# Patient Record
Sex: MALE | Race: ASIAN | Employment: OTHER | URBAN - METROPOLITAN AREA
[De-identification: names, ages, dates, MRNs, and addresses within clinical notes are randomized per-mention and may not be internally consistent; named-entity substitution may affect disease eponyms.]

---

## 2018-01-22 ENCOUNTER — OFFICE VISIT (OUTPATIENT)
Dept: FAMILY MEDICINE | Facility: CLINIC | Age: 63
End: 2018-01-22
Payer: COMMERCIAL

## 2018-01-22 VITALS
TEMPERATURE: 98 F | DIASTOLIC BLOOD PRESSURE: 82 MMHG | WEIGHT: 208.9 LBS | HEART RATE: 64 BPM | BODY MASS INDEX: 38.21 KG/M2 | SYSTOLIC BLOOD PRESSURE: 130 MMHG | OXYGEN SATURATION: 95 % | RESPIRATION RATE: 14 BRPM

## 2018-01-22 DIAGNOSIS — H10.33 ACUTE BACTERIAL CONJUNCTIVITIS OF BOTH EYES: Primary | ICD-10-CM

## 2018-01-22 PROCEDURE — 99213 OFFICE O/P EST LOW 20 MIN: CPT | Performed by: FAMILY MEDICINE

## 2018-01-22 RX ORDER — TOBRAMYCIN AND DEXAMETHASONE 3; 1 MG/ML; MG/ML
1 SUSPENSION/ DROPS OPHTHALMIC 4 TIMES DAILY
Qty: 1.4 ML | Refills: 0 | Status: SHIPPED | OUTPATIENT
Start: 2018-01-22 | End: 2018-01-29

## 2018-01-22 NOTE — MR AVS SNAPSHOT
"              After Visit Summary   2018    Essie Guzman    MRN: [id number]           Patient Information     Date Of Birth          1955        Visit Information        Provider Department      2018 11:15 AM Serge Kimble MD [facility]        Today's Diagnoses     Acute bacterial conjunctivitis of both eyes    -  1       Follow-ups after your visit        Who to contact     If you have questions or need follow up information about today's clinic visit or your schedule please contact [facility] directly at [phone].  Normal or non-critical lab and imaging results will be communicated to you by [employer], letter or phone within 4 business days after the clinic has received the results. If you do not hear from us within 7 days, please contact the clinic through [employer] or phone. If you have a critical or abnormal lab result, we will notify you by phone as soon as possible.  Submit refill requests through [employer] or call your pharmacy and they will forward the refill request to us. Please allow 3 business days for your refill to be completed.          Additional Information About Your Visit        MyChart Information     [employer] lets you send messages to your doctor, view your test results, renew your prescriptions, schedule appointments and more. To sign up, go to [email or web address] . Click on \"Log in\" on the left side of the screen, which will take you to the Welcome page. Then click on \"Sign up Now\" on the right side of the page.     You will be asked to enter the access code listed below, as well as some personal information. Please follow the directions to create your username and password.     Your access code is: [email or web address]  Expires: 2018 12:31 PM     Your access code will  in 90 days. If you need help or a new code, please call your [facility] or [phone].        Care EveryWhere ID     This is your Care EveryWhere ID. " This could be used by other organizations to access your [facility] medical records  [id number]        Your Vitals Were     Pulse Temperature Respirations Pulse Oximetry BMI (Body Mass Index)       64 98  F (36.7  C) (Oral) 14 95% 38.21 kg/m2        Blood Pressure from Last 3 Encounters:   01/22/18 130/82   11/24/15 117/77   10/16/15 102/62    Weight from Last 3 Encounters:   01/22/18 208 lb 14.4 oz (94.8 kg)   11/24/15 190 lb (86.2 kg)   10/16/15 193 lb (87.5 kg)              Today, you had the following     No orders found for display         Today's Medication Changes          These changes are accurate as of: 1/22/18 12:31 PM.  If you have any questions, ask your nurse or doctor.               Start taking these medicines.        Dose/Directions    tobramycin-dexamethasone 0.3-0.1 % ophthalmic susp   Commonly known as:  TOBRADEX   Used for:  Acute bacterial conjunctivitis of both eyes   Started by:  Serge Kimble MD        Dose:  1 drop   Place 1 drop into both eyes 4 times daily for 7 days   Quantity:  1.4 mL   Refills:  0            Where to get your medicines      These medications were sent to [facility] [address]  [id number] [place] [address]     Phone:  [phone]     tobramycin-dexamethasone 0.3-0.1 % ophthalmic susp                Primary Care Provider Office Phone # Fax #    Serge Kimble [phone] [address] [facility] [address]        Equal Access to Services     [facility] HospitalJACI AH: Hadii radha hyatt hadasho Soomaali, waaxda luqadaha, qaybta kaalmada adeegyaaletha, elia hernandez. So [facility] [phone].    ATENCIÓN: Si habla español, tiene a gentile disposición servicios gratuitos de asistencia lingüística. Llame al [phone].    We comply with applicable federal civil rights laws and [place] laws. We do not discriminate on the basis of race, color, national origin, age, disability,  sex, sexual orientation, or gender identity.            Thank you!     Thank you for choosing [facility]  for your care. Our goal is always to provide you with excellent care. Hearing back from our patients is one way we can continue to improve our services. Please take a few minutes to complete the written survey that you may receive in the mail after your visit with us. Thank you!             Your Updated Medication List - Protect others around you: Learn how to safely use, store and throw away your medicines at www.disposemymeds.org.          This list is accurate as of: 1/22/18 12:31 PM.  Always use your most recent med list.                   Brand Name Dispense Instructions for use Diagnosis    tobramycin-dexamethasone 0.3-0.1 % ophthalmic susp    TOBRADEX    1.4 mL    Place 1 drop into both eyes 4 times daily for 7 days    Acute bacterial conjunctivitis of both eyes

## 2018-01-22 NOTE — PROGRESS NOTES
SUBJECTIVE:   Essie Guzman is a 62 year old male who presents to clinic today for the following health issues:      Eye(s) Problem      Duration: 2 days    Description:  Location: bilateral  Pain: YES- a little  Redness: no   Discharge: no     Accompanying signs and symptoms: none    History (Trauma, foreign body exposure,): None    Precipitating or alleviating factors (contact use): None    Therapies tried and outcome: OTC eye drops      Symptoms of eye itching, mattering and watering since two days ago.  Did start with a household contact. No known contact  Does have associated uri symptoms of  coryza,cough and fever.  No abnormality of ear, throat,lungs,skin, adenopathy and no neck stiffness.    No corneal fluoroscein uptake or foreign body seen.    Bacterial conjunctivitis  (H10.33) Acute bacterial conjunctivitis of both eyes  (primary encounter diagnosis)  Comment:   Plan: tobramycin-dexamethasone (TOBRADEX) 0.3-0.1 %         ophthalmic susp

## 2018-03-06 ENCOUNTER — OFFICE VISIT (OUTPATIENT)
Dept: FAMILY MEDICINE | Facility: CLINIC | Age: 63
End: 2018-03-06
Payer: COMMERCIAL

## 2018-03-06 VITALS
OXYGEN SATURATION: 95 % | WEIGHT: 213 LBS | BODY MASS INDEX: 38.96 KG/M2 | RESPIRATION RATE: 14 BRPM | HEART RATE: 62 BPM | SYSTOLIC BLOOD PRESSURE: 121 MMHG | DIASTOLIC BLOOD PRESSURE: 81 MMHG | TEMPERATURE: 97.7 F

## 2018-03-06 DIAGNOSIS — M10.9 ACUTE GOUTY ARTHRITIS: Primary | ICD-10-CM

## 2018-03-06 PROCEDURE — 99213 OFFICE O/P EST LOW 20 MIN: CPT | Performed by: FAMILY MEDICINE

## 2018-03-06 RX ORDER — PREDNISONE 20 MG/1
20 TABLET ORAL DAILY
Qty: 8 TABLET | Refills: 0 | Status: SHIPPED | OUTPATIENT
Start: 2018-03-06 | End: 2018-12-22

## 2018-03-06 NOTE — MR AVS SNAPSHOT
"              After Visit Summary   3/6/2018    Essie Guzman    MRN: [id number]           Patient Information     Date Of Birth          1955        Visit Information        Provider Department      3/6/2018 10:30 AM Serge Kimble MD [facility]        Today's Diagnoses     Acute gouty arthritis    -  1       Follow-ups after your visit        Who to contact     If you have questions or need follow up information about today's clinic visit or your schedule please contact [facility] directly at [phone].  Normal or non-critical lab and imaging results will be communicated to you by [employer], letter or phone within 4 business days after the clinic has received the results. If you do not hear from us within 7 days, please contact the clinic through [employer] or phone. If you have a critical or abnormal lab result, we will notify you by phone as soon as possible.  Submit refill requests through [employer] or call your pharmacy and they will forward the refill request to us. Please allow 3 business days for your refill to be completed.          Additional Information About Your Visit        MyChart Information     [employer] lets you send messages to your doctor, view your test results, renew your prescriptions, schedule appointments and more. To sign up, go to [email or web address] . Click on \"Log in\" on the left side of the screen, which will take you to the Welcome page. Then click on \"Sign up Now\" on the right side of the page.     You will be asked to enter the access code listed below, as well as some personal information. Please follow the directions to create your username and password.     Your access code is: [email or web address]  Expires: 2018 12:31 PM     Your access code will  in 90 days. If you need help or a new code, please call your [facility] or [phone].        Care EveryWhere ID     This is your Care EveryWhere ID. This could be used by " other organizations to access your [facility] medical records  [id number]        Your Vitals Were     Pulse Temperature Respirations Pulse Oximetry BMI (Body Mass Index)       62 97.7  F (36.5  C) (Oral) 14 95% 38.96 kg/m2        Blood Pressure from Last 3 Encounters:   03/06/18 121/81   01/22/18 130/82   11/24/15 117/77    Weight from Last 3 Encounters:   03/06/18 213 lb (96.6 kg)   01/22/18 208 lb 14.4 oz (94.8 kg)   11/24/15 190 lb (86.2 kg)              Today, you had the following     No orders found for display         Today's Medication Changes          These changes are accurate as of 3/6/18 11:12 AM.  If you have any questions, ask your nurse or doctor.               Start taking these medicines.        Dose/Directions    predniSONE 20 MG tablet   Commonly known as:  DELTASONE   Used for:  Acute gouty arthritis   Started by:  Serge Kimble MD        Dose:  20 mg   Take 1 tablet (20 mg) by mouth daily For five days then one half pill daily for six days take with food   Quantity:  8 tablet   Refills:  0            Where to get your medicines      These medications were sent to [facility] [address]  [address]  [address]     Phone:  [phone]     predniSONE 20 MG tablet                Primary Care Provider Office Phone # Fax #    Serge Kimble [phone] [address] [facility] [address]        Equal Access to Services     JOSIANE MARIE : Hadii aad ku hadasho Soomaali, waaxda luqadaha, qaybta kaalmada adeegyada, waxay elvia hernandez. So [facility] [phone].    ATENCIÓN: Si habla español, tiene a gentile disposición servicios gratuitos de asistencia lingüística. Llame al [phone].    We comply with applicable federal civil rights laws and [place] laws. We do not discriminate on the basis of race, color, national origin, age, disability, sex, sexual orientation, or gender  identity.            Thank you!     Thank you for choosing [facility]  for your care. Our goal is always to provide you with excellent care. Hearing back from our patients is one way we can continue to improve our services. Please take a few minutes to complete the written survey that you may receive in the mail after your visit with us. Thank you!             Your Updated Medication List - Protect others around you: Learn how to safely use, store and throw away your medicines at www.disposemymeds.org.          This list is accurate as of 3/6/18 11:12 AM.  Always use your most recent med list.                   Brand Name Dispense Instructions for use Diagnosis    predniSONE 20 MG tablet    DELTASONE    8 tablet    Take 1 tablet (20 mg) by mouth daily For five days then one half pill daily for six days take with food    Acute gouty arthritis

## 2018-03-06 NOTE — PROGRESS NOTES
SUBJECTIVE:  Essie Guzman is a 62 year old male who sustained a right foot pain 3 days ago. Mechanism of injury: none. Immediate symptoms: immediate pain, immediate swelling. Symptoms have been gradual since that time. Prior history of related problems: no prior problems with this area in the past, had previous gouty elbow.    OBJECTIVE:  Vital signs as noted above.  Appearance: in no apparent distress.  Foot/ankle exam: reduced range of motion of mtp one,  two and three  .  X-ray: not indicated.    ASSESSMENT:  foot gouty arthritis, he has morbid obesity and could have pseudogout     PLAN:  NSAID, ice suggested  steroid burst wtth taper   See orders in [facility].      (M10.9) Acute gouty arthritis  (primary encounter diagnosis)  Comment:   Plan: predniSONE (DELTASONE) 20 MG tablet

## 2018-06-28 ENCOUNTER — HOSPITAL ENCOUNTER (EMERGENCY)
Facility: CLINIC | Age: 63
Discharge: HOME | End: 2018-06-28
Attending: EMERGENCY MEDICINE | Admitting: EMERGENCY MEDICINE
Payer: MEDICARE

## 2018-06-28 VITALS
RESPIRATION RATE: 18 BRPM | DIASTOLIC BLOOD PRESSURE: 94 MMHG | WEIGHT: 213.85 LBS | SYSTOLIC BLOOD PRESSURE: 143 MMHG | HEIGHT: 62 IN | BODY MASS INDEX: 39.35 KG/M2 | OXYGEN SATURATION: 100 % | TEMPERATURE: 99.2 F

## 2018-06-28 DIAGNOSIS — T15.11XA FOREIGN BODY OF RIGHT CONJUNCTIVA, INITIAL ENCOUNTER: ICD-10-CM

## 2018-06-28 PROCEDURE — 25000125 ZZHC RX 250

## 2018-06-28 PROCEDURE — 99283 EMERGENCY DEPT VISIT LOW MDM: CPT

## 2018-06-28 RX ORDER — POLYVINYL ALCOHOL 14 MG/ML
1 SOLUTION/ DROPS OPHTHALMIC PRN
Qty: 15 ML | Refills: 0 | Status: SHIPPED | OUTPATIENT
Start: 2018-06-28 | End: 2019-01-16

## 2018-06-28 RX ORDER — TETRACAINE HYDROCHLORIDE 5 MG/ML
2 SOLUTION OPHTHALMIC ONCE
Status: COMPLETED | OUTPATIENT
Start: 2018-06-28 | End: 2018-06-28

## 2018-06-28 RX ORDER — CIPROFLOXACIN HYDROCHLORIDE 3.5 MG/ML
1 SOLUTION/ DROPS TOPICAL
Qty: 1 BOTTLE | Refills: 0 | Status: SHIPPED | OUTPATIENT
Start: 2018-06-28 | End: 2018-07-05

## 2018-06-28 RX ORDER — TETRACAINE HYDROCHLORIDE 5 MG/ML
SOLUTION OPHTHALMIC
Status: COMPLETED
Start: 2018-06-28 | End: 2018-06-28

## 2018-06-28 RX ADMIN — TETRACAINE HYDROCHLORIDE 2 DROP: 5 SOLUTION OPHTHALMIC at 01:23

## 2018-06-28 RX ADMIN — FLUORESCEIN SODIUM 1 STRIP: 1 STRIP OPHTHALMIC at 01:23

## 2018-06-28 ASSESSMENT — ENCOUNTER SYMPTOMS
EYE PAIN: 1
EYE REDNESS: 1

## 2018-06-28 NOTE — ED AVS SNAPSHOT
[facility] Emergency Department    [address] [facility] [address]    Phone:  [phone]    Fax:  [phone]                                       Essie Guzman   MRN: [id number]    Department:  [facility] Emergency Department   Date of Visit:  6/28/2018           Patient Information     Date Of Birth          1955        Your diagnoses for this visit were:     Foreign body of right conjunctiva, initial encounter        You were seen by Talha Aguilar MD.      Follow-up Information     Schedule an appointment as soon as possible for a visit with Fer Bentley MD.    Specialty:  Ophthalmology    Contact information:    [facility]  [address]  [facility] [address] [phone]          Follow up with [facility] Emergency Department.    Specialty:  EMERGENCY MEDICINE    Why:  If symptoms worsen    Contact information:    [facility] [address] [phone]        Discharge Instructions       Watch for increased redness swelling of lower eye lid.      Antibiotic drops 4 times daily.    Lubrication eye drops every hour    24 Hour Appointment Hotline       To make an appointment at any [facility] clinic, call [phone] ([phone]). If you don't have a family doctor or clinic, we will help you find one. [facility] clinics are conveniently located to serve the needs of you and your family.             Review of your medicines      START taking        Dose / Directions Last dose taken    ciprofloxacin 0.3 % ophthalmic solution   Commonly known as:  CILOXAN   Dose:  1 drop   Quantity:  1 Bottle        Apply 1 drop to eye every 4 hours (while awake) for 7 days   Refills:  0        polyvinyl alcohol 1.4 % ophthalmic solution   Commonly known as:  LIQUIFILM TEARS   Dose:  1 drop   Quantity:  15 mL        Place 1 drop into the right eye as needed for dry eyes (apply 1-2 drops every 1-2  hours for eye irritation)   Refills:  0          Our records show that you are taking the medicines listed below. If these are incorrect, please call your family doctor or clinic.        Dose / Directions Last dose taken    predniSONE 20 MG tablet   Commonly known as:  DELTASONE   Dose:  20 mg   Quantity:  8 tablet        Take 1 tablet (20 mg) by mouth daily For five days then one half pill daily for six days take with food   Refills:  0                Prescriptions were sent or printed at these locations (2 Prescriptions)                   Other Prescriptions                Printed at Department/Unit printer (2 of 2)         ciprofloxacin (CILOXAN) 0.3 % ophthalmic solution               polyvinyl alcohol (LIQUIFILM TEARS) 1.4 % ophthalmic solution                Orders Needing Specimen Collection     None      Pending Results     No orders found from 6/26/2018 to 6/29/2018.            Pending Culture Results     No orders found from 6/26/2018 to 6/29/2018.            Pending Results Instructions     If you had any lab results that were not finalized at the time of your Discharge, you can call the ED Lab Result RN at [phone]. You will be contacted by this team for any positive Lab results or changes in treatment. The nurses are available 7 days a week from 10A to 6:30P.  You can leave a message 24 hours per day and they will return your call.        Test Results From Your Hospital Stay               Clinical Quality Measure: Blood Pressure Screening     Your blood pressure was checked while you were in the emergency department today. The last reading we obtained was  BP: (!) 143/94 . Please read the guidelines below about what these numbers mean and what you should do about them.  If your systolic blood pressure (the top number) is less than 120 and your diastolic blood pressure (the bottom number) is less than 80, then your blood pressure is normal. There is nothing more that you need to do about it.  If your  "systolic blood pressure (the top number) is 120-139 or your diastolic blood pressure (the bottom number) is 80-89, your blood pressure may be higher than it should be. You should have your blood pressure rechecked within a year by a primary care provider.  If your systolic blood pressure (the top number) is 140 or greater or your diastolic blood pressure (the bottom number) is 90 or greater, you may have high blood pressure. High blood pressure is treatable, but if left untreated over time it can put you at risk for heart attack, stroke, or kidney failure. You should have your blood pressure rechecked by a primary care provider within the next 4 weeks.  If your provider in the emergency department today gave you specific instructions to follow-up with your doctor or provider even sooner than that, you should follow that instruction and not wait for up to 4 weeks for your follow-up visit.        Thank you for choosing [facility]       Thank you for choosing [facility] for your care. Our goal is always to provide you with excellent care. Hearing back from our patients is one way we can continue to improve our services. Please take a few minutes to complete the written survey that you may receive in the mail after you visit with us. Thank you!        [employer] Information     [employer] lets you send messages to your doctor, view your test results, renew your prescriptions, schedule appointments and more. To sign up, go to [email or web address] . Click on \"Log in\" on the left side of the screen, which will take you to the Welcome page. Then click on \"Sign up Now\" on the right side of the page.     You will be asked to enter the access code listed below, as well as some personal information. Please follow the directions to create your username and password.     Your access code is: [email or web address]  Expires: 2018  2:25 AM     Your access code will  in 90 days. If you need help or a new code, please call your [facility] " [facility] or [phone].        Care EveryWhere ID     This is your Care EveryWhere ID. This could be used by other organizations to access your [facility] medical records  [id number]        Equal Access to Services     JOSIANE MARIE : Zuri Sandhu, wajanyda luqadaha, qaybta kaalmada luke, elia hernandez. So [facility] [phone].    ATENCIÓN: Si habla español, tiene a gentile disposición servicios gratuitos de asistencia lingüística. Llame al [phone].    We comply with applicable federal civil rights laws and [place] laws. We do not discriminate on the basis of race, color, national origin, age, disability, sex, sexual orientation, or gender identity.            After Visit Summary       This is your record. Keep this with you and show to your community pharmacist(s) and doctor(s) at your next visit.

## 2018-06-28 NOTE — ED AVS SNAPSHOT
[facility] Emergency Department    [address]    [facility] [address]    Phone:  [phone]    Fax:  [phone]                                       Essie Guzman   MRN: [id number]    Department:  [facility] Emergency Department   Date of Visit:  6/28/2018           After Visit Summary Signature Page     I have received my discharge instructions, and my questions have been answered. I have discussed any challenges I see with this plan with the nurse or doctor.    ..........................................................................................................................................  Patient/Patient Representative Signature      ..........................................................................................................................................  Patient Representative Print Name and Relationship to Patient    ..................................................               ................................................  Date                                            Time    ..........................................................................................................................................  Reviewed by Signature/Title    ...................................................              ..............................................  Date                                                            Time

## 2018-06-28 NOTE — ED TRIAGE NOTES
Pt felt something go into rt eye approx 3hrs PTA.  Now pain and redness to same.  Denies vision problems.

## 2018-06-28 NOTE — DISCHARGE INSTRUCTIONS
Watch for increased redness swelling of lower eye lid.      Antibiotic drops 4 times daily.    Lubrication eye drops every hour

## 2018-06-28 NOTE — ED PROVIDER NOTES
"  History     Chief Complaint:  Eye Pain    HPI   Essie Guzman is a 63 year old male who presents with eye pain. Per report, around 2200 the patient was parking the tractor when the wind blew and he felt either exhaust or a foreign object go into his right eye. He notes redness and irritation, but denies visual disturbance. Otherwise healthy.     Allergies:  No Known Allergies     Medications:    Deltasone    Past Medical History:    The patient does not have any past pertinent medical history.    Past Surgical History:    Colonoscopy    Family History:    History reviewed. No pertinent family history.     Social History:  The patient was accompanied to the ED by his daughter.  Smoking Status: Never  Smokeless Tobacco: Never  Alcohol Use: No  Marital Status:       Review of Systems   Eyes: Positive for pain and redness.   All other systems reviewed and are negative.    Physical Exam   First Vitals:  BP: (!) 143/94  Heart Rate: 69  Temp: 99.2  F (37.3  C)  Resp: 18  Height: 157.5 cm (5' 2\")  Weight: 97 kg (213 lb 13.5 oz)  SpO2: 100 %    Physical Exam  Vital signs and nursing notes reviewed    Vital signs and nursing notes reviewed.     Constitutional: laying on gurney appears comfortable  HENT: No evidence of facial or head injury.    Eyes:  Pupils equal, no tearing or foreign body of right sclera or cornea.  Appears to have organic foreign body at the conjunctiva of the right lower inner eyelid base.  No edema of lids.  No corneal uptake with fluorescein staining.  Neck: normal range of motion  Cardiovascular: Normal rate.    Pulmonary/Chest: No respiratory distress.   Musculoskeletal: normal  Neurological: Alert and oriented. No focal weakness  Skin: Skin is warm and dry. No rash noted.   Psych: normal affect   Emergency Department Course     Interventions:  0123 - Ful-taemra 1 strip right eye  0123 - Pontocaine 0.5% opthalmic solution 2 drop right eye     Emergency Department Course:  Nursing notes and " vitals reviewed.  0129: I performed an exam of the patient as documented above.     Findings and plan explained to the Patient. Patient discharged home with instructions regarding supportive care, medications, and reasons to return. The importance of close follow-up was reviewed.     I personally reviewed the results with the Patient and answered all related questions prior to discharge.      Impression & Plan      Medical Decision Making:  This patient is a 63 year old male who presents with a foreign body in his right eye. On examination no evidence of conjunctival eye corneal or scleral foreign body. Inversion of the upper eyelid did not show evidence of any retained foreign body, however at the conjunctiva of the lower inner eyelid it did appear that a foreign body was imbedded. I was able to remove the majority of this, but it seemed like it was underneath the conjunctival layer superficially. But it was very difficult to remove all the debri. I did not want to get too aggressive with this sight and potentially cause any further damage. Therefore I recommended antibiotic drops and artificial tear treatment. He is getting a referral to opthalmology and is advised to monitor his symptoms closely for any eyelid swelling or drainage.  If this occurs follow up with opthalmology or return here if he is not able to see them at an expedited time.     Diagnosis:    ICD-10-CM    1. Foreign body of right conjunctiva, initial encounter T15.11XA      Disposition:  discharged to home    Discharge Medications:  Discharge Medication List as of 6/28/2018  2:25 AM      START taking these medications    Details   ciprofloxacin (CILOXAN) 0.3 % ophthalmic solution Apply 1 drop to eye every 4 hours (while awake) for 7 days, Disp-1 Bottle, R-0, Local Print      polyvinyl alcohol (LIQUIFILM TEARS) 1.4 % ophthalmic solution Place 1 drop into the right eye as needed for dry eyes (apply 1-2 drops every 1-2 hours for eye irritation),  Disp-15 mL, R-0, Local Print           Lili Vital  6/28/2018   [facility] EMERGENCY DEPARTMENT  I, Lili Vital, am serving as a scribe at 1:29 AM on 6/28/2018 to document services personally performed by Talha Aguilar MD based on my observations and the provider's statements to me.       Talha Aguilar MD  06/29/18 0712

## 2018-12-22 ENCOUNTER — APPOINTMENT (OUTPATIENT)
Dept: GENERAL RADIOLOGY | Facility: CLINIC | Age: 63
End: 2018-12-22
Attending: EMERGENCY MEDICINE
Payer: COMMERCIAL

## 2018-12-22 ENCOUNTER — HOSPITAL ENCOUNTER (EMERGENCY)
Facility: CLINIC | Age: 63
Discharge: HOME | End: 2018-12-22
Attending: EMERGENCY MEDICINE | Admitting: EMERGENCY MEDICINE
Payer: COMMERCIAL

## 2018-12-22 VITALS
TEMPERATURE: 97.2 F | RESPIRATION RATE: 18 BRPM | SYSTOLIC BLOOD PRESSURE: 133 MMHG | HEART RATE: 65 BPM | OXYGEN SATURATION: 99 % | DIASTOLIC BLOOD PRESSURE: 104 MMHG

## 2018-12-22 DIAGNOSIS — S46.812A TRAPEZIUS STRAIN, LEFT, INITIAL ENCOUNTER: ICD-10-CM
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
## 2018-12-22 DIAGNOSIS — S80.12XA CONTUSION OF LEFT LOWER LEG, INITIAL ENCOUNTER: ICD-10-CM

## 2018-12-22 LAB — INTERPRETATION ECG - MUSE: NORMAL

## 2018-12-22 PROCEDURE — 93005 ELECTROCARDIOGRAM TRACING: CPT

## 2018-12-22 PROCEDURE — 99285 EMERGENCY DEPT VISIT HI MDM: CPT | Mod: 25

## 2018-12-22 PROCEDURE — A9270 NON-COVERED ITEM OR SERVICE: HCPCS | Mod: GY | Performed by: EMERGENCY MEDICINE

## 2018-12-22 PROCEDURE — 73590 X-RAY EXAM OF LOWER LEG: CPT | Mod: LT

## 2018-12-22 PROCEDURE — 71046 X-RAY EXAM CHEST 2 VIEWS: CPT

## 2018-12-22 PROCEDURE — 25000132 ZZH RX MED GY IP 250 OP 250 PS 637: Performed by: EMERGENCY MEDICINE

## 2018-12-22 RX ORDER — IBUPROFEN 800 MG/1
800 TABLET, FILM COATED ORAL ONCE
Status: COMPLETED | OUTPATIENT
Start: 2018-12-22 | End: 2018-12-22

## 2018-12-22 RX ADMIN — IBUPROFEN 800 MG: 800 TABLET ORAL at 01:54

## 2018-12-22 ASSESSMENT — ENCOUNTER SYMPTOMS
PHOTOPHOBIA: 0
NUMBNESS: 0
ABDOMINAL PAIN: 0
WEAKNESS: 0
ARTHRALGIAS: 1
NECK PAIN: 0
HEADACHES: 0
DIZZINESS: 0
MYALGIAS: 1
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
## 2018-12-22 NOTE — ED AVS SNAPSHOT
[facility] Emergency Department  [address]  [facility] [address]  Phone:  [phone]  Fax:  [phone]                                    Essie Guzman   MRN: [id number]    Department:  [facility] Emergency Department   Date of Visit:  12/22/2018           After Visit Summary Signature Page    I have received my discharge instructions, and my questions have been answered. I have discussed any challenges I see with this plan with the nurse or doctor.    ..........................................................................................................................................  Patient/Patient Representative Signature      ..........................................................................................................................................  Patient Representative Print Name and Relationship to Patient    ..................................................               ................................................  Date                                   Time    ..........................................................................................................................................  Reviewed by Signature/Title    ...................................................              ..............................................  Date                                               Time          22EPIC Rev 08/18

## 2019-01-16 ENCOUNTER — OFFICE VISIT (OUTPATIENT)
Dept: FAMILY MEDICINE | Facility: CLINIC | Age: 64
End: 2019-01-16
Payer: COMMERCIAL

## 2019-01-16 VITALS
BODY MASS INDEX: 38.32 KG/M2 | TEMPERATURE: 97.8 F | DIASTOLIC BLOOD PRESSURE: 72 MMHG | WEIGHT: 209.5 LBS | OXYGEN SATURATION: 94 % | HEART RATE: 57 BPM | SYSTOLIC BLOOD PRESSURE: 100 MMHG

## 2019-01-16 DIAGNOSIS — S80.12XD TRAUMATIC ECCHYMOSIS OF LEFT LOWER LEG, SUBSEQUENT ENCOUNTER: ICD-10-CM

## 2019-01-16 DIAGNOSIS — H04.123 DRY EYES: ICD-10-CM

## 2019-01-16 DIAGNOSIS — Z87.820 SIGNIFICANT CLOSED HEAD TRAUMA WITHIN PAST 3 MONTHS: Primary | ICD-10-CM

## 2019-01-16 DIAGNOSIS — S63.502D LEFT WRIST SPRAIN, SUBSEQUENT ENCOUNTER: ICD-10-CM

## 2019-01-16 PROCEDURE — 99214 OFFICE O/P EST MOD 30 MIN: CPT | Performed by: FAMILY MEDICINE

## 2019-01-16 RX ORDER — POLYVINYL ALCOHOL 14 MG/ML
1 SOLUTION/ DROPS OPHTHALMIC PRN
Qty: 15 ML | Refills: 11 | Status: SHIPPED | OUTPATIENT
Start: 2019-01-16 | End: 2020-09-08
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
## 2019-06-05 ENCOUNTER — OFFICE VISIT (OUTPATIENT)
Dept: FAMILY MEDICINE | Facility: CLINIC | Age: 64
End: 2019-06-05
Payer: COMMERCIAL

## 2019-06-05 VITALS
OXYGEN SATURATION: 97 % | WEIGHT: 215 LBS | RESPIRATION RATE: 12 BRPM | TEMPERATURE: 98.4 F | SYSTOLIC BLOOD PRESSURE: 120 MMHG | HEART RATE: 59 BPM | DIASTOLIC BLOOD PRESSURE: 82 MMHG | BODY MASS INDEX: 39.32 KG/M2

## 2019-06-05 DIAGNOSIS — H10.9 CONJUNCTIVITIS OF BOTH EYES, UNSPECIFIED CONJUNCTIVITIS TYPE: Primary | ICD-10-CM

## 2019-06-05 PROCEDURE — 99213 OFFICE O/P EST LOW 20 MIN: CPT | Performed by: PHYSICIAN ASSISTANT

## 2019-06-05 RX ORDER — CIPROFLOXACIN HYDROCHLORIDE 3.5 MG/ML
SOLUTION/ DROPS TOPICAL
Qty: 2.5 ML | Refills: 0 | Status: SHIPPED | OUTPATIENT
Start: 2019-06-05 | End: 2020-09-08

## 2019-06-05 NOTE — PATIENT INSTRUCTIONS
Start the antibiotic drops today  If not improving return to care for further evaluation  May consider trying OTC allergy drops called Zatador if no response to antibiotic drops

## 2019-06-05 NOTE — PROGRESS NOTES
Subjective     Essie Guzman is a 64 year old male who presents to clinic today for the following health issues:    HPI   Eye(s) Problem  Onset: 3-4 days    Description:   Location: bilateral  Pain: no  Redness: YES    Accompanying Signs & Symptoms:  Discharge/mattering: watering  Itching: Yes, mild  Swelling: no  Visual changes: no  Fever: no  Nasal Congestion: no  Bothered by bright lights: no    History:   Trauma: no   Foreign body exposure: no    Precipitating factors:   Wearing contacts: no    Alleviating factors:  Improved by: nothing    Therapies Tried and outcome: In the past tried Cipro drops which helped. Feels like past episode which responded well to cipro drops. No history of seasonal allergies.       Patient Active Problem List   Diagnosis   (none) - all problems resolved or deleted     Past Surgical History:   Procedure Laterality Date     COLONOSCOPY N/A 11/24/2015    Procedure: COLONOSCOPY;  Surgeon: Clyde Mosher MD;  Location:  GI       Social History     Tobacco Use     Smoking status: Never Smoker     Smokeless tobacco: Never Used   Substance Use Topics     Alcohol use: No     Family History   Problem Relation Age of Onset     Asthma No family hx of      Diabetes No family hx of      Hypertension No family hx of      Cerebrovascular Disease No family hx of      Cancer No family hx of          Current Outpatient Medications   Medication Sig Dispense Refill     ciprofloxacin (CILOXAN) 0.3 % ophthalmic solution Instill 1 to 2 drops every 2 hours while awake for 2 days and 1 to 2 drops every 4 hours while awake for the next 5 days 2.5 mL 0     polyvinyl alcohol (LIQUIFILM TEARS) 1.4 % ophthalmic solution Place 1 drop into the right eye as needed for dry eyes (apply 1-2 drops every 1-2 hours for eye irritation) 15 mL 11     No Known Allergies    Reviewed and updated as needed this visit by Provider         Review of Systems    ROS: 10 point ROS neg other than the symptoms noted above in the  HPI.        Objective    /82 (BP Location: Right arm, Patient Position: Chair, Cuff Size: Adult Large)   Pulse 59   Temp 98.4  F (36.9  C) (Oral)   Resp 12   Wt 97.5 kg (215 lb)   SpO2 97%   BMI 39.32 kg/m    Body mass index is 39.32 kg/m .  Physical Exam   Constitutional: He is oriented to person, place, and time. He appears well-developed and well-nourished. No distress.   HENT:   Head: Normocephalic and atraumatic.   Right Ear: External ear normal.   Left Ear: External ear normal.   Nose: Nose normal.   Mouth/Throat: Oropharynx is clear and moist. No oropharyngeal exudate.   Eyes: Pupils are equal, round, and reactive to light. EOM are normal. Right conjunctiva is injected. Left conjunctiva is injected.   Neck: Normal range of motion.   Pulmonary/Chest: Effort normal.   Musculoskeletal: Normal range of motion.   Neurological: He is alert and oriented to person, place, and time.   Skin: Skin is warm and dry.   Psychiatric: He has a normal mood and affect. His behavior is normal.          Diagnostic Test Results:  Labs reviewed in Epic  none         Assessment & Plan   Assessment  64 year old male presenting for evaluation of bilateral conjunctivitis for the past 3-4 days. Differential includes bacterial conjunctivitis, viral conjunctivitis, and allergic conjunctivitis. No history of welding, injury, or sensation of foreign body.     Plan  1. Conjunctivitis of both eyes, unspecified conjunctivitis type  - ciprofloxacin (CILOXAN) 0.3 % ophthalmic solution; Instill 1 to 2 drops every 2 hours while awake for 2 days and 1 to 2 drops every 4 hours while awake for the next 5 days  Dispense: 2.5 mL; Refill: 0  - Patient reports current symptoms feel like past episode which responded well to cipro drops  - If no resolution with cipro drops, would be inclined for patient to try OTC allergy eye drops. Provided patient with name of drops Zatador and he will  if eyes do not respond to the antibiotic drops.      Patient Instructions   Start the antibiotic drops today  If not improving return to care for further evaluation  May consider trying OTC allergy drops called Zatador if no response to antibiotic drops       Return for if not improving or worsening.    Patrick Sesay PA-C  [facility]

## 2020-01-01 ENCOUNTER — VIRTUAL VISIT (OUTPATIENT)
Dept: ONCOLOGY | Facility: CLINIC | Age: 65
End: 2020-01-01
Attending: STUDENT IN AN ORGANIZED HEALTH CARE EDUCATION/TRAINING PROGRAM
Payer: MEDICARE

## 2020-01-01 ENCOUNTER — HOSPITAL ENCOUNTER (OUTPATIENT)
Facility: AMBULATORY SURGERY CENTER | Age: 65
End: 2020-01-01
Attending: RADIOLOGY
Payer: MEDICARE

## 2020-01-01 ENCOUNTER — HOSPITAL ENCOUNTER (OUTPATIENT)
Dept: PET IMAGING | Facility: CLINIC | Age: 65
Discharge: HOME | End: 2020-12-16
Attending: STUDENT IN AN ORGANIZED HEALTH CARE EDUCATION/TRAINING PROGRAM | Admitting: STUDENT IN AN ORGANIZED HEALTH CARE EDUCATION/TRAINING PROGRAM
Payer: MEDICARE

## 2020-01-01 ENCOUNTER — OFFICE VISIT (OUTPATIENT)
Dept: SURGERY | Facility: CLINIC | Age: 65
End: 2020-01-01
Payer: MEDICARE

## 2020-01-01 ENCOUNTER — PRE VISIT (OUTPATIENT)
Dept: SURGERY | Facility: CLINIC | Age: 65
End: 2020-01-01

## 2020-01-01 ENCOUNTER — PATIENT OUTREACH (OUTPATIENT)
Dept: ONCOLOGY | Facility: CLINIC | Age: 65
End: 2020-01-01

## 2020-01-01 ENCOUNTER — HOSPITAL ENCOUNTER (OUTPATIENT)
Facility: CLINIC | Age: 65
Discharge: HOME | End: 2020-12-22
Attending: INTERNAL MEDICINE | Admitting: INTERNAL MEDICINE
Payer: MEDICARE

## 2020-01-01 ENCOUNTER — VIRTUAL VISIT (OUTPATIENT)
Dept: SURGERY | Facility: CLINIC | Age: 65
End: 2020-01-01
Attending: STUDENT IN AN ORGANIZED HEALTH CARE EDUCATION/TRAINING PROGRAM
Payer: MEDICARE

## 2020-01-01 ENCOUNTER — TELEPHONE (OUTPATIENT)
Dept: GASTROENTEROLOGY | Facility: CLINIC | Age: 65
End: 2020-01-01

## 2020-01-01 ENCOUNTER — ANESTHESIA (OUTPATIENT)
Dept: GASTROENTEROLOGY | Facility: CLINIC | Age: 65
End: 2020-01-01
Payer: MEDICARE

## 2020-01-01 ENCOUNTER — ANESTHESIA EVENT (OUTPATIENT)
Dept: SURGERY | Facility: AMBULATORY SURGERY CENTER | Age: 65
End: 2020-01-01

## 2020-01-01 ENCOUNTER — ANESTHESIA EVENT (OUTPATIENT)
Dept: GASTROENTEROLOGY | Facility: CLINIC | Age: 65
End: 2020-01-01
Payer: MEDICARE

## 2020-01-01 ENCOUNTER — ANESTHESIA (OUTPATIENT)
Dept: SURGERY | Facility: AMBULATORY SURGERY CENTER | Age: 65
End: 2020-01-01

## 2020-01-01 ENCOUNTER — PATIENT OUTREACH (OUTPATIENT)
Dept: SURGERY | Facility: CLINIC | Age: 65
End: 2020-01-01

## 2020-01-01 VITALS
HEIGHT: 60 IN | SYSTOLIC BLOOD PRESSURE: 104 MMHG | DIASTOLIC BLOOD PRESSURE: 64 MMHG | OXYGEN SATURATION: 96 % | TEMPERATURE: 97.7 F | RESPIRATION RATE: 7 BRPM | WEIGHT: 170 LBS | BODY MASS INDEX: 33.38 KG/M2 | HEART RATE: 49 BPM

## 2020-01-01 VITALS
DIASTOLIC BLOOD PRESSURE: 87 MMHG | HEIGHT: 60 IN | BODY MASS INDEX: 33.38 KG/M2 | OXYGEN SATURATION: 99 % | RESPIRATION RATE: 16 BRPM | WEIGHT: 170 LBS | HEART RATE: 57 BPM | SYSTOLIC BLOOD PRESSURE: 132 MMHG

## 2020-01-01 DIAGNOSIS — C16.9 GASTRIC CANCER (H): ICD-10-CM

## 2020-01-01 DIAGNOSIS — Z01.818 PREOP EXAMINATION: Primary | ICD-10-CM

## 2020-01-01 DIAGNOSIS — C78.6 MALIGNANT NEOPLASM OF STOMACH METASTATIC TO RETROPERITONEUM (H): ICD-10-CM

## 2020-01-01 DIAGNOSIS — C16.2: ICD-10-CM

## 2020-01-01 DIAGNOSIS — C16.9 MALIGNANT NEOPLASM OF STOMACH METASTATIC TO RETROPERITONEUM (H): ICD-10-CM

## 2020-01-01 DIAGNOSIS — C16.2: Primary | ICD-10-CM

## 2020-01-01 DIAGNOSIS — C16.9 MALIGNANT NEOPLASM OF STOMACH, UNSPECIFIED LOCATION (H): ICD-10-CM

## 2020-01-01 DIAGNOSIS — C16.9 GASTRIC CANCER (H): Primary | ICD-10-CM

## 2020-01-01 LAB
COPATH REPORT: NORMAL
UPPER EUS: NORMAL

## 2020-01-01 PROCEDURE — 99215 OFFICE O/P EST HI 40 MIN: CPT | Mod: 95 | Performed by: STUDENT IN AN ORGANIZED HEALTH CARE EDUCATION/TRAINING PROGRAM

## 2020-01-01 PROCEDURE — 88360 TUMOR IMMUNOHISTOCHEM/MANUAL: CPT | Mod: TC | Performed by: INTERNAL MEDICINE

## 2020-01-01 PROCEDURE — 88360 TUMOR IMMUNOHISTOCHEM/MANUAL: CPT | Mod: 26 | Performed by: PATHOLOGY

## 2020-01-01 PROCEDURE — 88305 TISSUE EXAM BY PATHOLOGIST: CPT | Mod: 26 | Performed by: PATHOLOGY

## 2020-01-01 PROCEDURE — 250N000013 HC RX MED GY IP 250 OP 250 PS 637: Performed by: INTERNAL MEDICINE

## 2020-01-01 PROCEDURE — 250N000011 HC RX IP 250 OP 636: Performed by: NURSE ANESTHETIST, CERTIFIED REGISTERED

## 2020-01-01 PROCEDURE — 999N001018 HC STATISTIC H-CELL BLOCK W/STAIN: Performed by: INTERNAL MEDICINE

## 2020-01-01 PROCEDURE — 88341 IMHCHEM/IMCYTCHM EA ADD ANTB: CPT | Mod: 26 | Performed by: PATHOLOGY

## 2020-01-01 PROCEDURE — 88341 IMHCHEM/IMCYTCHM EA ADD ANTB: CPT | Mod: TC,XU | Performed by: INTERNAL MEDICINE

## 2020-01-01 PROCEDURE — 78813 PET IMAGE FULL BODY: CPT | Mod: 26 | Performed by: RADIOLOGY

## 2020-01-01 PROCEDURE — 370N000002 HC ANESTHESIA TECHNICAL FEE, EACH ADDTL 15 MIN: Performed by: INTERNAL MEDICINE

## 2020-01-01 PROCEDURE — 999N001020 HC STATISTIC H-SEND OUTS PREP: Performed by: INTERNAL MEDICINE

## 2020-01-01 PROCEDURE — 343N000001 HC RX 343: Performed by: STUDENT IN AN ORGANIZED HEALTH CARE EDUCATION/TRAINING PROGRAM

## 2020-01-01 PROCEDURE — A9552 F18 FDG: HCPCS | Performed by: STUDENT IN AN ORGANIZED HEALTH CARE EDUCATION/TRAINING PROGRAM

## 2020-01-01 PROCEDURE — 43242 EGD US FINE NEEDLE BX/ASPIR: CPT | Performed by: INTERNAL MEDICINE

## 2020-01-01 PROCEDURE — 250N000009 HC RX 250: Performed by: NURSE ANESTHETIST, CERTIFIED REGISTERED

## 2020-01-01 PROCEDURE — 88305 TISSUE EXAM BY PATHOLOGIST: CPT | Mod: TC | Performed by: INTERNAL MEDICINE

## 2020-01-01 PROCEDURE — 88342 IMHCHEM/IMCYTCHM 1ST ANTB: CPT | Mod: 26 | Performed by: PATHOLOGY

## 2020-01-01 PROCEDURE — 250N000011 HC RX IP 250 OP 636: Performed by: STUDENT IN AN ORGANIZED HEALTH CARE EDUCATION/TRAINING PROGRAM

## 2020-01-01 PROCEDURE — 99204 OFFICE O/P NEW MOD 45 MIN: CPT | Mod: 95 | Performed by: SURGERY

## 2020-01-01 PROCEDURE — 88342 IMHCHEM/IMCYTCHM 1ST ANTB: CPT | Mod: TC | Performed by: INTERNAL MEDICINE

## 2020-01-01 PROCEDURE — 88173 CYTOPATH EVAL FNA REPORT: CPT | Mod: 26 | Performed by: PATHOLOGY

## 2020-01-01 PROCEDURE — 370N000001 HC ANESTHESIA TECHNICAL FEE, 1ST 30 MIN: Performed by: INTERNAL MEDICINE

## 2020-01-01 PROCEDURE — 88173 CYTOPATH EVAL FNA REPORT: CPT | Mod: TC | Performed by: INTERNAL MEDICINE

## 2020-01-01 PROCEDURE — 99202 OFFICE O/P NEW SF 15 MIN: CPT | Mod: CS | Performed by: PHYSICIAN ASSISTANT

## 2020-01-01 PROCEDURE — 272N000043 HC NEEDLE EUS, EACH ADDITIONAL: Performed by: INTERNAL MEDICINE

## 2020-01-01 PROCEDURE — 43238 EGD US FINE NEEDLE BX/ASPIR: CPT | Performed by: INTERNAL MEDICINE

## 2020-01-01 PROCEDURE — 88172 CYTP DX EVAL FNA 1ST EA SITE: CPT | Mod: 26 | Performed by: PATHOLOGY

## 2020-01-01 PROCEDURE — 71260 CT THORAX DX C+: CPT

## 2020-01-01 PROCEDURE — 88172 CYTP DX EVAL FNA 1ST EA SITE: CPT | Mod: TC | Performed by: INTERNAL MEDICINE

## 2020-01-01 PROCEDURE — 74177 CT ABD & PELVIS W/CONTRAST: CPT | Mod: 26 | Performed by: RADIOLOGY

## 2020-01-01 PROCEDURE — 71260 CT THORAX DX C+: CPT | Mod: 26 | Performed by: RADIOLOGY

## 2020-01-01 PROCEDURE — 258N000003 HC RX IP 258 OP 636: Performed by: NURSE ANESTHETIST, CERTIFIED REGISTERED

## 2020-01-01 RX ORDER — ONDANSETRON 2 MG/ML
4 INJECTION INTRAMUSCULAR; INTRAVENOUS EVERY 30 MIN PRN
Status: DISCONTINUED | OUTPATIENT
Start: 2020-01-01 | End: 2020-01-01 | Stop reason: HOSPADM

## 2020-01-01 RX ORDER — SODIUM CHLORIDE, SODIUM LACTATE, POTASSIUM CHLORIDE, CALCIUM CHLORIDE 600; 310; 30; 20 MG/100ML; MG/100ML; MG/100ML; MG/100ML
INJECTION, SOLUTION INTRAVENOUS CONTINUOUS PRN
Status: DISCONTINUED | OUTPATIENT
Start: 2020-01-01 | End: 2020-01-01

## 2020-01-01 RX ORDER — MEPERIDINE HYDROCHLORIDE 25 MG/ML
12.5 INJECTION INTRAMUSCULAR; INTRAVENOUS; SUBCUTANEOUS
Status: DISCONTINUED | OUTPATIENT
Start: 2020-01-01 | End: 2020-01-01 | Stop reason: HOSPADM

## 2020-01-01 RX ORDER — LIDOCAINE HYDROCHLORIDE 20 MG/ML
INJECTION, SOLUTION INFILTRATION; PERINEURAL PRN
Status: DISCONTINUED | OUTPATIENT
Start: 2020-01-01 | End: 2020-01-01

## 2020-01-01 RX ORDER — HYDROMORPHONE HYDROCHLORIDE 1 MG/ML
.3-.5 INJECTION, SOLUTION INTRAMUSCULAR; INTRAVENOUS; SUBCUTANEOUS EVERY 10 MIN PRN
Status: CANCELLED | OUTPATIENT
Start: 2020-01-01

## 2020-01-01 RX ORDER — NALOXONE HYDROCHLORIDE 0.4 MG/ML
0.4 INJECTION, SOLUTION INTRAMUSCULAR; INTRAVENOUS; SUBCUTANEOUS
Status: DISCONTINUED | OUTPATIENT
Start: 2020-01-01 | End: 2020-01-01 | Stop reason: HOSPADM

## 2020-01-01 RX ORDER — FENTANYL CITRATE 50 UG/ML
25-50 INJECTION, SOLUTION INTRAMUSCULAR; INTRAVENOUS
Status: CANCELLED | OUTPATIENT
Start: 2020-01-01

## 2020-01-01 RX ORDER — MEPERIDINE HYDROCHLORIDE 25 MG/ML
12.5 INJECTION INTRAMUSCULAR; INTRAVENOUS; SUBCUTANEOUS
Status: CANCELLED | OUTPATIENT
Start: 2020-01-01

## 2020-01-01 RX ORDER — ACETAMINOPHEN 325 MG/1
975 TABLET ORAL ONCE
Status: DISCONTINUED | OUTPATIENT
Start: 2020-01-01 | End: 2020-01-01 | Stop reason: HOSPADM

## 2020-01-01 RX ORDER — PROPOFOL 10 MG/ML
INJECTION, EMULSION INTRAVENOUS CONTINUOUS PRN
Status: DISCONTINUED | OUTPATIENT
Start: 2020-01-01 | End: 2020-01-01

## 2020-01-01 RX ORDER — NALOXONE HYDROCHLORIDE 0.4 MG/ML
0.4 INJECTION, SOLUTION INTRAMUSCULAR; INTRAVENOUS; SUBCUTANEOUS
Status: CANCELLED | OUTPATIENT
Start: 2020-01-01 | End: 2020-01-01

## 2020-01-01 RX ORDER — ACETAMINOPHEN 325 MG/1
975 TABLET ORAL ONCE
Status: CANCELLED | OUTPATIENT
Start: 2020-01-01 | End: 2020-01-01

## 2020-01-01 RX ORDER — NALOXONE HYDROCHLORIDE 0.4 MG/ML
0.2 INJECTION, SOLUTION INTRAMUSCULAR; INTRAVENOUS; SUBCUTANEOUS
Status: DISCONTINUED | OUTPATIENT
Start: 2020-01-01 | End: 2020-01-01 | Stop reason: HOSPADM

## 2020-01-01 RX ORDER — ONDANSETRON 4 MG/1
4 TABLET, ORALLY DISINTEGRATING ORAL EVERY 30 MIN PRN
Status: DISCONTINUED | OUTPATIENT
Start: 2020-01-01 | End: 2020-01-01 | Stop reason: HOSPADM

## 2020-01-01 RX ORDER — FLUMAZENIL 0.1 MG/ML
0.2 INJECTION, SOLUTION INTRAVENOUS
Status: DISCONTINUED | OUTPATIENT
Start: 2020-01-01 | End: 2020-01-01 | Stop reason: HOSPADM

## 2020-01-01 RX ORDER — ALBUTEROL SULFATE 0.83 MG/ML
2.5 SOLUTION RESPIRATORY (INHALATION) EVERY 4 HOURS PRN
Status: CANCELLED | OUTPATIENT
Start: 2020-01-01

## 2020-01-01 RX ORDER — ONDANSETRON 2 MG/ML
4 INJECTION INTRAMUSCULAR; INTRAVENOUS EVERY 30 MIN PRN
Status: CANCELLED | OUTPATIENT
Start: 2020-01-01

## 2020-01-01 RX ORDER — NALOXONE HYDROCHLORIDE 0.4 MG/ML
0.2 INJECTION, SOLUTION INTRAMUSCULAR; INTRAVENOUS; SUBCUTANEOUS
Status: CANCELLED | OUTPATIENT
Start: 2020-01-01 | End: 2020-01-01

## 2020-01-01 RX ORDER — DIMENHYDRINATE 50 MG/ML
25 INJECTION, SOLUTION INTRAMUSCULAR; INTRAVENOUS
Status: CANCELLED | OUTPATIENT
Start: 2020-01-01

## 2020-01-01 RX ORDER — FENTANYL CITRATE 50 UG/ML
25-50 INJECTION, SOLUTION INTRAMUSCULAR; INTRAVENOUS EVERY 5 MIN PRN
Status: DISCONTINUED | OUTPATIENT
Start: 2020-01-01 | End: 2020-01-01 | Stop reason: HOSPADM

## 2020-01-01 RX ORDER — SODIUM CHLORIDE, SODIUM LACTATE, POTASSIUM CHLORIDE, CALCIUM CHLORIDE 600; 310; 30; 20 MG/100ML; MG/100ML; MG/100ML; MG/100ML
INJECTION, SOLUTION INTRAVENOUS CONTINUOUS
Status: CANCELLED | OUTPATIENT
Start: 2020-01-01

## 2020-01-01 RX ORDER — OXYCODONE HYDROCHLORIDE 5 MG/1
5 TABLET ORAL EVERY 4 HOURS PRN
Status: CANCELLED | OUTPATIENT
Start: 2020-01-01

## 2020-01-01 RX ORDER — PROPOFOL 10 MG/ML
INJECTION, EMULSION INTRAVENOUS PRN
Status: DISCONTINUED | OUTPATIENT
Start: 2020-01-01 | End: 2020-01-01

## 2020-01-01 RX ORDER — IOPAMIDOL 755 MG/ML
10-140 INJECTION, SOLUTION INTRAVASCULAR ONCE
Status: COMPLETED | OUTPATIENT
Start: 2020-01-01 | End: 2020-01-01

## 2020-01-01 RX ORDER — SODIUM CHLORIDE, SODIUM LACTATE, POTASSIUM CHLORIDE, CALCIUM CHLORIDE 600; 310; 30; 20 MG/100ML; MG/100ML; MG/100ML; MG/100ML
INJECTION, SOLUTION INTRAVENOUS CONTINUOUS
Status: DISCONTINUED | OUTPATIENT
Start: 2020-01-01 | End: 2020-01-01 | Stop reason: HOSPADM

## 2020-01-01 RX ORDER — LORAZEPAM 2 MG/ML
.5-1 INJECTION INTRAMUSCULAR
Status: CANCELLED | OUTPATIENT
Start: 2020-01-01

## 2020-01-01 RX ORDER — ONDANSETRON 2 MG/ML
INJECTION INTRAMUSCULAR; INTRAVENOUS PRN
Status: DISCONTINUED | OUTPATIENT
Start: 2020-01-01 | End: 2020-01-01

## 2020-01-01 RX ORDER — SIMETHICONE
LIQUID (ML) MISCELLANEOUS PRN
Status: DISCONTINUED | OUTPATIENT
Start: 2020-01-01 | End: 2020-01-01 | Stop reason: HOSPADM

## 2020-01-01 RX ORDER — ONDANSETRON 4 MG/1
4 TABLET, ORALLY DISINTEGRATING ORAL EVERY 30 MIN PRN
Status: CANCELLED | OUTPATIENT
Start: 2020-01-01

## 2020-01-01 RX ADMIN — PROPOFOL 10 MG: 10 INJECTION, EMULSION INTRAVENOUS at 09:44

## 2020-01-01 RX ADMIN — FLUDEOXYGLUCOSE F-18 10.04 MCI.: 500 INJECTION, SOLUTION INTRAVENOUS at 08:23

## 2020-01-01 RX ADMIN — LIDOCAINE HYDROCHLORIDE 60 MG: 20 INJECTION, SOLUTION INFILTRATION; PERINEURAL at 09:24

## 2020-01-01 RX ADMIN — IOPAMIDOL 104 ML: 755 INJECTION, SOLUTION INTRAVENOUS at 09:26

## 2020-01-01 RX ADMIN — PROPOFOL 150 MCG/KG/MIN: 10 INJECTION, EMULSION INTRAVENOUS at 09:25

## 2020-01-01 RX ADMIN — SODIUM CHLORIDE, POTASSIUM CHLORIDE, SODIUM LACTATE AND CALCIUM CHLORIDE: 600; 310; 30; 20 INJECTION, SOLUTION INTRAVENOUS at 09:23

## 2020-01-01 RX ADMIN — ONDANSETRON 4 MG: 2 INJECTION INTRAMUSCULAR; INTRAVENOUS at 10:06

## 2020-01-01 RX ADMIN — TOPICAL ANESTHETIC 2 EACH: 200 SPRAY DENTAL; PERIODONTAL at 09:23

## 2020-01-01 RX ADMIN — PROPOFOL 20 MG: 10 INJECTION, EMULSION INTRAVENOUS at 09:34

## 2020-01-01 RX ADMIN — PROPOFOL 20 MG: 10 INJECTION, EMULSION INTRAVENOUS at 09:54

## 2020-01-01 ASSESSMENT — MIFFLIN-ST. JEOR
SCORE: 1393.61
SCORE: 1393.61

## 2020-01-01 ASSESSMENT — LIFESTYLE VARIABLES
TOBACCO_USE: 0
TOBACCO_USE: 0

## 2020-01-01 ASSESSMENT — PAIN SCALES - GENERAL: PAINLEVEL: NO PAIN (0)

## 2020-06-25 ENCOUNTER — TRANSFERRED RECORDS (OUTPATIENT)
Dept: HEALTH INFORMATION MANAGEMENT | Facility: CLINIC | Age: 65
End: 2020-06-25

## 2020-07-06 ENCOUNTER — APPOINTMENT (OUTPATIENT)
Dept: INTERPRETER SERVICES | Facility: CLINIC | Age: 65
End: 2020-07-06
Payer: MEDICARE

## 2020-09-08 ENCOUNTER — OFFICE VISIT (OUTPATIENT)
Dept: FAMILY MEDICINE | Facility: CLINIC | Age: 65
End: 2020-09-08
Payer: MEDICARE

## 2020-09-08 VITALS
RESPIRATION RATE: 14 BRPM | WEIGHT: 177 LBS | BODY MASS INDEX: 34.75 KG/M2 | DIASTOLIC BLOOD PRESSURE: 79 MMHG | HEART RATE: 59 BPM | SYSTOLIC BLOOD PRESSURE: 117 MMHG | OXYGEN SATURATION: 98 % | TEMPERATURE: 98.6 F | HEIGHT: 60 IN

## 2020-09-08 DIAGNOSIS — A04.8 H. PYLORI INFECTION: ICD-10-CM

## 2020-09-08 DIAGNOSIS — R74.8 ELEVATED LIVER ENZYMES: ICD-10-CM

## 2020-09-08 DIAGNOSIS — R63.4 WEIGHT LOSS: ICD-10-CM

## 2020-09-08 DIAGNOSIS — M54.50 ACUTE MIDLINE LOW BACK PAIN WITHOUT SCIATICA: ICD-10-CM

## 2020-09-08 DIAGNOSIS — R73.9 HYPERGLYCEMIA: ICD-10-CM

## 2020-09-08 DIAGNOSIS — M54.6 ACUTE MIDLINE THORACIC BACK PAIN: ICD-10-CM

## 2020-09-08 DIAGNOSIS — K21.9 GASTROESOPHAGEAL REFLUX DISEASE, ESOPHAGITIS PRESENCE NOT SPECIFIED: ICD-10-CM

## 2020-09-08 DIAGNOSIS — Z00.00 ENCOUNTER FOR MEDICARE ANNUAL WELLNESS EXAM: Primary | ICD-10-CM

## 2020-09-08 DIAGNOSIS — R10.13 EPIGASTRIC PAIN: ICD-10-CM

## 2020-09-08 LAB
BASOPHILS # BLD AUTO: 0 10E9/L (ref 0–0.2)
BASOPHILS NFR BLD AUTO: 0.2 %
DIFFERENTIAL METHOD BLD: ABNORMAL
EOSINOPHIL # BLD AUTO: 0.1 10E9/L (ref 0–0.7)
EOSINOPHIL NFR BLD AUTO: 0.9 %
ERYTHROCYTE [DISTWIDTH] IN BLOOD BY AUTOMATED COUNT: 12.9 % (ref 10–15)
HCT VFR BLD AUTO: 39.4 % (ref 40–53)
HGB BLD-MCNC: 13 G/DL (ref 13.3–17.7)
LIPASE SERPL-CCNC: 215 U/L (ref 73–393)
LYMPHOCYTES # BLD AUTO: 2.1 10E9/L (ref 0.8–5.3)
LYMPHOCYTES NFR BLD AUTO: 22.2 %
MCH RBC QN AUTO: 31 PG (ref 26.5–33)
MCHC RBC AUTO-ENTMCNC: 33 G/DL (ref 31.5–36.5)
MCV RBC AUTO: 94 FL (ref 78–100)
MONOCYTES # BLD AUTO: 0.7 10E9/L (ref 0–1.3)
MONOCYTES NFR BLD AUTO: 7.9 %
NEUTROPHILS # BLD AUTO: 6.4 10E9/L (ref 1.6–8.3)
NEUTROPHILS NFR BLD AUTO: 68.8 %
PLATELET # BLD AUTO: 297 10E9/L (ref 150–450)
RBC # BLD AUTO: 4.2 10E12/L (ref 4.4–5.9)
WBC # BLD AUTO: 9.3 10E9/L (ref 4–11)

## 2020-09-08 PROCEDURE — 83690 ASSAY OF LIPASE: CPT | Performed by: FAMILY MEDICINE

## 2020-09-08 PROCEDURE — 36415 COLL VENOUS BLD VENIPUNCTURE: CPT | Performed by: FAMILY MEDICINE

## 2020-09-08 PROCEDURE — 82150 ASSAY OF AMYLASE: CPT | Performed by: FAMILY MEDICINE

## 2020-09-08 PROCEDURE — G0438 PPPS, INITIAL VISIT: HCPCS | Performed by: FAMILY MEDICINE

## 2020-09-08 PROCEDURE — 83036 HEMOGLOBIN GLYCOSYLATED A1C: CPT | Performed by: FAMILY MEDICINE

## 2020-09-08 PROCEDURE — 85025 COMPLETE CBC W/AUTO DIFF WBC: CPT | Performed by: FAMILY MEDICINE

## 2020-09-08 PROCEDURE — 99214 OFFICE O/P EST MOD 30 MIN: CPT | Mod: 25 | Performed by: FAMILY MEDICINE

## 2020-09-08 ASSESSMENT — ENCOUNTER SYMPTOMS
EYE PAIN: 0
NAUSEA: 1
HEARTBURN: 1
ABDOMINAL PAIN: 0
NERVOUS/ANXIOUS: 0
HEMATURIA: 0
SHORTNESS OF BREATH: 0
WEAKNESS: 0
DIZZINESS: 0
FEVER: 0
MYALGIAS: 0
PALPITATIONS: 0
DIARRHEA: 0
DYSURIA: 0
HEMATOCHEZIA: 0
FREQUENCY: 0
JOINT SWELLING: 0
SORE THROAT: 0
CHILLS: 0
HEADACHES: 0
PARESTHESIAS: 0
COUGH: 0
ARTHRALGIAS: 0
CONSTIPATION: 0

## 2020-09-08 ASSESSMENT — ANXIETY QUESTIONNAIRES
7. FEELING AFRAID AS IF SOMETHING AWFUL MIGHT HAPPEN: NOT AT ALL
5. BEING SO RESTLESS THAT IT IS HARD TO SIT STILL: NOT AT ALL
4. TROUBLE RELAXING: NOT AT ALL
GAD7 TOTAL SCORE: 0
2. NOT BEING ABLE TO STOP OR CONTROL WORRYING: NOT AT ALL
GAD7 TOTAL SCORE: 0
6. BECOMING EASILY ANNOYED OR IRRITABLE: NOT AT ALL
3. WORRYING TOO MUCH ABOUT DIFFERENT THINGS: NOT AT ALL
GAD7 TOTAL SCORE: 0
7. FEELING AFRAID AS IF SOMETHING AWFUL MIGHT HAPPEN: NOT AT ALL
1. FEELING NERVOUS, ANXIOUS, OR ON EDGE: NOT AT ALL

## 2020-09-08 ASSESSMENT — PATIENT HEALTH QUESTIONNAIRE - PHQ9
10. IF YOU CHECKED OFF ANY PROBLEMS, HOW DIFFICULT HAVE THESE PROBLEMS MADE IT FOR YOU TO DO YOUR WORK, TAKE CARE OF THINGS AT HOME, OR GET ALONG WITH OTHER PEOPLE: NOT DIFFICULT AT ALL
SUM OF ALL RESPONSES TO PHQ QUESTIONS 1-9: 2
SUM OF ALL RESPONSES TO PHQ QUESTIONS 1-9: 2

## 2020-09-08 ASSESSMENT — MIFFLIN-ST. JEOR: SCORE: 1429.12

## 2020-09-08 ASSESSMENT — ACTIVITIES OF DAILY LIVING (ADL): CURRENT_FUNCTION: NO ASSISTANCE NEEDED

## 2020-09-08 NOTE — PROGRESS NOTES
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 you have a chronic health condition, ask your healthcare provider if you needs exams more often   Vaccine Who needs it How often   Chickenpox (varicella) All men in this age group who have no record of this infection or vaccine 2 doses; second dose should be given at least 4 weeks after the first dose   Hepatitis A Men at increased risk for infection - talk with your healthcare provider 2 doses given at least 6 months apart   Hepatitis B Men at increased risk for infection - talk with your healthcare provider 3 doses over 6 months; second dose should be given 1 month after the first dose; the third dose should be given at least 2 months after the second dose and at least 4 months after the first dose   Haemophilus influenzae Type B (HIB) Men at increased risk for infection - talk with your healthcare provider 1 to 3 doses   Influenza (flu) All men in this age group  Once a year   Meningococcal Men at increased risk for infection - talk with your healthcare provider 1 or more doses   Pneumococcal conjugate vaccine (PCV13) and pneumococcal polysaccharide vaccine (PPSV23) All men in this age group 1 dose of each vaccine   Tetanus/diphtheria/  pertussis (Td/Tdap) booster All  men in this age group Td every 10 years, or Tdap if you will have contact with a child younger than 12 months old   Zoster All men in this age group 1 dose   Counseling Who needs it How often   Diet and exercise Men who are overweight or obese When diagnosed, and then at routine exams   Fall prevention (exercise, vitamin D supplements) All men in this age group At routine exams   Sexually transmitted infection Men at increased risk for infection - talk with your healthcare provider At routine exams   Use of daily aspirin Men ages 45 to 79 at risk for cardiovascular health problems At routine exams   Use of tobacco and the health effects it can cause All men in this age group Every visit   [address] [place] [address] [facility]   Date Last Reviewed: 2/1/2017 2000-2019 [employer]. [address] [place] [address], [place] [address]. All rights reserved. This information is not intended as a substitute for professional medical care. Always follow your healthcare professional's instructions.               Appropriate preventive services were discussed with this patient, including applicable screening as appropriate for cardiovascular disease, diabetes, osteopenia/osteoporosis, and glaucoma.  As appropriate for age/gender, discussed screening for colorectal cancer, prostate cancer, breast cancer, and cervical cancer. Checklist reviewing preventive services available has been given to the patient.    Reviewed patients plan of care and provided an AVS. The Basic Care Plan (routine screening as documented in Health Maintenance) for Essie meets the Care Plan requirement. This Care Plan has been established and reviewed with the Patient.    Counseling Resources:  ATP IV Guidelines  Pooled Cohorts Equation Calculator  Breast Cancer Risk Calculator  Breast Cancer: Medication to Reduce Risk  FRAX Risk Assessment  ICSI Preventive Guidelines  Dietary Guidelines for Americans, 2010  USDA's MyPlate  ASA Prophylaxis  Lung CA  Screening    Gualberto Montes DO  [facility]    Identified Health Risks:

## 2020-09-08 NOTE — LETTER
September 11, 2020      Essie Guzman  [id number]  [facility] [address]        Dear ,    We are writing to inform you of your test results.    I reviewed your lab results, with most lab values being within, or very close to normal limits.  Your serum glucose was mildly elevated so I did a test to check for diabetes, the hemoglobin A1c, which was normal.  Two of your liver enzymes were mildly elevated.  This can have a lot of causes. Evaluation often starts with checking for possible hepatitis, and getting an ultrasound of the liver.  I think a short video visit would be a good idea to discuss the lab work, and further management.  No new treatment is needed immediately.     Resulted Orders   Comprehensive metabolic panel (BMP + Alb, Alk Phos, ALT, AST, Total. Bili, TP)   Result Value Ref Range    Sodium 133 133 - 144 mmol/L    Potassium 4.1 3.4 - 5.3 mmol/L    Chloride 101 94 - 109 mmol/L    Carbon Dioxide 24 20 - 32 mmol/L    Anion Gap 8 3 - 14 mmol/L    Glucose 124 (H) 70 - 99 mg/dL    Urea Nitrogen 14 7 - 30 mg/dL    Creatinine 0.89 0.66 - 1.25 mg/dL    GFR Estimate 88 >60 mL/min/[1.73_m2]      Comment:      Non  GFR Calc  Starting 12/18/2018, serum creatinine based estimated GFR (eGFR) will be   calculated using the Chronic Kidney Disease Epidemiology Collaboration   (CKD-EPI) equation.      GFR Estimate If Black >90 >60 mL/min/[1.73_m2]      Comment:       GFR Calc  Starting 12/18/2018, serum creatinine based estimated GFR (eGFR) will be   calculated using the Chronic Kidney Disease Epidemiology Collaboration   (CKD-EPI) equation.      Calcium 9.5 8.5 - 10.1 mg/dL    Bilirubin Total 0.5 0.2 - 1.3 mg/dL    Albumin 3.9 3.4 - 5.0 g/dL    Protein Total 8.0 6.8 - 8.8 g/dL    Alkaline Phosphatase 81 40 - 150 U/L    ALT 75 (H) 0 - 70 U/L    AST 46 (H) 0 - 45 U/L   Lipase   Result Value Ref Range    Lipase 215 73 - 393 U/L   Amylase   Result Value Ref Range    Amylase 72 30  - 110 U/L   TSH with free T4 reflex   Result Value Ref Range    TSH 1.80 0.40 - 4.00 mU/L   CBC with platelets and differential   Result Value Ref Range    WBC 9.3 4.0 - 11.0 10e9/L    RBC Count 4.20 (L) 4.4 - 5.9 10e12/L      Comment:      Results confirmed by repeat test    Hemoglobin 13.0 (L) 13.3 - 17.7 g/dL      Comment:      Results confirmed by repeat test    Hematocrit 39.4 (L) 40.0 - 53.0 %      Comment:      Results confirmed by repeat test    MCV 94 78 - 100 fl    MCH 31.0 26.5 - 33.0 pg    MCHC 33.0 31.5 - 36.5 g/dL    RDW 12.9 10.0 - 15.0 %    Platelet Count 297 150 - 450 10e9/L    % Neutrophils 68.8 %    % Lymphocytes 22.2 %    % Monocytes 7.9 %    % Eosinophils 0.9 %    % Basophils 0.2 %    Absolute Neutrophil 6.4 1.6 - 8.3 10e9/L    Absolute Lymphocytes 2.1 0.8 - 5.3 10e9/L    Absolute Monocytes 0.7 0.0 - 1.3 10e9/L    Absolute Eosinophils 0.1 0.0 - 0.7 10e9/L    Absolute Basophils 0.0 0.0 - 0.2 10e9/L    Diff Method Automated Method    Hemoglobin A1c   Result Value Ref Range    Hemoglobin A1C 5.6 0 - 5.6 %      Comment:      Normal <5.7% Prediabetes 5.7-6.4%  Diabetes 6.5% or higher - adopted from ADA   consensus guidelines.         If you have any questions or concerns, please call the clinic at the number listed above.       Sincerely,        Gualberto Montes, DO

## 2020-09-08 NOTE — PROGRESS NOTES
No future appointments.  Appointment Notes for this encounter:      rapid weight loss; painful stomach   Pt's will be with to assist; No  needed  *Get updated Medicare card scan*    Health Maintenance Due   Topic Date Due     HEPATITIS C SCREENING  1955     ANNUAL REVIEW OF HM ORDERS  1955     ADVANCE CARE PLANNING  1955     DTAP/TDAP/TD IMMUNIZATION (1 - Tdap) 05/31/1978     LIPID  05/31/1988     ZOSTER IMMUNIZATION (1 of 2) 05/31/2003     MEDICARE ANNUAL WELLNESS VISIT  05/31/2018     FALL RISK ASSESSMENT  05/31/2018     PNEUMOCOCCAL IMMUNIZATION 65+ LOW/MEDIUM RISK (1 of 2 - PCV13) 05/31/2018     PHQ-2  01/01/2020     INFLUENZA VACCINE (1) 09/01/2020     Health Maintenance addressed:  Colon Cancer Screen/FIT, Immunizations and Flu Vaccine    Immunizations Pt declined  Flu Vaccine Pt declined    MyChart Status:  Not Active Patient declined

## 2020-09-08 NOTE — PATIENT INSTRUCTIONS
Consider a good time to get a fasting lipid profile and recommended immunizations done done as a lab only appointment. If needing to use omeprazole daily for more then 6 months, further discuss use with a provider.   Patient Education   Personalized Prevention Plan  You are due for the preventive services outlined below.  Your care team is available to assist you in scheduling these services.  If you have already completed any of these items, please share that information with your care team to update in your medical record.  Health Maintenance Due   Topic Date Due     Hepatitis C Screening  1955     ANNUAL REVIEW OF HM ORDERS  1955     Discuss Advance Care Planning  1955     Diptheria Tetanus Pertussis (DTAP/TDAP/TD) Vaccine (1 - Tdap) 05/31/1978     Cholesterol Lab  05/31/1988     Zoster (Shingles) Vaccine (1 of 2) 05/31/2003     Annual Wellness Visit  05/31/2018     FALL RISK ASSESSMENT  05/31/2018     Pneumococcal Vaccine (1 of 2 - PCV13) 05/31/2018     PHQ-2  01/01/2020     Flu Vaccine (1) 09/01/2020        Patient Education     General Neck and Back Pain    Both neck and back pain are usually caused by injury to the muscles or ligaments of the spine. Sometimes the disks that separate each bone of the spine may cause pain by pressing on a nearby nerve. Back and neck pain may appear after a sudden twisting or bending force (such as in a car accident), or sometimes after a simple awkward movement. In either case, muscle spasm is often present and adds to the pain.  Acute neck and back pain usually gets better in 1 to 2 weeks. Pain related to disk disease, arthritis in the spinal joints or spinal stenosis (narrowing of the spinal canal) can become chronic and last for months or years.  Back and neck pain are common problems. Most people feel better in 1 or 2 weeks, and most of the rest in 1 to 2 months. Most people can remain active.  People have and describe pain differently.    Pain can be  sharp, stabbing, shooting, aching, cramping, or burning    Movement, standing, bending, lifting, sitting, or walking may worsen the pain    Pain can be localized to one spot or area, or it can be more generalized    Pain can spread or radiate upwards, downwards, to the front, or go down your arms    Muscle spasm may occur.  Most of the time mechanical problems with the muscles or spine cause the pain. it is usually caused by an injury, whether known or not, to the muscles or ligaments. While illnesses can cause back pain, it is usually not caused by a serious illness. Pain is usually related to physical activity, whether sports, exercise, work, or normal activity. Sometimes it can occur without an identifiable cause. This can happen simply by stretching or moving wrong, without noting pain at the time. Other causes include:    Overexertion, lifting, pushing, pulling incorrectly or too aggressively.    Sudden twisting, bending or stretching from an accident (car or fall), or accidental movement.    Poor posture    Poor conditioning, lack of regular exercise    Spinal disc disease or arthritis    Stress    Pregnancy, or illness like appendicitis, bladder or kidney infection, pelvic infections   Home care    For neck pain: Use a comfortable pillow that supports the head and keeps the spine in a neutral position. The position of the head should not be tilted forward or backward.    When in bed, try to find a position of comfort. A firm mattress is best. Try lying flat on your back with pillows under your knees. You can also try lying on your side with your knees bent up towards your chest and a pillow between your knees.    At first, do not try to stretch out the sore spots. If there is a strain, it is not like the good soreness you get after exercising without an injury. In this case, stretching may make it worse.    Don't sit for long periods, as in long car rides or other travel. This puts more stress on the lower  back than standing or walking.    During the first 24 to 72 hours after an injury, apply an ice pack to the painful area for 20 minutes and then remove it for 20 minutes over a period of 60 to 90 minutes or several times a day.     You can alternate ice and heat therapies. Talk with your healthcare provider about the best treatment for your back or neck pain. As a safety precaution, do not use a heating pad at bedtime. Sleeping with a heating pad can lead to skin burns or tissue damage.    Therapeutic massage can help relax the back and neck muscles without stretching them.    Be aware of safe lifting methods and do not lift anything over 15 pounds until all the pain is gone.  Medicines  Talk to your healthcare provider before using medicine, especially if you have other medical problems or are taking other medicines.    You may use over-the-counter medicine to control pain, unless another pain medicine was prescribed. If you have chronic conditions like diabetes, liver or kidney disease, stomach ulcers,  gastrointestinal bleeding, or are taking blood thinner medicines.    Be careful if you are given pain medicines, narcotics, or medicine for muscle spasm. They can cause drowsiness, and can affect your coordination, reflexes, and judgment. Do not drive or operate heavy machinery.  Follow-up care  Follow up with your healthcare provider, or as advised. Physical therapy or further tests may be needed.  If X-rays were taken, you will be notified of any new findings that may affect your care.  Call 911  Call 911 if any of the following occur:    Trouble breathing    Confusion    Very drowsy or trouble awakening    Fainting or loss of consciousness    Rapid or very slow heart rate    Loss of bowel or bladder control  When to seek medical advice  Call your healthcare provider right away if any of these occur:    Pain becomes worse or spreads into your arms or legs    Weakness, numbness or pain in one or both arms or  legs    Numbness in the groin area    Difficulty walking    Fever of 100.4 F (38 C) or higher, or as directed by your healthcare provider  Date Last Reviewed: 7/1/2016 2000-2019 [employer]. [address] [place] [id number]. All rights reserved. This information is not intended as a substitute for professional medical care. Always follow your healthcare professional's instructions.           Patient Education     Tips to Control Acid Reflux    To control acid reflux, you ll need to make some basic diet and lifestyle changes. The simple steps outlined below may be all you ll need to ease discomfort.  Watch what you eat    Avoid fatty foods and spicy foods.    Eat fewer acidic foods, such as citrus and tomato-based foods. These can increase symptoms.    Limit drinking alcohol, caffeine, and fizzy beverages. All increase acid reflux.    Try limiting chocolate, peppermint, and spearmint. These can worsen acid reflux in some people.  Watch when you eat    Avoid lying down for 3 hours after eating.    Do not snack before going to bed.  Raise your head  Raising your head and upper body by 4 to 6 inches helps limit reflux when you re lying down. Put blocks under the head of your bed frame to raise it.  Other changes    Lose weight, if you need to    Don t exercise near bedtime    Avoid tight-fitting clothes    Limit aspirin and ibuprofen    Stop smoking   Date Last Reviewed: 7/1/2016 2000-2019 [employer]. [address] [place] [id number]. All rights reserved. This information is not intended as a substitute for professional medical care. Always follow your healthcare professional's instructions.           Patient Education     Medicines for Acid Reflux  Your healthcare provider has told you that you have acid reflux. This condition causes stomach acid to wash up into your throat. For most people, acid reflux is troubling but not dangerous. But left untreated, acid reflux  sometimes damages the esophagus. Medicines can help control acid reflux and limit your risk of future problems.  Medicines for acid reflux  Your healthcare provider may prescribe medicine to help treat your acid reflux. Medicine will be based on your symptoms and any test results. Your provider will explain how to take your medicine. You will also be told about possible side effects.  Reducing stomach acid  Your provider may suggest antacids that you can buy over the counter. Antacids can give fast relief. Or you may be told to take a type of medicine called H2 blockers. These are available over the counter and by prescription (for higher doses).  Blocking stomach acid  In more severe cases, your healthcare provider may suggest stronger medicines such as proton pump inhibitors (PPIs). These keep the stomach from making acid. They are often prescribed for long-term use.  Other medicines  In some cases medicines to reduce or block stomach acid may not work. Then you may be switched to another type of medicine that helps your stomach empty better.     Date Last Reviewed: 10/1/2016    8172-1116 [employer]. [address] [place] [address]. All rights reserved. This information is not intended as a substitute for professional medical care. Always follow your healthcare professional's instructions.           Patient Education     Prevention Guidelines, Men Ages 65 and Older  Screening tests and vaccines are an important part of managing your health.A screening test is done to find possible disorders or diseases in people who don't have any symptoms. The goal is to find a disease early so lifestyle changes can be made and you can be watched more closely to reduce the risk of disease, or to detect it early enough to treat it most effectively. Screening tests are not considered diagnostic, but are used to determine if more testing is needed.  Health counseling is essential, too. Below are guidelines  for these, for men ages 65 and older. Talk with your healthcare provider to make sure you re up-to-date on what you need.  Screening Who needs it How often   Abdominal aortic aneurysm Men ages 65 to 75 who have ever smoked 1 ultrasound   Alcohol misuse All men in this age group At routine exams   Blood pressure All men in this age group Yearly checkup if your blood pressure is normal  Normal blood pressure is less than 120/80 mm Hg  If your blood pressure reading is higher than normal, follow the advice of your healthcare provider   Colorectal cancer All men in this age group Flexible sigmoidoscopy every 5 years, or colonoscopy every 10 years, or double-contrast barium enema every 5 years; yearly fecal occult blood test or fecal immunochemical test; or a stool DNA test as often as your healthcare provider advises; talk with your healthcare provider about which tests are best for you and when you no longer need colonoscopies (generally after age 75)   Depression All men in this age group At routine exams   Type 2 diabetes or prediabetes All men beginning at age 45 and men without symptoms at any age who are overweight or obese and have 1 or more other risk factors for diabetes At least every 3 years (yearly if your blood sugar has already begun to rise)   Type 2 diabetes All men with prediabetes Every year   Hepatitis C Men at increased risk for infection - talk with your healthcare provider At routine exams   High cholesterol or triglycerides All men in this age group At least every 5 years   HIV Men at increased risk for infection - talk with your healthcare provider At routine exams   Lung cancer Adults ages 55 to 80 who have smoked Yearly screening in smokers with 30 pack-year history of smoking or who quit within 15 years   Obesity All men in this age group At routine exams   Prostate cancer All men in this age group, talk to healthcare provider about risks and benefits of digital rectal exam (JASPER) and  prostate-specific antigen (PSA) screening1 At routine exams   Syphilis Men at increased risk for infection - talk with your healthcare provider At routine exams   Tuberculosis Men at increased risk for infection - talk with your healthcare provider Ask your healthcare provider   Vision All men in this age group Every 1 to 2 years; if you have a chronic health condition, ask your healthcare provider if you needs exams more often   Vaccine Who needs it How often   Chickenpox (varicella) All men in this age group who have no record of this infection or vaccine 2 doses; second dose should be given at least 4 weeks after the first dose   Hepatitis A Men at increased risk for infection - talk with your healthcare provider 2 doses given at least 6 months apart   Hepatitis B Men at increased risk for infection - talk with your healthcare provider 3 doses over 6 months; second dose should be given 1 month after the first dose; the third dose should be given at least 2 months after the second dose and at least 4 months after the first dose   Haemophilus influenzae Type B (HIB) Men at increased risk for infection - talk with your healthcare provider 1 to 3 doses   Influenza (flu) All men in this age group  Once a year   Meningococcal Men at increased risk for infection - talk with your healthcare provider 1 or more doses   Pneumococcal conjugate vaccine (PCV13) and pneumococcal polysaccharide vaccine (PPSV23) All men in this age group 1 dose of each vaccine   Tetanus/diphtheria/  pertussis (Td/Tdap) booster All men in this age group Td every 10 years, or Tdap if you will have contact with a child younger than 12 months old   Zoster All men in this age group 1 dose   Counseling Who needs it How often   Diet and exercise Men who are overweight or obese When diagnosed, and then at routine exams   Fall prevention (exercise, vitamin D supplements) All men in this age group At routine exams   Sexually transmitted infection Men at  increased risk for infection - talk with your healthcare provider At routine exams   Use of daily aspirin Men ages 45 to 79 at risk for cardiovascular health problems At routine exams   Use of tobacco and the health effects it can cause All men in this age group Every visit   [address] [place] [address] [facility]   Date Last Reviewed: 2/1/2017 2000-2019 [employer]. [address] [place] [address], [place] [address]. All rights reserved. This information is not intended as a substitute for professional medical care. Always follow your healthcare professional's instructions.

## 2020-09-09 LAB
ALBUMIN SERPL-MCNC: 3.9 G/DL (ref 3.4–5)
ALP SERPL-CCNC: 81 U/L (ref 40–150)
ALT SERPL W P-5'-P-CCNC: 75 U/L (ref 0–70)
AMYLASE SERPL-CCNC: 72 U/L (ref 30–110)
ANION GAP SERPL CALCULATED.3IONS-SCNC: 8 MMOL/L (ref 3–14)
AST SERPL W P-5'-P-CCNC: 46 U/L (ref 0–45)
BILIRUB SERPL-MCNC: 0.5 MG/DL (ref 0.2–1.3)
BUN SERPL-MCNC: 14 MG/DL (ref 7–30)
CALCIUM SERPL-MCNC: 9.5 MG/DL (ref 8.5–10.1)
CHLORIDE SERPL-SCNC: 101 MMOL/L (ref 94–109)
CO2 SERPL-SCNC: 24 MMOL/L (ref 20–32)
CREAT SERPL-MCNC: 0.89 MG/DL (ref 0.66–1.25)
GFR SERPL CREATININE-BSD FRML MDRD: 88 ML/MIN/{1.73_M2}
GLUCOSE SERPL-MCNC: 124 MG/DL (ref 70–99)
HBA1C MFR BLD: 5.6 % (ref 0–5.6)
POTASSIUM SERPL-SCNC: 4.1 MMOL/L (ref 3.4–5.3)
PROT SERPL-MCNC: 8 G/DL (ref 6.8–8.8)
SODIUM SERPL-SCNC: 133 MMOL/L (ref 133–144)
TSH SERPL DL<=0.005 MIU/L-ACNC: 1.8 MU/L (ref 0.4–4)

## 2020-09-09 ASSESSMENT — PATIENT HEALTH QUESTIONNAIRE - PHQ9: SUM OF ALL RESPONSES TO PHQ QUESTIONS 1-9: 2

## 2020-09-09 ASSESSMENT — ANXIETY QUESTIONNAIRES: GAD7 TOTAL SCORE: 0

## 2020-09-14 DIAGNOSIS — K21.9 GASTROESOPHAGEAL REFLUX DISEASE, ESOPHAGITIS PRESENCE NOT SPECIFIED: ICD-10-CM

## 2020-09-14 DIAGNOSIS — R10.13 EPIGASTRIC PAIN: ICD-10-CM

## 2020-09-14 PROCEDURE — 87338 HPYLORI STOOL AG IA: CPT | Performed by: FAMILY MEDICINE

## 2020-09-15 ENCOUNTER — ALLIED HEALTH/NURSE VISIT (OUTPATIENT)
Dept: INTERPRETER SERVICES | Facility: CLINIC | Age: 65
End: 2020-09-15
Payer: MEDICARE

## 2020-09-15 DIAGNOSIS — Z53.9 ERRONEOUS ENCOUNTER--DISREGARD: Primary | ICD-10-CM

## 2020-09-15 LAB — H PYLORI AG STL QL IA: POSITIVE

## 2020-09-15 RX ORDER — CLARITHROMYCIN 500 MG
500 TABLET ORAL 2 TIMES DAILY
Qty: 28 TABLET | Refills: 0 | Status: SHIPPED | OUTPATIENT
Start: 2020-09-15 | End: 2020-09-29

## 2020-09-15 RX ORDER — AMOXICILLIN 500 MG/1
1000 CAPSULE ORAL 2 TIMES DAILY
Qty: 56 CAPSULE | Refills: 0 | Status: SHIPPED | OUTPATIENT
Start: 2020-09-15 | End: 2020-09-29

## 2020-09-21 ENCOUNTER — ANCILLARY PROCEDURE (OUTPATIENT)
Dept: GENERAL RADIOLOGY | Facility: CLINIC | Age: 65
End: 2020-09-21
Attending: PHYSICIAN ASSISTANT
Payer: MEDICARE

## 2020-09-21 ENCOUNTER — OFFICE VISIT (OUTPATIENT)
Dept: NEUROSURGERY | Facility: CLINIC | Age: 65
End: 2020-09-21
Attending: FAMILY MEDICINE
Payer: MEDICARE

## 2020-09-21 VITALS
OXYGEN SATURATION: 97 % | WEIGHT: 173.8 LBS | SYSTOLIC BLOOD PRESSURE: 98 MMHG | HEIGHT: 62 IN | TEMPERATURE: 98.3 F | HEART RATE: 62 BPM | BODY MASS INDEX: 31.98 KG/M2 | DIASTOLIC BLOOD PRESSURE: 59 MMHG

## 2020-09-21 DIAGNOSIS — M54.50 ACUTE MIDLINE LOW BACK PAIN WITHOUT SCIATICA: ICD-10-CM

## 2020-09-21 DIAGNOSIS — M54.6 ACUTE MIDLINE THORACIC BACK PAIN: ICD-10-CM

## 2020-09-21 PROCEDURE — G0463 HOSPITAL OUTPT CLINIC VISIT: HCPCS

## 2020-09-21 PROCEDURE — 99204 OFFICE O/P NEW MOD 45 MIN: CPT | Performed by: PHYSICIAN ASSISTANT

## 2020-09-21 PROCEDURE — 72120 X-RAY BEND ONLY L-S SPINE: CPT

## 2020-09-21 ASSESSMENT — PAIN SCALES - GENERAL: PAINLEVEL: SEVERE PAIN (7)

## 2020-09-21 ASSESSMENT — MIFFLIN-ST. JEOR: SCORE: 1442.6
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
## 2020-09-21 NOTE — NURSING NOTE
"Essie Guzman is a 65 year old male who presents for:  Chief Complaint   Patient presents with     Neurologic Problem     Cronic Midline low back pain        Initial Vitals:  BP 98/59 (BP Location: Right arm, Patient Position: Sitting, Cuff Size: Adult Regular)   Pulse 62   Temp 98.3  F (36.8  C) (Oral)   Ht 5' 2\" (1.575 m)   Wt 173 lb 12.8 oz (78.8 kg)   SpO2 97%   BMI 31.79 kg/m   Estimated body mass index is 31.79 kg/m  as calculated from the following:    Height as of this encounter: 5' 2\" (1.575 m).    Weight as of this encounter: 173 lb 12.8 oz (78.8 kg).. Body surface area is 1.86 meters squared. BP completed using cuff size: regular  Severe Pain (7)    Nursing Comments: pain radiates across the back.     Fabricio Nicole, LOULOU    "

## 2020-09-21 NOTE — PATIENT INSTRUCTIONS
-Order placed for physical therapy. They will call you to set this up    -lumbar x-rays ordered today.     TULIO Parekh  [facility] Neurosurgery  [facility]     Tel: [phone]  Pager: [phone]
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
## 2020-10-21 NOTE — PROGRESS NOTES
Pre-Visit Planning   10/23/20  0715/0730  Gualberto Montes DO    Appointment Notes for this encounter:      follow up stomach issues   phone interp avail [phone] option 1 or ipad    Questionnaires Reviewed/Assigned  No additional questionnaires are needed        Patient preferred phone number: [phone]    Unable to reach patient and unable to leave voicemail.

## 2020-10-23 ENCOUNTER — OFFICE VISIT (OUTPATIENT)
Dept: FAMILY MEDICINE | Facility: CLINIC | Age: 65
End: 2020-10-23
Payer: MEDICARE

## 2020-10-23 VITALS
WEIGHT: 170.9 LBS | DIASTOLIC BLOOD PRESSURE: 79 MMHG | BODY MASS INDEX: 33.55 KG/M2 | RESPIRATION RATE: 16 BRPM | TEMPERATURE: 98.3 F | HEART RATE: 62 BPM | OXYGEN SATURATION: 100 % | SYSTOLIC BLOOD PRESSURE: 131 MMHG | HEIGHT: 60 IN

## 2020-10-23 DIAGNOSIS — R76.8 HEPATITIS A ANTIBODY POSITIVE: ICD-10-CM

## 2020-10-23 DIAGNOSIS — D62 ANEMIA DUE TO BLOOD LOSS, ACUTE: ICD-10-CM

## 2020-10-23 DIAGNOSIS — R19.5 DARK STOOLS: Primary | ICD-10-CM

## 2020-10-23 DIAGNOSIS — R10.13 ABDOMINAL PAIN, EPIGASTRIC: ICD-10-CM

## 2020-10-23 DIAGNOSIS — R74.8 ELEVATED LIVER ENZYMES: ICD-10-CM

## 2020-10-23 LAB
BASOPHILS # BLD AUTO: 0 10E9/L (ref 0–0.2)
BASOPHILS NFR BLD AUTO: 0.2 %
DIFFERENTIAL METHOD BLD: ABNORMAL
EOSINOPHIL # BLD AUTO: 0.1 10E9/L (ref 0–0.7)
EOSINOPHIL NFR BLD AUTO: 1.6 %
ERYTHROCYTE [DISTWIDTH] IN BLOOD BY AUTOMATED COUNT: 12.6 % (ref 10–15)
HCT VFR BLD AUTO: 37.5 % (ref 40–53)
HGB BLD-MCNC: 11.9 G/DL (ref 13.3–17.7)
LYMPHOCYTES # BLD AUTO: 1.9 10E9/L (ref 0.8–5.3)
LYMPHOCYTES NFR BLD AUTO: 21.8 %
MCH RBC QN AUTO: 29.2 PG (ref 26.5–33)
MCHC RBC AUTO-ENTMCNC: 31.7 G/DL (ref 31.5–36.5)
MCV RBC AUTO: 92 FL (ref 78–100)
MONOCYTES # BLD AUTO: 0.8 10E9/L (ref 0–1.3)
MONOCYTES NFR BLD AUTO: 8.8 %
NEUTROPHILS # BLD AUTO: 6 10E9/L (ref 1.6–8.3)
NEUTROPHILS NFR BLD AUTO: 67.6 %
PLATELET # BLD AUTO: 297 10E9/L (ref 150–450)
RBC # BLD AUTO: 4.07 10E12/L (ref 4.4–5.9)
WBC # BLD AUTO: 8.9 10E9/L (ref 4–11)

## 2020-10-23 PROCEDURE — 85025 COMPLETE CBC W/AUTO DIFF WBC: CPT | Performed by: FAMILY MEDICINE

## 2020-10-23 PROCEDURE — 86708 HEPATITIS A ANTIBODY: CPT | Performed by: FAMILY MEDICINE

## 2020-10-23 PROCEDURE — 80076 HEPATIC FUNCTION PANEL: CPT | Performed by: FAMILY MEDICINE

## 2020-10-23 PROCEDURE — 36415 COLL VENOUS BLD VENIPUNCTURE: CPT | Performed by: FAMILY MEDICINE

## 2020-10-23 PROCEDURE — 86803 HEPATITIS C AB TEST: CPT | Performed by: FAMILY MEDICINE

## 2020-10-23 PROCEDURE — 99214 OFFICE O/P EST MOD 30 MIN: CPT | Performed by: FAMILY MEDICINE

## 2020-10-23 ASSESSMENT — MIFFLIN-ST. JEOR: SCORE: 1397.7

## 2020-10-23 NOTE — PATIENT INSTRUCTIONS
Start taking two of your omeprazole 20 mg tablets daily from the prescription you already have. Someone should be calling you about your referral to gastroenterology. I am concerned you may have a slow bleed from your stomach area to investigate. If you should feel dizzy, or your resting heart rate stay above 80, call clinic or go to urgent care or ER to be seen if this happens before being seen by GI. I will review your labs by [facility] when they are available.

## 2020-10-23 NOTE — LETTER
October 26, 2020      Essie Guzman  [address] [place] [address]        Dear ,    We are writing to inform you of your test results.    The testing for hepatitis A suggested an infection with this virus sometime during lifetime.  I think the chances of being infected with this locally is very low.  Further discussion of this finding can be done through gastroenterology.  The previously elevated liver enzymes have returned back to normal range.  No other abnormalities were noted on lab testing not discussed at time of exam.  No change in management recommended from what was discussed at last exam.     Resulted Orders   CBC with platelets and differential   Result Value Ref Range    WBC 8.9 4.0 - 11.0 10e9/L    RBC Count 4.07 (L) 4.4 - 5.9 10e12/L      Comment:      Results confirmed by repeat test    Hemoglobin 11.9 (L) 13.3 - 17.7 g/dL      Comment:      Results confirmed by repeat test    Hematocrit 37.5 (L) 40.0 - 53.0 %      Comment:      Results confirmed by repeat test    MCV 92 78 - 100 fl    MCH 29.2 26.5 - 33.0 pg    MCHC 31.7 31.5 - 36.5 g/dL    RDW 12.6 10.0 - 15.0 %    Platelet Count 297 150 - 450 10e9/L    % Neutrophils 67.6 %    % Lymphocytes 21.8 %    % Monocytes 8.8 %    % Eosinophils 1.6 %    % Basophils 0.2 %    Absolute Neutrophil 6.0 1.6 - 8.3 10e9/L    Absolute Lymphocytes 1.9 0.8 - 5.3 10e9/L    Absolute Monocytes 0.8 0.0 - 1.3 10e9/L    Absolute Eosinophils 0.1 0.0 - 0.7 10e9/L    Absolute Basophils 0.0 0.0 - 0.2 10e9/L    Diff Method Automated Method    Hepatic panel (Albumin, ALT, AST, Bili, Alk Phos, TP)   Result Value Ref Range    Bilirubin Direct <0.1 0.0 - 0.2 mg/dL    Bilirubin Total 0.2 0.2 - 1.3 mg/dL    Albumin 3.5 3.4 - 5.0 g/dL    Protein Total 7.5 6.8 - 8.8 g/dL    Alkaline Phosphatase 87 40 - 150 U/L    ALT 48 0 - 70 U/L    AST 26 0 - 45 U/L   Hepatitis C antibody   Result Value Ref Range    Hepatitis C Antibody Nonreactive NR^Nonreactive      Comment:       Assay performance characteristics have not been established for newborns,   infants, and children     Hepatitis Antibody A IgG   Result Value Ref Range    Hepatitis A Antibody IgG Reactive (AA) NR^Nonreactive      Comment:      This assay cannot be used for the diagnosis of acute HAV infection.       If you have any questions or concerns, please call the clinic at the number listed above.       Sincerely,        Gualberto Montes DO/AL

## 2020-10-23 NOTE — PROGRESS NOTES
Subjective     Essie Guzman is a 65 year old male who presents to clinic today for the following health issues:    HPI         Concern - Stomach pains  Onset: rough 1 1/2 months ago  Description: burning intermittent pain, some heartburn   Intensity: mild  Progression of Symptoms:  same  Accompanying Signs & Symptoms: heartburn occasional   Previous history of similar problem: none  Precipitating factors:        Worsened by: get's worse when he is not eating.  Alleviating factors:        Improved by: goes away on its own.  Therapies tried and outcome: None    Review of Systems   Patient is seen, accompanied by his daughter who helps with language interpretation, primarily for follow-up of abdominal discomfort and recent treatment for an H. pylori infection.  He also had some abnormally elevated liver enzymes from labs done through last exam which need to be followed up on.    I discussed the treatment for H. pylori with patient and daughter.  A 2-week supply of the antibiotic and omeprazole treatment were prescribed for management of the H. pylori infection.  Unfortunately, there was some miscommunication between the patient and  which led the patient to stop taking the omeprazole treatment while taking the antibiotic treatment, and at time of exam he is currently not using any omeprazole treatment.    His symptoms of GERD and gastritis have improved since last exam, but he still has a burning sensation in his gastric area if he has not eaten recently.    He has had darker formed stools since last exam.  No bright red bloody stools or melanotic stools reported.    No reported dizziness or difficulty breathing.        Objective    /79 (BP Location: Right arm, Patient Position: Sitting, Cuff Size: Adult Regular)   Pulse 62   Temp 98.3  F (36.8  C) (Oral)   Resp 16   Ht 1.524 m (5')   Wt 77.5 kg (170 lb 14.4 oz)   SpO2 100%   BMI 33.38 kg/m    Body mass index is 33.38 kg/m .  Physical Exam    Vital signs reviewed.  Patient is in no acute appearing distress.  Breathing appears nonlabored.  Patient is alert and oriented ×3.  He makes good eye contact, and is very pleasant.  Heart: Heart rate is regular without murmur.  Lungs: Lungs are clear to auscultation with good airflow bilaterally.  Back: No areas of tenderness.  Abdomen:  Abdomen is soft, nontender.  No palpable abnormal masses or organomegaly.  Bowel sounds are normal.  Skin/extremities: Warm and dry, with no ankle edema noted.    Results for orders placed or performed in visit on 10/23/20   CBC with platelets and differential     Status: Abnormal   Result Value Ref Range    WBC 8.9 4.0 - 11.0 10e9/L    RBC Count 4.07 (L) 4.4 - 5.9 10e12/L    Hemoglobin 11.9 (L) 13.3 - 17.7 g/dL    Hematocrit 37.5 (L) 40.0 - 53.0 %    MCV 92 78 - 100 fl    MCH 29.2 26.5 - 33.0 pg    MCHC 31.7 31.5 - 36.5 g/dL    RDW 12.6 10.0 - 15.0 %    Platelet Count 297 150 - 450 10e9/L    % Neutrophils 67.6 %    % Lymphocytes 21.8 %    % Monocytes 8.8 %    % Eosinophils 1.6 %    % Basophils 0.2 %    Absolute Neutrophil 6.0 1.6 - 8.3 10e9/L    Absolute Lymphocytes 1.9 0.8 - 5.3 10e9/L    Absolute Monocytes 0.8 0.0 - 1.3 10e9/L    Absolute Eosinophils 0.1 0.0 - 0.7 10e9/L    Absolute Basophils 0.0 0.0 - 0.2 10e9/L    Diff Method Automated Method    Hepatic panel (Albumin, ALT, AST, Bili, Alk Phos, TP)     Status: None   Result Value Ref Range    Bilirubin Direct <0.1 0.0 - 0.2 mg/dL    Bilirubin Total 0.2 0.2 - 1.3 mg/dL    Albumin 3.5 3.4 - 5.0 g/dL    Protein Total 7.5 6.8 - 8.8 g/dL    Alkaline Phosphatase 87 40 - 150 U/L    ALT 48 0 - 70 U/L    AST 26 0 - 45 U/L   Hepatitis C antibody     Status: None   Result Value Ref Range    Hepatitis C Antibody Nonreactive NR^Nonreactive   Hepatitis Antibody A IgG     Status: Abnormal   Result Value Ref Range    Hepatitis A Antibody IgG Reactive (AA) NR^Nonreactive   The CBC lab result was available for review with patient during exam.   I advised him that the hemoglobin was mildly low, a little lower than previous exam, suggesting a possible slow upper GI bleed may be present.  I recommended further evaluation by gastroenterology to address this, which she was agreeable to.  Other lab results were reviewed after exam.  He was positive for hepatitis A, though the time of infection is unclear.  His ALT and AST labs have returned back to normal range.        Assessment & Plan     Abdominal pain, epigastric    - GASTROENTEROLOGY ADULT REF CONSULT ONLY    Dark stools    - CBC with platelets and differential  - Hepatic panel (Albumin, ALT, AST, Bili, Alk Phos, TP)  - GASTROENTEROLOGY ADULT REF CONSULT ONLY    Elevated liver enzymes    - Hepatic panel (Albumin, ALT, AST, Bili, Alk Phos, TP)  - Hepatitis C antibody  - Hepatitis Antibody A IgG  - GASTROENTEROLOGY ADULT REF CONSULT ONLY    Anemia due to blood loss, acute      Hepatitis A antibody positive           Patient Instructions   Start taking two of your omeprazole 20 mg tablets daily from the prescription you already have. Someone should be calling you about your referral to gastroenterology. I am concerned you may have a slow bleed from your stomach area to investigate. If you should feel dizzy, or your resting heart rate stay above 80, call clinic or go to urgent care or ER to be seen if this happens before being seen by GI. I will review your labs by [facility] when they are available.         Return in about 1 year (around 10/23/2021) for Routine preventive.    Gualberto Montes DO  [facility]

## 2020-10-24 LAB
ALBUMIN SERPL-MCNC: 3.5 G/DL (ref 3.4–5)
ALP SERPL-CCNC: 87 U/L (ref 40–150)
ALT SERPL W P-5'-P-CCNC: 48 U/L (ref 0–70)
AST SERPL W P-5'-P-CCNC: 26 U/L (ref 0–45)
BILIRUB DIRECT SERPL-MCNC: <0.1 MG/DL (ref 0–0.2)
BILIRUB SERPL-MCNC: 0.2 MG/DL (ref 0.2–1.3)
HAV IGG SER QL IA: REACTIVE
HCV AB SERPL QL IA: NONREACTIVE
PROT SERPL-MCNC: 7.5 G/DL (ref 6.8–8.8)

## 2020-10-26 ENCOUNTER — TELEPHONE (OUTPATIENT)
Dept: GASTROENTEROLOGY | Facility: CLINIC | Age: 65
End: 2020-10-26

## 2020-10-26 DIAGNOSIS — Z11.59 ENCOUNTER FOR SCREENING FOR OTHER VIRAL DISEASES: Primary | ICD-10-CM

## 2020-10-26 DIAGNOSIS — R79.89 ELEVATED LFTS: ICD-10-CM

## 2020-10-26 NOTE — TELEPHONE ENCOUNTER
RECORDS RECEIVED FROM: Internal   Appt Date: 10.29.2020   NOTES STATUS DETAILS   OFFICE NOTE from referring provider Internal 10.23.2020 Consult Gualberto Montes DO   OFFICE NOTES from other specialists N/A    DISCHARGE SUMMARY from hospital N/A    MEDICATION LIST Internal    LIVER BIOSPY (IF APPLICABLE)      PATHOLOGY REPORTS  N/A    IMAGING     ENDOSCOPY (IF AVAILABLE) N/A    COLONOSCOPY (IF AVAILABLE) N/A    ULTRASOUND LIVER N/A    CT OF ABDOMEN N/A    MRI OF LIVER N/A    FIBROSCAN, US ELASTOGRAPHY, FIBROSIS SCAN, MR ELASTOGRAPHY N/A    LABS     HEPATIC PANEL (LIVER PANEL) Internal 10.23.2020   BASIC METABOLIC PANEL N/A    COMPLETE METABOLIC PANEL Internal 10.23.2020   COMPLETE BLOOD COUNT (CBC) Internal 10.23.2020   INTERNATIONAL NORMALIZED RATIO (INR) N/A    HEPATITIS C ANTIBODY Internal 10.23.2020   HEPATITIS C VIRAL LOAD/PCR N/A    HEPATITIS C GENOTYPE N/A    HEPATITIS B SURFACE ANTIGEN N/A    HEPATITIS B SURFACE ANTIBODY N/A    HEPATITIS B DNA QUANT LEVEL N/A    HEPATITIS B CORE ANTIBODY N/A

## 2020-10-26 NOTE — TELEPHONE ENCOUNTER
[facility] Call Center    Phone Message    May a detailed message be left on voicemail: yes     Reason for Call: Order(s): Other:   Reason for requested: Pt is requesting for their lab orders to be sent to the  in [place].  Date needed: asap  Provider name: Dr. Renteria      Action Taken: Message routed to:  [facility] ([facility] hep    Travel Screening: Not Applicable

## 2020-10-28 DIAGNOSIS — R79.89 ELEVATED LFTS: ICD-10-CM

## 2020-10-28 DIAGNOSIS — Z11.59 ENCOUNTER FOR SCREENING FOR OTHER VIRAL DISEASES: ICD-10-CM

## 2020-10-28 LAB
ANION GAP SERPL CALCULATED.3IONS-SCNC: 4 MMOL/L (ref 3–14)
BUN SERPL-MCNC: 19 MG/DL (ref 7–30)
CALCIUM SERPL-MCNC: 9 MG/DL (ref 8.5–10.1)
CHLORIDE SERPL-SCNC: 104 MMOL/L (ref 94–109)
CO2 SERPL-SCNC: 28 MMOL/L (ref 20–32)
CREAT SERPL-MCNC: 0.84 MG/DL (ref 0.66–1.25)
GFR SERPL CREATININE-BSD FRML MDRD: >90 ML/MIN/{1.73_M2}
GLUCOSE SERPL-MCNC: 106 MG/DL (ref 70–99)
POTASSIUM SERPL-SCNC: 4.3 MMOL/L (ref 3.4–5.3)
SODIUM SERPL-SCNC: 136 MMOL/L (ref 133–144)

## 2020-10-28 PROCEDURE — 80048 BASIC METABOLIC PNL TOTAL CA: CPT | Performed by: PATHOLOGY

## 2020-10-28 PROCEDURE — 86704 HEP B CORE ANTIBODY TOTAL: CPT | Performed by: PATHOLOGY

## 2020-10-28 PROCEDURE — 36415 COLL VENOUS BLD VENIPUNCTURE: CPT | Performed by: PATHOLOGY

## 2020-10-28 PROCEDURE — 87340 HEPATITIS B SURFACE AG IA: CPT | Performed by: PATHOLOGY

## 2020-10-28 PROCEDURE — 86706 HEP B SURFACE ANTIBODY: CPT | Performed by: PATHOLOGY

## 2020-10-29 ENCOUNTER — VIRTUAL VISIT (OUTPATIENT)
Dept: GASTROENTEROLOGY | Facility: CLINIC | Age: 65
End: 2020-10-29
Attending: FAMILY MEDICINE
Payer: MEDICARE

## 2020-10-29 ENCOUNTER — PRE VISIT (OUTPATIENT)
Dept: GASTROENTEROLOGY | Facility: CLINIC | Age: 65
End: 2020-10-29

## 2020-10-29 DIAGNOSIS — R19.5 DARK STOOLS: ICD-10-CM

## 2020-10-29 DIAGNOSIS — R74.8 ELEVATED LIVER ENZYMES: ICD-10-CM

## 2020-10-29 DIAGNOSIS — Z11.59 ENCOUNTER FOR SCREENING FOR OTHER VIRAL DISEASES: ICD-10-CM

## 2020-10-29 DIAGNOSIS — R74.8 ABNORMAL LEVELS OF OTHER SERUM ENZYMES: ICD-10-CM

## 2020-10-29 DIAGNOSIS — R10.13 ABDOMINAL PAIN, EPIGASTRIC: ICD-10-CM

## 2020-10-29 DIAGNOSIS — R79.9 ABNORMAL FINDING OF BLOOD CHEMISTRY, UNSPECIFIED: ICD-10-CM

## 2020-10-29 LAB
HBV CORE AB SERPL QL IA: NONREACTIVE
HBV SURFACE AB SERPL IA-ACNC: 193.62 M[IU]/ML
HBV SURFACE AG SERPL QL IA: NONREACTIVE

## 2020-10-29 PROCEDURE — 99443 PR PHYSICIAN TELEPHONE EVALUATION 21-30 MIN: CPT | Mod: 95 | Performed by: PHYSICIAN ASSISTANT

## 2020-10-29 ASSESSMENT — PAIN SCALES - GENERAL: PAINLEVEL: MILD PAIN (2)

## 2020-10-29 NOTE — LETTER
"    10/29/2020         RE: Essie Guzman  [id number]  [facility] [address]        Dear Colleague,    Thank you for referring your patient, Essie Guzman, to the [facility] [place]. Please see a copy of my visit note below.    Essie Guzman is a 65 year old male who is being evaluated via a billable telephone visit.      The patient has been notified of following:     \"This telephone visit will be conducted via a call between you and your physician/provider. We have found that certain health care needs can be provided without the need for a physical exam.  This service lets us provide the care you need with a short phone conversation.  If a prescription is necessary we can send it directly to your pharmacy.  If lab work is needed we can place an order for that and you can then stop by our lab to have the test done at a later time.    Telephone visits are billed at different rates depending on your insurance coverage. During this emergency period, for some insurers they may be billed the same as an in-person visit.  Please reach out to your insurance provider with any questions.    If during the course of the call the physician/provider feels a telephone visit is not appropriate, you will not be charged for this service.\"    Patient has given verbal consent for Telephone visit?  Yes    What phone number would you like to be contacted at? Please call  services first at [phone] please use (other) number    How would you like to obtain your AVS? Mail a copy    Phone call duration: 36 minutes    Gama Renteria PA-C    Hepatology Clinic note  Essie Guzman   Date of Birth 1955  Date of Service 10/29/2020    REASON FOR CONSULTATION: Elevated LFT's  REFERRING PROVIDER: Serge Kimble MD          Assessment/plan:   Essie Guzman is a 65 year old male with history of mildly elevated LFT's which have trended down now (ALT 48 and AST 26). He was treated for H pylori " in September and continues to have persistent epigastric discomfort despite increasing prilosec. Given anemia and persistent symptoms, recommend upper endoscopy.     # Upper epigastric discomfort  - EGD  - US abdomen in the near future   - ADNRÉS, F-actin, iron panel  # Follow-up in clinic in six months or sooner as needed    Gama Renteria PA-C   [facility] Hepatology     -----------------------------------------------------       HPI:   Essie Guzman is a 65 year old male  presenting for the evaluation of elevated LFT's. Patient was called with a [facility] . His daughter also provided some history.     LFT's were first noticed to be abnormal in late September 2020, recent labs improved. No previous labs available for review.  Abdominal pain first started about 2 months ago, located on mlld burning in the epigastric area. He has been told his hemoglobin is low. He was prescibed omeprozole which he is still taking now at 6 am and 6 pm.  He was treated for H Pylori based on a stool test. Treated with standard antibiotics. States his symptoms improved for awhile but have now worsening again. He is having some weight loss 3 lbs. Appetite is improved now after increasing omeprazole to twice daily, but still not back to baseline.     Currently stools are dark green or brown. He typically has BM's once daily.  Patient also denies melena, hematochezia or hematemesis.      Patient denies jaundice, lower extremity edema, abdominal distension or confusion. Patient denies  fevers, sweats or chills.    PMH: H pylori,     SMH: No previous surgery     Medications:   See below     No previous tobacco use. No history of alcohol use. No previous IV/IN drug use.    Stays patient currently lives with wife and children. No known family history of liver disease or liver cancer.     Previous work-up:  HCV antibody nonreactive  HAV Ab IgG - reactive  HAV Ab IgM   HBV SAb 193 - immune  HBV SAg nonreactive  HBV CAb  nonreactive  ANDRÉS:   F-actin  AMA  Iron panel:   Ferritin   Iron Sats:   TTG   Alpha-1-antripsin  TSH 1.8  Cholesterol Total   HDL  LDL  Triglycerides -  Hemoglobin A1c 5.6    Medical hx Surgical hx   No past medical history on file. Past Surgical History:   Procedure Laterality Date     COLONOSCOPY N/A 11/24/2015    Procedure: COLONOSCOPY;  Surgeon: Clyde Mosher MD;  Location:  GI                 Medications:     Current Outpatient Medications   Medication     omeprazole (PRILOSEC) 20 MG DR capsule     omeprazole (PRILOSEC) 20 MG DR capsule     No current facility-administered medications for this visit.             Allergies:   No Known Allergies         Social History:     Social History     Socioeconomic History     Marital status:      Spouse name: Not on file     Number of children: Not on file     Years of education: Not on file     Highest education level: Not on file   Occupational History     Not on file   Social Needs     Financial resource strain: Not on file     Food insecurity     Worry: Not on file     Inability: Not on file     Transportation needs     Medical: Not on file     Non-medical: Not on file   Tobacco Use     Smoking status: Never Smoker     Smokeless tobacco: Never Used   Substance and Sexual Activity     Alcohol use: No     Drug use: No     Sexual activity: Yes     Partners: Female   Lifestyle     Physical activity     Days per week: Not on file     Minutes per session: Not on file     Stress: Not on file   Relationships     Social connections     Talks on phone: Not on file     Gets together: Not on file     Attends [employer] service: Not on file     Active member of club or organization: Not on file     Attends meetings of clubs or organizations: Not on file     Relationship status: Not on file     Intimate partner violence     Fear of current or ex partner: Not on file     Emotionally abused: Not on file     Physically abused: Not on file     Forced sexual activity: Not  on file   Other Topics Concern     Parent/sibling w/ CABG, MI or angioplasty before 65F 55M? No   Social History Narrative     Not on file            Family History:     Family History   Problem Relation Age of Onset     Asthma No family hx of      Diabetes No family hx of      Hypertension No family hx of      Cerebrovascular Disease No family hx of      Cancer No family hx of               Review of Systems:   Gen: See HPI     HEENT: No change in vision or hearing, mouth sores, dysphagia, lymph nodes  Resp: No shortness of breath, coughing, hx of asthma  CV: No chest pain, palpitations, syncope   GI: See HPI  : No dysuria, history of stones, urine color    Skin: No rash; no pruritus or psoriasis  MS: No arthralgias, myalgias, joint swelling  Neuro: No memory changes, confusion, numbness    Heme: No difficulty clotting, bruising, bleeding  Psych:  No anxiety, depression, agitation          Physical Exam:     PSYCH: Alert and oriented times 3; coherent speech, normal   rate and volume, able to articulate logical thoughts, able   to abstract reason, no tangential thoughts, no hallucinations   or delusions  His affect is normal  RESP: No cough, no audible wheezing, able to talk in full sentences  Remainder of exam unable to be completed due to telephone visits           Data:   Reviewed in person and significant for:    Lab Results   Component Value Date     10/28/2020      Lab Results   Component Value Date    POTASSIUM 4.3 10/28/2020     Lab Results   Component Value Date    CHLORIDE 104 10/28/2020     Lab Results   Component Value Date    CO2 28 10/28/2020     Lab Results   Component Value Date    BUN 19 10/28/2020     Lab Results   Component Value Date    CR 0.84 10/28/2020       Lab Results   Component Value Date    WBC 8.9 10/23/2020     Lab Results   Component Value Date    HGB 11.9 10/23/2020     Lab Results   Component Value Date    HCT 37.5 10/23/2020     Lab Results   Component Value Date    MCV 92  10/23/2020     Lab Results   Component Value Date     10/23/2020       Lab Results   Component Value Date    AST 26 10/23/2020     Lab Results   Component Value Date    ALT 48 10/23/2020     No results found for: BILICONJ   Lab Results   Component Value Date    BILITOTAL 0.2 10/23/2020       Lab Results   Component Value Date    ALBUMIN 3.5 10/23/2020     Lab Results   Component Value Date    PROTTOTAL 7.5 10/23/2020      Lab Results   Component Value Date    ALKPHOS 87 10/23/2020       Lab Results   Component Value Date    INR 1.04 10/11/2015         Imaging:      No recent abdominal imaging      Again, thank you for allowing me to participate in the care of your patient.        Sincerely,        Gama Renteria PA-C

## 2020-10-29 NOTE — PROGRESS NOTES
"Essie Guzman is a 65 year old male who is being evaluated via a billable telephone visit.      The patient has been notified of following:     \"This telephone visit will be conducted via a call between you and your physician/provider. We have found that certain health care needs can be provided without the need for a physical exam.  This service lets us provide the care you need with a short phone conversation.  If a prescription is necessary we can send it directly to your pharmacy.  If lab work is needed we can place an order for that and you can then stop by our lab to have the test done at a later time.    Telephone visits are billed at different rates depending on your insurance coverage. During this emergency period, for some insurers they may be billed the same as an in-person visit.  Please reach out to your insurance provider with any questions.    If during the course of the call the physician/provider feels a telephone visit is not appropriate, you will not be charged for this service.\"    Patient has given verbal consent for Telephone visit?  Yes    What phone number would you like to be contacted at? Please call  services first at [phone] please use (other) number    How would you like to obtain your AVS? Mail a copy    Phone call duration: 36 minutes    Gama Renteria PA-C    Hepatology Clinic note  Essie Guzman   Date of Birth 1955  Date of Service 10/29/2020    REASON FOR CONSULTATION: Elevated LFT's  REFERRING PROVIDER: Serge Kimble MD          Assessment/plan:   Essie Guzman is a 65 year old male with history of mildly elevated LFT's which have trended down now (ALT 48 and AST 26). He was treated for H pylori in September and continues to have persistent epigastric discomfort despite increasing prilosec. Given anemia and persistent symptoms, recommend upper endoscopy.     # Upper epigastric discomfort  - EGD  - US abdomen in the near future   - ANDRÉS, F-actin, iron " panel  # Follow-up in clinic in six months or sooner as needed    Gama Renteria PA-C   [facility] Hepatology     -----------------------------------------------------       HPI:   Essie Guzman is a 65 year old male  presenting for the evaluation of elevated LFT's. Patient was called with a [employer] . His daughter also provided some history.     LFT's were first noticed to be abnormal in late September 2020, recent labs improved. No previous labs available for review.  Abdominal pain first started about 2 months ago, located on mlld burning in the epigastric area. He has been told his hemoglobin is low. He was prescibed omeprozole which he is still taking now at 6 am and 6 pm.  He was treated for H Pylori based on a stool test. Treated with standard antibiotics. States his symptoms improved for awhile but have now worsening again. He is having some weight loss 3 lbs. Appetite is improved now after increasing omeprazole to twice daily, but still not back to baseline.     Currently stools are dark green or brown. He typically has BM's once daily.  Patient also denies melena, hematochezia or hematemesis.      Patient denies jaundice, lower extremity edema, abdominal distension or confusion. Patient denies  fevers, sweats or chills.    PMH: H pylori,     SMH: No previous surgery     Medications:   See below     No previous tobacco use. No history of alcohol use. No previous IV/IN drug use.    Stays patient currently lives with wife and children. No known family history of liver disease or liver cancer.     Previous work-up:  HCV antibody nonreactive  HAV Ab IgG - reactive  HAV Ab IgM   HBV SAb 193 - immune  HBV SAg nonreactive  HBV CAb nonreactive  ANDRÉS:   F-actin  AMA  Iron panel:   Ferritin   Iron Sats:   TTG   Alpha-1-antripsin  TSH 1.8  Cholesterol Total   HDL  LDL  Triglycerides -  Hemoglobin A1c 5.6    Medical hx Surgical hx   No past medical history on file. Past Surgical History:   Procedure  Laterality Date     COLONOSCOPY N/A 11/24/2015    Procedure: COLONOSCOPY;  Surgeon: Clyde Mosher MD;  Location:  GI                 Medications:     Current Outpatient Medications   Medication     omeprazole (PRILOSEC) 20 MG DR capsule     omeprazole (PRILOSEC) 20 MG DR capsule     No current facility-administered medications for this visit.             Allergies:   No Known Allergies         Social History:     Social History     Socioeconomic History     Marital status:      Spouse name: Not on file     Number of children: Not on file     Years of education: Not on file     Highest education level: Not on file   Occupational History     Not on file   Social Needs     Financial resource strain: Not on file     Food insecurity     Worry: Not on file     Inability: Not on file     Transportation needs     Medical: Not on file     Non-medical: Not on file   Tobacco Use     Smoking status: Never Smoker     Smokeless tobacco: Never Used   Substance and Sexual Activity     Alcohol use: No     Drug use: No     Sexual activity: Yes     Partners: Female   Lifestyle     Physical activity     Days per week: Not on file     Minutes per session: Not on file     Stress: Not on file   Relationships     Social connections     Talks on phone: Not on file     Gets together: Not on file     Attends [employer]ism service: Not on file     Active member of club or organization: Not on file     Attends meetings of clubs or organizations: Not on file     Relationship status: Not on file     Intimate partner violence     Fear of current or ex partner: Not on file     Emotionally abused: Not on file     Physically abused: Not on file     Forced sexual activity: Not on file   Other Topics Concern     Parent/sibling w/ CABG, MI or angioplasty before 65F 55M? No   Social History Narrative     Not on file            Family History:     Family History   Problem Relation Age of Onset     Asthma No family hx of      Diabetes No family  hx of      Hypertension No family hx of      Cerebrovascular Disease No family hx of      Cancer No family hx of               Review of Systems:   Gen: See HPI     HEENT: No change in vision or hearing, mouth sores, dysphagia, lymph nodes  Resp: No shortness of breath, coughing, hx of asthma  CV: No chest pain, palpitations, syncope   GI: See HPI  : No dysuria, history of stones, urine color    Skin: No rash; no pruritus or psoriasis  MS: No arthralgias, myalgias, joint swelling  Neuro: No memory changes, confusion, numbness    Heme: No difficulty clotting, bruising, bleeding  Psych:  No anxiety, depression, agitation          Physical Exam:     PSYCH: Alert and oriented times 3; coherent speech, normal   rate and volume, able to articulate logical thoughts, able   to abstract reason, no tangential thoughts, no hallucinations   or delusions  His affect is normal  RESP: No cough, no audible wheezing, able to talk in full sentences  Remainder of exam unable to be completed due to telephone visits           Data:   Reviewed in person and significant for:    Lab Results   Component Value Date     10/28/2020      Lab Results   Component Value Date    POTASSIUM 4.3 10/28/2020     Lab Results   Component Value Date    CHLORIDE 104 10/28/2020     Lab Results   Component Value Date    CO2 28 10/28/2020     Lab Results   Component Value Date    BUN 19 10/28/2020     Lab Results   Component Value Date    CR 0.84 10/28/2020       Lab Results   Component Value Date    WBC 8.9 10/23/2020     Lab Results   Component Value Date    HGB 11.9 10/23/2020     Lab Results   Component Value Date    HCT 37.5 10/23/2020     Lab Results   Component Value Date    MCV 92 10/23/2020     Lab Results   Component Value Date     10/23/2020       Lab Results   Component Value Date    AST 26 10/23/2020     Lab Results   Component Value Date    ALT 48 10/23/2020     No results found for: BILICONJ   Lab Results   Component Value Date     BILITOTAL 0.2 10/23/2020       Lab Results   Component Value Date    ALBUMIN 3.5 10/23/2020     Lab Results   Component Value Date    PROTTOTAL 7.5 10/23/2020      Lab Results   Component Value Date    ALKPHOS 87 10/23/2020       Lab Results   Component Value Date    INR 1.04 10/11/2015         Imaging:      No recent abdominal imaging

## 2020-10-29 NOTE — PATIENT INSTRUCTIONS
Lifestyle changes for acid reflux:     1) Prop actual bed up, not just with pillows.  Propping up with just pillows can actually make things worse if it is causing you to bend at the waist while sleeping  2) Avoid alcohol, as this causes relaxation of the sphincter and makes it easier for food to travel up esophagus.  If you do drink alcohol, do not drink before bed or before meals.  3)  Eat small meals. Avoid overeating.  4) Avoid triggers such as fatty foods, processed foods, and high fructose corn syrup (or food that contain these)  5) Weight loss

## 2020-10-30 DIAGNOSIS — Z11.59 ENCOUNTER FOR SCREENING FOR OTHER VIRAL DISEASES: Primary | ICD-10-CM

## 2020-11-03 ENCOUNTER — TELEPHONE (OUTPATIENT)
Dept: GASTROENTEROLOGY | Facility: CLINIC | Age: 65
End: 2020-11-03

## 2020-11-08 DIAGNOSIS — Z11.59 ENCOUNTER FOR SCREENING FOR OTHER VIRAL DISEASES: ICD-10-CM

## 2020-11-08 LAB
SARS-COV-2 RNA SPEC QL NAA+PROBE: NORMAL
SPECIMEN SOURCE: NORMAL

## 2020-11-08 PROCEDURE — U0003 INFECTIOUS AGENT DETECTION BY NUCLEIC ACID (DNA OR RNA); SEVERE ACUTE RESPIRATORY SYNDROME CORONAVIRUS 2 (SARS-COV-2) (CORONAVIRUS DISEASE [COVID-19]), AMPLIFIED PROBE TECHNIQUE, MAKING USE OF HIGH THROUGHPUT TECHNOLOGIES AS DESCRIBED BY CMS-2020-01-R: HCPCS | Performed by: INTERNAL MEDICINE

## 2020-11-09 LAB
LABORATORY COMMENT REPORT: ABNORMAL
SARS-COV-2 RNA SPEC QL NAA+PROBE: POSITIVE
SPECIMEN SOURCE: ABNORMAL

## 2020-11-11 ENCOUNTER — TELEPHONE (OUTPATIENT)
Dept: EMERGENCY MEDICINE | Facility: CLINIC | Age: 65
End: 2020-11-11

## 2020-11-11 NOTE — TELEPHONE ENCOUNTER
"Coronavirus (COVID-19) Notification    Caller Name (Patient, parent, daughter/son, grandparent, etc)  Patient Essie Guzman and daughter with      Reason for call  Notify of Positive Coronavirus (COVID-19) lab results, assess symptoms,  review  [employer] recommendations    Lab Result    Lab test:  2019-nCoV rRt-PCR or SARS-CoV-2 PCR    Oropharyngeal AND/OR nasopharyngeal swabs is POSITIVE for 2019-nCoV RNA/SARS-COV-2 PCR (COVID-19 virus)    RN Recommendations/Instructions per [facility] Coronavirus COVID-19 recommendations    Brief introduction script  Introduce self then review script:  \"I am calling on behalf of [employer].  We were notified that your Coronavirus test (COVID-19) for was POSITIVE for the virus.  I have some information to relay to you but first I wanted to mention that the [place] Dept of Health will be contacting you shortly [it's possible MD already called Patient] to talk to you more about how you are feeling and other people you have had contact with who might now also have the virus.  Also,  [employer] is Partnering with the [facility] for Covid-19 research, you may be contacted directly by research staff.\"    Assessment (Inquire about Patient's current symptoms)   Assessment   Current Symptoms at time of phone call: (if no symptoms, document No symptoms] No Covid Symptoms went in the clinic due to stomach pain and dark stools. I was then tested for Covid and found out I was positive.   Symptoms onset (if applicable) Stomach pain started in August.     If at time of call, Patients symptoms hare worsened, the Patient should contact 911 or have someone drive them to Emergency Dept promptly:      If Patient calling 911, inform 911 personal that you have tested positive for the Coronavirus (COVID-19).  Place mask on and await 911 to arrive.    If Emergency Dept, If possible, please have another adult drive you to the Emergency Dept but you need to wear mask when " in contact with other people.      Review information with Patient    Your result was positive. This means you have COVID-19 (coronavirus).  We have sent you a letter that reviews the information that I'll be reviewing with you now.    How can I protect others?    If you have symptoms: stay home and away from others (self-isolate) until:    You've had no fever--and no medicine that reduces fever--for 1 full day (24 hours). And       Your other symptoms have gotten better. For example, your cough or breathing has improved. And     At least 10 days have passed since your symptoms started. (If you've been told by a doctor that you have a weak immune system, wait 20 days.)     If you don't have symptoms: Stay home and away from others (self-isolate) until at least 10 days have passed since your first positive COVID-19 test. (Date test collected)    During this time:    Stay in your own room, including for meals. Use your own bathroom if you can.    Stay away from others in your home. No hugging, kissing or shaking hands. No visitors.     Don't go to work, school or anywhere else.     Clean  high touch  surfaces often (doorknobs, counters, handles, etc.). Use a household cleaning spray or wipes. You'll find a full list on the EPA website at www.epa.gov/pesticide-registration/list-n-disinfectants-use-against-sars-cov-2.     Cover your mouth and nose with a mask, tissue or other face covering to avoid spreading germs.    Wash your hands and face often with soap and water.    Caregivers in these groups are at risk for severe illness due to COVID-19:  o People 65 years and older  o People who live in a nursing home or long-term care facility  o People with chronic disease (lung, heart, cancer, diabetes, kidney, liver, immunologic)  o People who have a weakened immune system, including those who:  - Are in cancer treatment  - Take medicine that weakens the immune system, such as corticosteroids  - Had a bone marrow or organ  transplant  - Have an immune deficiency  - Have poorly controlled HIV or AIDS  - Are obese (body mass index of 40 or higher)  - Smoke regularly    Caregivers should wear gloves while washing dishes, handling laundry and cleaning bedrooms and bathrooms.    Wash and dry laundry with special caution. Don't shake dirty laundry, and use the warmest water setting you can.    If you have a weakened immune system, ask your doctor about other actions you should take.    For more tips, go to www.cdc.gov/coronavirus/2019-ncov/downloads/10Things.pdf.    You should not go back to work until you meet the guidelines above for ending your home isolation. You don't need to be retested for COVID-19 before going back to work--studies show that you won't spread the virus if it's been at least 10 days since your symptoms started (or 20 days, if you have a weak immune system).    Employers: This document serves as formal notice of your employee's medical guidelines for going back to work. They must meet the above guidelines before going back to work in person.    How can I take care of myself?    1. Get lots of rest. Drink extra fluids (unless a doctor has told you not to).    2. Take Tylenol (acetaminophen) for fever or pain. If you have liver or kidney problems, ask your family doctor if it's okay to take Tylenol.     Take either:     650 mg (two 325 mg pills) every 4 to 6 hours, or     1,000 mg (two 500 mg pills) every 8 hours as needed.     Note: Don't take more than 3,000 mg in one day. Acetaminophen is found in many medicines (both prescribed and over-the-counter medicines). Read all labels to be sure you don't take too much.    For children, check the Tylenol bottle for the right dose (based on their age or weight).    3. If you have other health problems (like cancer, heart failure, an organ transplant or severe kidney disease): Call your specialty clinic if you don't feel better in the next 2 days.    4. Know when to call 911:  Emergency warning signs include:    Trouble breathing or shortness of breath    Pain or pressure in the chest that doesn't go away    Feeling confused like you haven't felt before, or not being able to wake up    Bluish-colored lips or face    5. Sign up for [employer]. We know it's scary to hear that you have COVID-19. We want to track your symptoms to make sure you're okay over the next 2 weeks. Please look for an email from [employer]--this is a free, online program that we'll use to keep in touch. To sign up, follow the link in the email. Learn more at [email or web address].    Where can I get more information?    [facility]: [email or web address]    Coronavirus Basics: www.health.[facility].[place]/diseases/coronavirus/basics.html    What to Do If You're Sick: www.cdc.gov/coronavirus/2019-ncov/about/steps-when-sick.html    Ending Home Isolation: www.cdc.gov/coronavirus/2019-ncov/hcp/disposition-in-home-patients.html     Caring for Someone with COVID-19: www.cdc.gov/coronavirus/2019-ncov/if-you-are-sick/care-for-someone.html     [facility] clinical trials (COVID-19 research studies): clinicalaffairs.[facility]/umn-clinical-trials     A Positive COVID-19 letter will be sent via Sp[employer] or the mail. (Exception, no letters sent to Presurgerical/Preprocedure Patients)    [Name]  Jacinta Singh LPN

## 2020-11-24 ENCOUNTER — HOSPITAL ENCOUNTER (OUTPATIENT)
Dept: CT IMAGING | Facility: CLINIC | Age: 65
Discharge: HOME | End: 2020-11-24
Attending: INTERNAL MEDICINE | Admitting: INTERNAL MEDICINE
Payer: MEDICARE

## 2020-11-24 ENCOUNTER — HOSPITAL ENCOUNTER (OUTPATIENT)
Facility: CLINIC | Age: 65
Discharge: HOME | End: 2020-11-24
Attending: INTERNAL MEDICINE | Admitting: INTERNAL MEDICINE
Payer: MEDICARE

## 2020-11-24 VITALS
OXYGEN SATURATION: 97 % | DIASTOLIC BLOOD PRESSURE: 66 MMHG | SYSTOLIC BLOOD PRESSURE: 105 MMHG | HEART RATE: 71 BPM | RESPIRATION RATE: 16 BRPM

## 2020-11-24 DIAGNOSIS — C16.9 GASTRIC CANCER (H): ICD-10-CM

## 2020-11-24 LAB — UPPER GI ENDOSCOPY: NORMAL

## 2020-11-24 PROCEDURE — 88377 M/PHMTRC ALYS ISHQUANT/SEMIQ: CPT | Mod: TC

## 2020-11-24 PROCEDURE — 88305 TISSUE EXAM BY PATHOLOGIST: CPT | Mod: TC | Performed by: INTERNAL MEDICINE

## 2020-11-24 PROCEDURE — G0500 MOD SEDAT ENDO SERVICE >5YRS: HCPCS | Performed by: INTERNAL MEDICINE

## 2020-11-24 PROCEDURE — 250N000011 HC RX IP 250 OP 636: Performed by: RADIOLOGY

## 2020-11-24 PROCEDURE — 88377 M/PHMTRC ALYS ISHQUANT/SEMIQ: CPT | Mod: 26 | Performed by: PATHOLOGY

## 2020-11-24 PROCEDURE — 250N000009 HC RX 250: Performed by: RADIOLOGY

## 2020-11-24 PROCEDURE — 88305 TISSUE EXAM BY PATHOLOGIST: CPT | Mod: 26

## 2020-11-24 PROCEDURE — 999N001019 HC STATISTIC H-FISH PROCESS B/S: Performed by: INTERNAL MEDICINE

## 2020-11-24 PROCEDURE — 250N000009 HC RX 250: Performed by: INTERNAL MEDICINE

## 2020-11-24 PROCEDURE — 250N000011 HC RX IP 250 OP 636: Performed by: INTERNAL MEDICINE

## 2020-11-24 PROCEDURE — 43239 EGD BIOPSY SINGLE/MULTIPLE: CPT | Performed by: INTERNAL MEDICINE

## 2020-11-24 PROCEDURE — 999N001020 HC STATISTIC H-SEND OUTS PREP: Performed by: INTERNAL MEDICINE

## 2020-11-24 PROCEDURE — 71260 CT THORAX DX C+: CPT

## 2020-11-24 RX ORDER — FLUMAZENIL 0.1 MG/ML
0.2 INJECTION, SOLUTION INTRAVENOUS
Status: DISCONTINUED | OUTPATIENT
Start: 2020-11-24 | End: 2020-11-24 | Stop reason: HOSPADM

## 2020-11-24 RX ORDER — LIDOCAINE 40 MG/G
CREAM TOPICAL
Status: DISCONTINUED | OUTPATIENT
Start: 2020-11-24 | End: 2020-11-24 | Stop reason: HOSPADM

## 2020-11-24 RX ORDER — ONDANSETRON 2 MG/ML
4 INJECTION INTRAMUSCULAR; INTRAVENOUS
Status: DISCONTINUED | OUTPATIENT
Start: 2020-11-24 | End: 2020-11-24 | Stop reason: HOSPADM

## 2020-11-24 RX ORDER — FENTANYL CITRATE 50 UG/ML
INJECTION, SOLUTION INTRAMUSCULAR; INTRAVENOUS PRN
Status: DISCONTINUED | OUTPATIENT
Start: 2020-11-24 | End: 2020-11-24 | Stop reason: HOSPADM

## 2020-11-24 RX ORDER — NALOXONE HYDROCHLORIDE 0.4 MG/ML
0.4 INJECTION, SOLUTION INTRAMUSCULAR; INTRAVENOUS; SUBCUTANEOUS
Status: DISCONTINUED | OUTPATIENT
Start: 2020-11-24 | End: 2020-11-24 | Stop reason: HOSPADM

## 2020-11-24 RX ORDER — ONDANSETRON 4 MG/1
4 TABLET, ORALLY DISINTEGRATING ORAL EVERY 6 HOURS PRN
Status: DISCONTINUED | OUTPATIENT
Start: 2020-11-24 | End: 2020-11-24 | Stop reason: HOSPADM

## 2020-11-24 RX ORDER — NALOXONE HYDROCHLORIDE 0.4 MG/ML
0.2 INJECTION, SOLUTION INTRAMUSCULAR; INTRAVENOUS; SUBCUTANEOUS
Status: DISCONTINUED | OUTPATIENT
Start: 2020-11-24 | End: 2020-11-24 | Stop reason: HOSPADM

## 2020-11-24 RX ORDER — ONDANSETRON 2 MG/ML
4 INJECTION INTRAMUSCULAR; INTRAVENOUS EVERY 6 HOURS PRN
Status: DISCONTINUED | OUTPATIENT
Start: 2020-11-24 | End: 2020-11-24 | Stop reason: HOSPADM

## 2020-11-24 RX ORDER — PROCHLORPERAZINE MALEATE 5 MG
5 TABLET ORAL EVERY 6 HOURS PRN
Status: DISCONTINUED | OUTPATIENT
Start: 2020-11-24 | End: 2020-11-24 | Stop reason: HOSPADM

## 2020-11-24 RX ORDER — IOPAMIDOL 755 MG/ML
500 INJECTION, SOLUTION INTRAVASCULAR ONCE
Status: COMPLETED | OUTPATIENT
Start: 2020-11-24 | End: 2020-11-24

## 2020-11-24 RX ADMIN — IOPAMIDOL 83 ML: 755 INJECTION, SOLUTION INTRAVENOUS at 13:15

## 2020-11-24 RX ADMIN — SODIUM CHLORIDE 61 ML: 9 INJECTION, SOLUTION INTRAVENOUS at 13:15

## 2020-11-24 NOTE — LETTER
November 2, 2020      Essie Madelaine Thomas  [address]  [place] [address]        Dear Essie,   Thank you for choosing [facility]. You are scheduled for the following service(s).   Please be aware that coverage of these services is subject to the terms and limitations of your health insurance plan.  Call member services at your health plan with any benefit or coverage questions.    Date:  11/10/20      Procedure: UPPER ENDOSCOPY-EGD  Doctor:Dr Mosher       Arrival Time: 1:30 pm  *Enter and check in at the Main Hospital Entrance  Procedure Time:2 pm      Location:   [facility]        Endoscopy Department, First Floor *         [address] [place], [place] [address]      [phone] or [phone] (Markel) to reschedule          PRE-PROCEDURE CHECKLIST    If you have diabetes, ask your regular doctor for diet and medication restrictions.  If you take any antiplatelet or anticoagulant medications (such as Coumadin, Lovenox, Plavix, etc.) and have not already discussed this, please call your primary physician for advice on holding this medication.  If you take Aspirin, you may continue to do so.  If you are or may be pregnant, please discuss the risks and benefits of this procedure with your doctor.  You must arrange for a ride for the day of your exam. If you fail to arrange transportation with a responsible adult, your procedure will need to be cancelled and rescheduled. Taxi, bus and medical transport are not acceptable unless you have a responsible adult that you know & trust with you. Please arrange for this  to be able to pick you up in our department, approximately one hour after your scheduled procedure, if they are not able to stay with you.      Canceling or rescheduling   If you must cancel or reschedule your appointment, please call [phone] as soon as possible.      Upper Endoscopy or Esophagogastroduodenoscopy (EGD) is a test performed  to evaluate symptoms of persistent abdominal pain, nausea, vomiting and difficulty swallowing. It may also be used to treat various conditions of the upper gastrointestinal tract, such as bleeding, narrowing or abnormal growths.     What happens during an upper endoscopy?  On the day of your procedure, plan to spend up to one and a half hour after your arrival at the endoscopy center. The exam itself takes about 5 to 10 minutes.    Before the exam:  - You will change into a gown.   - Your medical history and medication list will be reviewed with you, unless it has already been done over the phone.   - A nurse will insert an intravenous (IV) line into your hand or arm.  - The doctor will talk to you and give you a consent form to sign.    During the exam:  - Medicine will be given through the IV line to help you relax and feel comfortable.   - Your heart rate and oxygen levels will be monitored. If your blood pressure is low, you may be given fluids through the IV line.   - The doctor will insert a flexible, hollow tube, called an endoscope, into your mouth and will advance it slowly through the esophagus, stomach and duodenum (the first part of your small intestine).   - You may have a feeling of pressure or fullness.   - If you have difficulty swallowing, and the doctor finds a narrowing in your esophagus, it may be possible for the area to be expanded-dilated during the exam.   - If abnormal tissue is found, the doctor may remove it through the endoscope (biopsy it) for closer examination. The tissue removal is painless.    After the exam:  - Any tissue samples removed during the exam will be sent to a lab for evaluation. It may take 5 to 7 working days for you to be notified of the results  - The doctor will prepare a full report for the physician who referred you for the upper endoscopy.   - The doctor will talk with you about the initial results of your exam.   - You may feel bloated after the procedure. That  is normal and should not last long.   - Your throat may feel sore for a short time.   - Following the exam, you may resume your normal diet. Avoid alcohol until the next day.   - You may resume your regular activities the day after the procedure.   - Medication given during the exam will prohibit you from driving for the rest of the day.  - A nurse will provide you with complete discharge instructions before you leave the endoscopy center. Be sure to ask the nurse for specific instructions if you take blood thinners such as Aspirin , Coumadin , Lovenox , Plavix , etc.       PREPARATION    To ensure a successful exam, please follow all instructions carefully.      The night before your exam:    STOP eating solid foods at 11:45 pm.     Clear liquids are okay to drink (examples: Gatorade , apple juice, clear broth,coffee or tea without milk or cream, etc.).     DO NOT drink red liquids or alcoholic beverages.    The day of your exam:    STOP drinking clear liquids 4 hours before your exam.     You may take your usual medications with 4 oz. of water, but it needs to be at least 4 hours prior to your procedure.    When you leave for the procedure:    Bring a list of all of your current medications, including any allergy or over-the-counter medications, unless you have already reviewed that with an Endoscopy RN over the phone.     Bring a photo ID as well as up-to-date insurance information, such as your insurance card and any referral forms that might be required by your payer.       DIRECTIONS TO THE ENDOSCOPY DEPARTMENT    From the north ([facility])  Take [address] South, exit on [address]. Get into the left hand malik, turn left (east), go one-half mile to [address] and turn left. Go north to the second stoplight, take a right on [address] and follow it to the Main Hospital entrance.  From the south ([facility])  Take 35N to the 35E split and exit on [address]. On  [address] , turn left (west) to [address]. Turn right (north) on [address]. Go north to the second stoplight, take a right on [address] and follow it to the Main Hospital entrance.  From the east via 35E ([facility])  Take [address] south to [address] [address] exit. Turn right on [address] [address]. Go west to [address]. Turn right (north) on [address]. Go to the second stoplight, take a right on [address] to the Main Hospital entrance.  From the east via [address] ([facility])  Take [address] to [address]. Turn left (south) on [address] to [address], turn left (east) on [address] and follow it to the Main Hospital entrance.    From the west via [address] ([place], [place])  Take [address] east to [address]. Turn right (south) on [address] to [address], turn left (east) on [address] and follow it to the Main Hospital entrance.

## 2020-11-24 NOTE — H&P
Pre-Endoscopy History and Physical     Essie Guzman MRN# [id number]   YOB: 1955 Age: 65 year old     Date of Procedure: 11/24/2020  Primary care provider: Serge Kimble  Type of Endoscopy: Gastroscopy with possible biopsy, possible dilation  Reason for Procedure: pain  Type of Anesthesia Anticipated: Conscious Sedation    HPI:    Essie is a 65 year old male who will be undergoing the above procedure.      A history and physical has been performed. The patient's medications and allergies have been reviewed. The risks and benefits of the procedure and the sedation options and risks were discussed with the patient.  All questions were answered and informed consent was obtained.      He denies a personal or family history of anesthesia complications or bleeding disorders.     Patient Active Problem List   Diagnosis   (none) - all problems resolved or deleted        History reviewed. No pertinent past medical history.     Past Surgical History:   Procedure Laterality Date     COLONOSCOPY N/A 11/24/2015    Procedure: COLONOSCOPY;  Surgeon: Clyde Mosher MD;  Location:  GI       Social History     Tobacco Use     Smoking status: Never Smoker     Smokeless tobacco: Never Used   Substance Use Topics     Alcohol use: No       Family History   Problem Relation Age of Onset     Asthma No family hx of      Diabetes No family hx of      Hypertension No family hx of      Cerebrovascular Disease No family hx of      Cancer No family hx of      Colon Cancer No family hx of        Prior to Admission medications    Medication Sig Start Date End Date Taking? Authorizing Provider   omeprazole (PRILOSEC) 20 MG DR capsule Take 1 capsule (20 mg) by mouth daily 9/8/20  Yes Gualberto Montes DO   omeprazole (PRILOSEC) 20 MG DR capsule Take 1 capsule (20 mg) by mouth daily 9/15/20   Gualberto Montes, DO       No Known Allergies     REVIEW OF SYSTEMS:   5 point ROS negative except as noted above in HPI, including  Gen., Resp., CV, GI &  system review.    PHYSICAL EXAM:   There were no vitals taken for this visit. Estimated body mass index is 33.38 kg/m  as calculated from the following:    Height as of 10/23/20: 1.524 m (5').    Weight as of 10/23/20: 77.5 kg (170 lb 14.4 oz).   GENERAL APPEARANCE: alert, and oriented  MENTAL STATUS: alert  AIRWAY EXAM: Mallampatti Class I (visualization of the soft palate, fauces, uvula, anterior and posterior pillars)  RESP: lungs clear to auscultation - no rales, rhonchi or wheezes  CV: regular rates and rhythm  DIAGNOSTICS:    Not indicated    IMPRESSION   ASA Class 2 - Mild systemic disease    PLAN:   Plan for Gastroscopy with possible biopsy, possible dilation. We discussed the risks, benefits and alternatives and the patient wished to proceed.    The above has been forwarded to the consulting provider.      Signed Electronically by: Clyde Mosher MD  November 24, 2020

## 2020-11-24 NOTE — OR NURSING
Discharge instructions reviewed with patient via   [id number] and Dr. Mosher.  Dr. Mosher spoke with daughter via telephone updated on plan of care.

## 2020-11-25 ENCOUNTER — TRANSCRIBE ORDERS (OUTPATIENT)
Dept: GASTROENTEROLOGY | Facility: CLINIC | Age: 65
End: 2020-11-25

## 2020-11-25 DIAGNOSIS — C16.9 GASTRIC CANCER (H): Primary | ICD-10-CM

## 2020-11-25 DIAGNOSIS — C16.9 GASTRIC CARCINOMA (H): Primary | ICD-10-CM

## 2020-11-25 RX ORDER — ONDANSETRON 4 MG/1
4 TABLET, ORALLY DISINTEGRATING ORAL EVERY 8 HOURS PRN
Qty: 10 TABLET | Refills: 1 | Status: SHIPPED | OUTPATIENT
Start: 2020-11-25 | End: 2020-01-01

## 2020-11-25 NOTE — PROGRESS NOTES
I spoke with daughter if patient develops recent test results which revealed poorly differentiated adenocarcinoma from gastroenterologist biopsy during upper endoscopy.    With a referral to oncology has already been made.  The only new order was for ondansetron to be taken as needed for relief of nausea.  Patient and daughter will reach out to me if they need anything else.  Gualberto Montes, DO on 11/25/2020 at 4:28 PM

## 2020-11-27 LAB — COPATH REPORT: NORMAL

## 2020-11-30 NOTE — TELEPHONE ENCOUNTER
RECORDS STATUS - ALL OTHER DIAGNOSIS      RECORDS RECEIVED FROM: [facility] - Internal Referral   DATE RECEIVED: 11/30/20   NOTES STATUS DETAILS   OFFICE NOTE from referring provider Epic Clyde Mosher MD in  ENDOSCOPY    Also seen:  Gastro - Myra: 10/29/20  FP - Jyoti: 10/23/20  Neurosurgery - Chaz: 9/21/20   OFFICE NOTE from medical oncologist     DISCHARGE SUMMARY from hospital [facility] 11/24/20   DISCHARGE REPORT from the ER     OPERATIVE REPORT Epic 11/24/20: EGD   MEDICATION LIST     CLINICAL TRIAL TREATMENTS TO DATE     LABS     PATHOLOGY REPORTS [facility] 11/24/20: HER 2 RAYMOND FISH  11/24/20: Surg Path   ANYTHING RELATED TO DIAGNOSIS Epic 10/28/20   GENONOMIC TESTING     TYPE:     IMAGING (NEED IMAGES & REPORT)     CT SCANS PACS 11/24/20: [facility]   MRI     MAMMO     ULTRASOUND     PET

## 2020-12-01 NOTE — PROGRESS NOTES
"Essie Guzman is a 65 year old male who is being evaluated via a billable video visit.      The patient has been notified of following:     \"This video visit will be conducted via a call between you and your physician/provider. We have found that certain health care needs can be provided without the need for an in-person physical exam.  This service lets us provide the care you need with a video conversation.  If a prescription is necessary we can send it directly to your pharmacy.  If lab work is needed we can place an order for that and you can then stop by our lab to have the test done at a later time.    Video visits are billed at different rates depending on your insurance coverage.  Please reach out to your insurance provider with any questions.    If during the course of the call the physician/provider feels a video visit is not appropriate, you will not be charged for this service.\"    Patient has given verbal consent for Video visit? Yes  How would you like to obtain your AVS? Mail a copy  If you are dropped from the video visit, the video invite should be resent to: Text to cell phone: [phone]  Will anyone else be joining your video visit? No      Zuly EWING    Type of service:  Video Visit    Video Start Time: 8:32 AM  Video End Time: 9:32 AM    Originating Location (pt. Location): Home    Distant Location (provider location):  [facility]     Platform used for Video Visit: Raul Mccarthy MD      MEDICAL ONCOLOGY INITIAL CONSULT NOTE    PATIENT NAME: Essie Guzman  ENCOUNTER DATE: 12/2/2020    Care Team  Primary Oncologist: Brennan Mccarthy MD    REASON FOR CURRENT VISIT: New diagnosis of gastric cancer    HISTORY OF PRESENT ILLNESS:  Mr. 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 Initially Rx for H.pylori with Abx and PPI. LFT's were mildly elevated.  11/24/20- UGI endoscopy at [facility] with normal esophagus. Non-bleeding gastric ulcer in pylorus with no stigmata of bleeding.  Non-bleeding duodenal ulcer with no stigmata of bleeding. Biopsy of Stomach, pylorus, - Poorly-differentiated carcinoma; consistent with adenocarcinoma (poorly cohesive type). HER2 by FISH was non-amplified  11/24/20- CT CAP- bilateral hilar lymph nodes are indeterminate (1.4 x 1.0 cm) right infrahilar lymph node. Multiple enlarged retroperitoneal and perigastric lymph nodes (10 mm right paraduodenal lymph node, 11 mm necrotic left para-aortic lymph node and a 10 mm left para-aortic lymph node.    Interval Hx:  Essie Guzman Patient was called with a [employer] . His daughter (Osman) also provided some history. However due to technical issues most of the interpretation was done by daughter.     He continues to have mild upper abdominal discomfort on and off. His stool is back to normal color, previously it was darker color. No hematochezia. No nausea, vomiting. Heartburn is also resolved.  He has lost about 50 lbs over 6 months, some of it was intentional due to change in diet habits and more activity..   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 He is up on his feet most of the day. He able to walk severel blocks with SOB or fatigue.  He can walk almost 50 miles of walking per week.   No weakness, tingling numbness, abdominal pain, N/V/D, melena, hematochezia, headache, recent fever or infection.        REVIEW OF SYSTEMS: 14 point ROS negative other than the symptoms noted above in the HPI.    PAST MEDICAL AND SURGICAL HISTORY:   Active Ambulatory Problems     Diagnosis  Date Noted     No Active Ambulatory Problems     Resolved Ambulatory Problems     Diagnosis Date Noted     Hematochezia 10/11/2015     No Additional Past Medical History   No surgeeies    SOCIAL HISTORY:   Social History     Tobacco Use     Smoking status: Never Smoker     Smokeless tobacco: Never Used   Substance Use Topics     Alcohol use: No     Drug use: No   Non-smoker/non-drinker  He is now retired. 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  He lives in [place] with family, sposue, sons and grandkids  He has 6 kids, lives with 2 younger boys, 4 grand kids      FAMILY HISTORY:   Family History   Problem Relation Age of Onset     Asthma No family hx of      Diabetes No family hx of      Hypertension No family hx of      Cerebrovascular Disease No family hx of      Cancer No family hx of      Colon Cancer No family hx of    No family hx any cancer      ALLERGIES: No Known Allergies    CURRENT MEDICATIONS:   Current Outpatient Medications:      omeprazole (PRILOSEC) 20 MG DR capsule, Take 1 capsule (20 mg) by mouth daily, Disp: 28 capsule, Rfl: 0     omeprazole (PRILOSEC) 20 MG DR capsule, Take 1 capsule (20 mg) by mouth daily, Disp: 90 capsule, Rfl: 1     ondansetron (ZOFRAN-ODT) 4 MG ODT tab, Take 1 tablet (4 mg) by mouth every 8 hours as needed for nausea, Disp: 10 tablet, Rfl: 1    PHYSICAL EXAMINATION:  Vital signs: There were no vitals taken for this visit.  ECOG performance status of 0. Fatigue 0.  GENERAL: Well-nourished healthy-appearing  male in chair, no acute distress.       LABORATORY DATA:     CBC RESULTS:   CBC RESULTS:   Recent Labs   Lab Test 10/23/20  1422 09/08/20  1410 10/12/15  0520   WBC 8.9 9.3 7.2   HGB 11.9* 13.0* 13.6   HCT 37.5* 39.4* 41.2   MCV 92 94 92   MCHC 31.7 33.0 33.0   RDW 12.6 12.9 13.0    297 201        Last Comprehensive Metabolic Panel:  Sodium   Date Value Ref Range Status   10/28/2020 136 133 - 144 mmol/L Final   09/08/2020 133 133 - 144 mmol/L Final    10/11/2015 134 133 - 144 mmol/L Final     Potassium   Date Value Ref Range Status   10/28/2020 4.3 3.4 - 5.3 mmol/L Final   09/08/2020 4.1 3.4 - 5.3 mmol/L Final   10/11/2015 4.3 3.4 - 5.3 mmol/L Final     Comment:     Specimen slightly hemolyzed, potassium may be falsely elevated     Chloride   Date Value Ref Range Status   10/28/2020 104 94 - 109 mmol/L Final   09/08/2020 101 94 - 109 mmol/L Final   10/11/2015 103 94 - 109 mmol/L Final     Carbon Dioxide   Date Value Ref Range Status   10/28/2020 28 20 - 32 mmol/L Final   09/08/2020 24 20 - 32 mmol/L Final   10/11/2015 23 20 - 32 mmol/L Final     Anion Gap   Date Value Ref Range Status   10/28/2020 4 3 - 14 mmol/L Final   09/08/2020 8 3 - 14 mmol/L Final   10/11/2015 8 3 - 14 mmol/L Final     Glucose   Date Value Ref Range Status   10/28/2020 106 (H) 70 - 99 mg/dL Final   09/08/2020 124 (H) 70 - 99 mg/dL Final   10/11/2015 126 (H) 70 - 99 mg/dL Final     Urea Nitrogen   Date Value Ref Range Status   10/28/2020 19 7 - 30 mg/dL Final   09/08/2020 14 7 - 30 mg/dL Final   10/11/2015 25 7 - 30 mg/dL Final     Creatinine   Date Value Ref Range Status   10/28/2020 0.84 0.66 - 1.25 mg/dL Final   09/08/2020 0.89 0.66 - 1.25 mg/dL Final   10/11/2015 0.96 0.66 - 1.25 mg/dL Final     GFR Estimate   Date Value Ref Range Status   10/28/2020 >90 >60 mL/min/[1.73_m2] Final     Comment:     Non  GFR Calc  Starting 12/18/2018, serum creatinine based estimated GFR (eGFR) will be   calculated using the Chronic Kidney Disease Epidemiology Collaboration   (CKD-EPI) equation.     09/08/2020 88 >60 mL/min/[1.73_m2] Final     Comment:     Non  GFR Calc  Starting 12/18/2018, serum creatinine based estimated GFR (eGFR) will be   calculated using the Chronic Kidney Disease Epidemiology Collaboration   (CKD-EPI) equation.     10/11/2015 80 >60 mL/min/1.7m2 Final     Comment:     Non  GFR Calc     Calcium   Date Value Ref Range Status    10/28/2020 9.0 8.5 - 10.1 mg/dL Final   09/08/2020 9.5 8.5 - 10.1 mg/dL Final   10/11/2015 9.0 8.5 - 10.1 mg/dL Final     Bilirubin Total   Date Value Ref Range Status   10/23/2020 0.2 0.2 - 1.3 mg/dL Final   09/08/2020 0.5 0.2 - 1.3 mg/dL Final     Alkaline Phosphatase   Date Value Ref Range Status   10/23/2020 87 40 - 150 U/L Final   09/08/2020 81 40 - 150 U/L Final     ALT   Date Value Ref Range Status   10/23/2020 48 0 - 70 U/L Final   09/08/2020 75 (H) 0 - 70 U/L Final     AST   Date Value Ref Range Status   10/23/2020 26 0 - 45 U/L Final   09/08/2020 46 (H) 0 - 45 U/L Final       IMAGING STUDIES:    CT CHEST/ABDOMEN/PELVIS W CONTRAST 11/24/2020 1:29 PM     CLINICAL HISTORY: Gastric cancer, initial staging; Gastric cancer (H)     TECHNIQUE: CT scan of the chest, abdomen, and pelvis was performed  following injection of IV contrast. Multiplanar reformats were  obtained. Dose reduction techniques were used.   CONTRAST: 83mL Isovue-370     COMPARISON: 10/11/2015     FINDINGS:   LUNGS AND PLEURA: Mild linear atelectasis in the lung bases.  Groundglass opacities in the right lower lobe (series 14, image  [id number]). This is favored to be infectious/inflammatory. Few small  pulmonary nodules. For example, there is a 3 mm right lower lobe  nodule (series 14, image 182) and a 2 mm left lower lobe nodule  (series 14, image 196). No infiltrates or pleural effusions.     MEDIASTINUM/AXILLAE: Small bilateral hilar lymph nodes are  indeterminate. For example there is a 1.4 x 1.0 cm right infrahilar  lymph node (series 6, image 73). No enlarged mediastinal lymph nodes.  No thoracic aortic aneurysm.     HEPATOBILIARY: No focal lesions of the liver. No radiopaque gallstones  or biliary ductal dilatation.     PANCREAS: Normal.     SPLEEN: Normal.     ADRENAL GLANDS: Normal.     KIDNEYS/BLADDER: Left upper pole renal cyst. No specific follow up  recommended. Otherwise normal kidneys.     BOWEL: Mild gastric distention. Wall  thickening and ulceration of the  pylorus, likely compatible with known malignant appearing also seen on  endoscopy. Normal caliber loops of small bowel and segments of colon.     PELVIC ORGANS: Normal.     ADDITIONAL FINDINGS: Multiple enlarged retroperitoneal and perigastric  lymph nodes. For example, there is a 10 mm right paraduodenal lymph  node (series 6, image 150) 11 mm necrotic left para-aortic lymph node  (series 6, image 140) and a 10 mm left para-aortic lymph node (series  6, image 163). No ascites.     MUSCULOSKELETAL: Unremarkable.                                                                      IMPRESSION:  1.  Wall thickening and ulceration at the pylorus compatible with the  known malignant appearing pyloric ulcer. Mild gastric distention.  2.  Mildly enlarged perigastric, paraduodenal lymph nodes and  retroperitoneal lymph nodes, some with necrosis are suspicious for  metastatic disease. Indeterminate mild hilar lymphadenopathy.  3.  Tiny pulmonary nodules measuring up to 3 mm are indeterminate.  4.  Mild groundglass opacities in the right lower lobe suspicious for  inflammatory or infectious process.    ASSESSMENT AND PLAN:    Mr. Essie Guzman is a 65 year old  male with PMHx of H.pylori infection, and recently diagnosed stomach cancer. He is otherwise healthy and does not have any co morbidities.    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 He also has mildly enlarged mild hilar lymphadenopathy which are called indeterminate.I will therefore evaluate with PET/CT scan to determine any distant sites for biopsy. If  PET CT shows localized disease then will get EUS for staging and possible surgical evaluation later.    I discussed the results of CT scan and biopsy. I also discussed the prognosis of gastric cancer in general. Several other questions were answered appropriately.    Plan  PET CT scan in the next 1-2 weeks   Schedule for EUS 2-3 days after PET scan   RTC to see me after the PET scan and EUS    #Asmptomaic/minimally symptomatic COViD  -Pt had covid test positive 2 weeks ago which was done prior to procedure. He has no symptoms currently.      #Nutrition  Pt has lost 50 lbs over 6 months, some of this was intentional due to diet changes and increased physical activity.    BILLING: I spent 60 minutes in consultation and over 50% of the time was spent on counseling and coordinating care.    Brenann Mccarthy M.D.   of Medicine  Division of Hematology, Oncology and Transplantation  [facility]

## 2020-12-02 ENCOUNTER — PRE VISIT (OUTPATIENT)
Dept: ONCOLOGY | Facility: CLINIC | Age: 65
End: 2020-12-02

## 2020-12-02 ENCOUNTER — VIRTUAL VISIT (OUTPATIENT)
Dept: ONCOLOGY | Facility: CLINIC | Age: 65
End: 2020-12-02
Attending: INTERNAL MEDICINE
Payer: MEDICARE

## 2020-12-02 DIAGNOSIS — C16.9 GASTRIC CANCER (H): ICD-10-CM

## 2020-12-02 DIAGNOSIS — C16.2: ICD-10-CM

## 2020-12-02 PROCEDURE — 999N001193 HC VIDEO/TELEPHONE VISIT; NO CHARGE

## 2020-12-02 PROCEDURE — 99205 OFFICE O/P NEW HI 60 MIN: CPT | Mod: 95 | Performed by: STUDENT IN AN ORGANIZED HEALTH CARE EDUCATION/TRAINING PROGRAM

## 2020-12-02 NOTE — PATIENT INSTRUCTIONS
Dear Essie,    It was nice to talk to you over the phone. You have adenocarcinoma of the stomach. We will need to evaluate if this is localized or metastatic. We have agreed for the following plan.    Plan  PET CT scan in the next 1-2 weeks   Schedule for EUS 2-3 days after PET scan   RTC to see me after the PET scan and EUS    Brennan Mccarthy MD

## 2020-12-02 NOTE — LETTER
"    12/2/2020         RE: Essie Guzman  [id number]  [facility] [address]        Dear Colleague,    Thank you for referring your patient, Essie Guzman, to the [facility]. Please see a copy of my visit note below.    Essie Guzman is a 65 year old male who is being evaluated via a billable video visit.      The patient has been notified of following:     \"This video visit will be conducted via a call between you and your physician/provider. We have found that certain health care needs can be provided without the need for an in-person physical exam.  This service lets us provide the care you need with a video conversation.  If a prescription is necessary we can send it directly to your pharmacy.  If lab work is needed we can place an order for that and you can then stop by our lab to have the test done at a later time.    Video visits are billed at different rates depending on your insurance coverage.  Please reach out to your insurance provider with any questions.    If during the course of the call the physician/provider feels a video visit is not appropriate, you will not be charged for this service.\"    Patient has given verbal consent for Video visit? Yes  How would you like to obtain your AVS? Mail a copy  If you are dropped from the video visit, the video invite should be resent to: Text to cell phone: [phone]  Will anyone else be joining your video visit? No      Zuly EWING    Type of service:  Video Visit    Video Start Time: 8:32 AM  Video End Time: 9:32 AM    Originating Location (pt. Location): Home    Distant Location (provider location):  [facility]     Platform used for Video Visit: Raul Mccarthy MD      MEDICAL ONCOLOGY INITIAL CONSULT NOTE    PATIENT NAME: Essie Guzman  ENCOUNTER DATE: 12/2/2020    Care Team  Primary Oncologist: Brennan Mccarthy MD    REASON FOR CURRENT VISIT: New diagnosis of gastric " cancer    HISTORY OF PRESENT ILLNESS:  Mr. 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 Initially Rx for H.pylori with Abx and PPI. LFT's were mildly elevated.  11/24/20- UGI endoscopy at [facility] with normal esophagus. Non-bleeding gastric ulcer in pylorus with no stigmata of bleeding.  Non-bleeding duodenal ulcer with no stigmata of bleeding. Biopsy of Stomach, pylorus, - Poorly-differentiated carcinoma; consistent with adenocarcinoma (poorly cohesive type). HER2 by FISH was non-amplified  11/24/20- CT CAP- bilateral hilar lymph nodes are indeterminate (1.4 x 1.0 cm) right infrahilar lymph node. Multiple enlarged retroperitoneal and perigastric lymph nodes (10 mm right paraduodenal lymph node, 11 mm necrotic left para-aortic lymph node and a 10 mm left para-aortic lymph node.    Interval Hx:  Essie Guzman Patient was called with a [employer] . His daughter (Osman) also provided some history. However due to technical issues most of the interpretation was done by daughter.     He continues to have mild upper abdominal discomfort on and off. His stool is back to normal color, previously it was darker color. No hematochezia. No nausea, vomiting. Heartburn is also resolved.  He has lost about 50 lbs over 6 months, some of it was intentional due to change in diet habits and more activity..   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 He is up on his feet most of the day. He able to walk severel blocks with SOB or fatigue.  He can walk almost 50 miles of walking per week.   No weakness, tingling numbness,  abdominal pain, N/V/D, melena, hematochezia, headache, recent fever or infection.        REVIEW OF SYSTEMS: 14 point ROS negative other than the symptoms noted above in the HPI.    PAST MEDICAL AND SURGICAL HISTORY:   Active Ambulatory Problems     Diagnosis Date Noted     No Active Ambulatory Problems     Resolved Ambulatory Problems     Diagnosis Date Noted     Hematochezia 10/11/2015     No Additional Past Medical History   No surgeeies    SOCIAL HISTORY:   Social History     Tobacco Use     Smoking status: Never Smoker     Smokeless tobacco: Never Used   Substance Use Topics     Alcohol use: No     Drug use: No   Non-smoker/non-drinker  He is now retired. 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  He lives in [place] with family, sposue, sons and grandkids  He has 6 kids, lives with 2 younger boys, 4 grand kids      FAMILY HISTORY:   Family History   Problem Relation Age of Onset     Asthma No family hx of      Diabetes No family hx of      Hypertension No family hx of      Cerebrovascular Disease No family hx of      Cancer No family hx of      Colon Cancer No family hx of    No family hx any cancer      ALLERGIES: No Known Allergies    CURRENT MEDICATIONS:   Current Outpatient Medications:      omeprazole (PRILOSEC) 20 MG DR capsule, Take 1 capsule (20 mg) by mouth daily, Disp: 28 capsule, Rfl: 0     omeprazole (PRILOSEC) 20 MG DR capsule, Take 1 capsule (20 mg) by mouth daily, Disp: 90 capsule, Rfl: 1     ondansetron (ZOFRAN-ODT) 4 MG ODT tab, Take 1 tablet (4 mg) by mouth every 8 hours as needed for nausea, Disp: 10 tablet, Rfl: 1    PHYSICAL EXAMINATION:  Vital signs: There were no vitals taken for this visit.  ECOG performance status of 0. Fatigue 0.  GENERAL: Well-nourished healthy-appearing  male in chair, no acute distress.       LABORATORY DATA:     CBC RESULTS:   CBC RESULTS:   Recent Labs   Lab Test 10/23/20  1422 09/08/20  1410 10/12/15  0520   WBC 8.9 9.3 7.2   HGB 11.9* 13.0* 13.6    HCT 37.5* 39.4* 41.2   MCV 92 94 92   MCHC 31.7 33.0 33.0   RDW 12.6 12.9 13.0    297 201        Last Comprehensive Metabolic Panel:  Sodium   Date Value Ref Range Status   10/28/2020 136 133 - 144 mmol/L Final   09/08/2020 133 133 - 144 mmol/L Final   10/11/2015 134 133 - 144 mmol/L Final     Potassium   Date Value Ref Range Status   10/28/2020 4.3 3.4 - 5.3 mmol/L Final   09/08/2020 4.1 3.4 - 5.3 mmol/L Final   10/11/2015 4.3 3.4 - 5.3 mmol/L Final     Comment:     Specimen slightly hemolyzed, potassium may be falsely elevated     Chloride   Date Value Ref Range Status   10/28/2020 104 94 - 109 mmol/L Final   09/08/2020 101 94 - 109 mmol/L Final   10/11/2015 103 94 - 109 mmol/L Final     Carbon Dioxide   Date Value Ref Range Status   10/28/2020 28 20 - 32 mmol/L Final   09/08/2020 24 20 - 32 mmol/L Final   10/11/2015 23 20 - 32 mmol/L Final     Anion Gap   Date Value Ref Range Status   10/28/2020 4 3 - 14 mmol/L Final   09/08/2020 8 3 - 14 mmol/L Final   10/11/2015 8 3 - 14 mmol/L Final     Glucose   Date Value Ref Range Status   10/28/2020 106 (H) 70 - 99 mg/dL Final   09/08/2020 124 (H) 70 - 99 mg/dL Final   10/11/2015 126 (H) 70 - 99 mg/dL Final     Urea Nitrogen   Date Value Ref Range Status   10/28/2020 19 7 - 30 mg/dL Final   09/08/2020 14 7 - 30 mg/dL Final   10/11/2015 25 7 - 30 mg/dL Final     Creatinine   Date Value Ref Range Status   10/28/2020 0.84 0.66 - 1.25 mg/dL Final   09/08/2020 0.89 0.66 - 1.25 mg/dL Final   10/11/2015 0.96 0.66 - 1.25 mg/dL Final     GFR Estimate   Date Value Ref Range Status   10/28/2020 >90 >60 mL/min/[1.73_m2] Final     Comment:     Non  GFR Calc  Starting 12/18/2018, serum creatinine based estimated GFR (eGFR) will be   calculated using the Chronic Kidney Disease Epidemiology Collaboration   (CKD-EPI) equation.     09/08/2020 88 >60 mL/min/[1.73_m2] Final     Comment:     Non  GFR Calc  Starting 12/18/2018, serum creatinine based  estimated GFR (eGFR) will be   calculated using the Chronic Kidney Disease Epidemiology Collaboration   (CKD-EPI) equation.     10/11/2015 80 >60 mL/min/1.7m2 Final     Comment:     Non  GFR Calc     Calcium   Date Value Ref Range Status   10/28/2020 9.0 8.5 - 10.1 mg/dL Final   09/08/2020 9.5 8.5 - 10.1 mg/dL Final   10/11/2015 9.0 8.5 - 10.1 mg/dL Final     Bilirubin Total   Date Value Ref Range Status   10/23/2020 0.2 0.2 - 1.3 mg/dL Final   09/08/2020 0.5 0.2 - 1.3 mg/dL Final     Alkaline Phosphatase   Date Value Ref Range Status   10/23/2020 87 40 - 150 U/L Final   09/08/2020 81 40 - 150 U/L Final     ALT   Date Value Ref Range Status   10/23/2020 48 0 - 70 U/L Final   09/08/2020 75 (H) 0 - 70 U/L Final     AST   Date Value Ref Range Status   10/23/2020 26 0 - 45 U/L Final   09/08/2020 46 (H) 0 - 45 U/L Final       IMAGING STUDIES:    CT CHEST/ABDOMEN/PELVIS W CONTRAST 11/24/2020 1:29 PM     CLINICAL HISTORY: Gastric cancer, initial staging; Gastric cancer (H)     TECHNIQUE: CT scan of the chest, abdomen, and pelvis was performed  following injection of IV contrast. Multiplanar reformats were  obtained. Dose reduction techniques were used.   CONTRAST: 83mL Isovue-370     COMPARISON: 10/11/2015     FINDINGS:   LUNGS AND PLEURA: Mild linear atelectasis in the lung bases.  Groundglass opacities in the right lower lobe (series 14, image  [id number]). This is favored to be infectious/inflammatory. Few small  pulmonary nodules. For example, there is a 3 mm right lower lobe  nodule (series 14, image 182) and a 2 mm left lower lobe nodule  (series 14, image 196). No infiltrates or pleural effusions.     MEDIASTINUM/AXILLAE: Small bilateral hilar lymph nodes are  indeterminate. For example there is a 1.4 x 1.0 cm right infrahilar  lymph node (series 6, image 73). No enlarged mediastinal lymph nodes.  No thoracic aortic aneurysm.     HEPATOBILIARY: No focal lesions of the liver. No radiopaque gallstones  or  biliary ductal dilatation.     PANCREAS: Normal.     SPLEEN: Normal.     ADRENAL GLANDS: Normal.     KIDNEYS/BLADDER: Left upper pole renal cyst. No specific follow up  recommended. Otherwise normal kidneys.     BOWEL: Mild gastric distention. Wall thickening and ulceration of the  pylorus, likely compatible with known malignant appearing also seen on  endoscopy. Normal caliber loops of small bowel and segments of colon.     PELVIC ORGANS: Normal.     ADDITIONAL FINDINGS: Multiple enlarged retroperitoneal and perigastric  lymph nodes. For example, there is a 10 mm right paraduodenal lymph  node (series 6, image 150) 11 mm necrotic left para-aortic lymph node  (series 6, image 140) and a 10 mm left para-aortic lymph node (series  6, image 163). No ascites.     MUSCULOSKELETAL: Unremarkable.                                                                      IMPRESSION:  1.  Wall thickening and ulceration at the pylorus compatible with the  known malignant appearing pyloric ulcer. Mild gastric distention.  2.  Mildly enlarged perigastric, paraduodenal lymph nodes and  retroperitoneal lymph nodes, some with necrosis are suspicious for  metastatic disease. Indeterminate mild hilar lymphadenopathy.  3.  Tiny pulmonary nodules measuring up to 3 mm are indeterminate.  4.  Mild groundglass opacities in the right lower lobe suspicious for  inflammatory or infectious process.    ASSESSMENT AND PLAN:    Mr. Essie Guzman is a 65 year old  male with PMHx of H.pylori infection, and recently diagnosed stomach cancer. He is otherwise healthy and does not have any co morbidities.    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 He also has mildly enlarged mild hilar lymphadenopathy which are called indeterminate.I will therefore evaluate with PET/CT scan to determine any distant sites for biopsy. If PET CT shows localized disease then will get EUS for staging and possible surgical evaluation later.    I discussed the results of CT scan and biopsy. I also discussed the prognosis of gastric cancer in general. Several other questions were answered appropriately.    Plan  PET CT scan in the next 1-2 weeks   Schedule for EUS 2-3 days after PET scan   RTC to see me after the PET scan and EUS    #Asmptomaic/minimally symptomatic COViD  -Pt had covid test positive 2 weeks ago which was done prior to procedure. He has no symptoms currently.      #Nutrition  Pt has lost 50 lbs over 6 months, some of this was intentional due to diet changes and increased physical activity.    BILLING: I spent 60 minutes in consultation and over 50% of the time was spent on counseling and coordinating care.    Brennan Mccarthy M.D.   of Medicine  Division of Hematology, Oncology and Transplantation  [facility]

## 2020-12-03 NOTE — TELEPHONE ENCOUNTER
New Patient Oncology Nurse Navigator Note     Referring provider: Dr. DUMONT    Referring Clinic/Organization: [facility]     Referred to: Surgical Oncology -  Hepatobiliary / GI Cancers    Requested provider (if applicable): Dr. Tre Amaya    Referral Received: 12/03/20       Evaluation for : Gastric Cancer     Clinical History (per Nurse review of records provided):      EGD 11/24:   Findings:   -The esophagus was normal.   - A large amount of food (residue) was found on the greater curvature of the stomach.  - One non-bleeding cratered gastric ulcer with no stigmata of bleeding was found at the pylorus. The lesion was 23 mm in largest dimension.   - Biopsies were taken with a cold forceps for histology. Verification of patient identification for the specimen was done. Estimated blood loss was minimal.   - One non-bleeding cratered duodenal ulcer with no stigmata of bleeding was found in the ampulla. The lesion was 23 mm in largest dimension.   - A moderate post-ulcer deformity was found in the duodenal bulb.       Pathology:   FINAL DIAGNOSIS:   Stomach, pylorus, biopsy-   -Poorly-differentiated carcinoma; consistent with adenocarcinoma (poorly cohesive type).   -Pending HER-2/addison analysis by fluorescence in-situ hybridization (FISH); see separate forthcoming cytogenetics laboratory report for results.     CT CAP:  IMPRESSION:  1.  Wall thickening and ulceration at the pylorus compatible with the known malignant appearing pyloric ulcer. Mild gastric distention.  2.  Mildly enlarged perigastric, paraduodenal lymph nodes and retroperitoneal lymph nodes, some with necrosis are suspicious for metastatic disease. Indeterminate mild hilar lymphadenopathy.  3.  Tiny pulmonary nodules measuring up to 3 mm are indeterminate.  4.  Mild groundglass opacities in the right lower lobe suspicious for inflammatory or infectious process.       Clinical Assessment / Barriers to Care (Per Nurse):    None at this time.        Records Location:     [facility]     Records Needed:     NONE AT THIS TIME      Additional testing needed prior to consult:     EUS  PET scan    Referral updates and Plan:       Consult with Surgical Oncology   PET scan   EUS - doesnt have to be done prior to consult.         Rosa Isela Sagastume, RN, BSN   Surgical Oncology New Patient Nurse Navigator  [facility]  [phone]

## 2020-12-04 NOTE — TELEPHONE ENCOUNTER
Patient is scheduled for EUS with Dr. Wilson    Spoke with: Thomas    Date of Procedure: 12/22/2020    Location: Unit J    Sedation Type MAC    Pre-op for Unit J MAC yes    Informed patient they will need an adult  yes     Informed Patient of COVID Test Requirement he had a positive covid test on 11/8/2020    Preferred Pharmacy for Pre Prescription on chart    Confirmed Nurse will call to complete assessment yes    Additional comments: no

## 2020-12-09 NOTE — PROGRESS NOTES
Called Pt via  services to introduce self and provide clinic #. Left VM with this information via .

## 2020-12-16 NOTE — TELEPHONE ENCOUNTER
FUTURE VISIT INFORMATION      SURGERY INFORMATION:    Date: 20    Location: [facility] GI    Surgeon:  Guru Teri Pedraza MD    Anesthesia Type:  MAC    Procedure: ENDOSCOPIC ULTRASOUND, ESOPHAGOSCOPY / UPPER GASTROINTESTINAL TRACT (GI)    RECORDS REQUESTED FROM:       [facility]    Most recent EKG+ [id number]

## 2020-12-17 NOTE — ANESTHESIA PREPROCEDURE EVALUATION
Anesthesia Pre-Procedure Evaluation    Patient: Essie Guzman   MRN:     [id number] Gender:   male   Age:    65 year old :      1955        Preoperative Diagnosis: Gastric cancer (H) [C16.9]   Procedure(s):  ENDOSCOPIC ULTRASOUND, ESOPHAGOSCOPY / UPPER GASTROINTESTINAL TRACT (GI)     LABS:  CBC:   Lab Results   Component Value Date    WBC 8.9 10/23/2020    WBC 9.3 2020    HGB 11.9 (L) 10/23/2020    HGB 13.0 (L) 2020    HCT 37.5 (L) 10/23/2020    HCT 39.4 (L) 2020     10/23/2020     2020     BMP:   Lab Results   Component Value Date     10/28/2020     2020    POTASSIUM 4.3 10/28/2020    POTASSIUM 4.1 2020    CHLORIDE 104 10/28/2020    CHLORIDE 101 2020    CO2 28 10/28/2020    CO2 24 2020    BUN 19 10/28/2020    BUN 14 2020    CR 0.84 10/28/2020    CR 0.89 2020     (H) 10/28/2020     (H) 2020     COAGS:   Lab Results   Component Value Date    PTT 27 10/11/2015    INR 1.04 10/11/2015     POC: No results found for: BGM, HCG, HCGS  OTHER:   Lab Results   Component Value Date    A1C 5.6 2020    HAWA 9.0 10/28/2020    ALBUMIN 3.5 10/23/2020    PROTTOTAL 7.5 10/23/2020    ALT 48 10/23/2020    AST 26 10/23/2020    ALKPHOS 87 10/23/2020    BILITOTAL 0.2 10/23/2020    LIPASE 215 2020    AMYLASE 72 2020    TSH 1.80 2020    SED 10 2014        Preop Vitals    BP Readings from Last 3 Encounters:   20 132/87   20 105/66   10/23/20 131/79    Pulse Readings from Last 3 Encounters:   20 57   20 71   10/23/20 62      Resp Readings from Last 3 Encounters:   20 16   20 16   10/23/20 16    SpO2 Readings from Last 3 Encounters:   20 99%   20 97%   10/23/20 100%      Temp Readings from Last 1 Encounters:   10/23/20 98.3  F (36.8  C) (Oral)    Ht Readings from Last 1 Encounters:   20 1.524 m (5')      Wt Readings from Last 1 Encounters:   20  77.1 kg (170 lb)    Estimated body mass index is 33.2 kg/m  as calculated from the following:    Height as of this encounter: 1.524 m (5').    Weight as of this encounter: 77.1 kg (170 lb).     LDA:        No past medical history on file.   Past Surgical History:   Procedure Laterality Date     COLONOSCOPY N/A 11/24/2015    Procedure: COLONOSCOPY;  Surgeon: Clyde Mosher MD;  Location:  GI     ESOPHAGOSCOPY, GASTROSCOPY, DUODENOSCOPY (EGD), COMBINED N/A 11/24/2020    Procedure: ESOPHAGOGASTRODUODENOSCOPY with biopsies using cold forceps;  Surgeon: Clyde Mosher MD;  Location:  GI      No Known Allergies     Anesthesia Evaluation     . Pt has had prior anesthetic. Type: MAC    No history of anesthetic complications          ROS/MED HX    ENT/Pulmonary:  - neg pulmonary ROS    (-) tobacco use, asthma and sleep apnea   Neurologic:  - neg neurologic ROS     Cardiovascular: Comment: Sinus bradycardia - neg cardiovascular ROS   (+) ----. : . . . :. . Previous cardiac testing date:results:date: results:ECG reviewed date:2018 results: date: results:          METS/Exercise Tolerance: Comment: [place], walks up to 10 miles/day, very active. >4 METS   Hematologic:  - neg hematologic  ROS      (-) history of blood clots and History of Transfusion   Musculoskeletal:  - neg musculoskeletal ROS       GI/Hepatic: Comment: Newly diagnosed gastric cancer       (-) liver disease   Renal/Genitourinary:  - ROS Renal section negative       Endo:  - neg endo ROS       Psychiatric:  - neg psychiatric ROS       Infectious Disease:  - neg infectious disease ROS       Malignancy:   (+) Malignancy History of GI  GI CA Active status post,         Other:    (+) no H/O Chronic Pain,                       PHYSICAL EXAM:   Mental Status/Neuro: A/A/O; Age Appropriate   Airway: Facies: Feasible  Mallampati: III  Mouth/Opening: Full  TM distance: > 6 cm  Neck ROM: Full   Respiratory: Auscultation: CTAB     Resp. Rate: Normal      Resp. Effort: Normal      CV: Rhythm: Regular  Rate: Age appropriate  Heart: Murmur (Systolic; soft)  Edema: None   Comments:      Dental: Dentures  Dentures: Upper; Lower; Complete                Assessment:   ASA SCORE: 2    H&P: History and physical reviewed and following examination; no interval change.   Smoking Status:  Non-Smoker/Unknown   NPO Status: NPO Appropriate     Plan:   Anes. Type:  MAC   Pre-Medication: None   Induction:  N/a   Airway: Native Airway   Access/Monitoring: PIV   Maintenance: Propofol Sedation     Postop Plan:   Postop Pain: None  Postop Sedation/Airway: Not planned  Disposition: Outpatient     PONV Management:   Adult Risk Factors:, Non-Smoker   Prevention: Ondansetron, Propofol     CONSENT: Direct conversation   Plan and risks discussed with: Patient   Blood Products: Consent Deferred (Minimal Blood Loss)                PAC Discussion and Assessment    ASA Classification: 2  Case is suitable for: ASC OK for Surgery: [facility].  Anesthetic techniques and relevant risks discussed: MAC with GA as backup  Invasive monitoring and risk discussed: No  Types:   Possibility and Risk of blood transfusion discussed: No  NPO instructions given:   Additional anesthetic preparation and risks discussed:   Needs early admission to pre-op area:   Other:     PAC Resident/NP Anesthesia Assessment:  Essie Guzman is a 65 year old male scheduled to undergo ENDOSCOPIC ULTRASOUND, ESOPHAGOSCOPY / UPPER GASTROINTESTINAL TRACT (GI) with Guru Milo MD on 12/22/20 at [facility] for treatment of Gastric cancer.       Pt has had prior anesthetic. Type: MAC    No history of anesthetic complications    He has the following specific operative considerations:   # CHASITY 3/8 = intermediate risk  # VTE risk: 3%  # Risk of PONV score = 1.  If > 2, anti-emetic intervention recommended.  # Anesthesia considerations:  Refer to PAC assessment in anesthesia  records      CARDIAC: METS >4,  [place], walks up to 10 miles/day, very active.     # RCRI : No serious cardiac risks.  0.4% risk of major adverse cardiac event.     #  Sinus bradycardia, average HR in 50s      PULMONARY:     # Never smoked    # No asthma or inhaler use    GI:     # Newly diagnosed gastric cancer     ENDO: BMI 33    # No DM    ORTHO: full ROM of neck      Patient is optimized and is acceptable candidate for the proposed procedure. No further diagnostic evaluation is needed.    Reviewed and Signed by PAC Mid-Level Provider/Resident  Mid-Level Provider/Resident: Annalise To PA-C  Date: 12/17/20  Time: 1240    Attending Anesthesiologist Anesthesia Assessment:      Reviewed and Signed by PAC Anesthesiologist  Anesthesiologist: Shira  Date:   Time:   Pass/Fail:   Disposition:     PAC Pharmacist Assessment:        Pharmacist:   Date:   Time:    Vijay Callahan MD

## 2020-12-17 NOTE — H&P
Pre-Operative H & P     CC:  Preoperative exam to assess for increased cardiopulmonary risk while undergoing surgery and anesthesia.    Date of Encounter: 12/17/2020  Primary Care Physician:  Serge Kimble  Reason for Visit: Gastric cancer (H)        RICARDO Guzman is a 66 y/o male who presents for pre-operative H&P in preparation for ENDOSCOPIC ULTRASOUND, ESOPHAGOSCOPY / UPPER GASTROINTESTINAL TRACT (GI) with Guru Milo MD on 12/22/20 at [facility] for treatment of Gastric cancer.        Mr. Guzman has a new diagnosis of gastric cancer. He initially presented with burning abdominal pain for 2 months in August and was treated for H.pylori. LFT's were mildly elevated. UGI endoscopy w/ biopsy at [facility] on 11/24/20 confirmed Poorly-differentiated carcinoma consistent with adenocarcinoma. He continues to have mild upper abdominal discomfort on and off. His stool is back to normal color, previously it was darker color. No hematochezia. No nausea, vomiting. Heartburn is also resolved. He has lost about 50 lbs over 6 months, some of it was intentional due to change in diet habits and more activity.  He now presents for the above procedure.      PMH is also significant for sinus bradycardia. He is otherwise healthy. 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 He is up on his feet most of the day. He able to walk several blocks with SOB or fatigue.       History was obtained from patient via iPad  & chart review.       Past Medical History  Sinus bradycardia    Past Surgical History  Past Surgical History:   Procedure Laterality Date     COLONOSCOPY N/A 11/24/2015    Procedure: COLONOSCOPY;  Surgeon: Clyde Mosher MD;  Location: [facility]     ESOPHAGOSCOPY, GASTROSCOPY, DUODENOSCOPY (EGD), COMBINED N/A 11/24/2020    Procedure: ESOPHAGOGASTRODUODENOSCOPY with biopsies using cold forceps;  Surgeon: Clyde Mosher MD;  Location: [facility]        Hx of Blood transfusions/reactions: denies     Hx of abnormal bleeding or anti-platelet use: denies    Menstrual history: No LMP for male patient.:      Steroid use in the last year: denies    Personal or FH with difficulty with Anesthesia:  denies    Prior to Admission Medications  Current Outpatient Medications   Medication Sig Dispense Refill     omeprazole (PRILOSEC) 20 MG DR capsule Take 1 capsule (20 mg) by mouth daily 90 capsule 1       Allergies  No Known Allergies    Social History  Social History     Socioeconomic History     Marital status:      Spouse name: Not on file     Number of children: Not on file     Years of education: Not on file     Highest education level: Not on file   Occupational History     Not on file   Social Needs     Financial resource strain: Not on file     Food insecurity     Worry: Not on file     Inability: Not on file     Transportation needs     Medical: Not on file     Non-medical: Not on file   Tobacco Use     Smoking status: Never Smoker     Smokeless tobacco: Never Used   Substance and Sexual Activity     Alcohol use: No     Drug use: No     Sexual activity: Yes     Partners: Female   Lifestyle     Physical activity     Days per week: Not on file     Minutes per session: Not on file     Stress: Not on file   Relationships     Social connections     Talks on phone: Not on file     Gets together: Not on file     Attends [employer] service: Not on file     Active member of club or organization: Not on file     Attends meetings of clubs or organizations: Not on file     Relationship status: Not on file     Intimate partner violence     Fear of current or ex partner: Not on file     Emotionally abused: Not on file     Physically abused: Not on file     Forced sexual activity: Not on file   Other Topics Concern     Parent/sibling w/ CABG, MI or angioplasty before 65F 55M? No   Social History Narrative     Not on file       Family History  Family History   Problem  Relation Age of Onset     Asthma No family hx of      Diabetes No family hx of      Hypertension No family hx of      Cerebrovascular Disease No family hx of      Cancer No family hx of      Colon Cancer No family hx of      Anesthesia Reaction No family hx of      Deep Vein Thrombosis (DVT) No family hx of            Preop Vitals    BP Readings from Last 3 Encounters:   12/17/20 132/87   11/24/20 105/66   10/23/20 131/79    Pulse Readings from Last 3 Encounters:   12/17/20 57   11/24/20 71   10/23/20 62      Resp Readings from Last 3 Encounters:   12/17/20 16   11/24/20 16   10/23/20 16    SpO2 Readings from Last 3 Encounters:   12/17/20 99%   11/24/20 97%   10/23/20 100%      Temp Readings from Last 1 Encounters:   10/23/20 98.3  F (36.8  C) (Oral)    Ht Readings from Last 1 Encounters:   12/17/20 1.524 m (5')      Wt Readings from Last 1 Encounters:   12/17/20 77.1 kg (170 lb)    Estimated body mass index is 33.2 kg/m  as calculated from the following:    Height as of this encounter: 1.524 m (5').    Weight as of this encounter: 77.1 kg (170 lb).         ROS/MED HX  The complete review of systems is negative other than noted in the HPI or here.  Patient denies recent illness, fever and respiratory infection during past month.  Pt denies steroid use during past year.    ENT/Pulmonary:  - neg pulmonary ROS    (-) tobacco use, asthma and sleep apnea   Neurologic:  - neg neurologic ROS     Cardiovascular: Comment: Sinus bradycardia - neg cardiovascular ROS   (+) ----. : . . . :. . Previous cardiac testing date:results:date: results:ECG reviewed date:2018 results: date: results:          METS/Exercise Tolerance: Comment: [place], walks up to 10 miles/day, very active. >4 METS   Hematologic:  - neg hematologic  ROS      (-) history of blood clots and History of Transfusion   Musculoskeletal:  - neg musculoskeletal ROS       GI/Hepatic: Comment: Newly diagnosed gastric cancer       (-) liver disease  "  Renal/Genitourinary:  - ROS Renal section negative       Endo:  - neg endo ROS       Psychiatric:  - neg psychiatric ROS       Infectious Disease:  - neg infectious disease ROS       Malignancy:   (+) Malignancy History of GI  GI CA Active status post,         Other:    (+) no H/O Chronic Pain,               PHYSICAL EXAM:   Mental Status/Neuro: A/A/O; Age Appropriate   Airway: Facies: Feasible  Mallampati: III  Mouth/Opening: Full  TM distance: > 6 cm  Neck ROM: Full   Respiratory: Auscultation: CTAB     Resp. Rate: Normal     Resp. Effort: Normal      CV: Rhythm: Regular  Rate: Age appropriate  Heart: Murmur (Systolic; soft)  Edema: None   Comments:      Dental: Dentures  Dentures: Upper; Lower; Complete                    BP: 132/87 Pulse: 57   Resp: 16 SpO2: 99 %         170 lbs 0 oz  5' 0\"   Body mass index is 33.2 kg/m .       Physical Exam  Constitutional: Awake, alert, cooperative, no apparent distress, and appears stated age.  Eyes: Pupils equal, round and reactive to light, extra ocular muscles intact, sclera clear, conjunctiva normal.  HENT: Normocephalic, oral pharynx with moist mucus membranes, good dentition. No goiter appreciated. No removable dental hardware.  Respiratory: Clear to auscultation bilaterally, no crackles or wheezing. No SOB when supine.  Cardiovascular: Regular rate and rhythm, normal S1 and S2, and no murmur noted.  Carotids +2, no bruits. No edema. Palpable pulses to radial, DP and PT arteries.   GI: Normal bowel sounds, soft, obese, non-tender, no masses palpated.    Lymph/Hematologic: No cervical lymphadenopathy and no supraclavicular lymphadenopathy.  Genitourinary:  deferred  Skin: Warm and dry.  No rashes.   Musculoskeletal: full ROM of neck. There is no redness, warmth, or swelling of the joints. Gross motor strength is normal.    Neurologic: Awake, alert, oriented to name, place and time. Cranial nerves II-XII are grossly intact. Gait is normal. Ambulates from chair to exam " table, seats self, lies supine and sits back up w/o assistance.  Neuropsychiatric: Calm, cooperative. Normal affect. Pleasant. Answers questions appropriately, follows commands w/o difficulty.    PRIOR LABS/DIAGNOSTIC STUDIES:    All labs and imaging personally reviewed        CT CHEST/ABDOMEN/PELVIS W CONTRAST 11/24/2020   IMPRESSION:   1.  Wall thickening and ulceration at the pylorus compatible with the   known malignant appearing pyloric ulcer. Mild gastric distention.   2.  Mildly enlarged perigastric, paraduodenal lymph nodes and   retroperitoneal lymph nodes, some with necrosis are suspicious for   metastatic disease. Indeterminate mild hilar lymphadenopathy.   3.  Tiny pulmonary nodules measuring up to 3 mm are indeterminate.   4.  Mild groundglass opacities in the right lower lobe suspicious for   inflammatory or infectious process.       LABS:  CBC:   Lab Results   Component Value Date    WBC 8.9 10/23/2020    WBC 9.3 09/08/2020    HGB 11.9 (L) 10/23/2020    HGB 13.0 (L) 09/08/2020    HCT 37.5 (L) 10/23/2020    HCT 39.4 (L) 09/08/2020     10/23/2020     09/08/2020     BMP:   Lab Results   Component Value Date     10/28/2020     09/08/2020    POTASSIUM 4.3 10/28/2020    POTASSIUM 4.1 09/08/2020    CHLORIDE 104 10/28/2020    CHLORIDE 101 09/08/2020    CO2 28 10/28/2020    CO2 24 09/08/2020    BUN 19 10/28/2020    BUN 14 09/08/2020    CR 0.84 10/28/2020    CR 0.89 09/08/2020     (H) 10/28/2020     (H) 09/08/2020     COAGS:   Lab Results   Component Value Date    PTT 27 10/11/2015    INR 1.04 10/11/2015     POC: No results found for: BGM, HCG, HCGS  OTHER:   Lab Results   Component Value Date    A1C 5.6 09/08/2020    HAWA 9.0 10/28/2020    ALBUMIN 3.5 10/23/2020    PROTTOTAL 7.5 10/23/2020    ALT 48 10/23/2020    AST 26 10/23/2020    ALKPHOS 87 10/23/2020    BILITOTAL 0.2 10/23/2020    LIPASE 215 09/08/2020    AMYLASE 72 09/08/2020    TSH 1.80 09/08/2020    SED 10  04/08/2014          Labs today: not indicated    COVID19 testing ordered, will need to be completed within 4 days of DOS      ASSESSMENT and PLAN  Essie Guzman is a 65 year old male scheduled to undergo ENDOSCOPIC ULTRASOUND, ESOPHAGOSCOPY / UPPER GASTROINTESTINAL TRACT (GI) with Guru Milo MD on 12/22/20 at [facility] for treatment of Gastric cancer.       Pt has had prior anesthetic. Type: MAC    No history of anesthetic complications    He has the following specific operative considerations:   # CHASITY 3/8 = intermediate risk  # VTE risk: 3%  # Risk of PONV score = 1.  If > 2, anti-emetic intervention recommended.  # Anesthesia considerations:  Refer to PAC assessment in anesthesia records      CARDIAC: METS >4,  [place], walks up to 10 miles/day, very active.     # RCRI : No serious cardiac risks.  0.4% risk of major adverse cardiac event.     #  Sinus bradycardia, average HR in 50s      PULMONARY:     # Never smoked    # No asthma or inhaler use    GI:     # Newly diagnosed gastric cancer     ENDO: BMI 33    # No DM    ORTHO: full ROM of neck      Patient is optimized and is acceptable candidate for the proposed procedure. No further diagnostic evaluation is needed.      Arrival time, NPO, shower and medication instructions provided by nursing staff today.  Preparing For Your Surgery handout given.        Annalise To PA-C  Preoperative Assessment Center  [facility]  Phone: [phone]  Fax: [phone]

## 2020-12-18 NOTE — PROGRESS NOTES
"Essie Guzman is a 65 year old male who is being evaluated via a billable video visit.      The patient has been notified of following:     \"This video visit will be conducted via a call between you and your physician/provider. We have found that certain health care needs can be provided without the need for an in-person physical exam.  This service lets us provide the care you need with a video conversation.  If a prescription is necessary we can send it directly to your pharmacy.  If lab work is needed we can place an order for that and you can then stop by our lab to have the test done at a later time.    Video visits are billed at different rates depending on your insurance coverage.  Please reach out to your insurance provider with any questions.    If during the course of the call the physician/provider feels a video visit is not appropriate, you will not be charged for this service.\"    Patient has given verbal consent for Video visit? Yes  How would you like to obtain your AVS? Mail a copy  If you are dropped from the video visit, the video invite should be resent to: Text to cell phone: [phone]  Will anyone else be joining your video visit? No      Vitals - Patient Reported  Weight (Patient Reported): 77.1 kg (170 lb)  Height (Patient Reported): 152.4 cm (5')  BMI (Based on Pt Reported Ht/Wt): 33.2  Pain Score: No Pain (0)        I have reviewed and updated patient's allergy and medication list.    Concerns: NONE  Refills: NONE      Christina Painting CMA    Video-Visit Details    Type of service:  Video Visit    Originating Location (pt. Location): Home    Distant Location (provider location):  [facility]     Platform used for Video Visit: [facility] PATIENT TELEPHONE CONSULTATION       REASON FOR EVALUATION:  Newly diagnosed gastric cancer.      HISTORY OF PRESENT ILLNESS:  Mr. Guzman is a 65-year-old Hmong-speaking gentleman who was having epigastric pain for several " months and ultimately some weight loss as well, some of which was intentional but because of the ongoing epigastric pain, he ultimately sought medical attention and sought further evaluation which revealed a mass in the distal stomach.  He had an endoscopy which confirmed the presence of a gastric cancer in the area of the pylorus.  He had a CT scan subsequent to that which showed concern for possibility of involvement of retroperitoneal lymph nodes.  He was seen by Medical Oncology, Dr. Mccarthy, and I was asked to see Mr. Guzman for consideration of surgical therapy.      Prior to my visit today, the patient did have a PET scan which has not been officially read yet but I did speak to the radiologist regarding its findings.  In addition to the mass in the stomach, the patient has multiple retroperitoneal lymph nodes in the aortocaval region which are very PET avid and certainly concerning and consistent with metastatic disease.  They extend all the way down to the iliac bifurcation.      I discussed with Mr. Guzman and his daughter, who was interpreting for him today, the findings on the PET scan and the fact that if this represents gastric cancer involvement, then this would be considered metastatic disease and surgical resection of the stomach would not be appropriate.  I also discussed this is a fairly unusual pattern of spread for a gastric cancer and because of that, I think it would be reasonable to biopsy of one of these lymph nodes to be certain that there is not some other etiology that may have caused it.  The patient is having an endoscopic procedure with Dr. Pedraza on 12/22 and I have sent a message to Dr. Pedraza to please see if he could address one of these lymph nodes for biopsy during that procedure.      I recommended to Mr. Guzman that he reconnect with Dr. Mccarthy in the very near future to get started on systemic chemotherapy.  Certainly, that will be the primary treatment for the  time being.  If the retroperitoneal lymphadenopathy ultimately is determined to be something other than gastric cancer, we could certainly consider surgical resection in the future; however, I think that unfortunately would be very unlikely.  Because of that, I will not plan any followup with Mr. Guzman at this time but I would certainly be happy to see him again if any questions or concerns arise about the appropriateness of surgical resection.  Clearly, in the setting of gastric cancer with retroperitoneal lymphadenopathy which is fairly extensive, surgical resection would be inappropriate at this time.      A total of 20 minutes was spent on the phone with Mr. Guzman and his family today.  An additional 15 minutes was spent on review of his pre-visit history as well as imaging.  Additional 10 minutes was spent in communications with radiologist and coordination of care with Dr. Mccarthy and Dr. Pedraza.           Tre Amaya MD

## 2020-12-18 NOTE — LETTER
"    12/18/2020         RE: Essie Guzman  [address]  [place] [place] [facility] [address]        Dear Colleague,    Thank you for referring your patient, Essie Guzman, to the [facility]. Please see a copy of my visit note below.    Essie Guzman is a 65 year old male who is being evaluated via a billable video visit.      The patient has been notified of following:     \"This video visit will be conducted via a call between you and your physician/provider. We have found that certain health care needs can be provided without the need for an in-person physical exam.  This service lets us provide the care you need with a video conversation.  If a prescription is necessary we can send it directly to your pharmacy.  If lab work is needed we can place an order for that and you can then stop by our lab to have the test done at a later time.    Video visits are billed at different rates depending on your insurance coverage.  Please reach out to your insurance provider with any questions.    If during the course of the call the physician/provider feels a video visit is not appropriate, you will not be charged for this service.\"    Patient has given verbal consent for Video visit? Yes  How would you like to obtain your AVS? Mail a copy  If you are dropped from the video visit, the video invite should be resent to: Text to cell phone: [phone]  Will anyone else be joining your video visit? No      Vitals - Patient Reported  Weight (Patient Reported): 77.1 kg (170 lb)  Height (Patient Reported): 152.4 cm (5')  BMI (Based on Pt Reported Ht/Wt): 33.2  Pain Score: No Pain (0)        I have reviewed and updated patient's allergy and medication list.    Concerns: NONE  Refills: NONE      Christina Painting CMA    Video-Visit Details    Type of service:  Video Visit    Originating Location (pt. Location): Home    Distant Location (provider location):  [facility]     Platform used " for Video Visit: Raul    [facility] PATIENT TELEPHONE CONSULTATION       REASON FOR EVALUATION:  Newly diagnosed gastric cancer.      HISTORY OF PRESENT ILLNESS:  Mr. Guzman is a 65-year-old Hmong-speaking gentleman who was having epigastric pain for several months and ultimately some weight loss as well, some of which was intentional but because of the ongoing epigastric pain, he ultimately sought medical attention and sought further evaluation which revealed a mass in the distal stomach.  He had an endoscopy which confirmed the presence of a gastric cancer in the area of the pylorus.  He had a CT scan subsequent to that which showed concern for possibility of involvement of retroperitoneal lymph nodes.  He was seen by Medical Oncology, Dr. Mccarthy, and I was asked to see Mr. Guzman for consideration of surgical therapy.      Prior to my visit today, the patient did have a PET scan which has not been officially read yet but I did speak to the radiologist regarding its findings.  In addition to the mass in the stomach, the patient has multiple retroperitoneal lymph nodes in the aortocaval region which are very PET avid and certainly concerning and consistent with metastatic disease.  They extend all the way down to the iliac bifurcation.      I discussed with Mr. Guzman and his daughter, who was interpreting for him today, the findings on the PET scan and the fact that if this represents gastric cancer involvement, then this would be considered metastatic disease and surgical resection of the stomach would not be appropriate.  I also discussed this is a fairly unusual pattern of spread for a gastric cancer and because of that, I think it would be reasonable to biopsy of one of these lymph nodes to be certain that there is not some other etiology that may have caused it.  The patient is having an endoscopic procedure with Dr. Pedraza on 12/22 and I have sent a message to Dr. Pedraza to please see if he could  address one of these lymph nodes for biopsy during that procedure.      I recommended to Mr. Guzman that he reconnect with Dr. Mccarthy in the very near future to get started on systemic chemotherapy.  Certainly, that will be the primary treatment for the time being.  If the retroperitoneal lymphadenopathy ultimately is determined to be something other than gastric cancer, we could certainly consider surgical resection in the future; however, I think that unfortunately would be very unlikely.  Because of that, I will not plan any followup with Mr. Guzman at this time but I would certainly be happy to see him again if any questions or concerns arise about the appropriateness of surgical resection.  Clearly, in the setting of gastric cancer with retroperitoneal lymphadenopathy which is fairly extensive, surgical resection would be inappropriate at this time.      A total of 20 minutes was spent on the phone with Mr. Guzman and his family today.  An additional 15 minutes was spent on review of his pre-visit history as well as imaging.  Additional 10 minutes was spent in communications with radiologist and coordination of care with Dr. Mccarthy and Dr. Pedraza.     Tre Amaya MD

## 2020-12-22 NOTE — ANESTHESIA POSTPROCEDURE EVALUATION
Anesthesia POST Procedure Evaluation    Patient: Essie Guzman   MRN:     [id number] Gender:   male   Age:    65 year old :      1955        Preoperative Diagnosis: Gastric cancer (H) [C16.9]   Procedure(s):  ESOPHAGOGASTRODUODENOSCOPY, WITH FINE NEEDLE ASPIRATION BIOPSY, WITH ENDOSCOPIC ULTRASOUND GUIDANCE   Postop Comments: No value filed.     Anesthesia Type: MAC       Disposition: Outpatient   Postop Pain Control: Uneventful            Sign Out: Well controlled pain   PONV: No   Neuro/Psych: Uneventful            Sign Out: Acceptable/Baseline neuro status   Airway/Respiratory: Uneventful            Sign Out: Acceptable/Baseline resp. status   CV/Hemodynamics: Uneventful            Sign Out: Acceptable CV status   Other NRE: NONE   DID A NON-ROUTINE EVENT OCCUR? No         Last Anesthesia Record Vitals:  CRNA VITALS  2020 1009 - 2020 1109      2020             Resp Rate (observed):  21          Last PACU Vitals:  Vitals Value Taken Time   /78 20 1044   Temp     Pulse 51 20 1044   Resp     SpO2     Temp src     NIBP     Pulse     SpO2     Resp     Temp     Ht Rate     Temp 2           Electronically Signed By: Benton Mishar MD, 2020, 11:23 AM

## 2020-12-22 NOTE — ANESTHESIA CARE TRANSFER NOTE
Patient: Essie Guzman    Procedure(s):  ESOPHAGOGASTRODUODENOSCOPY, WITH FINE NEEDLE ASPIRATION BIOPSY, WITH ENDOSCOPIC ULTRASOUND GUIDANCE    Diagnosis: Gastric cancer (H) [C16.9]  Diagnosis Additional Information: No value filed.    Anesthesia Type:   MAC     Note:  Airway :Nasal Cannula  Patient transferred to:Phase II  Handoff Report: Identifed the Patient, Identified the Reponsible Provider, Reviewed the pertinent medical history, Discussed the surgical course, Reviewed Intra-OP anesthesia mangement and issues during anesthesia, Set expectations for post-procedure period and Allowed opportunity for questions and acknowledgement of understanding      Vitals: (Last set prior to Anesthesia Care Transfer)    CRNA VITALS  12/22/2020 1009 - 12/22/2020 1039      12/22/2020             Resp Rate (observed):  21                Electronically Signed By: Charles Patrick Schlatter, APRN CRNA  December 22, 2020  10:39 AM

## 2020-12-22 NOTE — DISCHARGE INSTRUCTIONS
Endoscopic Ultrasound with Fine Needle Aspiration, along with Monitored Anesthesia Care by Guru Pedraza  For 24 hours after your procedure  Sedation:  1. Get plenty of rest. A responsible adult must stay with you for at least 24 hours after you leave the hospital.   2. Do not drive or use heavy equipment. If you have weakness or tingling, don't drive or use heavy equipment until this feeling goes away.  3. Do not drink alcohol.  4. Avoid strenuous or risky activities. Ask for help when climbing stairs.   5. You may feel lightheaded. IF so, sit for a few minutes before standing. Have someone help you get up.   6. If you have nausea (feel sick to your stomach): Drink only clear liquids such as apple juice, ginger ale, broth or 7-Up. Rest may also help. Be sure to drink enough fluids. Move to a regular diet as you feel able.  7. You may have a slight fever. Call the doctor if your fever is over 100 F (37.7 C) (taken under the tongue) or lasts longer than 24 hours.  8. You may have a dry mouth, a sore throat, muscle aches or trouble sleeping. These should go away after 24 hours.  9. Do not make important or legal decisions.   Procedural:  1. Start with sips of water. When your gag reflex has returned, you may return to your normal diet, medicines, and light exercise.  2. Some bloating is normal. You may have large burps or pass air.  3. You may have a sore throat for 2 to 3 days. If so, it may help to:    Use sore throat lozenges.    Gargle as need with salt water up to 4 times a day. Mix 1 cup of warm water with 1 teaspoon of salt. Do not swallow.  4. You may take Tylenol (acetaminophen) for pain unless your doctor has told you not to.   Call right away if you have:  1. Unusual pain in belly or chest pain not relieved with passing air.  2. Severe throat pain or trouble swallowing.  3. Black stools (tar-like looking bowel movement).  Signs of infection (fever).  Follow-up:  __X__ We took small tissue samples. Your  doctor will send you the results within two weeks.  If you have severe pain, bleeding, vomit blood, or shortness of breath, go to an emergency room.  If you have questions, call:  Endoscopy: Monday to Friday, 8 a.m. to 4:30 p.m. [phone] (We may have to call you back)  After hours: Hospital  [phone] (Ask for the GI fellow on call)

## 2020-12-22 NOTE — OR NURSING
Procedure: EUS with FNA x 2 sites  Sedation:monitored anesthesia care  Specimens: sent to lab, with pathology staff that was present.   O2: per CRNA  Tolerated procedure: well  Pt to recovery area in stable condition accompanied by CRNA  Other:  none    Kely Edwards RN

## 2020-12-23 NOTE — PROGRESS NOTES
Called Pt daughter to discuss Port Placement and arrange for Tx education. No answer left VM    Received email that Jass was available (she works in the system). We discussed Pt's upcoming port appointment, potential for entry in Dr Ortiz's study, and how to do the Pt Tx education. She would like an email with the materials and talking points. States there have been issues in the past with interpreters excluding information. I said I would accommodate this and to reach out for any questions or clarifications.

## 2020-12-30 NOTE — ANESTHESIA PREPROCEDURE EVALUATION
Anesthesia Pre-Procedure Evaluation    Patient: Essie Guzman   MRN:     [id number] Gender:   male   Age:    65 year old :      1955        Preoperative Diagnosis: Cancer of body of stomach (H) [C16.2]   Procedure(s):  SINGLE LUMEN CHEST INSERTION, VASCULAR ACCESS PORT @0745     LABS:  CBC:   Lab Results   Component Value Date    WBC 8.9 10/23/2020    WBC 9.3 2020    HGB 11.9 (L) 10/23/2020    HGB 13.0 (L) 2020    HCT 37.5 (L) 10/23/2020    HCT 39.4 (L) 2020     10/23/2020     2020     BMP:   Lab Results   Component Value Date     10/28/2020     2020    POTASSIUM 4.3 10/28/2020    POTASSIUM 4.1 2020    CHLORIDE 104 10/28/2020    CHLORIDE 101 2020    CO2 28 10/28/2020    CO2 24 2020    BUN 19 10/28/2020    BUN 14 2020    CR 0.84 10/28/2020    CR 0.89 2020     (H) 10/28/2020     (H) 2020     COAGS:   Lab Results   Component Value Date    PTT 27 10/11/2015    INR 1.04 10/11/2015     POC: No results found for: BGM, HCG, HCGS  OTHER:   Lab Results   Component Value Date    A1C 5.6 2020    HAWA 9.0 10/28/2020    ALBUMIN 3.5 10/23/2020    PROTTOTAL 7.5 10/23/2020    ALT 48 10/23/2020    AST 26 10/23/2020    ALKPHOS 87 10/23/2020    BILITOTAL 0.2 10/23/2020    LIPASE 215 2020    AMYLASE 72 2020    TSH 1.80 2020    SED 10 2014        Preop Vitals    BP Readings from Last 3 Encounters:   20 104/64   20 132/87   20 105/66    Pulse Readings from Last 3 Encounters:   20 (!) 49   20 57   20 71      Resp Readings from Last 3 Encounters:   20 (!) 7   20 16   20 16    SpO2 Readings from Last 3 Encounters:   20 96%   20 99%   20 97%      Temp Readings from Last 1 Encounters:   20 36.5  C (97.7  F) (Axillary)    Ht Readings from Last 1 Encounters:   20 1.524 m (5')      Wt Readings from Last 1 Encounters:    12/22/20 77.1 kg (170 lb)    Estimated body mass index is 33.2 kg/m  as calculated from the following:    Height as of 12/22/20: 1.524 m (5').    Weight as of 12/22/20: 77.1 kg (170 lb).     LDA:        No past medical history on file.   Past Surgical History:   Procedure Laterality Date     COLONOSCOPY N/A 11/24/2015    Procedure: COLONOSCOPY;  Surgeon: Clyde Mosher MD;  Location: [facility]     ESOPHAGOSCOPY, GASTROSCOPY, DUODENOSCOPY (EGD), COMBINED N/A 11/24/2020    Procedure: ESOPHAGOGASTRODUODENOSCOPY with biopsies using cold forceps;  Surgeon: Clyde Mosher MD;  Location: [facility]     ESOPHAGOSCOPY, GASTROSCOPY, DUODENOSCOPY (EGD), COMBINED N/A 12/22/2020    Procedure: ESOPHAGOGASTRODUODENOSCOPY, WITH FINE NEEDLE ASPIRATION BIOPSY, WITH ENDOSCOPIC ULTRASOUND GUIDANCE;  Surgeon: Guru Teri Pedraza MD;  Location: [facility]      No Known Allergies     Anesthesia Evaluation     . Pt has had prior anesthetic. Type: MAC    No history of anesthetic complications          ROS/MED HX    ENT/Pulmonary:  - neg pulmonary ROS    (-) tobacco use, asthma and sleep apnea   Neurologic:  - neg neurologic ROS     Cardiovascular: Comment: Sinus bradycardia - neg cardiovascular ROS   (+) ----. : . . . :. . Previous cardiac testing date:results:date: results:ECG reviewed date:2018 results: date: results:          METS/Exercise Tolerance: Comment: [place], walks up to 10 miles/day, very active. >4 METS   Hematologic:  - neg hematologic  ROS      (-) history of blood clots and History of Transfusion   Musculoskeletal:  - neg musculoskeletal ROS       GI/Hepatic: Comment: Newly diagnosed gastric cancer       (-) liver disease   Renal/Genitourinary:  - ROS Renal section negative       Endo:  - neg endo ROS       Psychiatric:  - neg psychiatric ROS       Infectious Disease:  - neg infectious disease ROS       Malignancy:   (+) Malignancy History of GI  GI CA Active status post,         Other:    (+) no  H/O Chronic Pain,                       PHYSICAL EXAM:   Mental Status/Neuro: A/A/O; Age Appropriate   Airway: Facies: Feasible  Mallampati: III  Mouth/Opening: Full  TM distance: > 6 cm  Neck ROM: Full   Respiratory: Auscultation: CTAB     Resp. Rate: Normal     Resp. Effort: Normal      CV: Rhythm: Regular  Rate: Age appropriate  Heart: Murmur (Systolic; soft)  Edema: None   Comments:      Dental: Dentures  Dentures: Upper; Lower; Complete                Assessment:   ASA SCORE: 2    H&P: History and physical reviewed and following examination; no interval change.   Smoking Status:  Non-Smoker/Unknown   NPO Status: NPO Appropriate     Plan:   Anes. Type:  MAC   Pre-Medication: None   Induction:  N/a   Airway: Native Airway   Access/Monitoring: PIV   Maintenance: N/a     Postop Plan:   Postop Pain: None  Postop Sedation/Airway: Not planned  Disposition: Outpatient     PONV Management:    Prevention: Ondansetron     CONSENT: Direct conversation   Plan and risks discussed with: Patient   Blood Products: Consent Deferred (Minimal Blood Loss)                PAC Discussion and Assessment    ASA Classification: 2  Case is suitable for: ASC OK for Surgery: [facility].  Anesthetic techniques and relevant risks discussed: MAC with GA as backup  Invasive monitoring and risk discussed: No  Types:   Possibility and Risk of blood transfusion discussed: No  NPO instructions given:   Additional anesthetic preparation and risks discussed:   Needs early admission to pre-op area:   Other:     PAC Resident/NP Anesthesia Assessment:  Essie Guzman is a 65 year old male scheduled to undergo ENDOSCOPIC ULTRASOUND, ESOPHAGOSCOPY / UPPER GASTROINTESTINAL TRACT (GI) with Guru Milo MD on 12/22/20 at [facility] for treatment of Gastric cancer.       Pt has had prior anesthetic. Type: MAC    No history of anesthetic complications    He has the following specific operative considerations:    # CHASITY 3/8 = intermediate risk  # VTE risk: 3%  # Risk of PONV score = 1.  If > 2, anti-emetic intervention recommended.  # Anesthesia considerations:  Refer to PAC assessment in anesthesia records      CARDIAC: METS >4,  [place], walks up to 10 miles/day, very active.     # RCRI : No serious cardiac risks.  0.4% risk of major adverse cardiac event.     #  Sinus bradycardia, average HR in 50s      PULMONARY:     # Never smoked    # No asthma or inhaler use    GI:     # Newly diagnosed gastric cancer     ENDO: BMI 33    # No DM    ORTHO: full ROM of neck      Patient is optimized and is acceptable candidate for the proposed procedure. No further diagnostic evaluation is needed.    Reviewed and Signed by PAC Mid-Level Provider/Resident  Mid-Level Provider/Resident: Annalise To PA-C  Date: 12/17/20  Time: 1240    Attending Anesthesiologist Anesthesia Assessment:      Reviewed and Signed by PAC Anesthesiologist  Anesthesiologist: Shira  Date:   Time:   Pass/Fail:   Disposition:     PAC Pharmacist Assessment:        Pharmacist:   Date:   Time:    Kamlesh Schwartz MD

## 2020-12-30 NOTE — LETTER
12/30/2020         RE: Essie Guzman  [id number]  [facility] [address]        Dear Colleague,    Thank you for referring your patient, Essie Guzman, to the [facility]. Please see a copy of my visit note below.      MEDICAL ONCOLOGY FOLLOW UP NOTE    PATIENT NAME: Essie Guzman  ENCOUNTER DATE: 12/30/2020    Care Team  Primary Oncologist: Brennan Mccarthy MD    REASON FOR CURRENT VISIT: New diagnosis of gastric cancer    HISTORY OF PRESENT ILLNESS:  Mr. 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 Initially Rx for H.pylori with Abx and PPI. LFT's were mildly elevated.  11/24/20- UGI endoscopy at [facility] with normal esophagus. Non-bleeding gastric ulcer in pylorus with no stigmata of bleeding.  Non-bleeding duodenal ulcer with no stigmata of bleeding. Biopsy of Stomach, pylorus, - Poorly-differentiated carcinoma; consistent with adenocarcinoma (poorly cohesive type). HER2 by FISH was non-amplified  11/24/20- CT CAP- bilateral hilar lymph nodes are indeterminate (1.4 x 1.0 cm) right infrahilar lymph node. Multiple enlarged retroperitoneal and perigastric lymph nodes (10 mm right paraduodenal lymph node, 11 mm necrotic left para-aortic lymph node and a 10 mm left para-aortic lymph node.  12/16/20-  PET/CT with metastatic disease- Hypermetabolic circumferential ulcerated mass at the gastric antrum, Multiple metastatic hypermetabolic lymph nodes:  adjacent just  inferior to the caudate lobe of the liver, Multiple enlarged, centrally necrotic, and hypermetabolic retroperitoneal para-aortic and paracaval lymph nodes.    12/18/20-Saw [facility]- Due to PET CT showing retroperitoneal lymphadenopathy not a candidate for surgical resection  12/22/20- EUS staging and biopsy- [id number] - Partially-obstructing cratered pre-pyloric gastric ulcer with a clean ulcer base, fully-circumferential ulcer  Several sub-centimeter malignant appearing round, well-circumscribed, hypoechoic lymph nodes were  visualized along the aorta, from the 2nd portion of  duodenum which corresponds to the hypermetabolic nodes seen on the PET scan.   12/31/- Port placement    Interval Hx:  Essie Guzman Patient was called with a [employer] . His daughter (Osman) also provided some history.     He has recovered well from the recent upper GI endoscopy.  He currently actually does not have any symptoms.  The the abdominal pain or discomfort is now completely resolved.  His stools are normal in color and is normal in frequency.  He has no nausea or vomiting.  He says that his appetite is improving and now that he is eating better. He has previously acknowledged that he had lost about 50 lbs over 6 months, some of it was intentional due to change in diet habits and more activity..  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 He is up on his feet most of the day. He able to walk severel blocks with SOB or fatigue.  He can walk almost 50 miles of walking per week.   No weakness, tingling numbness, abdominal pain, N/V/D, melena, hematochezia, headache, recent fever or infection.        REVIEW OF SYSTEMS: 14 point ROS negative other than the symptoms noted above in the HPI.    PAST MEDICAL AND SURGICAL HISTORY:   Active Ambulatory Problems     Diagnosis Date Noted     Malignant neoplasm of stomach metastatic to retroperitoneum (H) 12/21/2020     Resolved Ambulatory Problems     Diagnosis Date Noted      Hematochezia 10/11/2015     No Additional Past Medical History   No surgeeies    SOCIAL HISTORY:   Social History     Tobacco Use     Smoking status: Never Smoker     Smokeless tobacco: Never Used   Substance Use Topics     Alcohol use: No     Drug use: No   Non-smoker/non-drinker  He is now retired. 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  He lives in [place] with family, sposue, sons and grandkids  He has 6 kids, lives with 2 younger boys, 4 grand kids      FAMILY HISTORY:   Family History   Problem Relation Age of Onset     Asthma No family hx of      Diabetes No family hx of      Hypertension No family hx of      Cerebrovascular Disease No family hx of      Cancer No family hx of      Colon Cancer No family hx of      Anesthesia Reaction No family hx of      Deep Vein Thrombosis (DVT) No family hx of    No family hx any cancer      ALLERGIES: No Known Allergies    CURRENT MEDICATIONS:   Current Outpatient Medications:      omeprazole (PRILOSEC) 20 MG DR capsule, Take 1 capsule (20 mg) by mouth daily, Disp: 90 capsule, Rfl: 1    PHYSICAL EXAMINATION:  Vital signs: There were no vitals taken for this visit.  ECOG performance status of 0. Fatigue 0.  GENERAL: Well-nourished healthy-appearing  male in chair, no acute distress.       LABORATORY DATA:     CBC RESULTS:   CBC RESULTS:   Recent Labs   Lab Test 10/23/20  1422 09/08/20  1410 10/12/15  0520   WBC 8.9 9.3 7.2   HGB 11.9* 13.0* 13.6   HCT 37.5* 39.4* 41.2   MCV 92 94 92   MCHC 31.7 33.0 33.0   RDW 12.6 12.9 13.0    297 201        Last Comprehensive Metabolic Panel:  Sodium   Date Value Ref Range Status   10/28/2020 136 133 - 144 mmol/L Final   09/08/2020 133 133 - 144 mmol/L Final   10/11/2015 134 133 - 144 mmol/L Final     Potassium   Date Value Ref Range Status   10/28/2020 4.3 3.4 - 5.3 mmol/L Final   09/08/2020 4.1 3.4 - 5.3 mmol/L Final   10/11/2015 4.3 3.4 - 5.3 mmol/L Final     Comment:     Specimen slightly hemolyzed, potassium  may be falsely elevated     Chloride   Date Value Ref Range Status   10/28/2020 104 94 - 109 mmol/L Final   09/08/2020 101 94 - 109 mmol/L Final   10/11/2015 103 94 - 109 mmol/L Final     Carbon Dioxide   Date Value Ref Range Status   10/28/2020 28 20 - 32 mmol/L Final   09/08/2020 24 20 - 32 mmol/L Final   10/11/2015 23 20 - 32 mmol/L Final     Anion Gap   Date Value Ref Range Status   10/28/2020 4 3 - 14 mmol/L Final   09/08/2020 8 3 - 14 mmol/L Final   10/11/2015 8 3 - 14 mmol/L Final     Glucose   Date Value Ref Range Status   10/28/2020 106 (H) 70 - 99 mg/dL Final   09/08/2020 124 (H) 70 - 99 mg/dL Final   10/11/2015 126 (H) 70 - 99 mg/dL Final     Urea Nitrogen   Date Value Ref Range Status   10/28/2020 19 7 - 30 mg/dL Final   09/08/2020 14 7 - 30 mg/dL Final   10/11/2015 25 7 - 30 mg/dL Final     Creatinine   Date Value Ref Range Status   10/28/2020 0.84 0.66 - 1.25 mg/dL Final   09/08/2020 0.89 0.66 - 1.25 mg/dL Final   10/11/2015 0.96 0.66 - 1.25 mg/dL Final     GFR Estimate   Date Value Ref Range Status   10/28/2020 >90 >60 mL/min/[1.73_m2] Final     Comment:     Non  GFR Calc  Starting 12/18/2018, serum creatinine based estimated GFR (eGFR) will be   calculated using the Chronic Kidney Disease Epidemiology Collaboration   (CKD-EPI) equation.     09/08/2020 88 >60 mL/min/[1.73_m2] Final     Comment:     Non  GFR Calc  Starting 12/18/2018, serum creatinine based estimated GFR (eGFR) will be   calculated using the Chronic Kidney Disease Epidemiology Collaboration   (CKD-EPI) equation.     10/11/2015 80 >60 mL/min/1.7m2 Final     Comment:     Non  GFR Calc     Calcium   Date Value Ref Range Status   10/28/2020 9.0 8.5 - 10.1 mg/dL Final   09/08/2020 9.5 8.5 - 10.1 mg/dL Final   10/11/2015 9.0 8.5 - 10.1 mg/dL Final     Bilirubin Total   Date Value Ref Range Status   10/23/2020 0.2 0.2 - 1.3 mg/dL Final   09/08/2020 0.5 0.2 - 1.3 mg/dL Final     Alkaline  Phosphatase   Date Value Ref Range Status   10/23/2020 87 40 - 150 U/L Final   09/08/2020 81 40 - 150 U/L Final     ALT   Date Value Ref Range Status   10/23/2020 48 0 - 70 U/L Final   09/08/2020 75 (H) 0 - 70 U/L Final     AST   Date Value Ref Range Status   10/23/2020 26 0 - 45 U/L Final   09/08/2020 46 (H) 0 - 45 U/L Final     Imaging:    Combined Report of: PET and CT on  12/16/2020 10:02 AM :     1. PET of the neck, chest, abdomen, and pelvis.  2. PET CT Fusion for Attenuation Correction and Anatomical  Localization:    3. Diagnostic CT scan of the chest, abdomen, and pelvis with  intravenous contrast for interpretation.  4. 3D MIP and PET-CT fused images were processed on an independent  workstation and archived to PACS and reviewed by a radiologist.     Technique:     1. PET: The patient received 10.04 mCi of F-18-FDG; the serum glucose  was 105 prior to administration, body weight was 77.5 kg. Images were  evaluated in the axial, sagittal, and coronal planes as well as the  rotational whole body MIP. Images were acquired from the Vertex to the  Feet.     UPTAKE WAS MEASURED AT 61 MINUTES.      BACKGROUND:  Liver SUV max= 4.1,   Aorta Blood SUV Max: 3.3.      2. CT: Volumetric acquisition for clinical interpretation of the  chest, abdomen, and pelvis acquired at 3 mm sections after the  uneventful administration of intravenous contrast. The chest, abdomen,  and pelvis were evaluated at 5 mm sections in bone, soft tissue, and  lung windows.       The patient received 104 cc of Isovue 370 intravenously for the  examination.       3. 3D MIP and PET-CT fused images were processed on an independent  workstation and archived to PACS and reviewed by a radiologist.     INDICATION: Gastric cancer, staging scan; Carcinoma of body of stomach  (H); Gastric cancer (H)     ADDITIONAL INFORMATION OBTAINED FROM EMR: Weight loss of 50 pounds  over the last 6 months. Upper GI endoscopy with biopsy of ulcer at the  gastric  pylorus performed 11/24/2020 confirmed poorly differentiated  carcinoma consistent with adenocarcinoma.     COMPARISON: CT chest abdomen and pelvis 11/24/2020. CT abdomen and  pelvis 10/11/2015.     FINDINGS:      HEAD/NECK:  There is no  suspicious FDG uptake in the neck.      The paranasal sinuses are clear. The mastoid air cells are clear.      The mucosal pharyngeal space, the , prevertebral and carotid  spaces are within normal limits.      No masses, mass effect or pathologically enlarged lymph nodes are  evident. The thyroid gland is normal.     CHEST:  There is no suspicious FDG uptake in the chest.      No adenopathy in the chest. Heart is not enlarged. No pericardial  effusion. Main pulmonary artery measures up to 3.3 cm in diameter.  Moderate atherosclerotic calcification of the left anterior descending  coronary artery, mild circumflex and right coronary artery  calcification. Esophagus is unremarkable. Trachea and central airways  are clear of debris. No pleural effusion or pneumothorax. Trace linear  atelectasis in the left lung base. No pulmonary consolidation. No  worrisome pulmonary nodule.        ABDOMEN AND PELVIS:  Hypermetabolic (SUV max 16.2) circumferential heterogeneous thickening  at the gastric antrum with 2.2 cm ulcer along the inferior margin  (series 13, image 35). This results in gastric luminal narrowing and  associated distention of the proximal stomach, which contains a large  amount of food residue. Additionally, there is focal uptake (SUV max  6.96) which is misregistered but likely associated with a lymph node  just inferior to the caudate lobe.  Focal fatty infiltration along the  falciform ligament.     There are multiple enlarged and hypermetabolic retroperitoneal lymph  nodes. Examples include:  - 1.5 cm left para-aortic node immediately inferior to the left  adrenal gland (SUV max 16.0)  - Two 1.4 cm retroaortic/left common iliac nodes (SUV max 14.3  and  11.3)     Focal hypermetabolism (SUV max 10.7) within the lumen of the cecum,  with no associated mass on CT images. The left testicle is partially  retracted into the left inguinal canal.      The pancreas, spleen, and adrenal glands are unremarkable. Benign left  renal cysts. No hydronephrosis or renal stone. Urinary bladder is  unremarkable. No dilated small or large bowel. Right lower quadrant  appendix is normal. No free fluid or free air. Major vessels are  normal in caliber.     LOWER EXTREMITIES:   No abnormal masses or hypermetabolic lesions.     BONES:   There are no suspicious lytic or blastic osseous lesions. There is no  abnormal FDG uptake in the skeleton.                                                                      IMPRESSION: In this patient with biopsy-proven poorly differentiated  adenocarcinoma at the gastric antrum:  1. Hypermetabolic circumferential ulcerated mass at the gastric antrum  corresponding to biopsy-proven adenocarcinoma.  2. Multiple metastatic hypermetabolic lymph nodes:   2a. Focal mild hypermetabolism in lymph node adjacent just inferior to  the caudate lobe of the liver.    2b. Multiple enlarged, centrally necrotic, and hypermetabolic  retroperitoneal para-aortic and paracaval lymph nodes.      Pathology    A. Lymph node, para-aortic, endoscopic ultrasound guided fine needle   aspiration:   - Positive for malignancy   - Metastatic poorly differentiated carcinoma.   Specimen Adequacy: Satisfactory for evaluation.     B. Lymph node, superior mesenteric artery, endoscopic ultrasound guided   fine needle aspiration:   - Positive for malignancy   - Metastatic poorly differentiated carcinoma   Specimen Adequacy: Satisfactory for evaluation        ASSESSMENT AND PLAN:    Mr. Essie Guzman is a 65 year old  male with PMHx of H.pylori infection, and recently diagnosed stomach cancer. He is otherwise healthy and does not have any co morbidities.    --Metastatic  Poorly-differentiated adenocarcinoma of gastric pylorus (poorly cohesive type) diagnosed 11/2020, metastatic to retroperitoneal LN ([id number])-Stage LUDMILA (HER2 by FISH was non-amplified)  --MMR, HER2 by IHC, PD-L1 pending (on recent LN biopsy specimen)    I again discussed the results of the PET scan which showed distant metastatic disease in the retroperitoneal lymph nodes.  I then also discussed the results of the pathology from the recent EUS procedure where 2 lymph nodes were sampled, both lymph nodes came back positive for metastatic adenocarcinoma.  I then discussed that this is stage IV cancer, and briefly discussed the natural history of it and this is not curable curable but very treatable. I discussed that typically the standard of care treatment option is to do chemotherapy typically with 5 5-Follow-up based regimens in combination with platinum chemotherapy.  He was concerned about the side effects of chemotherapy and that might make him have more symptoms from the side effects given that right now he is minimally symptomatic from the cancer itself. I then discussed that the goals of treatment are to prevent further progression, improve quality of life and prolong survival.  I then discussed that if he chose not to get any treatment, then there is high likelihood that his cancer might further progress and eventually he might become symptomatic.  He said that he would need time to think about chemotherapy as this is a very big decision for him and his family.  He said that he will need 3 to 4 weeks to think about it and will get back to me by end of January.  He is aware that his cancer might progress even further with this potential delay in initiating treatment.    I also discussed that in the future if and when he is interested in treatment apart from standard of care FOLFOX chemotherapy there is a potential option of participating in a clinical trial using the combination FOLFOX+paclitaxel (Big 10  "clinical trial) (available at [facility], [facility], [facility], [facility], [facility]; PI: Jroi Ortiz).   He verbalized understanding of her discussion and several other questions from him and her daughter were answered to their satisfaction.        Plan  --Cancel port placement  --RTC on Jan 25, 2021 to discuss Rx options  --Might need an undated CT scan for a new baseline, before we start treatment    #Asmptomaic/minimally symptomatic COViD  -Pt had covid test positive 4 weeks ago which was done prior to procedure. He has no symptoms currently.    #Nutrition  Pt has lost 50 lbs over 6 months, some of this was intentional due to diet changes and increased physical activity.    BILLING: I spent 40 minutes in consultation and over 50% of the time was spent on counseling and coordinating care.    Brennan Mccarthy M.D.   of Medicine  Division of Hematology, Oncology and Transplantation  [facility]    Essie Guzman is a 65 year old male who is being evaluated via a billable video visit.      The patient has been notified of following:     \"This video visit will be conducted via a call between you and your physician/provider. We have found that certain health care needs can be provided without the need for an in-person physical exam.  This service lets us provide the care you need with a video conversation.  If a prescription is necessary we can send it directly to your pharmacy.  If lab work is needed we can place an order for that and you can then stop by our lab to have the test done at a later time.    Video visits are billed at different rates depending on your insurance coverage.  Please reach out to your insurance provider with any questions.    If during the course of the call the physician/provider feels a video visit is not appropriate, you will not be charged for this service.\"    Patient has given verbal consent for Video visit? Yes  How " would you like to obtain your AVS? MyChart  If you are dropped from the video visit, the video invite should be resent to: Send to e-mail at: [email or web address]  Will anyone else be joining your video visit? No      Vitals - Patient Reported  Weight (Patient Reported): 77.1 kg (170 lb)  Height (Patient Reported): 152.4 cm (5')  BMI (Based on Pt Reported Ht/Wt): 33.2  Pain Score: No Pain (0)        I have reviewed and updated patient's allergy and medication list.    Concerns: NONE  Refills: NONE      Christina Painting CMA      Video-Visit Details    Type of service:  Video Visit    Video Start Time: 08:35 AM  Video End Time: 09:15 AM    Originating Location (pt. Location): Home    Distant Location (provider location):  [facility]     Platform used for Video Visit: Raul Mccarthy MD

## 2020-12-30 NOTE — PROGRESS NOTES
MEDICAL ONCOLOGY FOLLOW UP NOTE    PATIENT NAME: Essie Guzman  ENCOUNTER DATE: 12/30/2020    Care Team  Primary Oncologist: Brennan Mccarthy MD    REASON FOR CURRENT VISIT: New diagnosis of gastric cancer    HISTORY OF PRESENT ILLNESS:  Mr. 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 Initially Rx for H.pylori with Abx and PPI. LFT's were mildly elevated.  11/24/20- UGI endoscopy at [facility] with normal esophagus. Non-bleeding gastric ulcer in pylorus with no stigmata of bleeding.  Non-bleeding duodenal ulcer with no stigmata of bleeding. Biopsy of Stomach, pylorus, - Poorly-differentiated carcinoma; consistent with adenocarcinoma (poorly cohesive type). HER2 by FISH was non-amplified  11/24/20- CT CAP- bilateral hilar lymph nodes are indeterminate (1.4 x 1.0 cm) right infrahilar lymph node. Multiple enlarged retroperitoneal and perigastric lymph nodes (10 mm right paraduodenal lymph node, 11 mm necrotic left para-aortic lymph node and a 10 mm left para-aortic lymph node.  12/16/20-  PET/CT with metastatic disease- Hypermetabolic circumferential ulcerated mass at the gastric antrum, Multiple metastatic hypermetabolic lymph nodes:  adjacent just inferior to the caudate lobe of the liver, Multiple enlarged, centrally necrotic, and hypermetabolic retroperitoneal para-aortic and paracaval lymph nodes.    12/18/20-Saw [facility]- Due to PET CT showing retroperitoneal lymphadenopathy not a candidate  for surgical resection  12/22/20- EUS staging and biopsy- [id number] - Partially-obstructing cratered pre-pyloric gastric ulcer with a clean ulcer base, fully-circumferential ulcer  Several sub-centimeter malignant appearing round, well-circumscribed, hypoechoic lymph nodes were  visualized along the aorta, from the 2nd portion of  duodenum which corresponds to the hypermetabolic nodes seen on the PET scan.   12/31/- Port placement    Interval Hx:  Essie Guzman [place] [facility] [place] (Osman) also provided some history.     He has recovered well from the recent upper GI endoscopy.  He currently actually does not have any symptoms.  The the abdominal pain or discomfort is now completely resolved.  His stools are normal in color and is normal in frequency.  He has no nausea or vomiting.  He says that his appetite is improving and now that he is eating better. He has previously acknowledged that he had lost about 50 lbs over 6 months, some of it was intentional due to change in diet habits and more activity..  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 He is up on his feet most of the day. He able to walk severel blocks with SOB or fatigue.  He can walk almost 50 miles of walking per week.   No weakness, tingling numbness, abdominal pain, N/V/D, melena, hematochezia, headache, recent fever or infection.        REVIEW OF SYSTEMS: 14 point ROS negative other than the symptoms noted above in the HPI.    PAST MEDICAL AND SURGICAL HISTORY:   Active Ambulatory Problems     Diagnosis Date Noted     Malignant neoplasm of stomach metastatic to retroperitoneum (H) 12/21/2020     Resolved Ambulatory Problems     Diagnosis Date Noted     Hematochezia 10/11/2015     No Additional Past Medical History   No surgeeies    SOCIAL HISTORY:   Social History     Tobacco Use     Smoking status: Never Smoker     Smokeless tobacco: Never Used   Substance Use Topics     Alcohol use: No     Drug use:  No   Non-smoker/non-drinker  He is now retired. 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  He lives in [place] with family, sposue, sons and grandkids  He has 6 kids, lives with 2 younger boys, 4 grand kids      FAMILY HISTORY:   Family History   Problem Relation Age of Onset     Asthma No family hx of      Diabetes No family hx of      Hypertension No family hx of      Cerebrovascular Disease No family hx of      Cancer No family hx of      Colon Cancer No family hx of      Anesthesia Reaction No family hx of      Deep Vein Thrombosis (DVT) No family hx of    No family hx any cancer      ALLERGIES: No Known Allergies    CURRENT MEDICATIONS:   Current Outpatient Medications:      omeprazole (PRILOSEC) 20 MG DR capsule, Take 1 capsule (20 mg) by mouth daily, Disp: 90 capsule, Rfl: 1    PHYSICAL EXAMINATION:  Vital signs: There were no vitals taken for this visit.  ECOG performance status of 0. Fatigue 0.  GENERAL: Well-nourished healthy-appearing  male in chair, no acute distress.       LABORATORY DATA:     CBC RESULTS:   CBC RESULTS:   Recent Labs   Lab Test 10/23/20  1422 09/08/20  1410 10/12/15  0520   WBC 8.9 9.3 7.2   HGB 11.9* 13.0* 13.6   HCT 37.5* 39.4* 41.2   MCV 92 94 92   MCHC 31.7 33.0 33.0   RDW 12.6 12.9 13.0    297 201        Last Comprehensive Metabolic Panel:  Sodium   Date Value Ref Range Status   10/28/2020 136 133 - 144 mmol/L Final   09/08/2020 133 133 - 144 mmol/L Final   10/11/2015 134 133 - 144 mmol/L Final     Potassium   Date Value Ref Range Status   10/28/2020 4.3 3.4 - 5.3 mmol/L Final   09/08/2020 4.1 3.4 - 5.3 mmol/L Final   10/11/2015 4.3 3.4 - 5.3 mmol/L Final     Comment:     Specimen slightly hemolyzed, potassium may be falsely elevated     Chloride   Date Value Ref Range Status   10/28/2020 104 94 - 109 mmol/L Final   09/08/2020 101 94 - 109 mmol/L Final   10/11/2015 103 94 - 109 mmol/L Final     Carbon Dioxide   Date Value Ref Range Status   10/28/2020 28 20 -  32 mmol/L Final   09/08/2020 24 20 - 32 mmol/L Final   10/11/2015 23 20 - 32 mmol/L Final     Anion Gap   Date Value Ref Range Status   10/28/2020 4 3 - 14 mmol/L Final   09/08/2020 8 3 - 14 mmol/L Final   10/11/2015 8 3 - 14 mmol/L Final     Glucose   Date Value Ref Range Status   10/28/2020 106 (H) 70 - 99 mg/dL Final   09/08/2020 124 (H) 70 - 99 mg/dL Final   10/11/2015 126 (H) 70 - 99 mg/dL Final     Urea Nitrogen   Date Value Ref Range Status   10/28/2020 19 7 - 30 mg/dL Final   09/08/2020 14 7 - 30 mg/dL Final   10/11/2015 25 7 - 30 mg/dL Final     Creatinine   Date Value Ref Range Status   10/28/2020 0.84 0.66 - 1.25 mg/dL Final   09/08/2020 0.89 0.66 - 1.25 mg/dL Final   10/11/2015 0.96 0.66 - 1.25 mg/dL Final     GFR Estimate   Date Value Ref Range Status   10/28/2020 >90 >60 mL/min/[1.73_m2] Final     Comment:     Non  GFR Calc  Starting 12/18/2018, serum creatinine based estimated GFR (eGFR) will be   calculated using the Chronic Kidney Disease Epidemiology Collaboration   (CKD-EPI) equation.     09/08/2020 88 >60 mL/min/[1.73_m2] Final     Comment:     Non  GFR Calc  Starting 12/18/2018, serum creatinine based estimated GFR (eGFR) will be   calculated using the Chronic Kidney Disease Epidemiology Collaboration   (CKD-EPI) equation.     10/11/2015 80 >60 mL/min/1.7m2 Final     Comment:     Non  GFR Calc     Calcium   Date Value Ref Range Status   10/28/2020 9.0 8.5 - 10.1 mg/dL Final   09/08/2020 9.5 8.5 - 10.1 mg/dL Final   10/11/2015 9.0 8.5 - 10.1 mg/dL Final     Bilirubin Total   Date Value Ref Range Status   10/23/2020 0.2 0.2 - 1.3 mg/dL Final   09/08/2020 0.5 0.2 - 1.3 mg/dL Final     Alkaline Phosphatase   Date Value Ref Range Status   10/23/2020 87 40 - 150 U/L Final   09/08/2020 81 40 - 150 U/L Final     ALT   Date Value Ref Range Status   10/23/2020 48 0 - 70 U/L Final   09/08/2020 75 (H) 0 - 70 U/L Final     AST   Date Value Ref Range Status    10/23/2020 26 0 - 45 U/L Final   09/08/2020 46 (H) 0 - 45 U/L Final     Imaging:    Combined Report of: PET and CT on  12/16/2020 10:02 AM :     1. PET of the neck, chest, abdomen, and pelvis.  2. PET CT Fusion for Attenuation Correction and Anatomical  Localization:    3. Diagnostic CT scan of the chest, abdomen, and pelvis with  intravenous contrast for interpretation.  4. 3D MIP and PET-CT fused images were processed on an independent  workstation and archived to PACS and reviewed by a radiologist.     Technique:     1. PET: The patient received 10.04 mCi of F-18-FDG; the serum glucose  was 105 prior to administration, body weight was 77.5 kg. Images were  evaluated in the axial, sagittal, and coronal planes as well as the  rotational whole body MIP. Images were acquired from the Vertex to the  Feet.     UPTAKE WAS MEASURED AT 61 MINUTES.      BACKGROUND:  Liver SUV max= 4.1,   Aorta Blood SUV Max: 3.3.      2. CT: Volumetric acquisition for clinical interpretation of the  chest, abdomen, and pelvis acquired at 3 mm sections after the  uneventful administration of intravenous contrast. The chest, abdomen,  and pelvis were evaluated at 5 mm sections in bone, soft tissue, and  lung windows.       The patient received 104 cc of Isovue 370 intravenously for the  examination.       3. 3D MIP and PET-CT fused images were processed on an independent  workstation and archived to PACS and reviewed by a radiologist.     INDICATION: Gastric cancer, staging scan; Carcinoma of body of stomach  (H); Gastric cancer (H)     ADDITIONAL INFORMATION OBTAINED FROM EMR: Weight loss of 50 pounds  over the last 6 months. Upper GI endoscopy with biopsy of ulcer at the  gastric pylorus performed 11/24/2020 confirmed poorly differentiated  carcinoma consistent with adenocarcinoma.     COMPARISON: CT chest abdomen and pelvis 11/24/2020. CT abdomen and  pelvis 10/11/2015.     FINDINGS:      HEAD/NECK:  There is no  suspicious FDG uptake  in the neck.      The paranasal sinuses are clear. The mastoid air cells are clear.      The mucosal pharyngeal space, the , prevertebral and carotid  spaces are within normal limits.      No masses, mass effect or pathologically enlarged lymph nodes are  evident. The thyroid gland is normal.     CHEST:  There is no suspicious FDG uptake in the chest.      No adenopathy in the chest. Heart is not enlarged. No pericardial  effusion. Main pulmonary artery measures up to 3.3 cm in diameter.  Moderate atherosclerotic calcification of the left anterior descending  coronary artery, mild circumflex and right coronary artery  calcification. Esophagus is unremarkable. Trachea and central airways  are clear of debris. No pleural effusion or pneumothorax. Trace linear  atelectasis in the left lung base. No pulmonary consolidation. No  worrisome pulmonary nodule.        ABDOMEN AND PELVIS:  Hypermetabolic (SUV max 16.2) circumferential heterogeneous thickening  at the gastric antrum with 2.2 cm ulcer along the inferior margin  (series 13, image 35). This results in gastric luminal narrowing and  associated distention of the proximal stomach, which contains a large  amount of food residue. Additionally, there is focal uptake (SUV max  6.96) which is misregistered but likely associated with a lymph node  just inferior to the caudate lobe.  Focal fatty infiltration along the  falciform ligament.     There are multiple enlarged and hypermetabolic retroperitoneal lymph  nodes. Examples include:  - 1.5 cm left para-aortic node immediately inferior to the left  adrenal gland (SUV max 16.0)  - Two 1.4 cm retroaortic/left common iliac nodes (SUV max 14.3 and  11.3)     Focal hypermetabolism (SUV max 10.7) within the lumen of the cecum,  with no associated mass on CT images. The left testicle is partially  retracted into the left inguinal canal.      The pancreas, spleen, and adrenal glands are unremarkable. Benign left  renal  cysts. No hydronephrosis or renal stone. Urinary bladder is  unremarkable. No dilated small or large bowel. Right lower quadrant  appendix is normal. No free fluid or free air. Major vessels are  normal in caliber.     LOWER EXTREMITIES:   No abnormal masses or hypermetabolic lesions.     BONES:   There are no suspicious lytic or blastic osseous lesions. There is no  abnormal FDG uptake in the skeleton.                                                                      IMPRESSION: In this patient with biopsy-proven poorly differentiated  adenocarcinoma at the gastric antrum:  1. Hypermetabolic circumferential ulcerated mass at the gastric antrum  corresponding to biopsy-proven adenocarcinoma.  2. Multiple metastatic hypermetabolic lymph nodes:   2a. Focal mild hypermetabolism in lymph node adjacent just inferior to  the caudate lobe of the liver.    2b. Multiple enlarged, centrally necrotic, and hypermetabolic  retroperitoneal para-aortic and paracaval lymph nodes.      Pathology    A. Lymph node, para-aortic, endoscopic ultrasound guided fine needle   aspiration:   - Positive for malignancy   - Metastatic poorly differentiated carcinoma.   Specimen Adequacy: Satisfactory for evaluation.     B. Lymph node, superior mesenteric artery, endoscopic ultrasound guided   fine needle aspiration:   - Positive for malignancy   - Metastatic poorly differentiated carcinoma   Specimen Adequacy: Satisfactory for evaluation        ASSESSMENT AND PLAN:    Mr. Essie Guzman is a 65 year old  male with PMHx of H.pylori infection, and recently diagnosed stomach cancer. He is otherwise healthy and does not have any co morbidities.    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  I then also discussed the results of the pathology from the recent EUS procedure where 2 lymph nodes were sampled, both lymph nodes came back positive for metastatic adenocarcinoma.  I then discussed that this is stage IV cancer, and briefly discussed the natural history of it and this is not curable curable but very treatable. I discussed that typically the standard of care treatment option is to do chemotherapy typically with 5 5-Follow-up based regimens in combination with platinum chemotherapy.  He was concerned about the side effects of chemotherapy and that might make him have more symptoms from the side effects given that right now he is minimally symptomatic from the cancer itself. I then discussed that the goals of treatment are to prevent further progression, improve quality of life and prolong survival.  I then discussed that if he chose not to get any treatment, then there is high likelihood that his cancer might further progress and eventually he might become symptomatic.  He said that he would need time to think about chemotherapy as this is a very big decision for him and his family.  He said that he will need 3 to 4 weeks to think about it and will get back to me by end of January.  He is aware that his cancer might progress even further with this potential delay in initiating treatment.    I also discussed that in the future if and when he is interested in treatment apart from standard of care FOLFOX chemotherapy there is a potential option of participating in a clinical trial using the combination FOLFOX+paclitaxel (Big 10 clinical trial) (available at [facility], [facility], [facility], [facility], [facility]; PI: Jori Ortiz).   He verbalized understanding of her discussion and several other questions from him and her daughter were answered to their  "satisfaction.        Plan  --Cancel port placement  --RTC on Jan 25, 2021 to discuss Rx options  --Might need an undated CT scan for a new baseline, before we start treatment    #Asmptomaic/minimally symptomatic COViD  -Pt had covid test positive 4 weeks ago which was done prior to procedure. He has no symptoms currently.    #Nutrition  Pt has lost 50 lbs over 6 months, some of this was intentional due to diet changes and increased physical activity.    BILLING: I spent 40 minutes in consultation and over 50% of the time was spent on counseling and coordinating care.    Brennan Mccarthy M.D.   of Medicine  Division of Hematology, Oncology and Transplantation  [facility]    Essie Guzman is a 65 year old male who is being evaluated via a billable video visit.      The patient has been notified of following:     \"This video visit will be conducted via a call between you and your physician/provider. We have found that certain health care needs can be provided without the need for an in-person physical exam.  This service lets us provide the care you need with a video conversation.  If a prescription is necessary we can send it directly to your pharmacy.  If lab work is needed we can place an order for that and you can then stop by our lab to have the test done at a later time.    Video visits are billed at different rates depending on your insurance coverage.  Please reach out to your insurance provider with any questions.    If during the course of the call the physician/provider feels a video visit is not appropriate, you will not be charged for this service.\"    Patient has given verbal consent for Video visit? Yes  How would you like to obtain your AVS? MyChart  If you are dropped from the video visit, the video invite should be resent to: Send to e-mail at: [email or web address]  Will anyone else be joining your video visit? No      Vitals - Patient Reported  Weight (Patient Reported): " 77.1 kg (170 lb)  Height (Patient Reported): 152.4 cm (5')  BMI (Based on Pt Reported Ht/Wt): 33.2  Pain Score: No Pain (0)        I have reviewed and updated patient's allergy and medication list.    Concerns: NONE  Refills: NONE      Christina Painting CMA      Video-Visit Details    Type of service:  Video Visit    Video Start Time: 08:35 AM  Video End Time: 09:15 AM    Originating Location (pt. Location): Home    Distant Location (provider location):  [facility] CANCER [facility]     Platform used for Video Visit: Raul Mccarthy MD

## 2021-01-01 ENCOUNTER — TELEPHONE (OUTPATIENT)
Dept: UROLOGY | Facility: CLINIC | Age: 66
End: 2021-01-01

## 2021-01-01 ENCOUNTER — DOCUMENTATION ONLY (OUTPATIENT)
Dept: ANESTHESIOLOGY | Facility: CLINIC | Age: 66
End: 2021-01-01
Payer: COMMERCIAL

## 2021-01-01 ENCOUNTER — VIRTUAL VISIT (OUTPATIENT)
Dept: ONCOLOGY | Facility: CLINIC | Age: 66
End: 2021-01-01
Attending: STUDENT IN AN ORGANIZED HEALTH CARE EDUCATION/TRAINING PROGRAM
Payer: COMMERCIAL

## 2021-01-01 ENCOUNTER — APPOINTMENT (OUTPATIENT)
Dept: INTERPRETER SERVICES | Facility: CLINIC | Age: 66
DRG: 659 | End: 2021-01-01
Payer: COMMERCIAL

## 2021-01-01 ENCOUNTER — TELEPHONE (OUTPATIENT)
Dept: ONCOLOGY | Facility: CLINIC | Age: 66
End: 2021-01-01

## 2021-01-01 ENCOUNTER — HOSPITAL ENCOUNTER (EMERGENCY)
Facility: CLINIC | Age: 66
Discharge: HOME | DRG: 947 | End: 2021-10-16
Attending: EMERGENCY MEDICINE | Admitting: EMERGENCY MEDICINE
Payer: COMMERCIAL

## 2021-01-01 ENCOUNTER — VIRTUAL VISIT (OUTPATIENT)
Dept: PALLIATIVE CARE | Facility: CLINIC | Age: 66
End: 2021-01-01
Attending: CLINICAL NURSE SPECIALIST
Payer: COMMERCIAL

## 2021-01-01 ENCOUNTER — INFUSION THERAPY VISIT (OUTPATIENT)
Dept: INFUSION THERAPY | Facility: CLINIC | Age: 66
DRG: 374 | End: 2021-01-01
Attending: STUDENT IN AN ORGANIZED HEALTH CARE EDUCATION/TRAINING PROGRAM
Payer: COMMERCIAL

## 2021-01-01 ENCOUNTER — APPOINTMENT (OUTPATIENT)
Dept: GENERAL RADIOLOGY | Facility: CLINIC | Age: 66
DRG: 947 | End: 2021-01-01
Attending: EMERGENCY MEDICINE
Payer: COMMERCIAL

## 2021-01-01 ENCOUNTER — PATIENT OUTREACH (OUTPATIENT)
Dept: FAMILY MEDICINE | Facility: CLINIC | Age: 66
End: 2021-01-01

## 2021-01-01 ENCOUNTER — APPOINTMENT (OUTPATIENT)
Dept: CT IMAGING | Facility: CLINIC | Age: 66
DRG: 947 | End: 2021-01-01
Attending: EMERGENCY MEDICINE
Payer: COMMERCIAL

## 2021-01-01 ENCOUNTER — APPOINTMENT (OUTPATIENT)
Dept: EDUCATION SERVICES | Facility: CLINIC | Age: 66
DRG: 693 | End: 2021-01-01
Attending: RADIOLOGY
Payer: COMMERCIAL

## 2021-01-01 ENCOUNTER — PATIENT OUTREACH (OUTPATIENT)
Dept: GERIATRIC MEDICINE | Facility: CLINIC | Age: 66
End: 2021-01-01
Payer: COMMERCIAL

## 2021-01-01 ENCOUNTER — APPOINTMENT (OUTPATIENT)
Dept: ULTRASOUND IMAGING | Facility: CLINIC | Age: 66
End: 2021-01-01
Attending: EMERGENCY MEDICINE
Payer: COMMERCIAL

## 2021-01-01 ENCOUNTER — HOSPITAL ENCOUNTER (OUTPATIENT)
Facility: CLINIC | Age: 66
Setting detail: OBSERVATION
Discharge: HOME | End: 2021-10-13
Attending: STUDENT IN AN ORGANIZED HEALTH CARE EDUCATION/TRAINING PROGRAM | Admitting: HOSPITALIST
Payer: COMMERCIAL

## 2021-01-01 ENCOUNTER — APPOINTMENT (OUTPATIENT)
Dept: LAB | Facility: CLINIC | Age: 66
DRG: 693 | End: 2021-01-01
Payer: COMMERCIAL

## 2021-01-01 ENCOUNTER — INFUSION THERAPY VISIT (OUTPATIENT)
Dept: ONCOLOGY | Facility: CLINIC | Age: 66
End: 2021-01-01
Attending: PHYSICIAN ASSISTANT
Payer: COMMERCIAL

## 2021-01-01 ENCOUNTER — NURSE TRIAGE (OUTPATIENT)
Dept: NURSING | Facility: CLINIC | Age: 66
End: 2021-01-01

## 2021-01-01 ENCOUNTER — OFFICE VISIT (OUTPATIENT)
Dept: URGENT CARE | Facility: URGENT CARE | Age: 66
End: 2021-01-01
Payer: COMMERCIAL

## 2021-01-01 ENCOUNTER — HOSPITAL ENCOUNTER (INPATIENT)
Facility: CLINIC | Age: 66
LOS: 2 days | Discharge: HOME | DRG: 659 | End: 2021-09-12
Attending: EMERGENCY MEDICINE | Admitting: STUDENT IN AN ORGANIZED HEALTH CARE EDUCATION/TRAINING PROGRAM
Payer: COMMERCIAL

## 2021-01-01 ENCOUNTER — INFUSION THERAPY VISIT (OUTPATIENT)
Dept: TRANSPLANT | Facility: CLINIC | Age: 66
DRG: 374 | End: 2021-01-01
Attending: STUDENT IN AN ORGANIZED HEALTH CARE EDUCATION/TRAINING PROGRAM
Payer: COMMERCIAL

## 2021-01-01 ENCOUNTER — HOSPITAL ENCOUNTER (INPATIENT)
Facility: CLINIC | Age: 66
LOS: 2 days | Discharge: HOSPICE | DRG: 374 | End: 2021-11-19
Attending: EMERGENCY MEDICINE | Admitting: STUDENT IN AN ORGANIZED HEALTH CARE EDUCATION/TRAINING PROGRAM
Payer: COMMERCIAL

## 2021-01-01 ENCOUNTER — PATIENT OUTREACH (OUTPATIENT)
Dept: CARE COORDINATION | Facility: CLINIC | Age: 66
End: 2021-01-01

## 2021-01-01 ENCOUNTER — OFFICE VISIT (OUTPATIENT)
Dept: FAMILY MEDICINE | Facility: CLINIC | Age: 66
End: 2021-01-01
Payer: COMMERCIAL

## 2021-01-01 ENCOUNTER — VIRTUAL VISIT (OUTPATIENT)
Dept: FAMILY MEDICINE | Facility: CLINIC | Age: 66
End: 2021-01-01
Payer: COMMERCIAL

## 2021-01-01 ENCOUNTER — ONCOLOGY VISIT (OUTPATIENT)
Dept: ONCOLOGY | Facility: CLINIC | Age: 66
DRG: 947 | End: 2021-01-01
Attending: PHYSICIAN ASSISTANT
Payer: COMMERCIAL

## 2021-01-01 ENCOUNTER — DOCUMENTATION ONLY (OUTPATIENT)
Dept: ONCOLOGY | Facility: CLINIC | Age: 66
End: 2021-01-01

## 2021-01-01 ENCOUNTER — HOSPITAL ENCOUNTER (OUTPATIENT)
Dept: PET IMAGING | Facility: CLINIC | Age: 66
Discharge: HOME | End: 2021-03-09
Attending: STUDENT IN AN ORGANIZED HEALTH CARE EDUCATION/TRAINING PROGRAM | Admitting: STUDENT IN AN ORGANIZED HEALTH CARE EDUCATION/TRAINING PROGRAM
Payer: COMMERCIAL

## 2021-01-01 ENCOUNTER — TELEPHONE (OUTPATIENT)
Dept: GERIATRIC MEDICINE | Facility: CLINIC | Age: 66
End: 2021-01-01
Payer: COMMERCIAL

## 2021-01-01 ENCOUNTER — HOSPITAL ENCOUNTER (EMERGENCY)
Facility: CLINIC | Age: 66
Discharge: HOME | End: 2021-09-27
Attending: EMERGENCY MEDICINE | Admitting: EMERGENCY MEDICINE
Payer: COMMERCIAL

## 2021-01-01 ENCOUNTER — ANESTHESIA EVENT (OUTPATIENT)
Dept: SURGERY | Facility: CLINIC | Age: 66
DRG: 659 | End: 2021-01-01
Payer: COMMERCIAL

## 2021-01-01 ENCOUNTER — APPOINTMENT (OUTPATIENT)
Dept: CT IMAGING | Facility: CLINIC | Age: 66
DRG: 659 | End: 2021-01-01
Attending: EMERGENCY MEDICINE
Payer: COMMERCIAL

## 2021-01-01 ENCOUNTER — PATIENT OUTREACH (OUTPATIENT)
Dept: ONCOLOGY | Facility: CLINIC | Age: 66
End: 2021-01-01

## 2021-01-01 ENCOUNTER — ANESTHESIA (OUTPATIENT)
Dept: SURGERY | Facility: CLINIC | Age: 66
DRG: 659 | End: 2021-01-01
Payer: COMMERCIAL

## 2021-01-01 ENCOUNTER — HOSPITAL ENCOUNTER (OUTPATIENT)
Dept: PET IMAGING | Facility: CLINIC | Age: 66
Setting detail: NUCLEAR MEDICINE
Discharge: HOME | End: 2021-03-09
Attending: STUDENT IN AN ORGANIZED HEALTH CARE EDUCATION/TRAINING PROGRAM | Admitting: STUDENT IN AN ORGANIZED HEALTH CARE EDUCATION/TRAINING PROGRAM
Payer: COMMERCIAL

## 2021-01-01 ENCOUNTER — APPOINTMENT (OUTPATIENT)
Dept: CT IMAGING | Facility: CLINIC | Age: 66
DRG: 693 | End: 2021-01-01
Attending: STUDENT IN AN ORGANIZED HEALTH CARE EDUCATION/TRAINING PROGRAM
Payer: COMMERCIAL

## 2021-01-01 ENCOUNTER — APPOINTMENT (OUTPATIENT)
Dept: LAB | Facility: CLINIC | Age: 66
End: 2021-01-01
Attending: STUDENT IN AN ORGANIZED HEALTH CARE EDUCATION/TRAINING PROGRAM
Payer: COMMERCIAL

## 2021-01-01 ENCOUNTER — APPOINTMENT (OUTPATIENT)
Dept: INTERPRETER SERVICES | Facility: CLINIC | Age: 66
End: 2021-01-01
Attending: STUDENT IN AN ORGANIZED HEALTH CARE EDUCATION/TRAINING PROGRAM
Payer: COMMERCIAL

## 2021-01-01 ENCOUNTER — APPOINTMENT (OUTPATIENT)
Dept: CT IMAGING | Facility: CLINIC | Age: 66
End: 2021-01-01
Attending: EMERGENCY MEDICINE
Payer: COMMERCIAL

## 2021-01-01 ENCOUNTER — HOSPITAL ENCOUNTER (OUTPATIENT)
Facility: CLINIC | Age: 66
Discharge: HOME | DRG: 693 | End: 2021-10-04
Attending: RADIOLOGY | Admitting: RADIOLOGY
Payer: COMMERCIAL

## 2021-01-01 ENCOUNTER — TELEPHONE (OUTPATIENT)
Dept: ANESTHESIOLOGY | Facility: CLINIC | Age: 66
End: 2021-01-01

## 2021-01-01 ENCOUNTER — APPOINTMENT (OUTPATIENT)
Dept: LAB | Facility: CLINIC | Age: 66
DRG: 947 | End: 2021-01-01
Attending: PHYSICIAN ASSISTANT
Payer: COMMERCIAL

## 2021-01-01 ENCOUNTER — APPOINTMENT (OUTPATIENT)
Dept: MEDSURG UNIT | Facility: CLINIC | Age: 66
DRG: 693 | End: 2021-01-01
Attending: RADIOLOGY
Payer: COMMERCIAL

## 2021-01-01 ENCOUNTER — HOSPITAL ENCOUNTER (OUTPATIENT)
Dept: CT IMAGING | Facility: CLINIC | Age: 66
Discharge: HOME | End: 2021-09-23
Attending: STUDENT IN AN ORGANIZED HEALTH CARE EDUCATION/TRAINING PROGRAM | Admitting: STUDENT IN AN ORGANIZED HEALTH CARE EDUCATION/TRAINING PROGRAM
Payer: COMMERCIAL

## 2021-01-01 ENCOUNTER — APPOINTMENT (OUTPATIENT)
Dept: INTERVENTIONAL RADIOLOGY/VASCULAR | Facility: CLINIC | Age: 66
DRG: 693 | End: 2021-01-01
Attending: PHYSICIAN ASSISTANT
Payer: COMMERCIAL

## 2021-01-01 ENCOUNTER — HOSPITAL ENCOUNTER (INPATIENT)
Facility: CLINIC | Age: 66
LOS: 5 days | Discharge: HOME | DRG: 693 | End: 2021-10-09
Attending: EMERGENCY MEDICINE | Admitting: INTERNAL MEDICINE
Payer: COMMERCIAL

## 2021-01-01 ENCOUNTER — TELEPHONE (OUTPATIENT)
Dept: FAMILY MEDICINE | Facility: CLINIC | Age: 66
End: 2021-01-01

## 2021-01-01 ENCOUNTER — PATIENT OUTREACH (OUTPATIENT)
Dept: ONCOLOGY | Facility: CLINIC | Age: 66
End: 2021-01-01
Payer: COMMERCIAL

## 2021-01-01 ENCOUNTER — ONCOLOGY VISIT (OUTPATIENT)
Dept: ONCOLOGY | Facility: CLINIC | Age: 66
DRG: 374 | End: 2021-01-01
Attending: STUDENT IN AN ORGANIZED HEALTH CARE EDUCATION/TRAINING PROGRAM
Payer: COMMERCIAL

## 2021-01-01 ENCOUNTER — PRE VISIT (OUTPATIENT)
Dept: UROLOGY | Facility: CLINIC | Age: 66
End: 2021-01-01

## 2021-01-01 ENCOUNTER — HEALTH MAINTENANCE LETTER (OUTPATIENT)
Age: 66
End: 2021-01-01

## 2021-01-01 ENCOUNTER — HOSPITAL ENCOUNTER (INPATIENT)
Facility: CLINIC | Age: 66
LOS: 5 days | Discharge: HOME | DRG: 947 | End: 2021-10-22
Attending: EMERGENCY MEDICINE | Admitting: STUDENT IN AN ORGANIZED HEALTH CARE EDUCATION/TRAINING PROGRAM
Payer: COMMERCIAL

## 2021-01-01 ENCOUNTER — APPOINTMENT (OUTPATIENT)
Dept: CT IMAGING | Facility: CLINIC | Age: 66
DRG: 374 | End: 2021-01-01
Payer: COMMERCIAL

## 2021-01-01 ENCOUNTER — PRE VISIT (OUTPATIENT)
Dept: ONCOLOGY | Facility: CLINIC | Age: 66
End: 2021-01-01

## 2021-01-01 ENCOUNTER — TELEPHONE (OUTPATIENT)
Dept: PALLIATIVE CARE | Facility: CLINIC | Age: 66
End: 2021-01-01

## 2021-01-01 ENCOUNTER — APPOINTMENT (OUTPATIENT)
Dept: GENERAL RADIOLOGY | Facility: CLINIC | Age: 66
DRG: 659 | End: 2021-01-01
Attending: UROLOGY
Payer: COMMERCIAL

## 2021-01-01 ENCOUNTER — TELEPHONE (OUTPATIENT)
Dept: FAMILY MEDICINE | Facility: CLINIC | Age: 66
End: 2021-01-01
Payer: COMMERCIAL

## 2021-01-01 ENCOUNTER — APPOINTMENT (OUTPATIENT)
Dept: SPEECH THERAPY | Facility: CLINIC | Age: 66
DRG: 659 | End: 2021-01-01
Attending: STUDENT IN AN ORGANIZED HEALTH CARE EDUCATION/TRAINING PROGRAM
Payer: COMMERCIAL

## 2021-01-01 VITALS
RESPIRATION RATE: 16 BRPM | HEART RATE: 65 BPM | TEMPERATURE: 98 F | OXYGEN SATURATION: 100 % | DIASTOLIC BLOOD PRESSURE: 98 MMHG | SYSTOLIC BLOOD PRESSURE: 150 MMHG

## 2021-01-01 VITALS
OXYGEN SATURATION: 100 % | TEMPERATURE: 98.1 F | WEIGHT: 112 LBS | HEART RATE: 82 BPM | BODY MASS INDEX: 20.49 KG/M2 | RESPIRATION RATE: 12 BRPM | SYSTOLIC BLOOD PRESSURE: 130 MMHG | DIASTOLIC BLOOD PRESSURE: 86 MMHG

## 2021-01-01 VITALS
HEART RATE: 94 BPM | DIASTOLIC BLOOD PRESSURE: 60 MMHG | TEMPERATURE: 97.1 F | SYSTOLIC BLOOD PRESSURE: 82 MMHG | OXYGEN SATURATION: 100 %

## 2021-01-01 VITALS
DIASTOLIC BLOOD PRESSURE: 91 MMHG | TEMPERATURE: 98.7 F | OXYGEN SATURATION: 99 % | SYSTOLIC BLOOD PRESSURE: 133 MMHG | BODY MASS INDEX: 22.79 KG/M2 | HEART RATE: 101 BPM | WEIGHT: 120.6 LBS | RESPIRATION RATE: 16 BRPM

## 2021-01-01 VITALS
DIASTOLIC BLOOD PRESSURE: 78 MMHG | BODY MASS INDEX: 20.43 KG/M2 | WEIGHT: 111 LBS | RESPIRATION RATE: 16 BRPM | HEIGHT: 62 IN | HEART RATE: 80 BPM | OXYGEN SATURATION: 98 % | TEMPERATURE: 99.2 F | SYSTOLIC BLOOD PRESSURE: 101 MMHG

## 2021-01-01 VITALS
TEMPERATURE: 99.3 F | OXYGEN SATURATION: 99 % | WEIGHT: 115.7 LBS | BODY MASS INDEX: 21.16 KG/M2 | DIASTOLIC BLOOD PRESSURE: 84 MMHG | HEART RATE: 89 BPM | SYSTOLIC BLOOD PRESSURE: 122 MMHG

## 2021-01-01 VITALS
HEIGHT: 61 IN | DIASTOLIC BLOOD PRESSURE: 80 MMHG | TEMPERATURE: 98.4 F | WEIGHT: 112.4 LBS | SYSTOLIC BLOOD PRESSURE: 111 MMHG | BODY MASS INDEX: 21.22 KG/M2 | HEART RATE: 67 BPM | RESPIRATION RATE: 14 BRPM | OXYGEN SATURATION: 98 %

## 2021-01-01 VITALS
HEART RATE: 75 BPM | RESPIRATION RATE: 16 BRPM | BODY MASS INDEX: 20.61 KG/M2 | DIASTOLIC BLOOD PRESSURE: 101 MMHG | HEIGHT: 62 IN | TEMPERATURE: 98.4 F | OXYGEN SATURATION: 100 % | WEIGHT: 112 LBS | SYSTOLIC BLOOD PRESSURE: 146 MMHG

## 2021-01-01 VITALS
BODY MASS INDEX: 19.86 KG/M2 | SYSTOLIC BLOOD PRESSURE: 111 MMHG | HEIGHT: 61 IN | HEART RATE: 99 BPM | RESPIRATION RATE: 16 BRPM | OXYGEN SATURATION: 99 % | DIASTOLIC BLOOD PRESSURE: 84 MMHG | TEMPERATURE: 97 F | WEIGHT: 105.2 LBS

## 2021-01-01 VITALS
RESPIRATION RATE: 14 BRPM | HEART RATE: 66 BPM | DIASTOLIC BLOOD PRESSURE: 77 MMHG | OXYGEN SATURATION: 100 % | SYSTOLIC BLOOD PRESSURE: 116 MMHG

## 2021-01-01 VITALS
SYSTOLIC BLOOD PRESSURE: 142 MMHG | TEMPERATURE: 97.6 F | BODY MASS INDEX: 22.09 KG/M2 | DIASTOLIC BLOOD PRESSURE: 94 MMHG | HEIGHT: 60 IN | RESPIRATION RATE: 14 BRPM | HEART RATE: 72 BPM | WEIGHT: 112.5 LBS | OXYGEN SATURATION: 100 %

## 2021-01-01 VITALS
RESPIRATION RATE: 18 BRPM | SYSTOLIC BLOOD PRESSURE: 120 MMHG | OXYGEN SATURATION: 96 % | WEIGHT: 112.2 LBS | BODY MASS INDEX: 21.18 KG/M2 | TEMPERATURE: 98.1 F | DIASTOLIC BLOOD PRESSURE: 80 MMHG | HEIGHT: 61 IN | HEART RATE: 100 BPM

## 2021-01-01 VITALS — WEIGHT: 109 LBS | BODY MASS INDEX: 20.58 KG/M2 | HEIGHT: 61 IN

## 2021-01-01 VITALS
SYSTOLIC BLOOD PRESSURE: 123 MMHG | DIASTOLIC BLOOD PRESSURE: 90 MMHG | TEMPERATURE: 98.3 F | OXYGEN SATURATION: 99 % | RESPIRATION RATE: 16 BRPM | HEART RATE: 95 BPM

## 2021-01-01 VITALS
RESPIRATION RATE: 15 BRPM | HEIGHT: 61 IN | HEART RATE: 87 BPM | SYSTOLIC BLOOD PRESSURE: 145 MMHG | TEMPERATURE: 99 F | DIASTOLIC BLOOD PRESSURE: 104 MMHG | OXYGEN SATURATION: 100 % | WEIGHT: 114 LBS | BODY MASS INDEX: 21.52 KG/M2

## 2021-01-01 VITALS
TEMPERATURE: 98.9 F | HEART RATE: 67 BPM | DIASTOLIC BLOOD PRESSURE: 80 MMHG | SYSTOLIC BLOOD PRESSURE: 125 MMHG | OXYGEN SATURATION: 99 % | RESPIRATION RATE: 18 BRPM | WEIGHT: 111.9 LBS | BODY MASS INDEX: 21.14 KG/M2

## 2021-01-01 VITALS
RESPIRATION RATE: 16 BRPM | DIASTOLIC BLOOD PRESSURE: 93 MMHG | OXYGEN SATURATION: 95 % | HEART RATE: 95 BPM | WEIGHT: 114.6 LBS | TEMPERATURE: 98.8 F | SYSTOLIC BLOOD PRESSURE: 126 MMHG | BODY MASS INDEX: 21.64 KG/M2 | HEIGHT: 61 IN

## 2021-01-01 VITALS
TEMPERATURE: 98.3 F | RESPIRATION RATE: 16 BRPM | HEART RATE: 82 BPM | SYSTOLIC BLOOD PRESSURE: 123 MMHG | OXYGEN SATURATION: 100 % | DIASTOLIC BLOOD PRESSURE: 84 MMHG

## 2021-01-01 VITALS
WEIGHT: 100.31 LBS | HEART RATE: 90 BPM | BODY MASS INDEX: 18.46 KG/M2 | DIASTOLIC BLOOD PRESSURE: 101 MMHG | HEIGHT: 62 IN | TEMPERATURE: 98.5 F | SYSTOLIC BLOOD PRESSURE: 131 MMHG | OXYGEN SATURATION: 99 % | RESPIRATION RATE: 18 BRPM

## 2021-01-01 DIAGNOSIS — R30.9 PAINFUL URINATION: Primary | ICD-10-CM

## 2021-01-01 DIAGNOSIS — C16.9 MALIGNANT NEOPLASM OF STOMACH METASTATIC TO RETROPERITONEUM (H): ICD-10-CM

## 2021-01-01 DIAGNOSIS — C78.6 MALIGNANT NEOPLASM OF STOMACH METASTATIC TO RETROPERITONEUM (H): ICD-10-CM

## 2021-01-01 DIAGNOSIS — Z11.52 ENCOUNTER FOR SCREENING LABORATORY TESTING FOR SEVERE ACUTE RESPIRATORY SYNDROME CORONAVIRUS 2 (SARS-COV-2): ICD-10-CM

## 2021-01-01 DIAGNOSIS — C16.2 MALIGNANT NEOPLASM OF BODY OF STOMACH (H): Primary | ICD-10-CM

## 2021-01-01 DIAGNOSIS — C16.9 GASTRIC CARCINOMA (H): ICD-10-CM

## 2021-01-01 DIAGNOSIS — N13.30 HYDRONEPHROSIS OF RIGHT KIDNEY: Primary | ICD-10-CM

## 2021-01-01 DIAGNOSIS — C78.6 MALIGNANT NEOPLASM OF STOMACH METASTATIC TO RETROPERITONEUM (H): Primary | ICD-10-CM

## 2021-01-01 DIAGNOSIS — D64.9 ANEMIA, UNSPECIFIED TYPE: ICD-10-CM

## 2021-01-01 DIAGNOSIS — G89.3 CANCER ASSOCIATED PAIN: ICD-10-CM

## 2021-01-01 DIAGNOSIS — C79.9 METASTASIS FROM GASTRIC CANCER (H): ICD-10-CM

## 2021-01-01 DIAGNOSIS — M79.89 SWELLING OF LEFT LOWER EXTREMITY: ICD-10-CM

## 2021-01-01 DIAGNOSIS — R10.84 ABDOMINAL PAIN, GENERALIZED: ICD-10-CM

## 2021-01-01 DIAGNOSIS — C16.9 MALIGNANT NEOPLASM OF STOMACH METASTATIC TO RETROPERITONEUM (H): Primary | ICD-10-CM

## 2021-01-01 DIAGNOSIS — R73.9 HYPERGLYCEMIA: ICD-10-CM

## 2021-01-01 DIAGNOSIS — K92.1 GASTROINTESTINAL HEMORRHAGE WITH MELENA: ICD-10-CM

## 2021-01-01 DIAGNOSIS — N50.89 TESTICULAR SWELLING: ICD-10-CM

## 2021-01-01 DIAGNOSIS — Z71.89 ADVANCE CARE PLANNING: Primary | ICD-10-CM

## 2021-01-01 DIAGNOSIS — R11.2 NAUSEA AND VOMITING, INTRACTABILITY OF VOMITING NOT SPECIFIED, UNSPECIFIED VOMITING TYPE: ICD-10-CM

## 2021-01-01 DIAGNOSIS — G89.3 CANCER ASSOCIATED PAIN: Primary | ICD-10-CM

## 2021-01-01 DIAGNOSIS — C16.9 METASTASIS FROM GASTRIC CANCER (H): ICD-10-CM

## 2021-01-01 DIAGNOSIS — N13.30 HYDRONEPHROSIS OF RIGHT KIDNEY: ICD-10-CM

## 2021-01-01 DIAGNOSIS — Z80.0 FAMILY HISTORY OF MALIGNANT NEOPLASM OF GASTROINTESTINAL TRACT: Primary | ICD-10-CM

## 2021-01-01 DIAGNOSIS — D64.9 ANEMIA, UNSPECIFIED TYPE: Primary | ICD-10-CM

## 2021-01-01 DIAGNOSIS — N39.0 URINARY TRACT INFECTION WITH HEMATURIA, SITE UNSPECIFIED: ICD-10-CM

## 2021-01-01 DIAGNOSIS — N39.0 ACUTE UTI: ICD-10-CM

## 2021-01-01 DIAGNOSIS — R10.9 FLANK PAIN: ICD-10-CM

## 2021-01-01 DIAGNOSIS — C16.9 MALIGNANT NEOPLASM OF STOMACH, UNSPECIFIED LOCATION (H): ICD-10-CM

## 2021-01-01 DIAGNOSIS — R31.9 URINARY TRACT INFECTION WITH HEMATURIA, SITE UNSPECIFIED: ICD-10-CM

## 2021-01-01 DIAGNOSIS — R10.30 ABDOMINAL PAIN, LOWER: ICD-10-CM

## 2021-01-01 DIAGNOSIS — K92.2 GASTROINTESTINAL HEMORRHAGE, UNSPECIFIED GASTROINTESTINAL HEMORRHAGE TYPE: ICD-10-CM

## 2021-01-01 DIAGNOSIS — N13.30 HYDRONEPHROSIS, UNSPECIFIED HYDRONEPHROSIS TYPE: ICD-10-CM

## 2021-01-01 DIAGNOSIS — D62 ANEMIA DUE TO BLOOD LOSS, ACUTE: ICD-10-CM

## 2021-01-01 DIAGNOSIS — R11.2 NAUSEA AND VOMITING, INTRACTABILITY OF VOMITING NOT SPECIFIED, UNSPECIFIED VOMITING TYPE: Primary | ICD-10-CM

## 2021-01-01 DIAGNOSIS — C16.9 MALIGNANT NEOPLASM OF STOMACH, UNSPECIFIED LOCATION (H): Primary | ICD-10-CM

## 2021-01-01 DIAGNOSIS — R10.9 RIGHT FLANK PAIN: ICD-10-CM

## 2021-01-01 DIAGNOSIS — K92.1 GASTROINTESTINAL HEMORRHAGE WITH MELENA: Primary | ICD-10-CM

## 2021-01-01 DIAGNOSIS — C19 COLORECTAL CANCER (H): Primary | ICD-10-CM

## 2021-01-01 DIAGNOSIS — Z71.89 OTHER SPECIFIED COUNSELING: ICD-10-CM

## 2021-01-01 LAB
ABO/RH(D): NORMAL
ALBUMIN SERPL-MCNC: 2.1 G/DL (ref 3.4–5)
ALBUMIN SERPL-MCNC: 2.2 G/DL (ref 3.4–5)
ALBUMIN SERPL-MCNC: 2.3 G/DL (ref 3.4–5)
ALBUMIN SERPL-MCNC: 2.4 G/DL (ref 3.4–5)
ALBUMIN SERPL-MCNC: 2.5 G/DL (ref 3.4–5)
ALBUMIN SERPL-MCNC: 2.5 G/DL (ref 3.4–5)
ALBUMIN SERPL-MCNC: 2.6 G/DL (ref 3.4–5)
ALBUMIN SERPL-MCNC: 2.9 G/DL (ref 3.4–5)
ALBUMIN SERPL-MCNC: 3.1 G/DL (ref 3.4–5)
ALBUMIN SERPL-MCNC: 3.2 G/DL (ref 3.4–5)
ALBUMIN SERPL-MCNC: 3.2 G/DL (ref 3.4–5)
ALBUMIN UR-MCNC: 20 MG/DL
ALBUMIN UR-MCNC: 30 MG/DL
ALBUMIN UR-MCNC: 70 MG/DL
ALBUMIN UR-MCNC: ABNORMAL MG/DL
ALBUMIN UR-MCNC: ABNORMAL MG/DL
ALBUMIN UR-MCNC: NEGATIVE MG/DL
ALP SERPL-CCNC: 59 U/L (ref 40–150)
ALP SERPL-CCNC: 61 U/L (ref 40–150)
ALP SERPL-CCNC: 63 U/L (ref 40–150)
ALP SERPL-CCNC: 64 U/L (ref 40–150)
ALP SERPL-CCNC: 64 U/L (ref 40–150)
ALP SERPL-CCNC: 66 U/L (ref 40–150)
ALP SERPL-CCNC: 68 U/L (ref 40–150)
ALP SERPL-CCNC: 74 U/L (ref 40–150)
ALP SERPL-CCNC: 75 U/L (ref 40–150)
ALP SERPL-CCNC: 80 U/L (ref 40–150)
ALP SERPL-CCNC: 85 U/L (ref 40–150)
ALT SERPL W P-5'-P-CCNC: 10 U/L (ref 0–70)
ALT SERPL W P-5'-P-CCNC: 12 U/L (ref 0–70)
ALT SERPL W P-5'-P-CCNC: 14 U/L (ref 0–70)
ALT SERPL W P-5'-P-CCNC: 14 U/L (ref 0–70)
ALT SERPL W P-5'-P-CCNC: 15 U/L (ref 0–70)
ALT SERPL W P-5'-P-CCNC: 16 U/L (ref 0–70)
ALT SERPL W P-5'-P-CCNC: 20 U/L (ref 0–70)
ALT SERPL W P-5'-P-CCNC: 21 U/L (ref 0–70)
ALT SERPL W P-5'-P-CCNC: 7 U/L (ref 0–70)
ALT SERPL W P-5'-P-CCNC: 7 U/L (ref 0–70)
ALT SERPL W P-5'-P-CCNC: 8 U/L (ref 0–70)
ALT SERPL W P-5'-P-CCNC: 8 U/L (ref 0–70)
ALT SERPL W P-5'-P-CCNC: 9 U/L (ref 0–70)
ANION GAP SERPL CALCULATED.3IONS-SCNC: 3 MMOL/L (ref 3–14)
ANION GAP SERPL CALCULATED.3IONS-SCNC: 3 MMOL/L (ref 3–14)
ANION GAP SERPL CALCULATED.3IONS-SCNC: 4 MMOL/L (ref 3–14)
ANION GAP SERPL CALCULATED.3IONS-SCNC: 5 MMOL/L (ref 3–14)
ANION GAP SERPL CALCULATED.3IONS-SCNC: 6 MMOL/L (ref 3–14)
ANION GAP SERPL CALCULATED.3IONS-SCNC: 7 MMOL/L (ref 3–14)
ANION GAP SERPL CALCULATED.3IONS-SCNC: 8 MMOL/L (ref 3–14)
ANION GAP SERPL CALCULATED.3IONS-SCNC: 9 MMOL/L (ref 3–14)
ANION GAP SERPL CALCULATED.3IONS-SCNC: 9 MMOL/L (ref 3–14)
ANTIBODY SCREEN: NEGATIVE
APPEARANCE UR: ABNORMAL
APPEARANCE UR: ABNORMAL
APPEARANCE UR: CLEAR
APTT PPP: 32 SECONDS (ref 22–38)
AST SERPL W P-5'-P-CCNC: 11 U/L (ref 0–45)
AST SERPL W P-5'-P-CCNC: 12 U/L (ref 0–45)
AST SERPL W P-5'-P-CCNC: 13 U/L (ref 0–45)
AST SERPL W P-5'-P-CCNC: 14 U/L (ref 0–45)
AST SERPL W P-5'-P-CCNC: 15 U/L (ref 0–45)
AST SERPL W P-5'-P-CCNC: 18 U/L (ref 0–45)
AST SERPL W P-5'-P-CCNC: 22 U/L (ref 0–45)
AST SERPL W P-5'-P-CCNC: 7 U/L (ref 0–45)
AST SERPL W P-5'-P-CCNC: 76 U/L (ref 0–45)
ATRIAL RATE - MUSE: 65 BPM
ATRIAL RATE - MUSE: 69 BPM
ATRIAL RATE - MUSE: 72 BPM
ATRIAL RATE - MUSE: 74 BPM
ATRIAL RATE - MUSE: 82 BPM
ATRIAL RATE - MUSE: 91 BPM
ATRIAL RATE - MUSE: 93 BPM
BACTERIA #/AREA URNS HPF: ABNORMAL /HPF
BACTERIA BLD CULT: NO GROWTH
BACTERIA UR CULT: ABNORMAL
BACTERIA UR CULT: ABNORMAL
BACTERIA UR CULT: NO GROWTH
BACTERIA UR CULT: NORMAL
BACTERIA UR CULT: NORMAL
BASOPHILS # BLD AUTO: 0 10E3/UL (ref 0–0.2)
BASOPHILS # BLD AUTO: 0.1 10E3/UL (ref 0–0.2)
BASOPHILS # BLD AUTO: 0.1 10E3/UL (ref 0–0.2)
BASOPHILS NFR BLD AUTO: 0 %
BASOPHILS NFR BLD AUTO: 1 %
BILIRUB DIRECT SERPL-MCNC: 0.1 MG/DL (ref 0–0.2)
BILIRUB DIRECT SERPL-MCNC: <0.1 MG/DL (ref 0–0.2)
BILIRUB SERPL-MCNC: 0.2 MG/DL (ref 0.2–1.3)
BILIRUB SERPL-MCNC: 0.3 MG/DL (ref 0.2–1.3)
BILIRUB SERPL-MCNC: 0.3 MG/DL (ref 0.2–1.3)
BILIRUB SERPL-MCNC: 0.4 MG/DL (ref 0.2–1.3)
BILIRUB SERPL-MCNC: 0.5 MG/DL (ref 0.2–1.3)
BILIRUB SERPL-MCNC: 0.6 MG/DL (ref 0.2–1.3)
BILIRUB UR QL STRIP: NEGATIVE
BLD PROD TYP BPU: NORMAL
BLOOD COMPONENT TYPE: NORMAL
BUN SERPL-MCNC: 13 MG/DL (ref 7–30)
BUN SERPL-MCNC: 15 MG/DL (ref 7–30)
BUN SERPL-MCNC: 16 MG/DL (ref 7–30)
BUN SERPL-MCNC: 17 MG/DL (ref 7–30)
BUN SERPL-MCNC: 18 MG/DL (ref 7–30)
BUN SERPL-MCNC: 20 MG/DL (ref 7–30)
BUN SERPL-MCNC: 23 MG/DL (ref 7–30)
BUN SERPL-MCNC: 23 MG/DL (ref 7–30)
BUN SERPL-MCNC: 25 MG/DL (ref 7–30)
BUN SERPL-MCNC: 26 MG/DL (ref 7–30)
BUN SERPL-MCNC: 27 MG/DL (ref 7–30)
BUN SERPL-MCNC: 38 MG/DL (ref 7–30)
BUN SERPL-MCNC: 40 MG/DL (ref 7–30)
BUN SERPL-MCNC: 40 MG/DL (ref 7–30)
BUN SERPL-MCNC: 44 MG/DL (ref 7–30)
BUN SERPL-MCNC: 44 MG/DL (ref 7–30)
CALCIUM SERPL-MCNC: 10 MG/DL (ref 8.5–10.1)
CALCIUM SERPL-MCNC: 8.3 MG/DL (ref 8.5–10.1)
CALCIUM SERPL-MCNC: 8.5 MG/DL (ref 8.5–10.1)
CALCIUM SERPL-MCNC: 8.6 MG/DL (ref 8.5–10.1)
CALCIUM SERPL-MCNC: 8.6 MG/DL (ref 8.5–10.1)
CALCIUM SERPL-MCNC: 8.7 MG/DL (ref 8.5–10.1)
CALCIUM SERPL-MCNC: 8.7 MG/DL (ref 8.5–10.1)
CALCIUM SERPL-MCNC: 8.8 MG/DL (ref 8.5–10.1)
CALCIUM SERPL-MCNC: 8.9 MG/DL (ref 8.5–10.1)
CALCIUM SERPL-MCNC: 8.9 MG/DL (ref 8.5–10.1)
CALCIUM SERPL-MCNC: 9 MG/DL (ref 8.5–10.1)
CALCIUM SERPL-MCNC: 9 MG/DL (ref 8.5–10.1)
CALCIUM SERPL-MCNC: 9.1 MG/DL (ref 8.5–10.1)
CALCIUM SERPL-MCNC: 9.4 MG/DL (ref 8.5–10.1)
CALCIUM SERPL-MCNC: 9.5 MG/DL (ref 8.5–10.1)
CHLORIDE BLD-SCNC: 100 MMOL/L (ref 94–109)
CHLORIDE BLD-SCNC: 101 MMOL/L (ref 94–109)
CHLORIDE BLD-SCNC: 102 MMOL/L (ref 94–109)
CHLORIDE BLD-SCNC: 102 MMOL/L (ref 94–109)
CHLORIDE BLD-SCNC: 103 MMOL/L (ref 94–109)
CHLORIDE BLD-SCNC: 103 MMOL/L (ref 94–109)
CHLORIDE BLD-SCNC: 104 MMOL/L (ref 94–109)
CHLORIDE BLD-SCNC: 92 MMOL/L (ref 94–109)
CHLORIDE BLD-SCNC: 96 MMOL/L (ref 94–109)
CHLORIDE BLD-SCNC: 97 MMOL/L (ref 94–109)
CHLORIDE BLD-SCNC: 98 MMOL/L (ref 94–109)
CHLORIDE BLD-SCNC: 99 MMOL/L (ref 94–109)
CO2 SERPL-SCNC: 21 MMOL/L (ref 20–32)
CO2 SERPL-SCNC: 22 MMOL/L (ref 20–32)
CO2 SERPL-SCNC: 24 MMOL/L (ref 20–32)
CO2 SERPL-SCNC: 25 MMOL/L (ref 20–32)
CO2 SERPL-SCNC: 25 MMOL/L (ref 20–32)
CO2 SERPL-SCNC: 26 MMOL/L (ref 20–32)
CO2 SERPL-SCNC: 27 MMOL/L (ref 20–32)
CO2 SERPL-SCNC: 28 MMOL/L (ref 20–32)
CO2 SERPL-SCNC: 29 MMOL/L (ref 20–32)
CO2 SERPL-SCNC: 30 MMOL/L (ref 20–32)
CODING SYSTEM: NORMAL
COLOR UR AUTO: ABNORMAL
COLOR UR AUTO: NORMAL
COLOR UR AUTO: YELLOW
COPATH REPORT: NORMAL
COPATH REPORT: NORMAL
CREAT SERPL-MCNC: 0.71 MG/DL (ref 0.66–1.25)
CREAT SERPL-MCNC: 0.71 MG/DL (ref 0.66–1.25)
CREAT SERPL-MCNC: 0.73 MG/DL (ref 0.66–1.25)
CREAT SERPL-MCNC: 0.78 MG/DL (ref 0.66–1.25)
CREAT SERPL-MCNC: 0.8 MG/DL (ref 0.66–1.25)
CREAT SERPL-MCNC: 0.82 MG/DL (ref 0.66–1.25)
CREAT SERPL-MCNC: 0.84 MG/DL (ref 0.66–1.25)
CREAT SERPL-MCNC: 0.85 MG/DL (ref 0.66–1.25)
CREAT SERPL-MCNC: 0.86 MG/DL (ref 0.66–1.25)
CREAT SERPL-MCNC: 0.86 MG/DL (ref 0.66–1.25)
CREAT SERPL-MCNC: 0.87 MG/DL (ref 0.66–1.25)
CREAT SERPL-MCNC: 0.87 MG/DL (ref 0.66–1.25)
CREAT SERPL-MCNC: 0.88 MG/DL (ref 0.66–1.25)
CREAT SERPL-MCNC: 0.9 MG/DL (ref 0.66–1.25)
CREAT SERPL-MCNC: 0.95 MG/DL (ref 0.66–1.25)
CREAT SERPL-MCNC: 0.95 MG/DL (ref 0.66–1.25)
CREAT SERPL-MCNC: 0.97 MG/DL (ref 0.66–1.25)
CREAT SERPL-MCNC: 0.97 MG/DL (ref 0.66–1.25)
CREAT SERPL-MCNC: 1.02 MG/DL (ref 0.66–1.25)
CREAT SERPL-MCNC: 1.03 MG/DL (ref 0.66–1.25)
CREAT SERPL-MCNC: 1.03 MG/DL (ref 0.66–1.25)
CREAT SERPL-MCNC: 1.24 MG/DL (ref 0.66–1.25)
CREAT UR-MCNC: 134 MG/DL
CROSSMATCH: NORMAL
CRP SERPL-MCNC: 33 MG/L (ref 0–8)
CRP SERPL-MCNC: 42 MG/L (ref 0–8)
DIASTOLIC BLOOD PRESSURE - MUSE: NORMAL MMHG
EOSINOPHIL # BLD AUTO: 0 10E3/UL (ref 0–0.7)
EOSINOPHIL # BLD AUTO: 0.1 10E3/UL (ref 0–0.7)
EOSINOPHIL NFR BLD AUTO: 0 %
EOSINOPHIL NFR BLD AUTO: 1 %
ERYTHROCYTE [DISTWIDTH] IN BLOOD BY AUTOMATED COUNT: 14.6 % (ref 10–15)
ERYTHROCYTE [DISTWIDTH] IN BLOOD BY AUTOMATED COUNT: 14.6 % (ref 10–15)
ERYTHROCYTE [DISTWIDTH] IN BLOOD BY AUTOMATED COUNT: 14.7 % (ref 10–15)
ERYTHROCYTE [DISTWIDTH] IN BLOOD BY AUTOMATED COUNT: 15.5 % (ref 10–15)
ERYTHROCYTE [DISTWIDTH] IN BLOOD BY AUTOMATED COUNT: 15.8 % (ref 10–15)
ERYTHROCYTE [DISTWIDTH] IN BLOOD BY AUTOMATED COUNT: 17.2 % (ref 10–15)
ERYTHROCYTE [DISTWIDTH] IN BLOOD BY AUTOMATED COUNT: 17.3 % (ref 10–15)
ERYTHROCYTE [DISTWIDTH] IN BLOOD BY AUTOMATED COUNT: 17.4 % (ref 10–15)
ERYTHROCYTE [DISTWIDTH] IN BLOOD BY AUTOMATED COUNT: 17.4 % (ref 10–15)
ERYTHROCYTE [DISTWIDTH] IN BLOOD BY AUTOMATED COUNT: 17.5 % (ref 10–15)
ERYTHROCYTE [DISTWIDTH] IN BLOOD BY AUTOMATED COUNT: 17.5 % (ref 10–15)
ERYTHROCYTE [DISTWIDTH] IN BLOOD BY AUTOMATED COUNT: 17.7 % (ref 10–15)
ERYTHROCYTE [DISTWIDTH] IN BLOOD BY AUTOMATED COUNT: 17.8 % (ref 10–15)
ERYTHROCYTE [DISTWIDTH] IN BLOOD BY AUTOMATED COUNT: 17.8 % (ref 10–15)
ERYTHROCYTE [DISTWIDTH] IN BLOOD BY AUTOMATED COUNT: 18 % (ref 10–15)
ERYTHROCYTE [DISTWIDTH] IN BLOOD BY AUTOMATED COUNT: 18.1 % (ref 10–15)
ERYTHROCYTE [DISTWIDTH] IN BLOOD BY AUTOMATED COUNT: 18.1 % (ref 10–15)
ERYTHROCYTE [DISTWIDTH] IN BLOOD BY AUTOMATED COUNT: 18.2 % (ref 10–15)
ERYTHROCYTE [DISTWIDTH] IN BLOOD BY AUTOMATED COUNT: 18.4 % (ref 10–15)
ERYTHROCYTE [DISTWIDTH] IN BLOOD BY AUTOMATED COUNT: 18.5 % (ref 10–15)
ERYTHROCYTE [DISTWIDTH] IN BLOOD BY AUTOMATED COUNT: 18.6 % (ref 10–15)
ERYTHROCYTE [DISTWIDTH] IN BLOOD BY AUTOMATED COUNT: 18.7 % (ref 10–15)
ERYTHROCYTE [DISTWIDTH] IN BLOOD BY AUTOMATED COUNT: 18.8 % (ref 10–15)
FERRITIN SERPL-MCNC: 14 NG/ML (ref 26–388)
GFR SERPL CREATININE-BSD FRML MDRD: 59 ML/MIN/1.73M2
GFR SERPL CREATININE-BSD FRML MDRD: 74 ML/MIN/1.73M2
GFR SERPL CREATININE-BSD FRML MDRD: 74 ML/MIN/1.73M2
GFR SERPL CREATININE-BSD FRML MDRD: 75 ML/MIN/1.73M2
GFR SERPL CREATININE-BSD FRML MDRD: 80 ML/MIN/1.73M2
GFR SERPL CREATININE-BSD FRML MDRD: 80 ML/MIN/1.73M2
GFR SERPL CREATININE-BSD FRML MDRD: 82 ML/MIN/1.73M2
GFR SERPL CREATININE-BSD FRML MDRD: 82 ML/MIN/1.73M2
GFR SERPL CREATININE-BSD FRML MDRD: 87 ML/MIN/1.73M2
GFR SERPL CREATININE-BSD FRML MDRD: 88 ML/MIN/1.73M2
GFR SERPL CREATININE-BSD FRML MDRD: 89 ML/MIN/1.73M2
GFR SERPL CREATININE-BSD FRML MDRD: 90 ML/MIN/1.73M2
GFR SERPL CREATININE-BSD FRML MDRD: 90 ML/MIN/1.73M2
GFR SERPL CREATININE-BSD FRML MDRD: >90 ML/MIN/1.73M2
GLUCOSE BLD-MCNC: 100 MG/DL (ref 70–99)
GLUCOSE BLD-MCNC: 101 MG/DL (ref 70–99)
GLUCOSE BLD-MCNC: 103 MG/DL (ref 70–99)
GLUCOSE BLD-MCNC: 104 MG/DL (ref 70–99)
GLUCOSE BLD-MCNC: 105 MG/DL (ref 70–99)
GLUCOSE BLD-MCNC: 107 MG/DL (ref 70–99)
GLUCOSE BLD-MCNC: 110 MG/DL (ref 70–99)
GLUCOSE BLD-MCNC: 112 MG/DL (ref 70–99)
GLUCOSE BLD-MCNC: 112 MG/DL (ref 70–99)
GLUCOSE BLD-MCNC: 123 MG/DL (ref 70–99)
GLUCOSE BLD-MCNC: 128 MG/DL (ref 70–99)
GLUCOSE BLD-MCNC: 148 MG/DL (ref 70–99)
GLUCOSE BLD-MCNC: 86 MG/DL (ref 70–99)
GLUCOSE BLD-MCNC: 86 MG/DL (ref 70–99)
GLUCOSE BLD-MCNC: 87 MG/DL (ref 70–99)
GLUCOSE BLD-MCNC: 88 MG/DL (ref 70–99)
GLUCOSE BLD-MCNC: 89 MG/DL (ref 70–99)
GLUCOSE BLD-MCNC: 90 MG/DL (ref 70–99)
GLUCOSE BLD-MCNC: 90 MG/DL (ref 70–99)
GLUCOSE BLD-MCNC: 92 MG/DL (ref 70–99)
GLUCOSE BLD-MCNC: 99 MG/DL (ref 70–99)
GLUCOSE BLDC GLUCOMTR-MCNC: 77 MG/DL (ref 70–99)
GLUCOSE UR STRIP-MCNC: NEGATIVE MG/DL
GRANULAR CAST: 5 /LPF
HBA1C MFR BLD: 5.4 % (ref 0–5.6)
HCT VFR BLD AUTO: 13.7 % (ref 40–53)
HCT VFR BLD AUTO: 17.2 % (ref 40–53)
HCT VFR BLD AUTO: 20 % (ref 40–53)
HCT VFR BLD AUTO: 21.8 % (ref 40–53)
HCT VFR BLD AUTO: 22.5 % (ref 40–53)
HCT VFR BLD AUTO: 22.5 % (ref 40–53)
HCT VFR BLD AUTO: 22.9 % (ref 40–53)
HCT VFR BLD AUTO: 23.8 % (ref 40–53)
HCT VFR BLD AUTO: 23.8 % (ref 40–53)
HCT VFR BLD AUTO: 24 % (ref 40–53)
HCT VFR BLD AUTO: 24.3 % (ref 40–53)
HCT VFR BLD AUTO: 24.7 % (ref 40–53)
HCT VFR BLD AUTO: 24.9 % (ref 40–53)
HCT VFR BLD AUTO: 24.9 % (ref 40–53)
HCT VFR BLD AUTO: 25 % (ref 40–53)
HCT VFR BLD AUTO: 25.1 % (ref 40–53)
HCT VFR BLD AUTO: 25.2 % (ref 40–53)
HCT VFR BLD AUTO: 25.7 % (ref 40–53)
HCT VFR BLD AUTO: 26.4 % (ref 40–53)
HCT VFR BLD AUTO: 26.9 % (ref 40–53)
HCT VFR BLD AUTO: 27.3 % (ref 40–53)
HCT VFR BLD AUTO: 27.9 % (ref 40–53)
HCT VFR BLD AUTO: 28.4 % (ref 40–53)
HCT VFR BLD AUTO: 28.6 % (ref 40–53)
HGB BLD-MCNC: 4.2 G/DL (ref 13.3–17.7)
HGB BLD-MCNC: 5.2 G/DL (ref 13.3–17.7)
HGB BLD-MCNC: 5.6 G/DL (ref 13.3–17.7)
HGB BLD-MCNC: 6.3 G/DL (ref 13.3–17.7)
HGB BLD-MCNC: 6.8 G/DL (ref 13.3–17.7)
HGB BLD-MCNC: 6.9 G/DL (ref 13.3–17.7)
HGB BLD-MCNC: 6.9 G/DL (ref 13.3–17.7)
HGB BLD-MCNC: 7 G/DL (ref 13.3–17.7)
HGB BLD-MCNC: 7.4 G/DL (ref 13.3–17.7)
HGB BLD-MCNC: 7.5 G/DL (ref 13.3–17.7)
HGB BLD-MCNC: 7.5 G/DL (ref 13.3–17.7)
HGB BLD-MCNC: 7.6 G/DL (ref 13.3–17.7)
HGB BLD-MCNC: 7.6 G/DL (ref 13.3–17.7)
HGB BLD-MCNC: 7.7 G/DL (ref 13.3–17.7)
HGB BLD-MCNC: 7.8 G/DL (ref 13.3–17.7)
HGB BLD-MCNC: 7.9 G/DL (ref 13.3–17.7)
HGB BLD-MCNC: 7.9 G/DL (ref 13.3–17.7)
HGB BLD-MCNC: 8 G/DL (ref 13.3–17.7)
HGB BLD-MCNC: 8.3 G/DL (ref 13.3–17.7)
HGB BLD-MCNC: 8.4 G/DL (ref 13.3–17.7)
HGB BLD-MCNC: 8.5 G/DL (ref 13.3–17.7)
HGB BLD-MCNC: 8.7 G/DL (ref 13.3–17.7)
HGB BLD-MCNC: 9 G/DL (ref 13.3–17.7)
HGB BLD-MCNC: 9.1 G/DL (ref 13.3–17.7)
HGB UR QL STRIP: ABNORMAL
HGB UR QL STRIP: NEGATIVE
HOLD SPECIMEN: NORMAL
HYALINE CASTS: 11 /LPF
IMM GRANULOCYTES # BLD: 0 10E3/UL
IMM GRANULOCYTES # BLD: 0.1 10E3/UL
IMM GRANULOCYTES NFR BLD: 0 %
IMM GRANULOCYTES NFR BLD: 2 %
INR PPP: 0.95 (ref 0.85–1.15)
INR PPP: 1.08 (ref 0.85–1.15)
INR PPP: 1.09 (ref 0.85–1.15)
INR PPP: 1.1 (ref 0.85–1.15)
INR PPP: 1.16 (ref 0.85–1.15)
INTERPRETATION ECG - MUSE: NORMAL
IRON SATN MFR SERPL: 4 % (ref 15–46)
IRON SERPL-MCNC: 15 UG/DL (ref 35–180)
ISSUE DATE AND TIME: NORMAL
KETONES UR STRIP-MCNC: ABNORMAL MG/DL
KETONES UR STRIP-MCNC: NEGATIVE MG/DL
LACTATE SERPL-SCNC: 0.6 MMOL/L (ref 0.7–2)
LACTATE SERPL-SCNC: 0.8 MMOL/L (ref 0.7–2)
LACTATE SERPL-SCNC: 0.9 MMOL/L (ref 0.7–2)
LACTATE SERPL-SCNC: 1.3 MMOL/L (ref 0.7–2)
LEUKOCYTE ESTERASE UR QL STRIP: ABNORMAL
LEUKOCYTE ESTERASE UR QL STRIP: NEGATIVE
LEUKOCYTE ESTERASE UR QL STRIP: NEGATIVE
LIPASE SERPL-CCNC: 102 U/L (ref 73–393)
LIPASE SERPL-CCNC: 155 U/L (ref 73–393)
LIPASE SERPL-CCNC: 87 U/L (ref 73–393)
LYMPHOCYTES # BLD AUTO: 0.4 10E3/UL (ref 0.8–5.3)
LYMPHOCYTES # BLD AUTO: 0.6 10E3/UL (ref 0.8–5.3)
LYMPHOCYTES # BLD AUTO: 0.7 10E3/UL (ref 0.8–5.3)
LYMPHOCYTES # BLD AUTO: 0.8 10E3/UL (ref 0.8–5.3)
LYMPHOCYTES # BLD AUTO: 0.9 10E3/UL (ref 0.8–5.3)
LYMPHOCYTES # BLD AUTO: 0.9 10E3/UL (ref 0.8–5.3)
LYMPHOCYTES # BLD AUTO: 1 10E3/UL (ref 0.8–5.3)
LYMPHOCYTES # BLD AUTO: 1 10E3/UL (ref 0.8–5.3)
LYMPHOCYTES # BLD AUTO: 1.1 10E3/UL (ref 0.8–5.3)
LYMPHOCYTES # BLD AUTO: 1.3 10E3/UL (ref 0.8–5.3)
LYMPHOCYTES # BLD AUTO: 1.3 10E3/UL (ref 0.8–5.3)
LYMPHOCYTES # BLD AUTO: 1.5 10E3/UL (ref 0.8–5.3)
LYMPHOCYTES NFR BLD AUTO: 12 %
LYMPHOCYTES NFR BLD AUTO: 13 %
LYMPHOCYTES NFR BLD AUTO: 14 %
LYMPHOCYTES NFR BLD AUTO: 15 %
LYMPHOCYTES NFR BLD AUTO: 15 %
LYMPHOCYTES NFR BLD AUTO: 17 %
LYMPHOCYTES NFR BLD AUTO: 6 %
LYMPHOCYTES NFR BLD AUTO: 8 %
LYMPHOCYTES NFR BLD AUTO: 8 %
LYMPHOCYTES NFR BLD AUTO: 9 %
MAGNESIUM SERPL-MCNC: 1.8 MG/DL (ref 1.6–2.3)
MAGNESIUM SERPL-MCNC: 2 MG/DL (ref 1.6–2.3)
MAGNESIUM SERPL-MCNC: 2.2 MG/DL (ref 1.6–2.3)
MAGNESIUM SERPL-MCNC: 2.2 MG/DL (ref 1.6–2.3)
MAGNESIUM SERPL-MCNC: 2.3 MG/DL (ref 1.6–2.3)
MAGNESIUM SERPL-MCNC: 2.5 MG/DL (ref 1.6–2.3)
MCH RBC QN AUTO: 24.1 PG (ref 26.5–33)
MCH RBC QN AUTO: 24.3 PG (ref 26.5–33)
MCH RBC QN AUTO: 24.5 PG (ref 26.5–33)
MCH RBC QN AUTO: 24.6 PG (ref 26.5–33)
MCH RBC QN AUTO: 24.7 PG (ref 26.5–33)
MCH RBC QN AUTO: 24.8 PG (ref 26.5–33)
MCH RBC QN AUTO: 25 PG (ref 26.5–33)
MCH RBC QN AUTO: 25.9 PG (ref 26.5–33)
MCH RBC QN AUTO: 26.1 PG (ref 26.5–33)
MCH RBC QN AUTO: 26.4 PG (ref 26.5–33)
MCH RBC QN AUTO: 26.5 PG (ref 26.5–33)
MCH RBC QN AUTO: 26.5 PG (ref 26.5–33)
MCH RBC QN AUTO: 26.6 PG (ref 26.5–33)
MCH RBC QN AUTO: 26.8 PG (ref 26.5–33)
MCH RBC QN AUTO: 26.8 PG (ref 26.5–33)
MCH RBC QN AUTO: 26.9 PG (ref 26.5–33)
MCH RBC QN AUTO: 27 PG (ref 26.5–33)
MCH RBC QN AUTO: 27 PG (ref 26.5–33)
MCH RBC QN AUTO: 27.5 PG (ref 26.5–33)
MCH RBC QN AUTO: 27.8 PG (ref 26.5–33)
MCH RBC QN AUTO: 28 PG (ref 26.5–33)
MCH RBC QN AUTO: 28.3 PG (ref 26.5–33)
MCH RBC QN AUTO: 28.4 PG (ref 26.5–33)
MCH RBC QN AUTO: 28.6 PG (ref 26.5–33)
MCH RBC QN AUTO: 28.6 PG (ref 26.5–33)
MCHC RBC AUTO-ENTMCNC: 29.7 G/DL (ref 31.5–36.5)
MCHC RBC AUTO-ENTMCNC: 30.2 G/DL (ref 31.5–36.5)
MCHC RBC AUTO-ENTMCNC: 30.4 G/DL (ref 31.5–36.5)
MCHC RBC AUTO-ENTMCNC: 30.7 G/DL (ref 31.5–36.5)
MCHC RBC AUTO-ENTMCNC: 30.7 G/DL (ref 31.5–36.5)
MCHC RBC AUTO-ENTMCNC: 30.8 G/DL (ref 31.5–36.5)
MCHC RBC AUTO-ENTMCNC: 31.1 G/DL (ref 31.5–36.5)
MCHC RBC AUTO-ENTMCNC: 31.2 G/DL (ref 31.5–36.5)
MCHC RBC AUTO-ENTMCNC: 31.3 G/DL (ref 31.5–36.5)
MCHC RBC AUTO-ENTMCNC: 31.3 G/DL (ref 31.5–36.5)
MCHC RBC AUTO-ENTMCNC: 31.4 G/DL (ref 31.5–36.5)
MCHC RBC AUTO-ENTMCNC: 31.5 G/DL (ref 31.5–36.5)
MCHC RBC AUTO-ENTMCNC: 31.5 G/DL (ref 31.5–36.5)
MCHC RBC AUTO-ENTMCNC: 31.6 G/DL (ref 31.5–36.5)
MCHC RBC AUTO-ENTMCNC: 31.7 G/DL (ref 31.5–36.5)
MCHC RBC AUTO-ENTMCNC: 31.9 G/DL (ref 31.5–36.5)
MCHC RBC AUTO-ENTMCNC: 32 G/DL (ref 31.5–36.5)
MCV RBC AUTO: 78 FL (ref 78–100)
MCV RBC AUTO: 78 FL (ref 78–100)
MCV RBC AUTO: 79 FL (ref 78–100)
MCV RBC AUTO: 79 FL (ref 78–100)
MCV RBC AUTO: 80 FL (ref 78–100)
MCV RBC AUTO: 80 FL (ref 78–100)
MCV RBC AUTO: 82 FL (ref 78–100)
MCV RBC AUTO: 82 FL (ref 78–100)
MCV RBC AUTO: 84 FL (ref 78–100)
MCV RBC AUTO: 84 FL (ref 78–100)
MCV RBC AUTO: 85 FL (ref 78–100)
MCV RBC AUTO: 86 FL (ref 78–100)
MCV RBC AUTO: 86 FL (ref 78–100)
MCV RBC AUTO: 87 FL (ref 78–100)
MCV RBC AUTO: 88 FL (ref 78–100)
MCV RBC AUTO: 88 FL (ref 78–100)
MCV RBC AUTO: 89 FL (ref 78–100)
MCV RBC AUTO: 90 FL (ref 78–100)
MCV RBC AUTO: 90 FL (ref 78–100)
MCV RBC AUTO: 91 FL (ref 78–100)
MCV RBC AUTO: 94 FL (ref 78–100)
MONOCYTES # BLD AUTO: 0.4 10E3/UL (ref 0–1.3)
MONOCYTES # BLD AUTO: 0.5 10E3/UL (ref 0–1.3)
MONOCYTES # BLD AUTO: 0.6 10E3/UL (ref 0–1.3)
MONOCYTES # BLD AUTO: 0.7 10E3/UL (ref 0–1.3)
MONOCYTES # BLD AUTO: 0.7 10E3/UL (ref 0–1.3)
MONOCYTES # BLD AUTO: 0.9 10E3/UL (ref 0–1.3)
MONOCYTES # BLD AUTO: 1 10E3/UL (ref 0–1.3)
MONOCYTES # BLD AUTO: 1 10E3/UL (ref 0–1.3)
MONOCYTES NFR BLD AUTO: 11 %
MONOCYTES NFR BLD AUTO: 11 %
MONOCYTES NFR BLD AUTO: 12 %
MONOCYTES NFR BLD AUTO: 5 %
MONOCYTES NFR BLD AUTO: 6 %
MONOCYTES NFR BLD AUTO: 6 %
MONOCYTES NFR BLD AUTO: 7 %
MONOCYTES NFR BLD AUTO: 7 %
MONOCYTES NFR BLD AUTO: 8 %
MONOCYTES NFR BLD AUTO: 9 %
MUCOUS THREADS #/AREA URNS LPF: PRESENT /LPF
NEUTROPHILS # BLD AUTO: 5.3 10E3/UL (ref 1.6–8.3)
NEUTROPHILS # BLD AUTO: 5.8 10E3/UL (ref 1.6–8.3)
NEUTROPHILS # BLD AUTO: 5.8 10E3/UL (ref 1.6–8.3)
NEUTROPHILS # BLD AUTO: 6 10E3/UL (ref 1.6–8.3)
NEUTROPHILS # BLD AUTO: 6.1 10E3/UL (ref 1.6–8.3)
NEUTROPHILS # BLD AUTO: 6.1 10E3/UL (ref 1.6–8.3)
NEUTROPHILS # BLD AUTO: 6.4 10E3/UL (ref 1.6–8.3)
NEUTROPHILS # BLD AUTO: 6.5 10E3/UL (ref 1.6–8.3)
NEUTROPHILS # BLD AUTO: 6.5 10E3/UL (ref 1.6–8.3)
NEUTROPHILS # BLD AUTO: 6.6 10E3/UL (ref 1.6–8.3)
NEUTROPHILS # BLD AUTO: 7 10E3/UL (ref 1.6–8.3)
NEUTROPHILS # BLD AUTO: 8.7 10E3/UL (ref 1.6–8.3)
NEUTROPHILS NFR BLD AUTO: 69 %
NEUTROPHILS NFR BLD AUTO: 74 %
NEUTROPHILS NFR BLD AUTO: 74 %
NEUTROPHILS NFR BLD AUTO: 78 %
NEUTROPHILS NFR BLD AUTO: 78 %
NEUTROPHILS NFR BLD AUTO: 79 %
NEUTROPHILS NFR BLD AUTO: 80 %
NEUTROPHILS NFR BLD AUTO: 80 %
NEUTROPHILS NFR BLD AUTO: 84 %
NEUTROPHILS NFR BLD AUTO: 84 %
NEUTROPHILS NFR BLD AUTO: 85 %
NEUTROPHILS NFR BLD AUTO: 87 %
NITRATE UR QL: NEGATIVE
NRBC # BLD AUTO: 0 10E3/UL
NRBC BLD AUTO-RTO: 0 /100
NT-PROBNP SERPL-MCNC: 506 PG/ML (ref 0–900)
OSMOLALITY SERPL: 282 MMOL/KG (ref 280–301)
OSMOLALITY SERPL: 286 MMOL/KG (ref 280–301)
OSMOLALITY UR: 359 MMOL/KG (ref 100–1200)
OSMOLALITY UR: 569 MMOL/KG (ref 100–1200)
P AXIS - MUSE: 10 DEGREES
P AXIS - MUSE: 13 DEGREES
P AXIS - MUSE: 34 DEGREES
P AXIS - MUSE: 43 DEGREES
P AXIS - MUSE: 43 DEGREES
P AXIS - MUSE: 47 DEGREES
P AXIS - MUSE: 48 DEGREES
PH UR STRIP: 5 [PH] (ref 5–7)
PH UR STRIP: 5 [PH] (ref 5–7)
PH UR STRIP: 5.5 [PH] (ref 5–7)
PH UR STRIP: 6 [PH] (ref 5–7)
PH UR STRIP: 6.5 [PH] (ref 5–7)
PHOSPHATE SERPL-MCNC: 3.2 MG/DL (ref 2.5–4.5)
PHOSPHATE SERPL-MCNC: 3.2 MG/DL (ref 2.5–4.5)
PHOSPHATE SERPL-MCNC: 3.3 MG/DL (ref 2.5–4.5)
PHOSPHATE SERPL-MCNC: 3.3 MG/DL (ref 2.5–4.5)
PHOSPHATE SERPL-MCNC: 3.8 MG/DL (ref 2.5–4.5)
PHOSPHATE SERPL-MCNC: 4.1 MG/DL (ref 2.5–4.5)
PLATELET # BLD AUTO: 314 10E3/UL (ref 150–450)
PLATELET # BLD AUTO: 325 10E3/UL (ref 150–450)
PLATELET # BLD AUTO: 336 10E3/UL (ref 150–450)
PLATELET # BLD AUTO: 337 10E3/UL (ref 150–450)
PLATELET # BLD AUTO: 347 10E3/UL (ref 150–450)
PLATELET # BLD AUTO: 349 10E3/UL (ref 150–450)
PLATELET # BLD AUTO: 356 10E3/UL (ref 150–450)
PLATELET # BLD AUTO: 366 10E3/UL (ref 150–450)
PLATELET # BLD AUTO: 374 10E3/UL (ref 150–450)
PLATELET # BLD AUTO: 378 10E3/UL (ref 150–450)
PLATELET # BLD AUTO: 388 10E3/UL (ref 150–450)
PLATELET # BLD AUTO: 392 10E3/UL (ref 150–450)
PLATELET # BLD AUTO: 401 10E3/UL (ref 150–450)
PLATELET # BLD AUTO: 402 10E3/UL (ref 150–450)
PLATELET # BLD AUTO: 402 10E3/UL (ref 150–450)
PLATELET # BLD AUTO: 406 10E3/UL (ref 150–450)
PLATELET # BLD AUTO: 413 10E3/UL (ref 150–450)
PLATELET # BLD AUTO: 429 10E3/UL (ref 150–450)
PLATELET # BLD AUTO: 454 10E3/UL (ref 150–450)
PLATELET # BLD AUTO: 473 10E3/UL (ref 150–450)
PLATELET # BLD AUTO: 494 10E3/UL (ref 150–450)
PLATELET # BLD AUTO: 502 10E3/UL (ref 150–450)
PLATELET # BLD AUTO: 508 10E3/UL (ref 150–450)
PLATELET # BLD AUTO: 573 10E3/UL (ref 150–450)
PLATELET # BLD AUTO: 592 10E3/UL (ref 150–450)
POTASSIUM BLD-SCNC: 3.3 MMOL/L (ref 3.4–5.3)
POTASSIUM BLD-SCNC: 3.7 MMOL/L (ref 3.4–5.3)
POTASSIUM BLD-SCNC: 3.8 MMOL/L (ref 3.4–5.3)
POTASSIUM BLD-SCNC: 3.9 MMOL/L (ref 3.4–5.3)
POTASSIUM BLD-SCNC: 4 MMOL/L (ref 3.4–5.3)
POTASSIUM BLD-SCNC: 4.2 MMOL/L (ref 3.4–5.3)
POTASSIUM BLD-SCNC: 4.3 MMOL/L (ref 3.4–5.3)
POTASSIUM BLD-SCNC: 4.4 MMOL/L (ref 3.4–5.3)
POTASSIUM BLD-SCNC: 4.5 MMOL/L (ref 3.4–5.3)
POTASSIUM BLD-SCNC: 4.6 MMOL/L (ref 3.4–5.3)
POTASSIUM BLD-SCNC: 4.9 MMOL/L (ref 3.4–5.3)
POTASSIUM BLD-SCNC: 5.4 MMOL/L (ref 3.4–5.3)
POTASSIUM BLD-SCNC: 5.6 MMOL/L (ref 3.4–5.3)
PR INTERVAL - MUSE: 124 MS
PR INTERVAL - MUSE: 134 MS
PR INTERVAL - MUSE: 138 MS
PR INTERVAL - MUSE: 138 MS
PR INTERVAL - MUSE: 140 MS
PR INTERVAL - MUSE: 146 MS
PR INTERVAL - MUSE: 156 MS
PROT SERPL-MCNC: 6.4 G/DL (ref 6.8–8.8)
PROT SERPL-MCNC: 6.6 G/DL (ref 6.8–8.8)
PROT SERPL-MCNC: 6.6 G/DL (ref 6.8–8.8)
PROT SERPL-MCNC: 6.8 G/DL (ref 6.8–8.8)
PROT SERPL-MCNC: 6.9 G/DL (ref 6.8–8.8)
PROT SERPL-MCNC: 7.4 G/DL (ref 6.8–8.8)
PROT SERPL-MCNC: 7.5 G/DL (ref 6.8–8.8)
PROT SERPL-MCNC: 7.6 G/DL (ref 6.8–8.8)
PROT SERPL-MCNC: 7.7 G/DL (ref 6.8–8.8)
PROT SERPL-MCNC: 7.8 G/DL (ref 6.8–8.8)
PROT SERPL-MCNC: 7.9 G/DL (ref 6.8–8.8)
PROT SERPL-MCNC: 8.1 G/DL (ref 6.8–8.8)
PROT SERPL-MCNC: 8.3 G/DL (ref 6.8–8.8)
PROT UR-MCNC: 0.51 G/L
PROT/CREAT 24H UR: 0.38 G/G CR (ref 0–0.2)
QRS DURATION - MUSE: 78 MS
QRS DURATION - MUSE: 80 MS
QRS DURATION - MUSE: 84 MS
QRS DURATION - MUSE: 84 MS
QRS DURATION - MUSE: 88 MS
QRS DURATION - MUSE: 88 MS
QRS DURATION - MUSE: 90 MS
QT - MUSE: 330 MS
QT - MUSE: 350 MS
QT - MUSE: 352 MS
QT - MUSE: 364 MS
QT - MUSE: 380 MS
QT - MUSE: 388 MS
QT - MUSE: 396 MS
QTC - MUSE: 398 MS
QTC - MUSE: 403 MS
QTC - MUSE: 405 MS
QTC - MUSE: 411 MS
QTC - MUSE: 421 MS
QTC - MUSE: 424 MS
QTC - MUSE: 435 MS
R AXIS - MUSE: -12 DEGREES
R AXIS - MUSE: -14 DEGREES
R AXIS - MUSE: -18 DEGREES
R AXIS - MUSE: -21 DEGREES
R AXIS - MUSE: -23 DEGREES
R AXIS - MUSE: -25 DEGREES
R AXIS - MUSE: 2 DEGREES
RBC # BLD AUTO: 1.5 10E6/UL (ref 4.4–5.9)
RBC # BLD AUTO: 1.83 10E6/UL (ref 4.4–5.9)
RBC # BLD AUTO: 2.52 10E6/UL (ref 4.4–5.9)
RBC # BLD AUTO: 2.6 10E6/UL (ref 4.4–5.9)
RBC # BLD AUTO: 2.66 10E6/UL (ref 4.4–5.9)
RBC # BLD AUTO: 2.66 10E6/UL (ref 4.4–5.9)
RBC # BLD AUTO: 2.68 10E6/UL (ref 4.4–5.9)
RBC # BLD AUTO: 2.76 10E6/UL (ref 4.4–5.9)
RBC # BLD AUTO: 2.76 10E6/UL (ref 4.4–5.9)
RBC # BLD AUTO: 2.8 10E6/UL (ref 4.4–5.9)
RBC # BLD AUTO: 2.84 10E6/UL (ref 4.4–5.9)
RBC # BLD AUTO: 2.85 10E6/UL (ref 4.4–5.9)
RBC # BLD AUTO: 2.91 10E6/UL (ref 4.4–5.9)
RBC # BLD AUTO: 2.91 10E6/UL (ref 4.4–5.9)
RBC # BLD AUTO: 2.96 10E6/UL (ref 4.4–5.9)
RBC # BLD AUTO: 2.98 10E6/UL (ref 4.4–5.9)
RBC # BLD AUTO: 2.99 10E6/UL (ref 4.4–5.9)
RBC # BLD AUTO: 3.05 10E6/UL (ref 4.4–5.9)
RBC # BLD AUTO: 3.09 10E6/UL (ref 4.4–5.9)
RBC # BLD AUTO: 3.16 10E6/UL (ref 4.4–5.9)
RBC # BLD AUTO: 3.18 10E6/UL (ref 4.4–5.9)
RBC # BLD AUTO: 3.22 10E6/UL (ref 4.4–5.9)
RBC # BLD AUTO: 3.23 10E6/UL (ref 4.4–5.9)
RBC # BLD AUTO: 3.43 10E6/UL (ref 4.4–5.9)
RBC # BLD AUTO: 3.67 10E6/UL (ref 4.4–5.9)
RBC #/AREA URNS AUTO: ABNORMAL /HPF
RBC #/AREA URNS AUTO: ABNORMAL /HPF
RBC URINE: 0 /HPF
RBC URINE: 1 /HPF
RBC URINE: 1 /HPF
RBC URINE: 3 /HPF
RBC URINE: 4 /HPF
RBC URINE: 9 /HPF
SARS-COV-2 RNA RESP QL NAA+PROBE: NEGATIVE
SODIUM SERPL-SCNC: 126 MMOL/L (ref 133–144)
SODIUM SERPL-SCNC: 128 MMOL/L (ref 133–144)
SODIUM SERPL-SCNC: 129 MMOL/L (ref 133–144)
SODIUM SERPL-SCNC: 130 MMOL/L (ref 133–144)
SODIUM SERPL-SCNC: 131 MMOL/L (ref 133–144)
SODIUM SERPL-SCNC: 131 MMOL/L (ref 133–144)
SODIUM SERPL-SCNC: 132 MMOL/L (ref 133–144)
SODIUM SERPL-SCNC: 133 MMOL/L (ref 133–144)
SODIUM SERPL-SCNC: 134 MMOL/L (ref 133–144)
SODIUM SERPL-SCNC: 135 MMOL/L (ref 133–144)
SODIUM SERPL-SCNC: 136 MMOL/L (ref 133–144)
SODIUM UR-SCNC: 16 MMOL/L
SODIUM UR-SCNC: 18 MMOL/L
SP GR UR STRIP: 1.01 (ref 1–1.03)
SP GR UR STRIP: 1.02 (ref 1–1.03)
SP GR UR STRIP: 1.03 (ref 1–1.03)
SPECIMEN EXPIRATION DATE: NORMAL
SPERM #/AREA URNS HPF: PRESENT /HPF
SQUAMOUS #/AREA URNS AUTO: ABNORMAL /LPF
SQUAMOUS EPITHELIAL: <1 /HPF
SYSTOLIC BLOOD PRESSURE - MUSE: NORMAL MMHG
T AXIS - MUSE: 18 DEGREES
T AXIS - MUSE: 26 DEGREES
T AXIS - MUSE: 30 DEGREES
T AXIS - MUSE: 37 DEGREES
T AXIS - MUSE: 39 DEGREES
T AXIS - MUSE: 40 DEGREES
T AXIS - MUSE: 53 DEGREES
TIBC SERPL-MCNC: 377 UG/DL (ref 240–430)
TRANSITIONAL EPI: <1 /HPF
TRANSITIONAL EPI: <1 /HPF
TROPONIN I SERPL-MCNC: <0.015 UG/L (ref 0–0.04)
TROPONIN I SERPL-MCNC: <0.015 UG/L (ref 0–0.04)
TSH SERPL DL<=0.005 MIU/L-ACNC: 3.91 MU/L (ref 0.4–4)
UNIT ABO/RH: NORMAL
UNIT NUMBER: NORMAL
UNIT STATUS: NORMAL
UNIT TYPE ISBT: 1700
UNIT TYPE ISBT: 1700
UNIT TYPE ISBT: 7300
UPPER GI ENDOSCOPY: NORMAL
URATE SERPL-MCNC: 5 MG/DL (ref 3.5–7.2)
UROBILINOGEN UR STRIP-ACNC: 0.2 E.U./DL
UROBILINOGEN UR STRIP-ACNC: 0.2 E.U./DL
UROBILINOGEN UR STRIP-MCNC: NORMAL MG/DL
VENTRICULAR RATE- MUSE: 65 BPM
VENTRICULAR RATE- MUSE: 69 BPM
VENTRICULAR RATE- MUSE: 72 BPM
VENTRICULAR RATE- MUSE: 74 BPM
VENTRICULAR RATE- MUSE: 82 BPM
VENTRICULAR RATE- MUSE: 91 BPM
VENTRICULAR RATE- MUSE: 93 BPM
WBC # BLD AUTO: 10.2 10E3/UL (ref 4–11)
WBC # BLD AUTO: 10.6 10E3/UL (ref 4–11)
WBC # BLD AUTO: 10.6 10E3/UL (ref 4–11)
WBC # BLD AUTO: 5.9 10E3/UL (ref 4–11)
WBC # BLD AUTO: 6.2 10E3/UL (ref 4–11)
WBC # BLD AUTO: 6.3 10E3/UL (ref 4–11)
WBC # BLD AUTO: 6.5 10E3/UL (ref 4–11)
WBC # BLD AUTO: 6.7 10E3/UL (ref 4–11)
WBC # BLD AUTO: 6.9 10E3/UL (ref 4–11)
WBC # BLD AUTO: 7 10E3/UL (ref 4–11)
WBC # BLD AUTO: 7.1 10E3/UL (ref 4–11)
WBC # BLD AUTO: 7.2 10E3/UL (ref 4–11)
WBC # BLD AUTO: 7.4 10E3/UL (ref 4–11)
WBC # BLD AUTO: 7.5 10E3/UL (ref 4–11)
WBC # BLD AUTO: 7.9 10E3/UL (ref 4–11)
WBC # BLD AUTO: 7.9 10E3/UL (ref 4–11)
WBC # BLD AUTO: 8.1 10E3/UL (ref 4–11)
WBC # BLD AUTO: 8.2 10E3/UL (ref 4–11)
WBC # BLD AUTO: 8.3 10E3/UL (ref 4–11)
WBC # BLD AUTO: 8.5 10E3/UL (ref 4–11)
WBC # BLD AUTO: 8.7 10E3/UL (ref 4–11)
WBC # BLD AUTO: 8.8 10E3/UL (ref 4–11)
WBC # BLD AUTO: 8.9 10E3/UL (ref 4–11)
WBC # BLD AUTO: 8.9 10E3/UL (ref 4–11)
WBC # BLD AUTO: 9.1 10E3/UL (ref 4–11)
WBC #/AREA URNS AUTO: ABNORMAL /HPF
WBC #/AREA URNS AUTO: ABNORMAL /HPF
WBC URINE: 0 /HPF
WBC URINE: 11 /HPF
WBC URINE: 12 /HPF
WBC URINE: 12 /HPF
WBC URINE: 2 /HPF
WBC URINE: 3 /HPF
WBC URINE: 5 /HPF
WBC URINE: 9 /HPF

## 2021-01-01 PROCEDURE — 74177 CT ABD & PELVIS W/CONTRAST: CPT | Mod: 26 | Performed by: RADIOLOGY

## 2021-01-01 PROCEDURE — 99233 SBSQ HOSP IP/OBS HIGH 50: CPT | Performed by: STUDENT IN AN ORGANIZED HEALTH CARE EDUCATION/TRAINING PROGRAM

## 2021-01-01 PROCEDURE — 258N000003 HC RX IP 258 OP 636: Performed by: EMERGENCY MEDICINE

## 2021-01-01 PROCEDURE — 99215 OFFICE O/P EST HI 40 MIN: CPT | Mod: 95 | Performed by: STUDENT IN AN ORGANIZED HEALTH CARE EDUCATION/TRAINING PROGRAM

## 2021-01-01 PROCEDURE — U0003 INFECTIOUS AGENT DETECTION BY NUCLEIC ACID (DNA OR RNA); SEVERE ACUTE RESPIRATORY SYNDROME CORONAVIRUS 2 (SARS-COV-2) (CORONAVIRUS DISEASE [COVID-19]), AMPLIFIED PROBE TECHNIQUE, MAKING USE OF HIGH THROUGHPUT TECHNOLOGIES AS DESCRIBED BY CMS-2020-01-R: HCPCS | Mod: 90 | Performed by: PATHOLOGY

## 2021-01-01 PROCEDURE — 83930 ASSAY OF BLOOD OSMOLALITY: CPT | Performed by: STUDENT IN AN ORGANIZED HEALTH CARE EDUCATION/TRAINING PROGRAM

## 2021-01-01 PROCEDURE — 206N000001 HC R&B BMT UMMC

## 2021-01-01 PROCEDURE — 96374 THER/PROPH/DIAG INJ IV PUSH: CPT

## 2021-01-01 PROCEDURE — 999N000141 HC STATISTIC PRE-PROCEDURE NURSING ASSESSMENT: Performed by: UROLOGY

## 2021-01-01 PROCEDURE — G0378 HOSPITAL OBSERVATION PER HR: HCPCS

## 2021-01-01 PROCEDURE — 99283 EMERGENCY DEPT VISIT LOW MDM: CPT | Performed by: EMERGENCY MEDICINE

## 2021-01-01 PROCEDURE — 120N000002 HC R&B MED SURG/OB UMMC

## 2021-01-01 PROCEDURE — 250N000011 HC RX IP 250 OP 636: Performed by: INTERNAL MEDICINE

## 2021-01-01 PROCEDURE — 87040 BLOOD CULTURE FOR BACTERIA: CPT | Performed by: EMERGENCY MEDICINE

## 2021-01-01 PROCEDURE — 99285 EMERGENCY DEPT VISIT HI MDM: CPT | Mod: 25

## 2021-01-01 PROCEDURE — 250N000011 HC RX IP 250 OP 636: Performed by: EMERGENCY MEDICINE

## 2021-01-01 PROCEDURE — 999N000133 HC STATISTIC PP CARE STAGE 2

## 2021-01-01 PROCEDURE — 86140 C-REACTIVE PROTEIN: CPT | Performed by: STUDENT IN AN ORGANIZED HEALTH CARE EDUCATION/TRAINING PROGRAM

## 2021-01-01 PROCEDURE — 250N000013 HC RX MED GY IP 250 OP 250 PS 637: Performed by: INTERNAL MEDICINE

## 2021-01-01 PROCEDURE — 74420 UROGRAPHY RTRGR +-KUB: CPT | Mod: 26 | Performed by: UROLOGY

## 2021-01-01 PROCEDURE — 80048 BASIC METABOLIC PNL TOTAL CA: CPT | Performed by: INTERNAL MEDICINE

## 2021-01-01 PROCEDURE — C9113 INJ PANTOPRAZOLE SODIUM, VIA: HCPCS | Performed by: STUDENT IN AN ORGANIZED HEALTH CARE EDUCATION/TRAINING PROGRAM

## 2021-01-01 PROCEDURE — 82040 ASSAY OF SERUM ALBUMIN: CPT | Performed by: HOSPITALIST

## 2021-01-01 PROCEDURE — 250N000013 HC RX MED GY IP 250 OP 250 PS 637: Performed by: STUDENT IN AN ORGANIZED HEALTH CARE EDUCATION/TRAINING PROGRAM

## 2021-01-01 PROCEDURE — 999N000127 HC STATISTIC PERIPHERAL IV START W US GUIDANCE

## 2021-01-01 PROCEDURE — 120N000003 HC R&B IMCU UMMC

## 2021-01-01 PROCEDURE — 93010 ELECTROCARDIOGRAM REPORT: CPT | Performed by: EMERGENCY MEDICINE

## 2021-01-01 PROCEDURE — 99285 EMERGENCY DEPT VISIT HI MDM: CPT | Mod: 25 | Performed by: EMERGENCY MEDICINE

## 2021-01-01 PROCEDURE — 250N000013 HC RX MED GY IP 250 OP 250 PS 637: Performed by: PHYSICIAN ASSISTANT

## 2021-01-01 PROCEDURE — 343N000001 HC RX 343: Performed by: STUDENT IN AN ORGANIZED HEALTH CARE EDUCATION/TRAINING PROGRAM

## 2021-01-01 PROCEDURE — 93005 ELECTROCARDIOGRAM TRACING: CPT

## 2021-01-01 PROCEDURE — 86923 COMPATIBILITY TEST ELECTRIC: CPT | Performed by: EMERGENCY MEDICINE

## 2021-01-01 PROCEDURE — C1758 CATHETER, URETERAL: HCPCS | Performed by: UROLOGY

## 2021-01-01 PROCEDURE — 81001 URINALYSIS AUTO W/SCOPE: CPT | Performed by: FAMILY MEDICINE

## 2021-01-01 PROCEDURE — 99285 EMERGENCY DEPT VISIT HI MDM: CPT | Performed by: EMERGENCY MEDICINE

## 2021-01-01 PROCEDURE — 83690 ASSAY OF LIPASE: CPT | Performed by: EMERGENCY MEDICINE

## 2021-01-01 PROCEDURE — 258N000003 HC RX IP 258 OP 636: Performed by: STUDENT IN AN ORGANIZED HEALTH CARE EDUCATION/TRAINING PROGRAM

## 2021-01-01 PROCEDURE — 99222 1ST HOSP IP/OBS MODERATE 55: CPT | Performed by: NURSE PRACTITIONER

## 2021-01-01 PROCEDURE — 81288 MLH1 GENE: CPT | Performed by: INTERNAL MEDICINE

## 2021-01-01 PROCEDURE — 99233 SBSQ HOSP IP/OBS HIGH 50: CPT | Performed by: CLINICAL NURSE SPECIALIST

## 2021-01-01 PROCEDURE — 85014 HEMATOCRIT: CPT | Performed by: STUDENT IN AN ORGANIZED HEALTH CARE EDUCATION/TRAINING PROGRAM

## 2021-01-01 PROCEDURE — 99215 OFFICE O/P EST HI 40 MIN: CPT | Performed by: PHYSICIAN ASSISTANT

## 2021-01-01 PROCEDURE — C9113 INJ PANTOPRAZOLE SODIUM, VIA: HCPCS | Performed by: EMERGENCY MEDICINE

## 2021-01-01 PROCEDURE — 74177 CT ABD & PELVIS W/CONTRAST: CPT

## 2021-01-01 PROCEDURE — C1769 GUIDE WIRE: HCPCS | Performed by: UROLOGY

## 2021-01-01 PROCEDURE — 272N000508 HC NEEDLE CR8

## 2021-01-01 PROCEDURE — 99207 PR CDG-CODE CATEGORY CHANGED: CPT | Performed by: STUDENT IN AN ORGANIZED HEALTH CARE EDUCATION/TRAINING PROGRAM

## 2021-01-01 PROCEDURE — 85025 COMPLETE CBC W/AUTO DIFF WBC: CPT | Performed by: PHYSICIAN ASSISTANT

## 2021-01-01 PROCEDURE — 93010 ELECTROCARDIOGRAM REPORT: CPT | Mod: 59 | Performed by: INTERNAL MEDICINE

## 2021-01-01 PROCEDURE — 96374 THER/PROPH/DIAG INJ IV PUSH: CPT | Mod: 59

## 2021-01-01 PROCEDURE — 85014 HEMATOCRIT: CPT | Performed by: EMERGENCY MEDICINE

## 2021-01-01 PROCEDURE — 82040 ASSAY OF SERUM ALBUMIN: CPT | Performed by: STUDENT IN AN ORGANIZED HEALTH CARE EDUCATION/TRAINING PROGRAM

## 2021-01-01 PROCEDURE — 96361 HYDRATE IV INFUSION ADD-ON: CPT | Performed by: EMERGENCY MEDICINE

## 2021-01-01 PROCEDURE — 85027 COMPLETE CBC AUTOMATED: CPT | Performed by: INTERNAL MEDICINE

## 2021-01-01 PROCEDURE — 92610 EVALUATE SWALLOWING FUNCTION: CPT | Mod: GN

## 2021-01-01 PROCEDURE — 250N000011 HC RX IP 250 OP 636: Performed by: NURSE PRACTITIONER

## 2021-01-01 PROCEDURE — U0005 INFEC AGEN DETEC AMPLI PROBE: HCPCS | Performed by: EMERGENCY MEDICINE

## 2021-01-01 PROCEDURE — 80053 COMPREHEN METABOLIC PANEL: CPT | Performed by: EMERGENCY MEDICINE

## 2021-01-01 PROCEDURE — 99233 SBSQ HOSP IP/OBS HIGH 50: CPT | Performed by: INTERNAL MEDICINE

## 2021-01-01 PROCEDURE — 36415 COLL VENOUS BLD VENIPUNCTURE: CPT | Performed by: EMERGENCY MEDICINE

## 2021-01-01 PROCEDURE — P9016 RBC LEUKOCYTES REDUCED: HCPCS

## 2021-01-01 PROCEDURE — 93970 EXTREMITY STUDY: CPT

## 2021-01-01 PROCEDURE — 71250 CT THORAX DX C-: CPT

## 2021-01-01 PROCEDURE — 85730 THROMBOPLASTIN TIME PARTIAL: CPT | Performed by: EMERGENCY MEDICINE

## 2021-01-01 PROCEDURE — 80048 BASIC METABOLIC PNL TOTAL CA: CPT | Performed by: EMERGENCY MEDICINE

## 2021-01-01 PROCEDURE — 36415 COLL VENOUS BLD VENIPUNCTURE: CPT | Performed by: INTERNAL MEDICINE

## 2021-01-01 PROCEDURE — 250N000011 HC RX IP 250 OP 636: Performed by: STUDENT IN AN ORGANIZED HEALTH CARE EDUCATION/TRAINING PROGRAM

## 2021-01-01 PROCEDURE — 36415 COLL VENOUS BLD VENIPUNCTURE: CPT

## 2021-01-01 PROCEDURE — 999N001193 HC VIDEO/TELEPHONE VISIT; NO CHARGE

## 2021-01-01 PROCEDURE — 99221 1ST HOSP IP/OBS SF/LOW 40: CPT | Performed by: PHYSICIAN ASSISTANT

## 2021-01-01 PROCEDURE — 99214 OFFICE O/P EST MOD 30 MIN: CPT | Performed by: FAMILY MEDICINE

## 2021-01-01 PROCEDURE — 99214 OFFICE O/P EST MOD 30 MIN: CPT | Mod: 95 | Performed by: STUDENT IN AN ORGANIZED HEALTH CARE EDUCATION/TRAINING PROGRAM

## 2021-01-01 PROCEDURE — 82565 ASSAY OF CREATININE: CPT | Performed by: INTERNAL MEDICINE

## 2021-01-01 PROCEDURE — 81001 URINALYSIS AUTO W/SCOPE: CPT | Performed by: STUDENT IN AN ORGANIZED HEALTH CARE EDUCATION/TRAINING PROGRAM

## 2021-01-01 PROCEDURE — 85018 HEMOGLOBIN: CPT | Performed by: STUDENT IN AN ORGANIZED HEALTH CARE EDUCATION/TRAINING PROGRAM

## 2021-01-01 PROCEDURE — 85027 COMPLETE CBC AUTOMATED: CPT | Performed by: HOSPITALIST

## 2021-01-01 PROCEDURE — 78816 PET IMAGE W/CT FULL BODY: CPT | Mod: 26

## 2021-01-01 PROCEDURE — 36415 COLL VENOUS BLD VENIPUNCTURE: CPT | Performed by: STUDENT IN AN ORGANIZED HEALTH CARE EDUCATION/TRAINING PROGRAM

## 2021-01-01 PROCEDURE — 99233 SBSQ HOSP IP/OBS HIGH 50: CPT | Mod: GC | Performed by: INTERNAL MEDICINE

## 2021-01-01 PROCEDURE — 84550 ASSAY OF BLOOD/URIC ACID: CPT | Performed by: STUDENT IN AN ORGANIZED HEALTH CARE EDUCATION/TRAINING PROGRAM

## 2021-01-01 PROCEDURE — 999N000128 HC STATISTIC PERIPHERAL IV START W/O US GUIDANCE

## 2021-01-01 PROCEDURE — 96376 TX/PRO/DX INJ SAME DRUG ADON: CPT

## 2021-01-01 PROCEDURE — 84132 ASSAY OF SERUM POTASSIUM: CPT | Performed by: HOSPITALIST

## 2021-01-01 PROCEDURE — 50432 PLMT NEPHROSTOMY CATHETER: CPT | Mod: RT | Performed by: RADIOLOGY

## 2021-01-01 PROCEDURE — 370N000017 HC ANESTHESIA TECHNICAL FEE, PER MIN: Performed by: UROLOGY

## 2021-01-01 PROCEDURE — 99153 MOD SED SAME PHYS/QHP EA: CPT | Performed by: INTERNAL MEDICINE

## 2021-01-01 PROCEDURE — 99223 1ST HOSP IP/OBS HIGH 75: CPT | Mod: AI | Performed by: STUDENT IN AN ORGANIZED HEALTH CARE EDUCATION/TRAINING PROGRAM

## 2021-01-01 PROCEDURE — 81001 URINALYSIS AUTO W/SCOPE: CPT | Performed by: EMERGENCY MEDICINE

## 2021-01-01 PROCEDURE — 999N000007 HC SITE CHECK

## 2021-01-01 PROCEDURE — 99220 PR INITIAL OBSERVATION CARE,LEVEL III: CPT | Performed by: INTERNAL MEDICINE

## 2021-01-01 PROCEDURE — 250N000009 HC RX 250: Performed by: STUDENT IN AN ORGANIZED HEALTH CARE EDUCATION/TRAINING PROGRAM

## 2021-01-01 PROCEDURE — 86850 RBC ANTIBODY SCREEN: CPT | Performed by: PHYSICIAN ASSISTANT

## 2021-01-01 PROCEDURE — 84484 ASSAY OF TROPONIN QUANT: CPT | Performed by: EMERGENCY MEDICINE

## 2021-01-01 PROCEDURE — 250N000013 HC RX MED GY IP 250 OP 250 PS 637: Performed by: CLINICAL NURSE SPECIALIST

## 2021-01-01 PROCEDURE — 99221 1ST HOSP IP/OBS SF/LOW 40: CPT | Performed by: STUDENT IN AN ORGANIZED HEALTH CARE EDUCATION/TRAINING PROGRAM

## 2021-01-01 PROCEDURE — 99232 SBSQ HOSP IP/OBS MODERATE 35: CPT | Performed by: STUDENT IN AN ORGANIZED HEALTH CARE EDUCATION/TRAINING PROGRAM

## 2021-01-01 PROCEDURE — 85025 COMPLETE CBC W/AUTO DIFF WBC: CPT | Performed by: EMERGENCY MEDICINE

## 2021-01-01 PROCEDURE — 82040 ASSAY OF SERUM ALBUMIN: CPT | Performed by: EMERGENCY MEDICINE

## 2021-01-01 PROCEDURE — G0452 MOLECULAR PATHOLOGY INTERPR: HCPCS | Mod: 26 | Performed by: PATHOLOGY

## 2021-01-01 PROCEDURE — 83036 HEMOGLOBIN GLYCOSYLATED A1C: CPT | Performed by: FAMILY MEDICINE

## 2021-01-01 PROCEDURE — C9803 HOPD COVID-19 SPEC COLLECT: HCPCS

## 2021-01-01 PROCEDURE — 52332 CYSTOSCOPY AND TREATMENT: CPT | Mod: RT | Performed by: UROLOGY

## 2021-01-01 PROCEDURE — 250N000009 HC RX 250: Performed by: EMERGENCY MEDICINE

## 2021-01-01 PROCEDURE — 999N000111 HC STATISTIC OT IP EVAL DEFER

## 2021-01-01 PROCEDURE — 250N000013 HC RX MED GY IP 250 OP 250 PS 637: Performed by: HOSPITALIST

## 2021-01-01 PROCEDURE — 85025 COMPLETE CBC W/AUTO DIFF WBC: CPT

## 2021-01-01 PROCEDURE — 86923 COMPATIBILITY TEST ELECTRIC: CPT | Performed by: PHYSICIAN ASSISTANT

## 2021-01-01 PROCEDURE — 99215 OFFICE O/P EST HI 40 MIN: CPT | Performed by: STUDENT IN AN ORGANIZED HEALTH CARE EDUCATION/TRAINING PROGRAM

## 2021-01-01 PROCEDURE — 99207 PR SC NO CHARGE VISIT/PATIENT NOT SEEN: CPT | Performed by: ANESTHESIOLOGY

## 2021-01-01 PROCEDURE — 250N000009 HC RX 250

## 2021-01-01 PROCEDURE — 83550 IRON BINDING TEST: CPT | Performed by: STUDENT IN AN ORGANIZED HEALTH CARE EDUCATION/TRAINING PROGRAM

## 2021-01-01 PROCEDURE — 83735 ASSAY OF MAGNESIUM: CPT | Performed by: INTERNAL MEDICINE

## 2021-01-01 PROCEDURE — 99152 MOD SED SAME PHYS/QHP 5/>YRS: CPT

## 2021-01-01 PROCEDURE — 99207 PR CDG-CODE CATEGORY CHANGED: CPT | Performed by: HOSPITALIST

## 2021-01-01 PROCEDURE — 84100 ASSAY OF PHOSPHORUS: CPT | Performed by: STUDENT IN AN ORGANIZED HEALTH CARE EDUCATION/TRAINING PROGRAM

## 2021-01-01 PROCEDURE — 999N000054 HC STATISTIC EKG NON-CHARGEABLE

## 2021-01-01 PROCEDURE — 80048 BASIC METABOLIC PNL TOTAL CA: CPT | Performed by: STUDENT IN AN ORGANIZED HEALTH CARE EDUCATION/TRAINING PROGRAM

## 2021-01-01 PROCEDURE — 85610 PROTHROMBIN TIME: CPT | Performed by: EMERGENCY MEDICINE

## 2021-01-01 PROCEDURE — 83880 ASSAY OF NATRIURETIC PEPTIDE: CPT | Performed by: EMERGENCY MEDICINE

## 2021-01-01 PROCEDURE — 99291 CRITICAL CARE FIRST HOUR: CPT | Mod: 25 | Performed by: EMERGENCY MEDICINE

## 2021-01-01 PROCEDURE — 80053 COMPREHEN METABOLIC PANEL: CPT | Performed by: STUDENT IN AN ORGANIZED HEALTH CARE EDUCATION/TRAINING PROGRAM

## 2021-01-01 PROCEDURE — 96375 TX/PRO/DX INJ NEW DRUG ADDON: CPT

## 2021-01-01 PROCEDURE — 250N000011 HC RX IP 250 OP 636: Performed by: HOSPITALIST

## 2021-01-01 PROCEDURE — 250N000013 HC RX MED GY IP 250 OP 250 PS 637: Performed by: EMERGENCY MEDICINE

## 2021-01-01 PROCEDURE — 250N000009 HC RX 250: Performed by: HOSPITALIST

## 2021-01-01 PROCEDURE — 85004 AUTOMATED DIFF WBC COUNT: CPT | Performed by: PHYSICIAN ASSISTANT

## 2021-01-01 PROCEDURE — 36430 TRANSFUSION BLD/BLD COMPNT: CPT

## 2021-01-01 PROCEDURE — 87086 URINE CULTURE/COLONY COUNT: CPT | Performed by: EMERGENCY MEDICINE

## 2021-01-01 PROCEDURE — 76870 US EXAM SCROTUM: CPT

## 2021-01-01 PROCEDURE — 86900 BLOOD TYPING SEROLOGIC ABO: CPT | Performed by: PHYSICIAN ASSISTANT

## 2021-01-01 PROCEDURE — 99153 MOD SED SAME PHYS/QHP EA: CPT

## 2021-01-01 PROCEDURE — 85018 HEMOGLOBIN: CPT | Performed by: HOSPITALIST

## 2021-01-01 PROCEDURE — 71046 X-RAY EXAM CHEST 2 VIEWS: CPT

## 2021-01-01 PROCEDURE — 0T768DZ DILATION OF RIGHT URETER WITH INTRALUMINAL DEVICE, VIA NATURAL OR ARTIFICIAL OPENING ENDOSCOPIC: ICD-10-PCS | Performed by: UROLOGY

## 2021-01-01 PROCEDURE — 99215 OFFICE O/P EST HI 40 MIN: CPT | Mod: GC | Performed by: STUDENT IN AN ORGANIZED HEALTH CARE EDUCATION/TRAINING PROGRAM

## 2021-01-01 PROCEDURE — 84132 ASSAY OF SERUM POTASSIUM: CPT | Performed by: EMERGENCY MEDICINE

## 2021-01-01 PROCEDURE — 250N000013 HC RX MED GY IP 250 OP 250 PS 637: Performed by: NURSE PRACTITIONER

## 2021-01-01 PROCEDURE — 83605 ASSAY OF LACTIC ACID: CPT | Performed by: EMERGENCY MEDICINE

## 2021-01-01 PROCEDURE — 36415 COLL VENOUS BLD VENIPUNCTURE: CPT | Performed by: HOSPITALIST

## 2021-01-01 PROCEDURE — 99214 OFFICE O/P EST MOD 30 MIN: CPT | Performed by: PHYSICIAN ASSISTANT

## 2021-01-01 PROCEDURE — 96366 THER/PROPH/DIAG IV INF ADDON: CPT

## 2021-01-01 PROCEDURE — G0463 HOSPITAL OUTPT CLINIC VISIT: HCPCS

## 2021-01-01 PROCEDURE — 82374 ASSAY BLOOD CARBON DIOXIDE: CPT | Performed by: STUDENT IN AN ORGANIZED HEALTH CARE EDUCATION/TRAINING PROGRAM

## 2021-01-01 PROCEDURE — 83735 ASSAY OF MAGNESIUM: CPT | Performed by: HOSPITALIST

## 2021-01-01 PROCEDURE — 83735 ASSAY OF MAGNESIUM: CPT | Performed by: STUDENT IN AN ORGANIZED HEALTH CARE EDUCATION/TRAINING PROGRAM

## 2021-01-01 PROCEDURE — 86901 BLOOD TYPING SEROLOGIC RH(D): CPT | Performed by: HOSPITALIST

## 2021-01-01 PROCEDURE — U0003 INFECTIOUS AGENT DETECTION BY NUCLEIC ACID (DNA OR RNA); SEVERE ACUTE RESPIRATORY SYNDROME CORONAVIRUS 2 (SARS-COV-2) (CORONAVIRUS DISEASE [COVID-19]), AMPLIFIED PROBE TECHNIQUE, MAKING USE OF HIGH THROUGHPUT TECHNOLOGIES AS DESCRIBED BY CMS-2020-01-R: HCPCS

## 2021-01-01 PROCEDURE — XW0G886 INTRODUCTION OF MINERAL-BASED TOPICAL HEMOSTATIC AGENT INTO UPPER GI, VIA NATURAL OR ARTIFICIAL OPENING ENDOSCOPIC, NEW TECHNOLOGY GROUP 6: ICD-10-PCS | Performed by: INTERNAL MEDICINE

## 2021-01-01 PROCEDURE — 87086 URINE CULTURE/COLONY COUNT: CPT | Mod: GZ | Performed by: PHYSICIAN ASSISTANT

## 2021-01-01 PROCEDURE — 84443 ASSAY THYROID STIM HORMONE: CPT | Performed by: EMERGENCY MEDICINE

## 2021-01-01 PROCEDURE — C1769 GUIDE WIRE: HCPCS

## 2021-01-01 PROCEDURE — 99152 MOD SED SAME PHYS/QHP 5/>YRS: CPT | Mod: GC | Performed by: RADIOLOGY

## 2021-01-01 PROCEDURE — 99283 EMERGENCY DEPT VISIT LOW MDM: CPT | Mod: 25 | Performed by: EMERGENCY MEDICINE

## 2021-01-01 PROCEDURE — 84155 ASSAY OF PROTEIN SERUM: CPT | Performed by: EMERGENCY MEDICINE

## 2021-01-01 PROCEDURE — 82248 BILIRUBIN DIRECT: CPT | Performed by: EMERGENCY MEDICINE

## 2021-01-01 PROCEDURE — 250N000011 HC RX IP 250 OP 636

## 2021-01-01 PROCEDURE — 96413 CHEMO IV INFUSION 1 HR: CPT

## 2021-01-01 PROCEDURE — 86923 COMPATIBILITY TEST ELECTRIC: CPT | Performed by: HOSPITALIST

## 2021-01-01 PROCEDURE — 84450 TRANSFERASE (AST) (SGOT): CPT | Performed by: EMERGENCY MEDICINE

## 2021-01-01 PROCEDURE — 96365 THER/PROPH/DIAG IV INF INIT: CPT

## 2021-01-01 PROCEDURE — 96361 HYDRATE IV INFUSION ADD-ON: CPT

## 2021-01-01 PROCEDURE — C1729 CATH, DRAINAGE: HCPCS

## 2021-01-01 PROCEDURE — 71260 CT THORAX DX C+: CPT

## 2021-01-01 PROCEDURE — 36415 COLL VENOUS BLD VENIPUNCTURE: CPT | Performed by: FAMILY MEDICINE

## 2021-01-01 PROCEDURE — 85610 PROTHROMBIN TIME: CPT | Performed by: STUDENT IN AN ORGANIZED HEALTH CARE EDUCATION/TRAINING PROGRAM

## 2021-01-01 PROCEDURE — 93976 VASCULAR STUDY: CPT | Mod: 26 | Performed by: RADIOLOGY

## 2021-01-01 PROCEDURE — 36592 COLLECT BLOOD FROM PICC: CPT

## 2021-01-01 PROCEDURE — U0005 INFEC AGEN DETEC AMPLI PROBE: HCPCS | Mod: 90 | Performed by: PATHOLOGY

## 2021-01-01 PROCEDURE — P9016 RBC LEUKOCYTES REDUCED: HCPCS | Performed by: EMERGENCY MEDICINE

## 2021-01-01 PROCEDURE — 71260 CT THORAX DX C+: CPT | Mod: 26

## 2021-01-01 PROCEDURE — 84100 ASSAY OF PHOSPHORUS: CPT | Performed by: HOSPITALIST

## 2021-01-01 PROCEDURE — 81001 URINALYSIS AUTO W/SCOPE: CPT | Performed by: PHYSICIAN ASSISTANT

## 2021-01-01 PROCEDURE — G0463 HOSPITAL OUTPT CLINIC VISIT: HCPCS | Mod: PN,RTG | Performed by: INTERNAL MEDICINE

## 2021-01-01 PROCEDURE — 99223 1ST HOSP IP/OBS HIGH 75: CPT | Performed by: STUDENT IN AN ORGANIZED HEALTH CARE EDUCATION/TRAINING PROGRAM

## 2021-01-01 PROCEDURE — G0500 MOD SEDAT ENDO SERVICE >5YRS: HCPCS | Performed by: INTERNAL MEDICINE

## 2021-01-01 PROCEDURE — 74176 CT ABD & PELVIS W/O CONTRAST: CPT | Mod: 26 | Performed by: RADIOLOGY

## 2021-01-01 PROCEDURE — 71046 X-RAY EXAM CHEST 2 VIEWS: CPT | Mod: 26 | Performed by: RADIOLOGY

## 2021-01-01 PROCEDURE — 93010 ELECTROCARDIOGRAM REPORT: CPT | Performed by: INTERNAL MEDICINE

## 2021-01-01 PROCEDURE — 85025 COMPLETE CBC W/AUTO DIFF WBC: CPT | Performed by: FAMILY MEDICINE

## 2021-01-01 PROCEDURE — 258N000003 HC RX IP 258 OP 636: Performed by: INTERNAL MEDICINE

## 2021-01-01 PROCEDURE — 360N000082 HC SURGERY LEVEL 2 W/ FLUORO, PER MIN: Performed by: UROLOGY

## 2021-01-01 PROCEDURE — 82728 ASSAY OF FERRITIN: CPT | Performed by: STUDENT IN AN ORGANIZED HEALTH CARE EDUCATION/TRAINING PROGRAM

## 2021-01-01 PROCEDURE — 84100 ASSAY OF PHOSPHORUS: CPT | Performed by: INTERNAL MEDICINE

## 2021-01-01 PROCEDURE — 82040 ASSAY OF SERUM ALBUMIN: CPT

## 2021-01-01 PROCEDURE — 99283 EMERGENCY DEPT VISIT LOW MDM: CPT | Performed by: PHYSICIAN ASSISTANT

## 2021-01-01 PROCEDURE — 99233 SBSQ HOSP IP/OBS HIGH 50: CPT | Performed by: HOSPITALIST

## 2021-01-01 PROCEDURE — 76870 US EXAM SCROTUM: CPT | Mod: 26 | Performed by: RADIOLOGY

## 2021-01-01 PROCEDURE — 86901 BLOOD TYPING SEROLOGIC RH(D): CPT | Performed by: STUDENT IN AN ORGANIZED HEALTH CARE EDUCATION/TRAINING PROGRAM

## 2021-01-01 PROCEDURE — 85027 COMPLETE CBC AUTOMATED: CPT | Performed by: STUDENT IN AN ORGANIZED HEALTH CARE EDUCATION/TRAINING PROGRAM

## 2021-01-01 PROCEDURE — 99239 HOSP IP/OBS DSCHRG MGMT >30: CPT | Performed by: STUDENT IN AN ORGANIZED HEALTH CARE EDUCATION/TRAINING PROGRAM

## 2021-01-01 PROCEDURE — 99207 PR CDG-CUT & PASTE-POTENTIAL IMPACT ON LEVEL: CPT | Performed by: STUDENT IN AN ORGANIZED HEALTH CARE EDUCATION/TRAINING PROGRAM

## 2021-01-01 PROCEDURE — 999N000179 XR SURGERY CARM FLUORO LESS THAN 5 MIN W STILLS

## 2021-01-01 PROCEDURE — 74177 CT ABD & PELVIS W/CONTRAST: CPT | Mod: 26

## 2021-01-01 PROCEDURE — 255N000002 HC RX 255 OP 636: Performed by: RADIOLOGY

## 2021-01-01 PROCEDURE — P9016 RBC LEUKOCYTES REDUCED: HCPCS | Performed by: PHYSICIAN ASSISTANT

## 2021-01-01 PROCEDURE — 99239 HOSP IP/OBS DSCHRG MGMT >30: CPT | Mod: GC | Performed by: STUDENT IN AN ORGANIZED HEALTH CARE EDUCATION/TRAINING PROGRAM

## 2021-01-01 PROCEDURE — P9016 RBC LEUKOCYTES REDUCED: HCPCS | Performed by: HOSPITALIST

## 2021-01-01 PROCEDURE — 84300 ASSAY OF URINE SODIUM: CPT | Performed by: INTERNAL MEDICINE

## 2021-01-01 PROCEDURE — 78816 PET IMAGE W/CT FULL BODY: CPT | Mod: PS

## 2021-01-01 PROCEDURE — 84132 ASSAY OF SERUM POTASSIUM: CPT | Performed by: STUDENT IN AN ORGANIZED HEALTH CARE EDUCATION/TRAINING PROGRAM

## 2021-01-01 PROCEDURE — 70491 CT SOFT TISSUE NECK W/DYE: CPT | Mod: 26 | Performed by: RADIOLOGY

## 2021-01-01 PROCEDURE — 99204 OFFICE O/P NEW MOD 45 MIN: CPT | Mod: GC | Performed by: INTERNAL MEDICINE

## 2021-01-01 PROCEDURE — 84156 ASSAY OF PROTEIN URINE: CPT | Performed by: INTERNAL MEDICINE

## 2021-01-01 PROCEDURE — 99221 1ST HOSP IP/OBS SF/LOW 40: CPT | Mod: 25 | Performed by: PHYSICIAN ASSISTANT

## 2021-01-01 PROCEDURE — 272N000001 HC OR GENERAL SUPPLY STERILE: Performed by: UROLOGY

## 2021-01-01 PROCEDURE — 43255 EGD CONTROL BLEEDING ANY: CPT | Performed by: INTERNAL MEDICINE

## 2021-01-01 PROCEDURE — 255N000002 HC RX 255 OP 636: Performed by: UROLOGY

## 2021-01-01 PROCEDURE — 82374 ASSAY BLOOD CARBON DIOXIDE: CPT | Performed by: HOSPITALIST

## 2021-01-01 PROCEDURE — 250N000025 HC SEVOFLURANE, PER MIN: Performed by: UROLOGY

## 2021-01-01 PROCEDURE — 87086 URINE CULTURE/COLONY COUNT: CPT | Performed by: PHYSICIAN ASSISTANT

## 2021-01-01 PROCEDURE — 99223 1ST HOSP IP/OBS HIGH 75: CPT | Mod: GC | Performed by: STUDENT IN AN ORGANIZED HEALTH CARE EDUCATION/TRAINING PROGRAM

## 2021-01-01 PROCEDURE — 84132 ASSAY OF SERUM POTASSIUM: CPT | Performed by: INTERNAL MEDICINE

## 2021-01-01 PROCEDURE — 86923 COMPATIBILITY TEST ELECTRIC: CPT

## 2021-01-01 PROCEDURE — 250N000013 HC RX MED GY IP 250 OP 250 PS 637

## 2021-01-01 PROCEDURE — 99223 1ST HOSP IP/OBS HIGH 75: CPT | Mod: GC | Performed by: INTERNAL MEDICINE

## 2021-01-01 PROCEDURE — U0003 INFECTIOUS AGENT DETECTION BY NUCLEIC ACID (DNA OR RNA); SEVERE ACUTE RESPIRATORY SYNDROME CORONAVIRUS 2 (SARS-COV-2) (CORONAVIRUS DISEASE [COVID-19]), AMPLIFIED PROBE TECHNIQUE, MAKING USE OF HIGH THROUGHPUT TECHNOLOGIES AS DESCRIBED BY CMS-2020-01-R: HCPCS | Performed by: EMERGENCY MEDICINE

## 2021-01-01 PROCEDURE — 710N000010 HC RECOVERY PHASE 1, LEVEL 2, PER MIN: Performed by: UROLOGY

## 2021-01-01 PROCEDURE — 74176 CT ABD & PELVIS W/O CONTRAST: CPT

## 2021-01-01 PROCEDURE — A9552 F18 FDG: HCPCS | Performed by: STUDENT IN AN ORGANIZED HEALTH CARE EDUCATION/TRAINING PROGRAM

## 2021-01-01 PROCEDURE — 85041 AUTOMATED RBC COUNT: CPT | Performed by: INTERNAL MEDICINE

## 2021-01-01 PROCEDURE — 83935 ASSAY OF URINE OSMOLALITY: CPT | Performed by: INTERNAL MEDICINE

## 2021-01-01 PROCEDURE — 99222 1ST HOSP IP/OBS MODERATE 55: CPT | Mod: GC | Performed by: INTERNAL MEDICINE

## 2021-01-01 PROCEDURE — C2617 STENT, NON-COR, TEM W/O DEL: HCPCS | Performed by: UROLOGY

## 2021-01-01 PROCEDURE — 85004 AUTOMATED DIFF WBC COUNT: CPT | Performed by: STUDENT IN AN ORGANIZED HEALTH CARE EDUCATION/TRAINING PROGRAM

## 2021-01-01 PROCEDURE — 99223 1ST HOSP IP/OBS HIGH 75: CPT | Performed by: CLINICAL NURSE SPECIALIST

## 2021-01-01 PROCEDURE — 83935 ASSAY OF URINE OSMOLALITY: CPT | Performed by: STUDENT IN AN ORGANIZED HEALTH CARE EDUCATION/TRAINING PROGRAM

## 2021-01-01 PROCEDURE — 96360 HYDRATION IV INFUSION INIT: CPT | Performed by: EMERGENCY MEDICINE

## 2021-01-01 PROCEDURE — 258N000003 HC RX IP 258 OP 636: Performed by: NURSE PRACTITIONER

## 2021-01-01 PROCEDURE — 83930 ASSAY OF BLOOD OSMOLALITY: CPT | Performed by: INTERNAL MEDICINE

## 2021-01-01 PROCEDURE — 80048 BASIC METABOLIC PNL TOTAL CA: CPT | Performed by: FAMILY MEDICINE

## 2021-01-01 PROCEDURE — BT1D1ZZ FLUOROSCOPY OF RIGHT KIDNEY, URETER AND BLADDER USING LOW OSMOLAR CONTRAST: ICD-10-PCS | Performed by: UROLOGY

## 2021-01-01 PROCEDURE — 93970 EXTREMITY STUDY: CPT | Mod: 26 | Performed by: RADIOLOGY

## 2021-01-01 PROCEDURE — 81003 URINALYSIS AUTO W/O SCOPE: CPT | Performed by: EMERGENCY MEDICINE

## 2021-01-01 PROCEDURE — 99233 SBSQ HOSP IP/OBS HIGH 50: CPT | Mod: GC | Performed by: STUDENT IN AN ORGANIZED HEALTH CARE EDUCATION/TRAINING PROGRAM

## 2021-01-01 PROCEDURE — 99207 PR CDG-CODE CATEGORY CHANGED: CPT | Performed by: INTERNAL MEDICINE

## 2021-01-01 PROCEDURE — 36415 COLL VENOUS BLD VENIPUNCTURE: CPT | Performed by: PHYSICIAN ASSISTANT

## 2021-01-01 PROCEDURE — 99223 1ST HOSP IP/OBS HIGH 75: CPT | Mod: AI | Performed by: INTERNAL MEDICINE

## 2021-01-01 PROCEDURE — 99233 SBSQ HOSP IP/OBS HIGH 50: CPT | Performed by: PAIN MEDICINE

## 2021-01-01 PROCEDURE — 0TP98DZ REMOVAL OF INTRALUMINAL DEVICE FROM URETER, VIA NATURAL OR ARTIFICIAL OPENING ENDOSCOPIC: ICD-10-PCS | Performed by: INTERNAL MEDICINE

## 2021-01-01 PROCEDURE — 84300 ASSAY OF URINE SODIUM: CPT | Performed by: STUDENT IN AN ORGANIZED HEALTH CARE EDUCATION/TRAINING PROGRAM

## 2021-01-01 PROCEDURE — 86900 BLOOD TYPING SEROLOGIC ABO: CPT

## 2021-01-01 PROCEDURE — 99207 PR APP CREDIT; MD BILLING SHARED VISIT: CPT | Performed by: HOSPITALIST

## 2021-01-01 PROCEDURE — 999N000142 HC STATISTIC PROCEDURE PREP ONLY

## 2021-01-01 PROCEDURE — 99217 PR OBSERVATION CARE DISCHARGE: CPT | Performed by: HOSPITALIST

## 2021-01-01 PROCEDURE — 70491 CT SOFT TISSUE NECK W/DYE: CPT

## 2021-01-01 DEVICE — URETERAL STENT
Type: IMPLANTABLE DEVICE | Site: URETHRA | Status: FUNCTIONAL
Brand: PERCUFLEX™ PLUS

## 2021-01-01 RX ORDER — METHYLPREDNISOLONE SODIUM SUCCINATE 125 MG/2ML
125 INJECTION, POWDER, LYOPHILIZED, FOR SOLUTION INTRAMUSCULAR; INTRAVENOUS
Status: CANCELLED
Start: 2021-01-01

## 2021-01-01 RX ORDER — FENTANYL CITRATE 50 UG/ML
25 INJECTION, SOLUTION INTRAMUSCULAR; INTRAVENOUS EVERY 5 MIN PRN
Status: DISCONTINUED | OUTPATIENT
Start: 2021-01-01 | End: 2021-01-01 | Stop reason: HOSPADM

## 2021-01-01 RX ORDER — METHYLPREDNISOLONE SODIUM SUCCINATE 125 MG/2ML
125 INJECTION, POWDER, LYOPHILIZED, FOR SOLUTION INTRAMUSCULAR; INTRAVENOUS
Status: DISCONTINUED | OUTPATIENT
Start: 2021-01-01 | End: 2021-01-01 | Stop reason: HOSPADM

## 2021-01-01 RX ORDER — ALBUTEROL SULFATE 0.83 MG/ML
2.5 SOLUTION RESPIRATORY (INHALATION)
Status: CANCELLED | OUTPATIENT
Start: 2021-01-01

## 2021-01-01 RX ORDER — FERROUS GLUCONATE 324(38)MG
324 TABLET ORAL
Qty: 100 TABLET | Refills: 1 | Status: SHIPPED | OUTPATIENT
Start: 2021-01-01 | End: 2021-01-01

## 2021-01-01 RX ORDER — HYDROMORPHONE HYDROCHLORIDE 1 MG/ML
0.5 INJECTION, SOLUTION INTRAMUSCULAR; INTRAVENOUS; SUBCUTANEOUS ONCE
Status: COMPLETED | OUTPATIENT
Start: 2021-01-01 | End: 2021-01-01

## 2021-01-01 RX ORDER — OXYCODONE HYDROCHLORIDE 5 MG/1
5 TABLET ORAL EVERY 4 HOURS PRN
Status: DISCONTINUED | OUTPATIENT
Start: 2021-01-01 | End: 2021-01-01 | Stop reason: HOSPADM

## 2021-01-01 RX ORDER — METHADONE HYDROCHLORIDE 5 MG/1
5 TABLET ORAL EVERY 8 HOURS SCHEDULED
Status: DISCONTINUED | OUTPATIENT
Start: 2021-01-01 | End: 2021-01-01 | Stop reason: HOSPADM

## 2021-01-01 RX ORDER — AMPICILLIN 1 G/1
1 INJECTION, POWDER, FOR SOLUTION INTRAMUSCULAR; INTRAVENOUS
Status: COMPLETED | OUTPATIENT
Start: 2021-01-01 | End: 2021-01-01

## 2021-01-01 RX ORDER — ONDANSETRON 4 MG/1
4 TABLET, ORALLY DISINTEGRATING ORAL EVERY 6 HOURS PRN
Status: DISCONTINUED | OUTPATIENT
Start: 2021-01-01 | End: 2021-01-01 | Stop reason: HOSPADM

## 2021-01-01 RX ORDER — DIPHENHYDRAMINE HCL 25 MG
25 CAPSULE ORAL EVERY 24 HOURS
Status: DISCONTINUED | OUTPATIENT
Start: 2021-01-01 | End: 2021-01-01 | Stop reason: HOSPADM

## 2021-01-01 RX ORDER — OXYCODONE HYDROCHLORIDE 10 MG/1
10 TABLET ORAL
Status: DISCONTINUED | OUTPATIENT
Start: 2021-01-01 | End: 2021-01-01

## 2021-01-01 RX ORDER — HYDROMORPHONE HYDROCHLORIDE 2 MG/1
2 TABLET ORAL EVERY 6 HOURS PRN
Qty: 10 TABLET | Refills: 0 | Status: SHIPPED | OUTPATIENT
Start: 2021-01-01 | End: 2021-01-01 | Stop reason: ALTCHOICE

## 2021-01-01 RX ORDER — LIDOCAINE 40 MG/G
CREAM TOPICAL
Status: DISCONTINUED | OUTPATIENT
Start: 2021-01-01 | End: 2021-01-01 | Stop reason: HOSPADM

## 2021-01-01 RX ORDER — PANTOPRAZOLE SODIUM 40 MG/1
40 TABLET, DELAYED RELEASE ORAL DAILY
Status: DISCONTINUED | OUTPATIENT
Start: 2021-01-01 | End: 2021-01-01 | Stop reason: HOSPADM

## 2021-01-01 RX ORDER — OXYCODONE HYDROCHLORIDE 5 MG/1
5 TABLET ORAL EVERY 6 HOURS PRN
Qty: 60 TABLET | Refills: 0 | Status: SHIPPED | OUTPATIENT
Start: 2021-01-01 | End: 2021-01-01

## 2021-01-01 RX ORDER — ACETAMINOPHEN 500 MG
1000 TABLET ORAL EVERY 8 HOURS PRN
Status: DISCONTINUED | OUTPATIENT
Start: 2021-01-01 | End: 2021-01-01 | Stop reason: HOSPADM

## 2021-01-01 RX ORDER — OXYCODONE HYDROCHLORIDE 10 MG/1
10-20 TABLET ORAL
Status: DISCONTINUED | OUTPATIENT
Start: 2021-01-01 | End: 2021-01-01

## 2021-01-01 RX ORDER — ACETAMINOPHEN 325 MG/1
650 TABLET ORAL EVERY 4 HOURS PRN
Status: DISCONTINUED | OUTPATIENT
Start: 2021-01-01 | End: 2021-01-01 | Stop reason: HOSPADM

## 2021-01-01 RX ORDER — LANOLIN ALCOHOL/MO/W.PET/CERES
100 CREAM (GRAM) TOPICAL DAILY
Status: DISCONTINUED | OUTPATIENT
Start: 2021-01-01 | End: 2021-01-01 | Stop reason: HOSPADM

## 2021-01-01 RX ORDER — OXYCODONE HYDROCHLORIDE 10 MG/1
10 TABLET ORAL EVERY 4 HOURS PRN
Status: DISCONTINUED | OUTPATIENT
Start: 2021-01-01 | End: 2021-01-01

## 2021-01-01 RX ORDER — OXYCODONE HYDROCHLORIDE 10 MG/1
10-15 TABLET ORAL
Status: DISCONTINUED | OUTPATIENT
Start: 2021-01-01 | End: 2021-01-01 | Stop reason: HOSPADM

## 2021-01-01 RX ORDER — MORPHINE SULFATE 15 MG/1
30 TABLET, FILM COATED, EXTENDED RELEASE ORAL EVERY 8 HOURS
Status: DISCONTINUED | OUTPATIENT
Start: 2021-01-01 | End: 2021-01-01

## 2021-01-01 RX ORDER — MORPHINE SULFATE 30 MG/1
30 TABLET, FILM COATED, EXTENDED RELEASE ORAL EVERY 8 HOURS
Qty: 21 TABLET | Refills: 0 | Status: ON HOLD | OUTPATIENT
Start: 2021-01-01 | End: 2021-01-01

## 2021-01-01 RX ORDER — MORPHINE SULFATE 15 MG/1
30 TABLET, FILM COATED, EXTENDED RELEASE ORAL EVERY 12 HOURS SCHEDULED
Status: DISCONTINUED | OUTPATIENT
Start: 2021-01-01 | End: 2021-01-01

## 2021-01-01 RX ORDER — SODIUM CHLORIDE 9 MG/ML
INJECTION, SOLUTION INTRAVENOUS CONTINUOUS
Status: DISCONTINUED | OUTPATIENT
Start: 2021-01-01 | End: 2021-01-01 | Stop reason: HOSPADM

## 2021-01-01 RX ORDER — NALOXONE HYDROCHLORIDE 0.4 MG/ML
0.4 INJECTION, SOLUTION INTRAMUSCULAR; INTRAVENOUS; SUBCUTANEOUS
Status: DISCONTINUED | OUTPATIENT
Start: 2021-01-01 | End: 2021-01-01 | Stop reason: HOSPADM

## 2021-01-01 RX ORDER — HYDROMORPHONE HYDROCHLORIDE 1 MG/ML
.3-.5 INJECTION, SOLUTION INTRAMUSCULAR; INTRAVENOUS; SUBCUTANEOUS
Status: DISCONTINUED | OUTPATIENT
Start: 2021-01-01 | End: 2021-01-01

## 2021-01-01 RX ORDER — SIMETHICONE 80 MG
80 TABLET,CHEWABLE ORAL EVERY 6 HOURS PRN
Status: DISCONTINUED | OUTPATIENT
Start: 2021-01-01 | End: 2021-01-01 | Stop reason: HOSPADM

## 2021-01-01 RX ORDER — NALOXONE HYDROCHLORIDE 0.4 MG/ML
0.2 INJECTION, SOLUTION INTRAMUSCULAR; INTRAVENOUS; SUBCUTANEOUS
Status: DISCONTINUED | OUTPATIENT
Start: 2021-01-01 | End: 2021-01-01 | Stop reason: HOSPADM

## 2021-01-01 RX ORDER — TAMSULOSIN HYDROCHLORIDE 0.4 MG/1
0.4 CAPSULE ORAL DAILY
Status: DISCONTINUED | OUTPATIENT
Start: 2021-01-01 | End: 2021-01-01 | Stop reason: HOSPADM

## 2021-01-01 RX ORDER — ACETAMINOPHEN 325 MG/1
650 TABLET ORAL EVERY 4 HOURS PRN
Qty: 100 TABLET | Refills: 0 | Status: ON HOLD | OUTPATIENT
Start: 2021-01-01 | End: 2021-01-01

## 2021-01-01 RX ORDER — TAMSULOSIN HYDROCHLORIDE 0.4 MG/1
0.4 CAPSULE ORAL DAILY
Qty: 30 CAPSULE | Refills: 1 | Status: SHIPPED | OUTPATIENT
Start: 2021-01-01

## 2021-01-01 RX ORDER — SODIUM CHLORIDE, SODIUM LACTATE, POTASSIUM CHLORIDE, CALCIUM CHLORIDE 600; 310; 30; 20 MG/100ML; MG/100ML; MG/100ML; MG/100ML
INJECTION, SOLUTION INTRAVENOUS CONTINUOUS
Status: DISCONTINUED | OUTPATIENT
Start: 2021-01-01 | End: 2021-01-01 | Stop reason: HOSPADM

## 2021-01-01 RX ORDER — PANTOPRAZOLE SODIUM 40 MG/1
40 TABLET, DELAYED RELEASE ORAL
Status: DISCONTINUED | OUTPATIENT
Start: 2021-01-01 | End: 2021-01-01 | Stop reason: HOSPADM

## 2021-01-01 RX ORDER — POLYETHYLENE GLYCOL 3350 17 G/17G
17 POWDER, FOR SOLUTION ORAL DAILY
Status: DISCONTINUED | OUTPATIENT
Start: 2021-01-01 | End: 2021-01-01 | Stop reason: HOSPADM

## 2021-01-01 RX ORDER — MORPHINE SULFATE 4 MG/ML
4 INJECTION, SOLUTION INTRAMUSCULAR; INTRAVENOUS ONCE
Status: COMPLETED | OUTPATIENT
Start: 2021-01-01 | End: 2021-01-01

## 2021-01-01 RX ORDER — OXYBUTYNIN CHLORIDE 5 MG/1
10 TABLET, EXTENDED RELEASE ORAL DAILY
Status: DISCONTINUED | OUTPATIENT
Start: 2021-01-01 | End: 2021-01-01 | Stop reason: HOSPADM

## 2021-01-01 RX ORDER — LORAZEPAM 2 MG/ML
0.5 INJECTION INTRAMUSCULAR EVERY 4 HOURS PRN
Status: CANCELLED | OUTPATIENT
Start: 2021-01-01

## 2021-01-01 RX ORDER — TAMSULOSIN HYDROCHLORIDE 0.4 MG/1
0.4 CAPSULE ORAL DAILY
Qty: 30 CAPSULE | Refills: 1 | Status: SHIPPED | OUTPATIENT
Start: 2021-01-01 | End: 2021-01-01

## 2021-01-01 RX ORDER — ONDANSETRON 2 MG/ML
4 INJECTION INTRAMUSCULAR; INTRAVENOUS EVERY 30 MIN PRN
Status: DISCONTINUED | OUTPATIENT
Start: 2021-01-01 | End: 2021-01-01 | Stop reason: HOSPADM

## 2021-01-01 RX ORDER — ONDANSETRON 2 MG/ML
4 INJECTION INTRAMUSCULAR; INTRAVENOUS EVERY 6 HOURS PRN
Status: DISCONTINUED | OUTPATIENT
Start: 2021-01-01 | End: 2021-01-01 | Stop reason: HOSPADM

## 2021-01-01 RX ORDER — OXYCODONE HYDROCHLORIDE 10 MG/1
10 TABLET ORAL ONCE
Status: DISCONTINUED | OUTPATIENT
Start: 2021-01-01 | End: 2021-01-01

## 2021-01-01 RX ORDER — ONDANSETRON 8 MG/1
8 TABLET, ORALLY DISINTEGRATING ORAL EVERY 8 HOURS PRN
Qty: 20 TABLET | Refills: 1 | Status: SHIPPED | OUTPATIENT
Start: 2021-01-01 | End: 2021-01-01 | Stop reason: DRUGHIGH

## 2021-01-01 RX ORDER — FENTANYL CITRATE 50 UG/ML
25-50 INJECTION, SOLUTION INTRAMUSCULAR; INTRAVENOUS EVERY 5 MIN PRN
Status: DISCONTINUED | OUTPATIENT
Start: 2021-01-01 | End: 2021-01-01 | Stop reason: HOSPADM

## 2021-01-01 RX ORDER — HYDROMORPHONE HYDROCHLORIDE 1 MG/ML
0.5 INJECTION, SOLUTION INTRAMUSCULAR; INTRAVENOUS; SUBCUTANEOUS
Status: DISCONTINUED | OUTPATIENT
Start: 2021-01-01 | End: 2021-01-01 | Stop reason: HOSPADM

## 2021-01-01 RX ORDER — PROCHLORPERAZINE MALEATE 10 MG
5 TABLET ORAL EVERY 6 HOURS PRN
Qty: 30 TABLET | Refills: 5 | Status: SHIPPED | OUTPATIENT
Start: 2021-01-01

## 2021-01-01 RX ORDER — ONDANSETRON 4 MG/1
4 TABLET, ORALLY DISINTEGRATING ORAL EVERY 6 HOURS PRN
Qty: 30 TABLET | Refills: 0 | Status: SHIPPED | OUTPATIENT
Start: 2021-01-01 | End: 2021-01-01

## 2021-01-01 RX ORDER — LIDOCAINE HYDROCHLORIDE 20 MG/ML
JELLY TOPICAL ONCE
Status: COMPLETED | OUTPATIENT
Start: 2021-01-01 | End: 2021-01-01

## 2021-01-01 RX ORDER — IOPAMIDOL 755 MG/ML
60-135 INJECTION, SOLUTION INTRAVASCULAR ONCE
Status: COMPLETED | OUTPATIENT
Start: 2021-01-01 | End: 2021-01-01

## 2021-01-01 RX ORDER — HEPARIN SODIUM,PORCINE 10 UNIT/ML
5 VIAL (ML) INTRAVENOUS
Status: CANCELLED | OUTPATIENT
Start: 2021-01-01

## 2021-01-01 RX ORDER — OXYCODONE HYDROCHLORIDE 5 MG/1
10 TABLET ORAL ONCE
Status: COMPLETED | OUTPATIENT
Start: 2021-01-01 | End: 2021-01-01

## 2021-01-01 RX ORDER — OXYBUTYNIN CHLORIDE 10 MG/1
10 TABLET, EXTENDED RELEASE ORAL DAILY
Qty: 30 TABLET | Refills: 0 | Status: SHIPPED | OUTPATIENT
Start: 2021-01-01

## 2021-01-01 RX ORDER — POLYETHYLENE GLYCOL 3350 17 G/17G
17 POWDER, FOR SOLUTION ORAL DAILY
Status: DISCONTINUED | OUTPATIENT
Start: 2021-01-01 | End: 2021-01-01

## 2021-01-01 RX ORDER — POLYETHYLENE GLYCOL 3350 17 G/17G
17 POWDER, FOR SOLUTION ORAL DAILY
Qty: 510 G | Refills: 0 | Status: SHIPPED | OUTPATIENT
Start: 2021-01-01

## 2021-01-01 RX ORDER — ACETAMINOPHEN 325 MG/1
650 TABLET ORAL EVERY 4 HOURS PRN
Qty: 100 TABLET | Refills: 0 | Status: SHIPPED | OUTPATIENT
Start: 2021-01-01 | End: 2021-01-01

## 2021-01-01 RX ORDER — IOPAMIDOL 755 MG/ML
69 INJECTION, SOLUTION INTRAVASCULAR ONCE
Status: COMPLETED | OUTPATIENT
Start: 2021-01-01 | End: 2021-01-01

## 2021-01-01 RX ORDER — CEFAZOLIN SODIUM 1 G/3ML
INJECTION, POWDER, FOR SOLUTION INTRAMUSCULAR; INTRAVENOUS PRN
Status: DISCONTINUED | OUTPATIENT
Start: 2021-01-01 | End: 2021-01-01

## 2021-01-01 RX ORDER — DIPHENHYDRAMINE HYDROCHLORIDE 50 MG/ML
25 INJECTION INTRAMUSCULAR; INTRAVENOUS ONCE
Status: DISCONTINUED | OUTPATIENT
Start: 2021-01-01 | End: 2021-01-01

## 2021-01-01 RX ORDER — GABAPENTIN 100 MG/1
100 CAPSULE ORAL 3 TIMES DAILY
Qty: 90 CAPSULE | Refills: 0 | Status: SHIPPED | OUTPATIENT
Start: 2021-01-01 | End: 2021-01-01

## 2021-01-01 RX ORDER — MORPHINE SULFATE 15 MG/1
30 TABLET, FILM COATED, EXTENDED RELEASE ORAL EVERY 8 HOURS SCHEDULED
Status: DISCONTINUED | OUTPATIENT
Start: 2021-01-01 | End: 2021-01-01 | Stop reason: HOSPADM

## 2021-01-01 RX ORDER — OXYCODONE HYDROCHLORIDE 5 MG/1
5 TABLET ORAL ONCE
Status: COMPLETED | OUTPATIENT
Start: 2021-01-01 | End: 2021-01-01

## 2021-01-01 RX ORDER — SENNOSIDES 8.6 MG
1-2 TABLET ORAL 2 TIMES DAILY
Qty: 60 TABLET | Refills: 0 | Status: SHIPPED | OUTPATIENT
Start: 2021-01-01

## 2021-01-01 RX ORDER — MORPHINE SULFATE 30 MG/1
30 TABLET, FILM COATED, EXTENDED RELEASE ORAL EVERY 8 HOURS
Qty: 10 TABLET | Refills: 0 | Status: SHIPPED | OUTPATIENT
Start: 2021-01-01 | End: 2021-01-01

## 2021-01-01 RX ORDER — ONDANSETRON 4 MG/1
4 TABLET, ORALLY DISINTEGRATING ORAL EVERY 30 MIN PRN
Status: DISCONTINUED | OUTPATIENT
Start: 2021-01-01 | End: 2021-01-01 | Stop reason: HOSPADM

## 2021-01-01 RX ORDER — HYDROMORPHONE HCL IN WATER/PF 6 MG/30 ML
.2-.4 PATIENT CONTROLLED ANALGESIA SYRINGE INTRAVENOUS
Status: DISCONTINUED | OUTPATIENT
Start: 2021-01-01 | End: 2021-01-01 | Stop reason: HOSPADM

## 2021-01-01 RX ORDER — MORPHINE SULFATE 15 MG/1
15 TABLET, FILM COATED, EXTENDED RELEASE ORAL EVERY 12 HOURS SCHEDULED
Status: DISCONTINUED | OUTPATIENT
Start: 2021-01-01 | End: 2021-01-01

## 2021-01-01 RX ORDER — OXYBUTYNIN CHLORIDE 5 MG/1
5 TABLET ORAL ONCE
Status: COMPLETED | OUTPATIENT
Start: 2021-01-01 | End: 2021-01-01

## 2021-01-01 RX ORDER — HYDROMORPHONE HYDROCHLORIDE 1 MG/ML
0.5 INJECTION, SOLUTION INTRAMUSCULAR; INTRAVENOUS; SUBCUTANEOUS EVERY 4 HOURS PRN
Status: DISCONTINUED | OUTPATIENT
Start: 2021-01-01 | End: 2021-01-01

## 2021-01-01 RX ORDER — ONDANSETRON 8 MG/1
8 TABLET, FILM COATED ORAL EVERY 8 HOURS PRN
Qty: 10 TABLET | Refills: 5 | Status: SHIPPED | OUTPATIENT
Start: 2021-01-01

## 2021-01-01 RX ORDER — LIDOCAINE HYDROCHLORIDE 20 MG/ML
INJECTION, SOLUTION INFILTRATION; PERINEURAL PRN
Status: DISCONTINUED | OUTPATIENT
Start: 2021-01-01 | End: 2021-01-01

## 2021-01-01 RX ORDER — IODIXANOL 320 MG/ML
100 INJECTION, SOLUTION INTRAVASCULAR ONCE
Status: DISCONTINUED | OUTPATIENT
Start: 2021-01-01 | End: 2021-01-01 | Stop reason: HOSPADM

## 2021-01-01 RX ORDER — IBUPROFEN 200 MG
200 TABLET ORAL EVERY 6 HOURS PRN
Qty: 30 TABLET | Refills: 0 | Status: ON HOLD | OUTPATIENT
Start: 2021-01-01 | End: 2021-01-01

## 2021-01-01 RX ORDER — PANTOPRAZOLE SODIUM 40 MG/1
40 TABLET, DELAYED RELEASE ORAL DAILY
Qty: 60 TABLET | Refills: 1 | Status: SHIPPED | OUTPATIENT
Start: 2021-01-01

## 2021-01-01 RX ORDER — ACETAMINOPHEN 500 MG
1000 TABLET ORAL EVERY 8 HOURS
Qty: 120 TABLET | Refills: 0 | Status: ON HOLD | OUTPATIENT
Start: 2021-01-01 | End: 2021-01-01

## 2021-01-01 RX ORDER — OXYCODONE HYDROCHLORIDE 5 MG/1
5 TABLET ORAL EVERY 4 HOURS PRN
Qty: 60 TABLET | Refills: 0 | Status: ON HOLD | OUTPATIENT
Start: 2021-01-01 | End: 2021-01-01

## 2021-01-01 RX ORDER — SULFAMETHOXAZOLE/TRIMETHOPRIM 800-160 MG
1 TABLET ORAL 2 TIMES DAILY
Qty: 14 TABLET | Refills: 0 | Status: SHIPPED | OUTPATIENT
Start: 2021-01-01 | End: 2021-01-01

## 2021-01-01 RX ORDER — ACETAMINOPHEN 325 MG/1
975 TABLET ORAL 3 TIMES DAILY
Status: DISCONTINUED | OUTPATIENT
Start: 2021-01-01 | End: 2021-01-01 | Stop reason: HOSPADM

## 2021-01-01 RX ORDER — FERROUS GLUCONATE 324(38)MG
324 TABLET ORAL
Qty: 100 TABLET | Refills: 1 | Status: SHIPPED | OUTPATIENT
Start: 2021-01-01

## 2021-01-01 RX ORDER — HYDROMORPHONE HYDROCHLORIDE 1 MG/ML
0.5 INJECTION, SOLUTION INTRAMUSCULAR; INTRAVENOUS; SUBCUTANEOUS
Status: DISCONTINUED | OUTPATIENT
Start: 2021-01-01 | End: 2021-01-01

## 2021-01-01 RX ORDER — OXYCODONE HYDROCHLORIDE 5 MG/1
5-10 TABLET ORAL
Status: DISCONTINUED | OUTPATIENT
Start: 2021-01-01 | End: 2021-01-01 | Stop reason: HOSPADM

## 2021-01-01 RX ORDER — PROCHLORPERAZINE 25 MG
12.5 SUPPOSITORY, RECTAL RECTAL EVERY 12 HOURS PRN
Status: DISCONTINUED | OUTPATIENT
Start: 2021-01-01 | End: 2021-01-01 | Stop reason: HOSPADM

## 2021-01-01 RX ORDER — OXYCODONE HYDROCHLORIDE 5 MG/1
5-10 TABLET ORAL EVERY 4 HOURS PRN
Status: DISCONTINUED | OUTPATIENT
Start: 2021-01-01 | End: 2021-01-01 | Stop reason: HOSPADM

## 2021-01-01 RX ORDER — DIPHENHYDRAMINE HYDROCHLORIDE 50 MG/ML
50 INJECTION INTRAMUSCULAR; INTRAVENOUS
Status: DISCONTINUED | OUTPATIENT
Start: 2021-01-01 | End: 2021-01-01 | Stop reason: HOSPADM

## 2021-01-01 RX ORDER — MULTIVITAMIN,THERAPEUTIC
1 TABLET ORAL DAILY
Status: DISCONTINUED | OUTPATIENT
Start: 2021-01-01 | End: 2021-01-01 | Stop reason: HOSPADM

## 2021-01-01 RX ORDER — SODIUM CHLORIDE 9 MG/ML
INJECTION, SOLUTION INTRAVENOUS CONTINUOUS
Status: DISCONTINUED | OUTPATIENT
Start: 2021-01-01 | End: 2021-01-01

## 2021-01-01 RX ORDER — MORPHINE SULFATE 4 MG/ML
4 INJECTION, SOLUTION INTRAMUSCULAR; INTRAVENOUS ONCE
Status: DISCONTINUED | OUTPATIENT
Start: 2021-01-01 | End: 2021-01-01

## 2021-01-01 RX ORDER — OXYCODONE HYDROCHLORIDE 5 MG/1
5 TABLET ORAL EVERY 4 HOURS PRN
Status: DISCONTINUED | OUTPATIENT
Start: 2021-01-01 | End: 2021-01-01

## 2021-01-01 RX ORDER — ALBUTEROL SULFATE 90 UG/1
1-2 AEROSOL, METERED RESPIRATORY (INHALATION)
Status: CANCELLED
Start: 2021-01-01

## 2021-01-01 RX ORDER — MEPERIDINE HYDROCHLORIDE 25 MG/ML
25 INJECTION INTRAMUSCULAR; INTRAVENOUS; SUBCUTANEOUS EVERY 30 MIN PRN
Status: CANCELLED | OUTPATIENT
Start: 2021-01-01

## 2021-01-01 RX ORDER — HEPARIN SODIUM (PORCINE) LOCK FLUSH IV SOLN 100 UNIT/ML 100 UNIT/ML
5 SOLUTION INTRAVENOUS
Status: CANCELLED | OUTPATIENT
Start: 2021-01-01

## 2021-01-01 RX ORDER — DIPHENHYDRAMINE HYDROCHLORIDE 50 MG/ML
50 INJECTION INTRAMUSCULAR; INTRAVENOUS
Status: CANCELLED
Start: 2021-01-01

## 2021-01-01 RX ORDER — SENNOSIDES 8.6 MG
1-2 TABLET ORAL 2 TIMES DAILY
Status: DISCONTINUED | OUTPATIENT
Start: 2021-01-01 | End: 2021-01-01 | Stop reason: HOSPADM

## 2021-01-01 RX ORDER — MULTIPLE VITAMINS W/ MINERALS TAB 9MG-400MCG
1 TAB ORAL DAILY
Status: DISCONTINUED | OUTPATIENT
Start: 2021-01-01 | End: 2021-01-01 | Stop reason: HOSPADM

## 2021-01-01 RX ORDER — TRANEXAMIC ACID 10 MG/ML
1 INJECTION, SOLUTION INTRAVENOUS ONCE
Status: COMPLETED | OUTPATIENT
Start: 2021-01-01 | End: 2021-01-01

## 2021-01-01 RX ORDER — HYDROMORPHONE HYDROCHLORIDE 1 MG/ML
.3-.5 INJECTION, SOLUTION INTRAMUSCULAR; INTRAVENOUS; SUBCUTANEOUS EVERY 4 HOURS PRN
Status: DISCONTINUED | OUTPATIENT
Start: 2021-01-01 | End: 2021-01-01 | Stop reason: HOSPADM

## 2021-01-01 RX ORDER — METHADONE HYDROCHLORIDE 5 MG/1
5 TABLET ORAL EVERY 8 HOURS
Qty: 30 TABLET | Refills: 0 | Status: SHIPPED | OUTPATIENT
Start: 2021-01-01 | End: 2021-01-01

## 2021-01-01 RX ORDER — IOPAMIDOL 755 MG/ML
75 INJECTION, SOLUTION INTRAVASCULAR ONCE
Status: COMPLETED | OUTPATIENT
Start: 2021-01-01 | End: 2021-01-01

## 2021-01-01 RX ORDER — LANOLIN ALCOHOL/MO/W.PET/CERES
100 CREAM (GRAM) TOPICAL DAILY
Qty: 100 TABLET | Refills: 1 | Status: SHIPPED | OUTPATIENT
Start: 2021-01-01

## 2021-01-01 RX ORDER — FENTANYL CITRATE 50 UG/ML
INJECTION, SOLUTION INTRAMUSCULAR; INTRAVENOUS PRN
Status: COMPLETED | OUTPATIENT
Start: 2021-01-01 | End: 2021-01-01

## 2021-01-01 RX ORDER — OXYBUTYNIN CHLORIDE 10 MG/1
10 TABLET, EXTENDED RELEASE ORAL DAILY
Status: DISCONTINUED | OUTPATIENT
Start: 2021-01-01 | End: 2021-01-01 | Stop reason: HOSPADM

## 2021-01-01 RX ORDER — ONDANSETRON 2 MG/ML
INJECTION INTRAMUSCULAR; INTRAVENOUS PRN
Status: DISCONTINUED | OUTPATIENT
Start: 2021-01-01 | End: 2021-01-01

## 2021-01-01 RX ORDER — HYDROMORPHONE HYDROCHLORIDE 1 MG/ML
0.5 INJECTION, SOLUTION INTRAMUSCULAR; INTRAVENOUS; SUBCUTANEOUS EVERY 4 HOURS PRN
Status: DISCONTINUED | OUTPATIENT
Start: 2021-01-01 | End: 2021-01-01 | Stop reason: HOSPADM

## 2021-01-01 RX ORDER — HYDROMORPHONE HYDROCHLORIDE 1 MG/ML
0.5 INJECTION, SOLUTION INTRAMUSCULAR; INTRAVENOUS; SUBCUTANEOUS
Status: COMPLETED | OUTPATIENT
Start: 2021-01-01 | End: 2021-01-01

## 2021-01-01 RX ORDER — OXYCODONE HYDROCHLORIDE 5 MG/1
10-15 TABLET ORAL
Status: DISCONTINUED | OUTPATIENT
Start: 2021-01-01 | End: 2021-01-01 | Stop reason: HOSPADM

## 2021-01-01 RX ORDER — GABAPENTIN 100 MG/1
100 CAPSULE ORAL 3 TIMES DAILY
Status: DISCONTINUED | OUTPATIENT
Start: 2021-01-01 | End: 2021-01-01 | Stop reason: HOSPADM

## 2021-01-01 RX ORDER — NALOXONE HYDROCHLORIDE 0.4 MG/ML
0.2 INJECTION, SOLUTION INTRAMUSCULAR; INTRAVENOUS; SUBCUTANEOUS
Status: CANCELLED | OUTPATIENT
Start: 2021-01-01

## 2021-01-01 RX ORDER — OXYCODONE HYDROCHLORIDE 10 MG/1
10 TABLET ORAL EVERY 4 HOURS PRN
Qty: 60 TABLET | Refills: 0 | Status: SHIPPED | OUTPATIENT
Start: 2021-01-01

## 2021-01-01 RX ORDER — FERROUS GLUCONATE 324(38)MG
324 TABLET ORAL
Status: DISCONTINUED | OUTPATIENT
Start: 2021-01-01 | End: 2021-01-01 | Stop reason: HOSPADM

## 2021-01-01 RX ORDER — SIMETHICONE 80 MG
80 TABLET,CHEWABLE ORAL EVERY 6 HOURS PRN
Qty: 20 TABLET | Refills: 0 | Status: SHIPPED | OUTPATIENT
Start: 2021-01-01

## 2021-01-01 RX ORDER — OXYBUTYNIN CHLORIDE 5 MG/1
5 TABLET, EXTENDED RELEASE ORAL DAILY
Status: DISCONTINUED | OUTPATIENT
Start: 2021-01-01 | End: 2021-01-01

## 2021-01-01 RX ORDER — ONDANSETRON 2 MG/ML
4 INJECTION INTRAMUSCULAR; INTRAVENOUS EVERY 30 MIN PRN
Status: DISCONTINUED | OUTPATIENT
Start: 2021-01-01 | End: 2021-01-01

## 2021-01-01 RX ORDER — EPINEPHRINE 1 MG/ML
0.3 INJECTION, SOLUTION INTRAMUSCULAR; SUBCUTANEOUS EVERY 5 MIN PRN
Status: CANCELLED | OUTPATIENT
Start: 2021-01-01

## 2021-01-01 RX ORDER — PROPOFOL 10 MG/ML
INJECTION, EMULSION INTRAVENOUS PRN
Status: DISCONTINUED | OUTPATIENT
Start: 2021-01-01 | End: 2021-01-01

## 2021-01-01 RX ORDER — DEXAMETHASONE SODIUM PHOSPHATE 4 MG/ML
INJECTION, SOLUTION INTRA-ARTICULAR; INTRALESIONAL; INTRAMUSCULAR; INTRAVENOUS; SOFT TISSUE PRN
Status: DISCONTINUED | OUTPATIENT
Start: 2021-01-01 | End: 2021-01-01

## 2021-01-01 RX ORDER — ACETAMINOPHEN 500 MG
1000 TABLET ORAL EVERY 8 HOURS
Qty: 120 TABLET | Refills: 0 | Status: SHIPPED | OUTPATIENT
Start: 2021-01-01

## 2021-01-01 RX ORDER — POTASSIUM CHLORIDE 20MEQ/15ML
40 LIQUID (ML) ORAL ONCE
Status: COMPLETED | OUTPATIENT
Start: 2021-01-01 | End: 2021-01-01

## 2021-01-01 RX ORDER — OXYCODONE HYDROCHLORIDE 5 MG/1
5-10 TABLET ORAL
Status: DISCONTINUED | OUTPATIENT
Start: 2021-01-01 | End: 2021-01-01

## 2021-01-01 RX ORDER — IODIXANOL 320 MG/ML
100 INJECTION, SOLUTION INTRAVASCULAR ONCE
Status: COMPLETED | OUTPATIENT
Start: 2021-01-01 | End: 2021-01-01

## 2021-01-01 RX ORDER — METHADONE HYDROCHLORIDE 5 MG/1
5 TABLET ORAL EVERY 12 HOURS SCHEDULED
Status: DISCONTINUED | OUTPATIENT
Start: 2021-01-01 | End: 2021-01-01

## 2021-01-01 RX ORDER — OXYBUTYNIN CHLORIDE 5 MG/1
5 TABLET, EXTENDED RELEASE ORAL DAILY
Qty: 30 TABLET | Refills: 0 | Status: ON HOLD | OUTPATIENT
Start: 2021-01-01 | End: 2021-01-01

## 2021-01-01 RX ORDER — ACETAMINOPHEN 500 MG
1000 TABLET ORAL EVERY 8 HOURS
Status: DISCONTINUED | OUTPATIENT
Start: 2021-01-01 | End: 2021-01-01 | Stop reason: HOSPADM

## 2021-01-01 RX ORDER — OXYCODONE HYDROCHLORIDE 10 MG/1
10-15 TABLET ORAL
Qty: 20 TABLET | Refills: 0 | Status: SHIPPED | OUTPATIENT
Start: 2021-01-01 | End: 2021-01-01

## 2021-01-01 RX ORDER — OXYCODONE HYDROCHLORIDE 10 MG/1
10-15 TABLET ORAL
Qty: 70 TABLET | Refills: 0 | Status: ON HOLD | OUTPATIENT
Start: 2021-01-01 | End: 2021-01-01

## 2021-01-01 RX ORDER — DIMENHYDRINATE 50 MG/ML
25 INJECTION, SOLUTION INTRAMUSCULAR; INTRAVENOUS
Status: DISCONTINUED | OUTPATIENT
Start: 2021-01-01 | End: 2021-01-01 | Stop reason: HOSPADM

## 2021-01-01 RX ORDER — OXYCODONE HYDROCHLORIDE 5 MG/1
5 TABLET ORAL EVERY 4 HOURS PRN
Qty: 15 TABLET | Refills: 0 | Status: SHIPPED | OUTPATIENT
Start: 2021-01-01 | End: 2021-01-01

## 2021-01-01 RX ORDER — PANTOPRAZOLE SODIUM 40 MG/1
40 TABLET, DELAYED RELEASE ORAL DAILY
Qty: 60 TABLET | Refills: 1 | Status: SHIPPED | OUTPATIENT
Start: 2021-01-01 | End: 2021-01-01

## 2021-01-01 RX ORDER — LANOLIN ALCOHOL/MO/W.PET/CERES
100 CREAM (GRAM) TOPICAL DAILY
Qty: 100 TABLET | Refills: 1 | Status: SHIPPED | OUTPATIENT
Start: 2021-01-01 | End: 2021-01-01

## 2021-01-01 RX ORDER — OXYCODONE HYDROCHLORIDE 10 MG/1
10 TABLET ORAL EVERY 4 HOURS PRN
Qty: 30 TABLET | Refills: 0 | Status: SHIPPED | OUTPATIENT
Start: 2021-01-01 | End: 2021-01-01

## 2021-01-01 RX ORDER — MORPHINE SULFATE 15 MG/1
30 TABLET, FILM COATED, EXTENDED RELEASE ORAL EVERY 8 HOURS
Status: DISCONTINUED | OUTPATIENT
Start: 2021-01-01 | End: 2021-01-01 | Stop reason: HOSPADM

## 2021-01-01 RX ORDER — PHENAZOPYRIDINE HYDROCHLORIDE 100 MG/1
100 TABLET, FILM COATED ORAL
Status: CANCELLED | OUTPATIENT
Start: 2021-01-01

## 2021-01-01 RX ORDER — CEFEPIME HYDROCHLORIDE 1 G/1
1 INJECTION, POWDER, FOR SOLUTION INTRAMUSCULAR; INTRAVENOUS EVERY 12 HOURS
Status: DISCONTINUED | OUTPATIENT
Start: 2021-01-01 | End: 2021-01-01

## 2021-01-01 RX ORDER — ATROPA BELLADONNA AND OPIUM 16.2; 3 MG/1; MG/1
15 SUPPOSITORY RECTAL 2 TIMES DAILY PRN
Status: DISCONTINUED | OUTPATIENT
Start: 2021-01-01 | End: 2021-01-01 | Stop reason: HOSPADM

## 2021-01-01 RX ORDER — SENNOSIDES 8.6 MG
1-2 TABLET ORAL 2 TIMES DAILY
Qty: 60 TABLET | Refills: 0 | Status: SHIPPED | OUTPATIENT
Start: 2021-01-01 | End: 2021-01-01

## 2021-01-01 RX ORDER — DIPHENHYDRAMINE HCL 25 MG
25 CAPSULE ORAL ONCE
Status: COMPLETED | OUTPATIENT
Start: 2021-01-01 | End: 2021-01-01

## 2021-01-01 RX ORDER — METHADONE HYDROCHLORIDE 5 MG/1
5 TABLET ORAL EVERY 8 HOURS
Qty: 90 TABLET | Refills: 0 | Status: SHIPPED | OUTPATIENT
Start: 2021-01-01

## 2021-01-01 RX ORDER — HYDROMORPHONE HCL IN WATER/PF 6 MG/30 ML
.2-.4 PATIENT CONTROLLED ANALGESIA SYRINGE INTRAVENOUS
Status: DISCONTINUED | OUTPATIENT
Start: 2021-01-01 | End: 2021-01-01

## 2021-01-01 RX ORDER — HYDROMORPHONE HCL IN WATER/PF 6 MG/30 ML
.2-.5 PATIENT CONTROLLED ANALGESIA SYRINGE INTRAVENOUS
Status: DISCONTINUED | OUTPATIENT
Start: 2021-01-01 | End: 2021-01-01

## 2021-01-01 RX ORDER — ONDANSETRON 4 MG/1
4 TABLET, ORALLY DISINTEGRATING ORAL EVERY 6 HOURS PRN
Qty: 20 TABLET | Refills: 0 | Status: SHIPPED | OUTPATIENT
Start: 2021-01-01

## 2021-01-01 RX ORDER — FLUMAZENIL 0.1 MG/ML
0.2 INJECTION, SOLUTION INTRAVENOUS
Status: DISCONTINUED | OUTPATIENT
Start: 2021-01-01 | End: 2021-01-01 | Stop reason: HOSPADM

## 2021-01-01 RX ORDER — HYDROMORPHONE HCL IN WATER/PF 6 MG/30 ML
0.2 PATIENT CONTROLLED ANALGESIA SYRINGE INTRAVENOUS EVERY 5 MIN PRN
Status: DISCONTINUED | OUTPATIENT
Start: 2021-01-01 | End: 2021-01-01 | Stop reason: HOSPADM

## 2021-01-01 RX ORDER — POTASSIUM CHLORIDE 750 MG/1
20 TABLET, EXTENDED RELEASE ORAL ONCE
Status: COMPLETED | OUTPATIENT
Start: 2021-01-01 | End: 2021-01-01

## 2021-01-01 RX ORDER — CIPROFLOXACIN 500 MG/1
500 TABLET, FILM COATED ORAL ONCE
Status: COMPLETED | OUTPATIENT
Start: 2021-01-01 | End: 2021-01-01

## 2021-01-01 RX ORDER — IBUPROFEN 200 MG
200 TABLET ORAL EVERY 6 HOURS PRN
Status: DISCONTINUED | OUTPATIENT
Start: 2021-01-01 | End: 2021-01-01 | Stop reason: HOSPADM

## 2021-01-01 RX ORDER — HYDROMORPHONE HYDROCHLORIDE 1 MG/ML
0.5 INJECTION, SOLUTION INTRAMUSCULAR; INTRAVENOUS; SUBCUTANEOUS EVERY 8 HOURS PRN
Status: DISCONTINUED | OUTPATIENT
Start: 2021-01-01 | End: 2021-01-01 | Stop reason: HOSPADM

## 2021-01-01 RX ORDER — PROCHLORPERAZINE MALEATE 5 MG
5 TABLET ORAL EVERY 6 HOURS PRN
Status: DISCONTINUED | OUTPATIENT
Start: 2021-01-01 | End: 2021-01-01 | Stop reason: HOSPADM

## 2021-01-01 RX ORDER — HYDROMORPHONE HCL IN WATER/PF 6 MG/30 ML
0.2 PATIENT CONTROLLED ANALGESIA SYRINGE INTRAVENOUS EVERY 4 HOURS PRN
Status: DISCONTINUED | OUTPATIENT
Start: 2021-01-01 | End: 2021-01-01

## 2021-01-01 RX ORDER — LIDOCAINE HYDROCHLORIDE 20 MG/ML
JELLY TOPICAL
Status: COMPLETED
Start: 2021-01-01 | End: 2021-01-01

## 2021-01-01 RX ORDER — ATROPA BELLADONNA AND OPIUM 16.2; 3 MG/1; MG/1
15 SUPPOSITORY RECTAL 2 TIMES DAILY PRN
Qty: 30 SUPPOSITORY | Refills: 0 | Status: SHIPPED | OUTPATIENT
Start: 2021-01-01 | End: 2021-01-01

## 2021-01-01 RX ORDER — ONDANSETRON 4 MG/1
4 TABLET, ORALLY DISINTEGRATING ORAL EVERY 6 HOURS PRN
Status: DISCONTINUED | OUTPATIENT
Start: 2021-01-01 | End: 2021-01-01

## 2021-01-01 RX ORDER — POLYETHYLENE GLYCOL 3350 17 G/17G
17 POWDER, FOR SOLUTION ORAL DAILY
Qty: 510 G | Refills: 0 | Status: ON HOLD | OUTPATIENT
Start: 2021-01-01 | End: 2021-01-01

## 2021-01-01 RX ORDER — POLYETHYLENE GLYCOL 3350 17 G/17G
17 POWDER, FOR SOLUTION ORAL DAILY PRN
Status: DISCONTINUED | OUTPATIENT
Start: 2021-01-01 | End: 2021-01-01 | Stop reason: HOSPADM

## 2021-01-01 RX ORDER — MEPERIDINE HYDROCHLORIDE 25 MG/ML
12.5 INJECTION INTRAMUSCULAR; INTRAVENOUS; SUBCUTANEOUS EVERY 5 MIN PRN
Status: DISCONTINUED | OUTPATIENT
Start: 2021-01-01 | End: 2021-01-01 | Stop reason: HOSPADM

## 2021-01-01 RX ORDER — FUROSEMIDE 10 MG/ML
20 INJECTION INTRAMUSCULAR; INTRAVENOUS ONCE
Status: COMPLETED | OUTPATIENT
Start: 2021-01-01 | End: 2021-01-01

## 2021-01-01 RX ORDER — ONDANSETRON 2 MG/ML
4 INJECTION INTRAMUSCULAR; INTRAVENOUS ONCE
Status: DISCONTINUED | OUTPATIENT
Start: 2021-01-01 | End: 2021-01-01 | Stop reason: HOSPADM

## 2021-01-01 RX ORDER — FENTANYL CITRATE 50 UG/ML
INJECTION, SOLUTION INTRAMUSCULAR; INTRAVENOUS PRN
Status: DISCONTINUED | OUTPATIENT
Start: 2021-01-01 | End: 2021-01-01

## 2021-01-01 RX ORDER — IOPAMIDOL 755 MG/ML
62 INJECTION, SOLUTION INTRAVASCULAR ONCE
Status: COMPLETED | OUTPATIENT
Start: 2021-01-01 | End: 2021-01-01

## 2021-01-01 RX ORDER — SODIUM POLYSTYRENE SULFONATE 15 G/60ML
30 SUSPENSION ORAL; RECTAL ONCE
Status: COMPLETED | OUTPATIENT
Start: 2021-01-01 | End: 2021-01-01

## 2021-01-01 RX ADMIN — SODIUM CHLORIDE 1000 ML: 9 INJECTION, SOLUTION INTRAVENOUS at 14:02

## 2021-01-01 RX ADMIN — IBUPROFEN 200 MG: 200 TABLET, FILM COATED ORAL at 11:47

## 2021-01-01 RX ADMIN — SODIUM CHLORIDE 500 ML: 9 INJECTION, SOLUTION INTRAVENOUS at 13:50

## 2021-01-01 RX ADMIN — THIAMINE HCL TAB 100 MG 100 MG: 100 TAB at 08:23

## 2021-01-01 RX ADMIN — PANTOPRAZOLE SODIUM 40 MG: 40 INJECTION, POWDER, FOR SOLUTION INTRAVENOUS at 09:40

## 2021-01-01 RX ADMIN — SODIUM CHLORIDE: 9 INJECTION, SOLUTION INTRAVENOUS at 01:22

## 2021-01-01 RX ADMIN — PANTOPRAZOLE SODIUM 40 MG: 40 INJECTION, POWDER, FOR SOLUTION INTRAVENOUS at 08:24

## 2021-01-01 RX ADMIN — OXYCODONE HYDROCHLORIDE 10 MG: 5 TABLET ORAL at 03:16

## 2021-01-01 RX ADMIN — OXYBUTYNIN CHLORIDE 10 MG: 10 TABLET, EXTENDED RELEASE ORAL at 07:46

## 2021-01-01 RX ADMIN — SENNOSIDES 2 TABLET: 8.6 TABLET, COATED ORAL at 08:22

## 2021-01-01 RX ADMIN — TAMSULOSIN HYDROCHLORIDE 0.4 MG: 0.4 CAPSULE ORAL at 08:10

## 2021-01-01 RX ADMIN — THIAMINE HCL TAB 100 MG 100 MG: 100 TAB at 07:45

## 2021-01-01 RX ADMIN — PANTOPRAZOLE SODIUM 40 MG: 40 TABLET, DELAYED RELEASE ORAL at 08:09

## 2021-01-01 RX ADMIN — CIPROFLOXACIN HYDROCHLORIDE 500 MG: 500 TABLET, FILM COATED ORAL at 13:11

## 2021-01-01 RX ADMIN — MORPHINE SULFATE 15 MG: 15 TABLET, EXTENDED RELEASE ORAL at 08:06

## 2021-01-01 RX ADMIN — TAMSULOSIN HYDROCHLORIDE 0.4 MG: 0.4 CAPSULE ORAL at 08:23

## 2021-01-01 RX ADMIN — OXYCODONE HYDROCHLORIDE 10 MG: 5 TABLET ORAL at 12:36

## 2021-01-01 RX ADMIN — THERA TABS 1 TABLET: TAB at 08:10

## 2021-01-01 RX ADMIN — MORPHINE SULFATE 30 MG: 15 TABLET, EXTENDED RELEASE ORAL at 13:43

## 2021-01-01 RX ADMIN — MIDAZOLAM 1 MG: 1 INJECTION INTRAMUSCULAR; INTRAVENOUS at 12:23

## 2021-01-01 RX ADMIN — FERROUS GLUCONATE 324 MG: 324 TABLET ORAL at 07:45

## 2021-01-01 RX ADMIN — OXYCODONE HYDROCHLORIDE 10 MG: 5 TABLET ORAL at 17:22

## 2021-01-01 RX ADMIN — SODIUM CHLORIDE 1000 ML: 9 INJECTION, SOLUTION INTRAVENOUS at 00:49

## 2021-01-01 RX ADMIN — OXYBUTYNIN CHLORIDE 5 MG: 5 TABLET, EXTENDED RELEASE ORAL at 08:48

## 2021-01-01 RX ADMIN — OXYCODONE HYDROCHLORIDE 5 MG: 5 TABLET ORAL at 20:56

## 2021-01-01 RX ADMIN — MORPHINE SULFATE 30 MG: 15 TABLET, EXTENDED RELEASE ORAL at 08:55

## 2021-01-01 RX ADMIN — OXYCODONE HYDROCHLORIDE 10 MG: 5 TABLET ORAL at 05:23

## 2021-01-01 RX ADMIN — OXYCODONE HYDROCHLORIDE 20 MG: 10 TABLET ORAL at 12:48

## 2021-01-01 RX ADMIN — ACETAMINOPHEN 1000 MG: 500 TABLET, FILM COATED ORAL at 20:52

## 2021-01-01 RX ADMIN — HYDROMORPHONE HYDROCHLORIDE 0.5 MG: 1 INJECTION, SOLUTION INTRAMUSCULAR; INTRAVENOUS; SUBCUTANEOUS at 23:04

## 2021-01-01 RX ADMIN — PANTOPRAZOLE SODIUM 40 MG: 40 INJECTION, POWDER, FOR SOLUTION INTRAVENOUS at 16:46

## 2021-01-01 RX ADMIN — OXYCODONE HYDROCHLORIDE 5 MG: 5 TABLET ORAL at 13:52

## 2021-01-01 RX ADMIN — THIAMINE HCL TAB 100 MG 100 MG: 100 TAB at 07:46

## 2021-01-01 RX ADMIN — OXYCODONE HYDROCHLORIDE 10 MG: 5 TABLET ORAL at 20:51

## 2021-01-01 RX ADMIN — PANTOPRAZOLE SODIUM 40 MG: 40 TABLET, DELAYED RELEASE ORAL at 08:48

## 2021-01-01 RX ADMIN — IRON SUCROSE 200 MG: 20 INJECTION, SOLUTION INTRAVENOUS at 10:20

## 2021-01-01 RX ADMIN — SENNOSIDES 1 TABLET: 8.6 TABLET, COATED ORAL at 20:18

## 2021-01-01 RX ADMIN — HYDROMORPHONE HYDROCHLORIDE 0.5 MG: 1 INJECTION, SOLUTION INTRAMUSCULAR; INTRAVENOUS; SUBCUTANEOUS at 16:41

## 2021-01-01 RX ADMIN — IBUPROFEN 200 MG: 200 TABLET, FILM COATED ORAL at 08:22

## 2021-01-01 RX ADMIN — ACETAMINOPHEN 1000 MG: 500 TABLET, FILM COATED ORAL at 10:11

## 2021-01-01 RX ADMIN — OXYCODONE HYDROCHLORIDE 10 MG: 5 TABLET ORAL at 19:20

## 2021-01-01 RX ADMIN — IOPAMIDOL 75 ML: 755 INJECTION, SOLUTION INTRAVENOUS at 06:22

## 2021-01-01 RX ADMIN — LIDOCAINE HYDROCHLORIDE: 20 JELLY TOPICAL at 16:46

## 2021-01-01 RX ADMIN — MORPHINE SULFATE 30 MG: 15 TABLET, EXTENDED RELEASE ORAL at 23:34

## 2021-01-01 RX ADMIN — POLYETHYLENE GLYCOL 3350 17 G: 17 POWDER, FOR SOLUTION ORAL at 07:46

## 2021-01-01 RX ADMIN — FERROUS GLUCONATE 324 MG: 324 TABLET ORAL at 08:23

## 2021-01-01 RX ADMIN — MORPHINE SULFATE 30 MG: 15 TABLET, EXTENDED RELEASE ORAL at 16:48

## 2021-01-01 RX ADMIN — SIMETHICONE 80 MG: 80 TABLET, CHEWABLE ORAL at 11:40

## 2021-01-01 RX ADMIN — OXYBUTYNIN CHLORIDE 10 MG: 10 TABLET, EXTENDED RELEASE ORAL at 08:18

## 2021-01-01 RX ADMIN — PANTOPRAZOLE SODIUM 40 MG: 40 TABLET, DELAYED RELEASE ORAL at 07:45

## 2021-01-01 RX ADMIN — ACETAMINOPHEN 975 MG: 325 TABLET, FILM COATED ORAL at 19:33

## 2021-01-01 RX ADMIN — SENNOSIDES 2 TABLET: 8.6 TABLET ORAL at 19:53

## 2021-01-01 RX ADMIN — TAMSULOSIN HYDROCHLORIDE 0.4 MG: 0.4 CAPSULE ORAL at 07:55

## 2021-01-01 RX ADMIN — HYDROMORPHONE HYDROCHLORIDE 0.5 MG: 1 INJECTION, SOLUTION INTRAMUSCULAR; INTRAVENOUS; SUBCUTANEOUS at 11:35

## 2021-01-01 RX ADMIN — SODIUM POLYSTYRENE SULFONATE 30 G: 15 SUSPENSION ORAL; RECTAL at 06:12

## 2021-01-01 RX ADMIN — HYDROMORPHONE HYDROCHLORIDE 0.5 MG: 1 INJECTION, SOLUTION INTRAMUSCULAR; INTRAVENOUS; SUBCUTANEOUS at 22:14

## 2021-01-01 RX ADMIN — OXYCODONE HYDROCHLORIDE 20 MG: 10 TABLET ORAL at 19:33

## 2021-01-01 RX ADMIN — ACETAMINOPHEN 975 MG: 325 TABLET, FILM COATED ORAL at 13:48

## 2021-01-01 RX ADMIN — HYDROMORPHONE HYDROCHLORIDE 0.5 MG: 1 INJECTION, SOLUTION INTRAMUSCULAR; INTRAVENOUS; SUBCUTANEOUS at 23:38

## 2021-01-01 RX ADMIN — PROPOFOL 100 MG: 10 INJECTION, EMULSION INTRAVENOUS at 13:59

## 2021-01-01 RX ADMIN — HYDROMORPHONE HYDROCHLORIDE 0.5 MG: 1 INJECTION, SOLUTION INTRAMUSCULAR; INTRAVENOUS; SUBCUTANEOUS at 18:57

## 2021-01-01 RX ADMIN — POLYETHYLENE GLYCOL 3350 17 G: 17 POWDER, FOR SOLUTION ORAL at 08:10

## 2021-01-01 RX ADMIN — MORPHINE SULFATE 30 MG: 15 TABLET, EXTENDED RELEASE ORAL at 06:01

## 2021-01-01 RX ADMIN — SENNOSIDES 1 TABLET: 8.6 TABLET ORAL at 08:41

## 2021-01-01 RX ADMIN — OXYCODONE HYDROCHLORIDE 10 MG: 5 TABLET ORAL at 06:24

## 2021-01-01 RX ADMIN — HYDROMORPHONE HYDROCHLORIDE 0.5 MG: 1 INJECTION, SOLUTION INTRAMUSCULAR; INTRAVENOUS; SUBCUTANEOUS at 12:47

## 2021-01-01 RX ADMIN — OXYCODONE HYDROCHLORIDE 5 MG: 5 TABLET ORAL at 00:22

## 2021-01-01 RX ADMIN — OXYCODONE HYDROCHLORIDE 5 MG: 5 TABLET ORAL at 22:03

## 2021-01-01 RX ADMIN — OXYCODONE HYDROCHLORIDE 10 MG: 10 TABLET ORAL at 08:17

## 2021-01-01 RX ADMIN — ACETAMINOPHEN 1000 MG: 500 TABLET, FILM COATED ORAL at 03:43

## 2021-01-01 RX ADMIN — THIAMINE HCL TAB 100 MG 100 MG: 100 TAB at 08:13

## 2021-01-01 RX ADMIN — METHADONE HYDROCHLORIDE 5 MG: 5 TABLET ORAL at 06:24

## 2021-01-01 RX ADMIN — OXYCODONE HYDROCHLORIDE 10 MG: 5 TABLET ORAL at 06:11

## 2021-01-01 RX ADMIN — HYDROMORPHONE HYDROCHLORIDE 0.4 MG: 0.2 INJECTION, SOLUTION INTRAMUSCULAR; INTRAVENOUS; SUBCUTANEOUS at 16:17

## 2021-01-01 RX ADMIN — GABAPENTIN 100 MG: 100 CAPSULE ORAL at 08:17

## 2021-01-01 RX ADMIN — POLYETHYLENE GLYCOL 3350 17 G: 17 POWDER, FOR SOLUTION ORAL at 08:23

## 2021-01-01 RX ADMIN — ROCURONIUM BROMIDE 20 MG: 10 INJECTION INTRAVENOUS at 14:16

## 2021-01-01 RX ADMIN — MORPHINE SULFATE 30 MG: 15 TABLET, EXTENDED RELEASE ORAL at 08:24

## 2021-01-01 RX ADMIN — PANTOPRAZOLE SODIUM 40 MG: 40 TABLET, DELAYED RELEASE ORAL at 08:55

## 2021-01-01 RX ADMIN — POLYETHYLENE GLYCOL 3350 17 G: 17 POWDER, FOR SOLUTION ORAL at 08:39

## 2021-01-01 RX ADMIN — FENTANYL CITRATE 50 MCG: 50 INJECTION INTRAMUSCULAR; INTRAVENOUS at 12:15

## 2021-01-01 RX ADMIN — DIPHENHYDRAMINE HYDROCHLORIDE 25 MG: 25 CAPSULE ORAL at 10:01

## 2021-01-01 RX ADMIN — HYDROMORPHONE HYDROCHLORIDE 0.2 MG: 0.2 INJECTION, SOLUTION INTRAMUSCULAR; INTRAVENOUS; SUBCUTANEOUS at 13:29

## 2021-01-01 RX ADMIN — FENTANYL CITRATE 25 MCG: 50 INJECTION, SOLUTION INTRAMUSCULAR; INTRAVENOUS at 10:10

## 2021-01-01 RX ADMIN — MORPHINE SULFATE 30 MG: 15 TABLET, EXTENDED RELEASE ORAL at 07:46

## 2021-01-01 RX ADMIN — POTASSIUM CHLORIDE 40 MEQ: 20 SOLUTION ORAL at 06:21

## 2021-01-01 RX ADMIN — LIDOCAINE HYDROCHLORIDE 80 MG: 20 INJECTION, SOLUTION INFILTRATION; PERINEURAL at 13:56

## 2021-01-01 RX ADMIN — Medication 5 MG: at 18:01

## 2021-01-01 RX ADMIN — METHADONE HYDROCHLORIDE 5 MG: 5 TABLET ORAL at 13:51

## 2021-01-01 RX ADMIN — HYDROMORPHONE HYDROCHLORIDE 0.5 MG: 1 INJECTION, SOLUTION INTRAMUSCULAR; INTRAVENOUS; SUBCUTANEOUS at 00:29

## 2021-01-01 RX ADMIN — MORPHINE SULFATE 30 MG: 15 TABLET, EXTENDED RELEASE ORAL at 16:04

## 2021-01-01 RX ADMIN — OXYCODONE HYDROCHLORIDE 5 MG: 5 TABLET ORAL at 20:02

## 2021-01-01 RX ADMIN — CEFAZOLIN 2 G: 1 INJECTION, POWDER, FOR SOLUTION INTRAMUSCULAR; INTRAVENOUS at 14:08

## 2021-01-01 RX ADMIN — HYDROMORPHONE HYDROCHLORIDE 0.5 MG: 1 INJECTION, SOLUTION INTRAMUSCULAR; INTRAVENOUS; SUBCUTANEOUS at 16:13

## 2021-01-01 RX ADMIN — MORPHINE SULFATE 15 MG: 15 TABLET, EXTENDED RELEASE ORAL at 08:49

## 2021-01-01 RX ADMIN — OXYCODONE HYDROCHLORIDE 10 MG: 5 TABLET ORAL at 13:44

## 2021-01-01 RX ADMIN — PANTOPRAZOLE SODIUM 40 MG: 40 INJECTION, POWDER, FOR SOLUTION INTRAVENOUS at 20:02

## 2021-01-01 RX ADMIN — OXYCODONE HYDROCHLORIDE 10 MG: 5 TABLET ORAL at 10:09

## 2021-01-01 RX ADMIN — IOPAMIDOL 69 ML: 755 INJECTION, SOLUTION INTRAVENOUS at 22:05

## 2021-01-01 RX ADMIN — OXYCODONE HYDROCHLORIDE 10 MG: 5 TABLET ORAL at 20:18

## 2021-01-01 RX ADMIN — SODIUM CHLORIDE: 9 INJECTION, SOLUTION INTRAVENOUS at 04:01

## 2021-01-01 RX ADMIN — THIAMINE HCL TAB 100 MG 100 MG: 100 TAB at 08:55

## 2021-01-01 RX ADMIN — ACETAMINOPHEN 1000 MG: 500 TABLET, FILM COATED ORAL at 16:52

## 2021-01-01 RX ADMIN — OXYCODONE HYDROCHLORIDE 20 MG: 10 TABLET ORAL at 15:43

## 2021-01-01 RX ADMIN — METHADONE HYDROCHLORIDE 5 MG: 5 TABLET ORAL at 21:19

## 2021-01-01 RX ADMIN — ACETAMINOPHEN 1000 MG: 500 TABLET, FILM COATED ORAL at 07:55

## 2021-01-01 RX ADMIN — HYDROMORPHONE HYDROCHLORIDE 0.5 MG: 1 INJECTION, SOLUTION INTRAMUSCULAR; INTRAVENOUS; SUBCUTANEOUS at 08:36

## 2021-01-01 RX ADMIN — OXYCODONE HYDROCHLORIDE 10 MG: 5 TABLET ORAL at 15:56

## 2021-01-01 RX ADMIN — OXYCODONE HYDROCHLORIDE 10 MG: 5 TABLET ORAL at 01:24

## 2021-01-01 RX ADMIN — Medication 5 MG: at 01:37

## 2021-01-01 RX ADMIN — FENTANYL CITRATE 50 MCG: 50 INJECTION INTRAMUSCULAR; INTRAVENOUS at 12:23

## 2021-01-01 RX ADMIN — TAMSULOSIN HYDROCHLORIDE 0.4 MG: 0.4 CAPSULE ORAL at 17:48

## 2021-01-01 RX ADMIN — OXYCODONE HYDROCHLORIDE 10 MG: 5 TABLET ORAL at 09:29

## 2021-01-01 RX ADMIN — OXYCODONE HYDROCHLORIDE 10 MG: 5 TABLET ORAL at 02:31

## 2021-01-01 RX ADMIN — HYDROMORPHONE HYDROCHLORIDE 0.5 MG: 1 INJECTION, SOLUTION INTRAMUSCULAR; INTRAVENOUS; SUBCUTANEOUS at 21:39

## 2021-01-01 RX ADMIN — OXYCODONE HYDROCHLORIDE 5 MG: 5 TABLET ORAL at 10:40

## 2021-01-01 RX ADMIN — IRON SUCROSE 200 MG: 20 INJECTION, SOLUTION INTRAVENOUS at 06:42

## 2021-01-01 RX ADMIN — HYDROMORPHONE HYDROCHLORIDE 0.5 MG: 1 INJECTION, SOLUTION INTRAMUSCULAR; INTRAVENOUS; SUBCUTANEOUS at 03:46

## 2021-01-01 RX ADMIN — AMPICILLIN SODIUM 1 G: 1 INJECTION, POWDER, FOR SOLUTION INTRAMUSCULAR; INTRAVENOUS at 11:30

## 2021-01-01 RX ADMIN — GABAPENTIN 100 MG: 100 CAPSULE ORAL at 19:21

## 2021-01-01 RX ADMIN — SODIUM CHLORIDE: 9 INJECTION, SOLUTION INTRAVENOUS at 09:30

## 2021-01-01 RX ADMIN — PANTOPRAZOLE SODIUM 40 MG: 40 INJECTION, POWDER, FOR SOLUTION INTRAVENOUS at 05:54

## 2021-01-01 RX ADMIN — MORPHINE SULFATE 15 MG: 15 TABLET, EXTENDED RELEASE ORAL at 20:33

## 2021-01-01 RX ADMIN — Medication 5 MG: at 10:11

## 2021-01-01 RX ADMIN — HYDROMORPHONE HYDROCHLORIDE 0.5 MG: 1 INJECTION, SOLUTION INTRAMUSCULAR; INTRAVENOUS; SUBCUTANEOUS at 01:21

## 2021-01-01 RX ADMIN — THIAMINE HCL TAB 100 MG 100 MG: 100 TAB at 14:03

## 2021-01-01 RX ADMIN — ACETAMINOPHEN 1000 MG: 500 TABLET, FILM COATED ORAL at 18:42

## 2021-01-01 RX ADMIN — IBUPROFEN 200 MG: 200 TABLET, FILM COATED ORAL at 21:30

## 2021-01-01 RX ADMIN — TAMSULOSIN HYDROCHLORIDE 0.4 MG: 0.4 CAPSULE ORAL at 07:46

## 2021-01-01 RX ADMIN — PANTOPRAZOLE SODIUM 40 MG: 40 TABLET, DELAYED RELEASE ORAL at 08:23

## 2021-01-01 RX ADMIN — HYDROMORPHONE HYDROCHLORIDE 0.2 MG: 0.2 INJECTION, SOLUTION INTRAMUSCULAR; INTRAVENOUS; SUBCUTANEOUS at 00:23

## 2021-01-01 RX ADMIN — HYDROMORPHONE HYDROCHLORIDE 0.5 MG: 1 INJECTION, SOLUTION INTRAMUSCULAR; INTRAVENOUS; SUBCUTANEOUS at 04:11

## 2021-01-01 RX ADMIN — OXYCODONE HYDROCHLORIDE 10 MG: 5 TABLET ORAL at 15:28

## 2021-01-01 RX ADMIN — OXYCODONE HYDROCHLORIDE 10 MG: 5 TABLET ORAL at 23:20

## 2021-01-01 RX ADMIN — FENTANYL CITRATE 25 MCG: 50 INJECTION, SOLUTION INTRAMUSCULAR; INTRAVENOUS at 15:45

## 2021-01-01 RX ADMIN — OXYCODONE HYDROCHLORIDE 10 MG: 10 TABLET ORAL at 13:54

## 2021-01-01 RX ADMIN — PANTOPRAZOLE SODIUM 40 MG: 40 TABLET, DELAYED RELEASE ORAL at 08:13

## 2021-01-01 RX ADMIN — ACETAMINOPHEN 650 MG: 325 TABLET, FILM COATED ORAL at 11:22

## 2021-01-01 RX ADMIN — TAMSULOSIN HYDROCHLORIDE 0.4 MG: 0.4 CAPSULE ORAL at 08:40

## 2021-01-01 RX ADMIN — IRON SUCROSE 200 MG: 20 INJECTION, SOLUTION INTRAVENOUS at 12:57

## 2021-01-01 RX ADMIN — CEFEPIME HYDROCHLORIDE 1 G: 1 INJECTION, POWDER, FOR SOLUTION INTRAMUSCULAR; INTRAVENOUS at 14:09

## 2021-01-01 RX ADMIN — HYDROMORPHONE HYDROCHLORIDE 0.4 MG: 0.2 INJECTION, SOLUTION INTRAMUSCULAR; INTRAVENOUS; SUBCUTANEOUS at 19:41

## 2021-01-01 RX ADMIN — MIDAZOLAM 2 MG: 1 INJECTION INTRAMUSCULAR; INTRAVENOUS at 09:53

## 2021-01-01 RX ADMIN — OXYCODONE HYDROCHLORIDE 10 MG: 5 TABLET ORAL at 06:21

## 2021-01-01 RX ADMIN — OXYBUTYNIN CHLORIDE 10 MG: 10 TABLET, EXTENDED RELEASE ORAL at 07:45

## 2021-01-01 RX ADMIN — THIAMINE HCL TAB 100 MG 100 MG: 100 TAB at 08:42

## 2021-01-01 RX ADMIN — IODIXANOL 5 ML: 320 INJECTION, SOLUTION INTRAVASCULAR at 12:43

## 2021-01-01 RX ADMIN — OXYCODONE HYDROCHLORIDE 10 MG: 5 TABLET ORAL at 09:49

## 2021-01-01 RX ADMIN — TRANEXAMIC ACID 1 G: 10 INJECTION, SOLUTION INTRAVENOUS at 17:03

## 2021-01-01 RX ADMIN — HYDROMORPHONE HYDROCHLORIDE 0.5 MG: 1 INJECTION, SOLUTION INTRAMUSCULAR; INTRAVENOUS; SUBCUTANEOUS at 21:51

## 2021-01-01 RX ADMIN — PANTOPRAZOLE SODIUM 40 MG: 40 INJECTION, POWDER, FOR SOLUTION INTRAVENOUS at 20:52

## 2021-01-01 RX ADMIN — GABAPENTIN 100 MG: 100 CAPSULE ORAL at 14:19

## 2021-01-01 RX ADMIN — OXYBUTYNIN CHLORIDE 5 MG: 5 TABLET ORAL at 09:56

## 2021-01-01 RX ADMIN — SODIUM CHLORIDE 1000 ML: 9 INJECTION, SOLUTION INTRAVENOUS at 10:36

## 2021-01-01 RX ADMIN — SODIUM CHLORIDE 360 MG: 9 INJECTION, SOLUTION INTRAVENOUS at 13:42

## 2021-01-01 RX ADMIN — HYDROMORPHONE HYDROCHLORIDE 0.5 MG: 1 INJECTION, SOLUTION INTRAMUSCULAR; INTRAVENOUS; SUBCUTANEOUS at 09:58

## 2021-01-01 RX ADMIN — HYDROMORPHONE HYDROCHLORIDE 0.5 MG: 1 INJECTION, SOLUTION INTRAMUSCULAR; INTRAVENOUS; SUBCUTANEOUS at 15:55

## 2021-01-01 RX ADMIN — HYDROMORPHONE HYDROCHLORIDE 0.5 MG: 1 INJECTION, SOLUTION INTRAMUSCULAR; INTRAVENOUS; SUBCUTANEOUS at 11:07

## 2021-01-01 RX ADMIN — SENNOSIDES 2 TABLET: 8.6 TABLET ORAL at 07:45

## 2021-01-01 RX ADMIN — HYDROMORPHONE HYDROCHLORIDE 0.5 MG: 1 INJECTION, SOLUTION INTRAMUSCULAR; INTRAVENOUS; SUBCUTANEOUS at 18:50

## 2021-01-01 RX ADMIN — PANTOPRAZOLE SODIUM 40 MG: 40 TABLET, DELAYED RELEASE ORAL at 07:55

## 2021-01-01 RX ADMIN — MULTIPLE VITAMINS W/ MINERALS TAB 1 TABLET: TAB at 08:09

## 2021-01-01 RX ADMIN — FLUDEOXYGLUCOSE F-18 10.15 MCI.: 500 INJECTION, SOLUTION INTRAVENOUS at 12:51

## 2021-01-01 RX ADMIN — SODIUM CHLORIDE: 9 INJECTION, SOLUTION INTRAVENOUS at 17:09

## 2021-01-01 RX ADMIN — HYDROMORPHONE HYDROCHLORIDE 0.5 MG: 1 INJECTION, SOLUTION INTRAMUSCULAR; INTRAVENOUS; SUBCUTANEOUS at 03:48

## 2021-01-01 RX ADMIN — TAMSULOSIN HYDROCHLORIDE 0.4 MG: 0.4 CAPSULE ORAL at 08:33

## 2021-01-01 RX ADMIN — POLYETHYLENE GLYCOL 3350 17 G: 17 POWDER, FOR SOLUTION ORAL at 08:24

## 2021-01-01 RX ADMIN — SENNOSIDES 1 TABLET: 8.6 TABLET, COATED ORAL at 07:45

## 2021-01-01 RX ADMIN — OXYCODONE HYDROCHLORIDE 10 MG: 5 TABLET ORAL at 14:03

## 2021-01-01 RX ADMIN — SENNOSIDES 2 TABLET: 8.6 TABLET ORAL at 20:51

## 2021-01-01 RX ADMIN — MORPHINE SULFATE 4 MG: 4 INJECTION, SOLUTION INTRAMUSCULAR; INTRAVENOUS at 00:05

## 2021-01-01 RX ADMIN — Medication 5 MG: at 20:52

## 2021-01-01 RX ADMIN — ACETAMINOPHEN 1000 MG: 500 TABLET, FILM COATED ORAL at 01:37

## 2021-01-01 RX ADMIN — TAMSULOSIN HYDROCHLORIDE 0.4 MG: 0.4 CAPSULE ORAL at 08:55

## 2021-01-01 RX ADMIN — METHADONE HYDROCHLORIDE 5 MG: 5 TABLET ORAL at 06:21

## 2021-01-01 RX ADMIN — ACETAMINOPHEN 975 MG: 325 TABLET, FILM COATED ORAL at 15:43

## 2021-01-01 RX ADMIN — POLYETHYLENE GLYCOL 3350 17 G: 17 POWDER, FOR SOLUTION ORAL at 08:49

## 2021-01-01 RX ADMIN — PANTOPRAZOLE SODIUM 40 MG: 40 TABLET, DELAYED RELEASE ORAL at 08:24

## 2021-01-01 RX ADMIN — ONDANSETRON 4 MG: 2 INJECTION INTRAMUSCULAR; INTRAVENOUS at 14:58

## 2021-01-01 RX ADMIN — MIDAZOLAM 1 MG: 1 INJECTION INTRAMUSCULAR; INTRAVENOUS at 12:15

## 2021-01-01 RX ADMIN — GABAPENTIN 100 MG: 100 CAPSULE ORAL at 20:52

## 2021-01-01 RX ADMIN — OXYBUTYNIN CHLORIDE 10 MG: 10 TABLET, EXTENDED RELEASE ORAL at 08:54

## 2021-01-01 RX ADMIN — HYDROMORPHONE HYDROCHLORIDE 0.5 MG: 1 INJECTION, SOLUTION INTRAMUSCULAR; INTRAVENOUS; SUBCUTANEOUS at 08:45

## 2021-01-01 RX ADMIN — HYDROMORPHONE HYDROCHLORIDE 0.5 MG: 1 INJECTION, SOLUTION INTRAMUSCULAR; INTRAVENOUS; SUBCUTANEOUS at 08:11

## 2021-01-01 RX ADMIN — OXYCODONE HYDROCHLORIDE 10 MG: 5 TABLET ORAL at 04:33

## 2021-01-01 RX ADMIN — OXYCODONE HYDROCHLORIDE 10 MG: 10 TABLET ORAL at 09:05

## 2021-01-01 RX ADMIN — POLYETHYLENE GLYCOL 3350 17 G: 17 POWDER, FOR SOLUTION ORAL at 07:55

## 2021-01-01 RX ADMIN — SENNOSIDES 1 TABLET: 8.6 TABLET ORAL at 08:23

## 2021-01-01 RX ADMIN — ACETAMINOPHEN 1000 MG: 500 TABLET, FILM COATED ORAL at 02:40

## 2021-01-01 RX ADMIN — MIDAZOLAM 1 MG: 1 INJECTION INTRAMUSCULAR; INTRAVENOUS at 12:08

## 2021-01-01 RX ADMIN — TAMSULOSIN HYDROCHLORIDE 0.4 MG: 0.4 CAPSULE ORAL at 09:12

## 2021-01-01 RX ADMIN — HYDROMORPHONE HYDROCHLORIDE 0.5 MG: 1 INJECTION, SOLUTION INTRAMUSCULAR; INTRAVENOUS; SUBCUTANEOUS at 02:26

## 2021-01-01 RX ADMIN — OXYCODONE HYDROCHLORIDE 10 MG: 5 TABLET ORAL at 00:15

## 2021-01-01 RX ADMIN — MORPHINE SULFATE 30 MG: 15 TABLET, EXTENDED RELEASE ORAL at 00:23

## 2021-01-01 RX ADMIN — OXYCODONE HYDROCHLORIDE 5 MG: 5 TABLET ORAL at 12:43

## 2021-01-01 RX ADMIN — IBUPROFEN 200 MG: 200 TABLET, FILM COATED ORAL at 01:30

## 2021-01-01 RX ADMIN — OXYCODONE HYDROCHLORIDE 5 MG: 5 TABLET ORAL at 22:52

## 2021-01-01 RX ADMIN — FENTANYL CITRATE 25 MCG: 50 INJECTION, SOLUTION INTRAMUSCULAR; INTRAVENOUS at 10:18

## 2021-01-01 RX ADMIN — SENNOSIDES 2 TABLET: 8.6 TABLET ORAL at 19:21

## 2021-01-01 RX ADMIN — IBUPROFEN 200 MG: 200 TABLET, FILM COATED ORAL at 16:41

## 2021-01-01 RX ADMIN — THERA TABS 1 TABLET: TAB at 10:04

## 2021-01-01 RX ADMIN — GABAPENTIN 100 MG: 100 CAPSULE ORAL at 20:51

## 2021-01-01 RX ADMIN — METHADONE HYDROCHLORIDE 5 MG: 5 TABLET ORAL at 08:22

## 2021-01-01 RX ADMIN — SENNOSIDES 1 TABLET: 8.6 TABLET ORAL at 07:46

## 2021-01-01 RX ADMIN — METHADONE HYDROCHLORIDE 5 MG: 5 TABLET ORAL at 22:25

## 2021-01-01 RX ADMIN — OXYCODONE HYDROCHLORIDE 20 MG: 10 TABLET ORAL at 08:24

## 2021-01-01 RX ADMIN — GABAPENTIN 100 MG: 100 CAPSULE ORAL at 07:45

## 2021-01-01 RX ADMIN — MORPHINE SULFATE 4 MG: 4 INJECTION, SOLUTION INTRAMUSCULAR; INTRAVENOUS at 23:12

## 2021-01-01 RX ADMIN — HYDROMORPHONE HYDROCHLORIDE 0.5 MG: 1 INJECTION, SOLUTION INTRAMUSCULAR; INTRAVENOUS; SUBCUTANEOUS at 00:04

## 2021-01-01 RX ADMIN — IBUPROFEN 200 MG: 200 TABLET, FILM COATED ORAL at 13:54

## 2021-01-01 RX ADMIN — DIPHENHYDRAMINE HYDROCHLORIDE 25 MG: 25 CAPSULE ORAL at 06:11

## 2021-01-01 RX ADMIN — OXYBUTYNIN CHLORIDE 5 MG: 5 TABLET, EXTENDED RELEASE ORAL at 12:22

## 2021-01-01 RX ADMIN — METHADONE HYDROCHLORIDE 5 MG: 5 TABLET ORAL at 21:51

## 2021-01-01 RX ADMIN — HYDROMORPHONE HYDROCHLORIDE 0.5 MG: 1 INJECTION, SOLUTION INTRAMUSCULAR; INTRAVENOUS; SUBCUTANEOUS at 11:18

## 2021-01-01 RX ADMIN — HYDROMORPHONE HYDROCHLORIDE 0.5 MG: 1 INJECTION, SOLUTION INTRAMUSCULAR; INTRAVENOUS; SUBCUTANEOUS at 14:03

## 2021-01-01 RX ADMIN — SODIUM CHLORIDE 500 ML: 9 INJECTION, SOLUTION INTRAVENOUS at 12:50

## 2021-01-01 RX ADMIN — TAMSULOSIN HYDROCHLORIDE 0.4 MG: 0.4 CAPSULE ORAL at 07:45

## 2021-01-01 RX ADMIN — OXYBUTYNIN CHLORIDE 10 MG: 10 TABLET, EXTENDED RELEASE ORAL at 07:47

## 2021-01-01 RX ADMIN — METHADONE HYDROCHLORIDE 5 MG: 5 TABLET ORAL at 19:30

## 2021-01-01 RX ADMIN — POLYETHYLENE GLYCOL 3350 17 G: 17 POWDER, FOR SOLUTION ORAL at 08:13

## 2021-01-01 RX ADMIN — OXYCODONE HYDROCHLORIDE 10 MG: 5 TABLET ORAL at 05:46

## 2021-01-01 RX ADMIN — GENTAMICIN SULFATE 90 MG: 40 INJECTION, SOLUTION INTRAMUSCULAR; INTRAVENOUS at 11:53

## 2021-01-01 RX ADMIN — MORPHINE SULFATE 30 MG: 15 TABLET, EXTENDED RELEASE ORAL at 08:33

## 2021-01-01 RX ADMIN — FENTANYL CITRATE 50 MCG: 50 INJECTION INTRAMUSCULAR; INTRAVENOUS at 12:08

## 2021-01-01 RX ADMIN — HYDROMORPHONE HYDROCHLORIDE 0.5 MG: 1 INJECTION, SOLUTION INTRAMUSCULAR; INTRAVENOUS; SUBCUTANEOUS at 21:21

## 2021-01-01 RX ADMIN — THERA TABS 1 TABLET: TAB at 08:26

## 2021-01-01 RX ADMIN — DEXAMETHASONE SODIUM PHOSPHATE 4 MG: 4 INJECTION, SOLUTION INTRA-ARTICULAR; INTRALESIONAL; INTRAMUSCULAR; INTRAVENOUS; SOFT TISSUE at 14:08

## 2021-01-01 RX ADMIN — GABAPENTIN 100 MG: 100 CAPSULE ORAL at 08:42

## 2021-01-01 RX ADMIN — FENTANYL CITRATE 100 MCG: 50 INJECTION, SOLUTION INTRAMUSCULAR; INTRAVENOUS at 13:55

## 2021-01-01 RX ADMIN — ACETAMINOPHEN 1000 MG: 500 TABLET, FILM COATED ORAL at 01:51

## 2021-01-01 RX ADMIN — PANTOPRAZOLE SODIUM 40 MG: 40 INJECTION, POWDER, FOR SOLUTION INTRAVENOUS at 21:03

## 2021-01-01 RX ADMIN — SENNOSIDES 2 TABLET: 8.6 TABLET ORAL at 19:31

## 2021-01-01 RX ADMIN — TAMSULOSIN HYDROCHLORIDE 0.4 MG: 0.4 CAPSULE ORAL at 08:09

## 2021-01-01 RX ADMIN — METHADONE HYDROCHLORIDE 5 MG: 5 TABLET ORAL at 14:25

## 2021-01-01 RX ADMIN — PANTOPRAZOLE SODIUM 40 MG: 40 TABLET, DELAYED RELEASE ORAL at 09:12

## 2021-01-01 RX ADMIN — HYDROMORPHONE HYDROCHLORIDE 0.2 MG: 0.2 INJECTION, SOLUTION INTRAMUSCULAR; INTRAVENOUS; SUBCUTANEOUS at 15:13

## 2021-01-01 RX ADMIN — POLYETHYLENE GLYCOL 3350 17 G: 17 POWDER, FOR SOLUTION ORAL at 09:12

## 2021-01-01 RX ADMIN — OXYCODONE HYDROCHLORIDE 10 MG: 5 TABLET ORAL at 22:57

## 2021-01-01 RX ADMIN — TAMSULOSIN HYDROCHLORIDE 0.4 MG: 0.4 CAPSULE ORAL at 08:24

## 2021-01-01 RX ADMIN — HYDROMORPHONE HYDROCHLORIDE 0.5 MG: 1 INJECTION, SOLUTION INTRAMUSCULAR; INTRAVENOUS; SUBCUTANEOUS at 04:01

## 2021-01-01 RX ADMIN — SODIUM CHLORIDE 1000 ML: 9 INJECTION, SOLUTION INTRAVENOUS at 12:50

## 2021-01-01 RX ADMIN — OXYCODONE HYDROCHLORIDE 10 MG: 5 TABLET ORAL at 01:51

## 2021-01-01 RX ADMIN — PANTOPRAZOLE SODIUM 40 MG: 40 INJECTION, POWDER, FOR SOLUTION INTRAVENOUS at 08:32

## 2021-01-01 RX ADMIN — GABAPENTIN 100 MG: 100 CAPSULE ORAL at 08:13

## 2021-01-01 RX ADMIN — OXYBUTYNIN CHLORIDE 5 MG: 5 TABLET, EXTENDED RELEASE ORAL at 08:23

## 2021-01-01 RX ADMIN — TAMSULOSIN HYDROCHLORIDE 0.4 MG: 0.4 CAPSULE ORAL at 08:48

## 2021-01-01 RX ADMIN — MORPHINE SULFATE 30 MG: 15 TABLET, EXTENDED RELEASE ORAL at 07:55

## 2021-01-01 RX ADMIN — MORPHINE SULFATE 30 MG: 15 TABLET, EXTENDED RELEASE ORAL at 13:48

## 2021-01-01 RX ADMIN — HYDROMORPHONE HYDROCHLORIDE 1 MG: 1 INJECTION, SOLUTION INTRAMUSCULAR; INTRAVENOUS; SUBCUTANEOUS at 09:39

## 2021-01-01 RX ADMIN — OXYCODONE HYDROCHLORIDE 10 MG: 5 TABLET ORAL at 14:20

## 2021-01-01 RX ADMIN — PROPOFOL 30 MG: 10 INJECTION, EMULSION INTRAVENOUS at 14:16

## 2021-01-01 RX ADMIN — ACETAMINOPHEN 1000 MG: 500 TABLET, FILM COATED ORAL at 18:02

## 2021-01-01 RX ADMIN — SENNOSIDES 2 TABLET: 8.6 TABLET, COATED ORAL at 09:03

## 2021-01-01 RX ADMIN — IOPAMIDOL 88 ML: 755 INJECTION, SOLUTION INTRAVENOUS at 13:49

## 2021-01-01 RX ADMIN — GABAPENTIN 100 MG: 100 CAPSULE ORAL at 19:54

## 2021-01-01 RX ADMIN — HYDROMORPHONE HYDROCHLORIDE 0.2 MG: 0.2 INJECTION, SOLUTION INTRAMUSCULAR; INTRAVENOUS; SUBCUTANEOUS at 04:27

## 2021-01-01 RX ADMIN — ACETAMINOPHEN 1000 MG: 500 TABLET, FILM COATED ORAL at 08:13

## 2021-01-01 RX ADMIN — OXYCODONE HYDROCHLORIDE 10 MG: 10 TABLET ORAL at 19:35

## 2021-01-01 RX ADMIN — SENNOSIDES 2 TABLET: 8.6 TABLET, COATED ORAL at 19:33

## 2021-01-01 RX ADMIN — POTASSIUM CHLORIDE 20 MEQ: 750 TABLET, EXTENDED RELEASE ORAL at 10:39

## 2021-01-01 RX ADMIN — OXYCODONE HYDROCHLORIDE 5 MG: 5 TABLET ORAL at 07:09

## 2021-01-01 RX ADMIN — SENNOSIDES 2 TABLET: 8.6 TABLET ORAL at 08:13

## 2021-01-01 RX ADMIN — OXYCODONE HYDROCHLORIDE 10 MG: 5 TABLET ORAL at 02:08

## 2021-01-01 RX ADMIN — SODIUM CHLORIDE: 9 INJECTION, SOLUTION INTRAVENOUS at 11:30

## 2021-01-01 RX ADMIN — OXYCODONE HYDROCHLORIDE 10 MG: 5 TABLET ORAL at 10:21

## 2021-01-01 RX ADMIN — PANTOPRAZOLE SODIUM 40 MG: 40 TABLET, DELAYED RELEASE ORAL at 07:46

## 2021-01-01 RX ADMIN — HYDROMORPHONE HYDROCHLORIDE 0.5 MG: 1 INJECTION, SOLUTION INTRAMUSCULAR; INTRAVENOUS; SUBCUTANEOUS at 00:37

## 2021-01-01 RX ADMIN — HYDROMORPHONE HYDROCHLORIDE 0.5 MG: 1 INJECTION, SOLUTION INTRAMUSCULAR; INTRAVENOUS; SUBCUTANEOUS at 21:18

## 2021-01-01 RX ADMIN — OXYCODONE HYDROCHLORIDE 10 MG: 5 TABLET ORAL at 17:01

## 2021-01-01 RX ADMIN — HYDROMORPHONE HYDROCHLORIDE 0.5 MG: 1 INJECTION, SOLUTION INTRAMUSCULAR; INTRAVENOUS; SUBCUTANEOUS at 03:40

## 2021-01-01 RX ADMIN — PANTOPRAZOLE SODIUM 40 MG: 40 TABLET, DELAYED RELEASE ORAL at 08:42

## 2021-01-01 RX ADMIN — HYDROMORPHONE HYDROCHLORIDE 0.5 MG: 1 INJECTION, SOLUTION INTRAMUSCULAR; INTRAVENOUS; SUBCUTANEOUS at 08:32

## 2021-01-01 RX ADMIN — THIAMINE HCL TAB 100 MG 100 MG: 100 TAB at 07:55

## 2021-01-01 RX ADMIN — SODIUM CHLORIDE, POTASSIUM CHLORIDE, SODIUM LACTATE AND CALCIUM CHLORIDE: 600; 310; 30; 20 INJECTION, SOLUTION INTRAVENOUS at 13:46

## 2021-01-01 RX ADMIN — HYDROMORPHONE HYDROCHLORIDE 0.5 MG: 1 INJECTION, SOLUTION INTRAMUSCULAR; INTRAVENOUS; SUBCUTANEOUS at 12:22

## 2021-01-01 RX ADMIN — OXYCODONE HYDROCHLORIDE 10 MG: 10 TABLET ORAL at 03:09

## 2021-01-01 RX ADMIN — MIDAZOLAM 1 MG: 1 INJECTION INTRAMUSCULAR; INTRAVENOUS at 14:16

## 2021-01-01 RX ADMIN — GABAPENTIN 100 MG: 100 CAPSULE ORAL at 13:51

## 2021-01-01 RX ADMIN — IBUPROFEN 200 MG: 200 TABLET, FILM COATED ORAL at 22:24

## 2021-01-01 RX ADMIN — HYDROMORPHONE HYDROCHLORIDE 0.5 MG: 1 INJECTION, SOLUTION INTRAMUSCULAR; INTRAVENOUS; SUBCUTANEOUS at 03:33

## 2021-01-01 RX ADMIN — POLYETHYLENE GLYCOL 3350 17 G: 17 POWDER, FOR SOLUTION ORAL at 08:21

## 2021-01-01 RX ADMIN — ACETAMINOPHEN 1000 MG: 500 TABLET, FILM COATED ORAL at 17:37

## 2021-01-01 RX ADMIN — IOPAMIDOL 62 ML: 755 INJECTION, SOLUTION INTRAVENOUS at 20:02

## 2021-01-01 RX ADMIN — OXYBUTYNIN CHLORIDE 5 MG: 5 TABLET, EXTENDED RELEASE ORAL at 08:10

## 2021-01-01 RX ADMIN — ACETAMINOPHEN 650 MG: 325 TABLET, FILM COATED ORAL at 01:21

## 2021-01-01 RX ADMIN — OXYBUTYNIN CHLORIDE 10 MG: 10 TABLET, EXTENDED RELEASE ORAL at 08:23

## 2021-01-01 RX ADMIN — TAMSULOSIN HYDROCHLORIDE 0.4 MG: 0.4 CAPSULE ORAL at 08:13

## 2021-01-01 RX ADMIN — OXYBUTYNIN CHLORIDE 10 MG: 10 TABLET, EXTENDED RELEASE ORAL at 07:55

## 2021-01-01 RX ADMIN — HYDROMORPHONE HYDROCHLORIDE 0.5 MG: 1 INJECTION, SOLUTION INTRAMUSCULAR; INTRAVENOUS; SUBCUTANEOUS at 03:26

## 2021-01-01 RX ADMIN — HYDROMORPHONE HYDROCHLORIDE 0.4 MG: 0.2 INJECTION, SOLUTION INTRAMUSCULAR; INTRAVENOUS; SUBCUTANEOUS at 09:00

## 2021-01-01 RX ADMIN — HYDROMORPHONE HYDROCHLORIDE 0.5 MG: 1 INJECTION, SOLUTION INTRAMUSCULAR; INTRAVENOUS; SUBCUTANEOUS at 15:58

## 2021-01-01 RX ADMIN — OXYCODONE HYDROCHLORIDE 10 MG: 5 TABLET ORAL at 18:52

## 2021-01-01 RX ADMIN — MULTIPLE VITAMINS W/ MINERALS TAB 1 TABLET: TAB at 14:03

## 2021-01-01 RX ADMIN — SIMETHICONE 80 MG: 80 TABLET, CHEWABLE ORAL at 08:24

## 2021-01-01 RX ADMIN — MORPHINE SULFATE 30 MG: 15 TABLET, EXTENDED RELEASE ORAL at 20:18

## 2021-01-01 RX ADMIN — LIDOCAINE HYDROCHLORIDE: 20 JELLY TOPICAL at 13:44

## 2021-01-01 RX ADMIN — HYDROMORPHONE HYDROCHLORIDE 0.5 MG: 1 INJECTION, SOLUTION INTRAMUSCULAR; INTRAVENOUS; SUBCUTANEOUS at 08:06

## 2021-01-01 RX ADMIN — ACETAMINOPHEN 1000 MG: 500 TABLET, FILM COATED ORAL at 10:39

## 2021-01-01 RX ADMIN — THERA TABS 1 TABLET: TAB at 08:24

## 2021-01-01 RX ADMIN — OXYCODONE HYDROCHLORIDE 10 MG: 10 TABLET ORAL at 01:41

## 2021-01-01 RX ADMIN — OXYBUTYNIN CHLORIDE 10 MG: 5 TABLET, EXTENDED RELEASE ORAL at 08:24

## 2021-01-01 RX ADMIN — MIDAZOLAM 1 MG: 1 INJECTION INTRAMUSCULAR; INTRAVENOUS at 10:10

## 2021-01-01 RX ADMIN — OXYCODONE HYDROCHLORIDE 10 MG: 5 TABLET ORAL at 00:12

## 2021-01-01 RX ADMIN — GABAPENTIN 100 MG: 100 CAPSULE ORAL at 13:33

## 2021-01-01 RX ADMIN — SENNOSIDES 1 TABLET: 8.6 TABLET ORAL at 20:01

## 2021-01-01 RX ADMIN — MORPHINE SULFATE 30 MG: 15 TABLET, EXTENDED RELEASE ORAL at 22:14

## 2021-01-01 RX ADMIN — SIMETHICONE 80 MG: 80 TABLET, CHEWABLE ORAL at 17:49

## 2021-01-01 RX ADMIN — HYDROMORPHONE HYDROCHLORIDE 0.5 MG: 1 INJECTION, SOLUTION INTRAMUSCULAR; INTRAVENOUS; SUBCUTANEOUS at 06:21

## 2021-01-01 RX ADMIN — OXYCODONE HYDROCHLORIDE 10 MG: 5 TABLET ORAL at 06:08

## 2021-01-01 RX ADMIN — DIPHENHYDRAMINE HYDROCHLORIDE 25 MG: 25 CAPSULE ORAL at 15:31

## 2021-01-01 RX ADMIN — SENNOSIDES 1 TABLET: 8.6 TABLET ORAL at 07:55

## 2021-01-01 RX ADMIN — ACETAMINOPHEN 975 MG: 325 TABLET, FILM COATED ORAL at 08:24

## 2021-01-01 RX ADMIN — HYDROMORPHONE HYDROCHLORIDE 0.5 MG: 1 INJECTION, SOLUTION INTRAMUSCULAR; INTRAVENOUS; SUBCUTANEOUS at 03:42

## 2021-01-01 RX ADMIN — HYDROMORPHONE HYDROCHLORIDE 0.5 MG: 1 INJECTION, SOLUTION INTRAMUSCULAR; INTRAVENOUS; SUBCUTANEOUS at 05:35

## 2021-01-01 RX ADMIN — HYDROMORPHONE HYDROCHLORIDE 0.5 MG: 1 INJECTION, SOLUTION INTRAMUSCULAR; INTRAVENOUS; SUBCUTANEOUS at 03:54

## 2021-01-01 RX ADMIN — MORPHINE SULFATE 30 MG: 15 TABLET, EXTENDED RELEASE ORAL at 13:54

## 2021-01-01 RX ADMIN — LIDOCAINE HYDROCHLORIDE 8 ML: 10 INJECTION, SOLUTION EPIDURAL; INFILTRATION; INTRACAUDAL; PERINEURAL at 12:26

## 2021-01-01 RX ADMIN — HYDROMORPHONE HYDROCHLORIDE 0.5 MG: 1 INJECTION, SOLUTION INTRAMUSCULAR; INTRAVENOUS; SUBCUTANEOUS at 02:45

## 2021-01-01 RX ADMIN — MORPHINE SULFATE 15 MG: 15 TABLET, EXTENDED RELEASE ORAL at 19:51

## 2021-01-01 RX ADMIN — SUGAMMADEX 200 MG: 100 INJECTION, SOLUTION INTRAVENOUS at 14:45

## 2021-01-01 RX ADMIN — METHADONE HYDROCHLORIDE 5 MG: 5 TABLET ORAL at 14:20

## 2021-01-01 RX ADMIN — HYDROMORPHONE HYDROCHLORIDE 0.5 MG: 1 INJECTION, SOLUTION INTRAMUSCULAR; INTRAVENOUS; SUBCUTANEOUS at 06:37

## 2021-01-01 RX ADMIN — Medication 50 MG: at 14:02

## 2021-01-01 RX ADMIN — HYDROMORPHONE HYDROCHLORIDE 1 MG: 1 INJECTION, SOLUTION INTRAMUSCULAR; INTRAVENOUS; SUBCUTANEOUS at 06:06

## 2021-01-01 RX ADMIN — HYDROMORPHONE HYDROCHLORIDE 0.5 MG: 1 INJECTION, SOLUTION INTRAMUSCULAR; INTRAVENOUS; SUBCUTANEOUS at 07:55

## 2021-01-01 RX ADMIN — ACETAMINOPHEN 1000 MG: 500 TABLET, FILM COATED ORAL at 11:31

## 2021-01-01 RX ADMIN — HYDROMORPHONE HYDROCHLORIDE 0.5 MG: 1 INJECTION, SOLUTION INTRAMUSCULAR; INTRAVENOUS; SUBCUTANEOUS at 15:34

## 2021-01-01 RX ADMIN — IRON SUCROSE 300 MG: 20 INJECTION, SOLUTION INTRAVENOUS at 11:13

## 2021-01-01 RX ADMIN — OXYBUTYNIN CHLORIDE 10 MG: 10 TABLET, EXTENDED RELEASE ORAL at 08:13

## 2021-01-01 RX ADMIN — PANTOPRAZOLE SODIUM 40 MG: 40 INJECTION, POWDER, FOR SOLUTION INTRAVENOUS at 08:18

## 2021-01-01 RX ADMIN — FERROUS GLUCONATE 324 MG: 324 TABLET ORAL at 10:09

## 2021-01-01 RX ADMIN — OXYCODONE HYDROCHLORIDE 10 MG: 5 TABLET ORAL at 16:35

## 2021-01-01 RX ADMIN — OXYCODONE HYDROCHLORIDE 5 MG: 5 TABLET ORAL at 20:01

## 2021-01-01 RX ADMIN — OXYCODONE HYDROCHLORIDE 10 MG: 5 TABLET ORAL at 06:46

## 2021-01-01 RX ADMIN — HYDROMORPHONE HYDROCHLORIDE 0.5 MG: 1 INJECTION, SOLUTION INTRAMUSCULAR; INTRAVENOUS; SUBCUTANEOUS at 23:25

## 2021-01-01 RX ADMIN — HYDROMORPHONE HYDROCHLORIDE 0.3 MG: 1 INJECTION, SOLUTION INTRAMUSCULAR; INTRAVENOUS; SUBCUTANEOUS at 17:44

## 2021-01-01 RX ADMIN — OXYCODONE HYDROCHLORIDE 10 MG: 5 TABLET ORAL at 01:54

## 2021-01-01 RX ADMIN — POLYETHYLENE GLYCOL 3350 17 G: 17 POWDER, FOR SOLUTION ORAL at 09:00

## 2021-01-01 RX ADMIN — HYDROMORPHONE HYDROCHLORIDE 0.5 MG: 1 INJECTION, SOLUTION INTRAMUSCULAR; INTRAVENOUS; SUBCUTANEOUS at 18:03

## 2021-01-01 RX ADMIN — HYDROMORPHONE HYDROCHLORIDE 0.5 MG: 1 INJECTION, SOLUTION INTRAMUSCULAR; INTRAVENOUS; SUBCUTANEOUS at 00:03

## 2021-01-01 RX ADMIN — HYDROMORPHONE HYDROCHLORIDE 1 MG: 1 INJECTION, SOLUTION INTRAMUSCULAR; INTRAVENOUS; SUBCUTANEOUS at 01:34

## 2021-01-01 RX ADMIN — OXYCODONE HYDROCHLORIDE 10 MG: 5 TABLET ORAL at 10:15

## 2021-01-01 RX ADMIN — METHADONE HYDROCHLORIDE 5 MG: 5 TABLET ORAL at 06:11

## 2021-01-01 RX ADMIN — OXYCODONE HYDROCHLORIDE 10 MG: 5 TABLET ORAL at 22:45

## 2021-01-01 RX ADMIN — THIAMINE HCL TAB 100 MG 100 MG: 100 TAB at 08:09

## 2021-01-01 RX ADMIN — FUROSEMIDE 20 MG: 10 INJECTION, SOLUTION INTRAVENOUS at 06:06

## 2021-01-01 RX ADMIN — FENTANYL CITRATE 50 MCG: 50 INJECTION, SOLUTION INTRAMUSCULAR; INTRAVENOUS at 09:53

## 2021-01-01 RX ADMIN — PANTOPRAZOLE SODIUM 40 MG: 40 TABLET, DELAYED RELEASE ORAL at 16:08

## 2021-01-01 RX ADMIN — FERROUS GLUCONATE 324 MG: 324 TABLET ORAL at 08:13

## 2021-01-01 RX ADMIN — OXYCODONE HYDROCHLORIDE 20 MG: 10 TABLET ORAL at 22:36

## 2021-01-01 RX ADMIN — SODIUM CHLORIDE 1000 ML: 9 INJECTION, SOLUTION INTRAVENOUS at 12:45

## 2021-01-01 RX ADMIN — OXYCODONE HYDROCHLORIDE 10 MG: 5 TABLET ORAL at 00:13

## 2021-01-01 RX ADMIN — FENTANYL CITRATE 50 MCG: 50 INJECTION, SOLUTION INTRAMUSCULAR; INTRAVENOUS at 14:13

## 2021-01-01 RX ADMIN — OXYCODONE HYDROCHLORIDE 20 MG: 10 TABLET ORAL at 05:52

## 2021-01-01 RX ADMIN — IOPAMIDOL 69 ML: 755 INJECTION, SOLUTION INTRAVENOUS at 08:47

## 2021-01-01 RX ADMIN — OXYBUTYNIN CHLORIDE 10 MG: 10 TABLET, EXTENDED RELEASE ORAL at 08:40

## 2021-01-01 SDOH — ECONOMIC STABILITY: TRANSPORTATION INSECURITY
IN THE PAST 12 MONTHS, HAS LACK OF TRANSPORTATION KEPT YOU FROM MEETINGS, WORK, OR FROM GETTING THINGS NEEDED FOR DAILY LIVING?: NO

## 2021-01-01 SDOH — ECONOMIC STABILITY: TRANSPORTATION INSECURITY
IN THE PAST 12 MONTHS, HAS THE LACK OF TRANSPORTATION KEPT YOU FROM MEDICAL APPOINTMENTS OR FROM GETTING MEDICATIONS?: NO

## 2021-01-01 SDOH — ECONOMIC STABILITY: FOOD INSECURITY: WITHIN THE PAST 12 MONTHS, YOU WORRIED THAT YOUR FOOD WOULD RUN OUT BEFORE YOU GOT MONEY TO BUY MORE.: NEVER TRUE

## 2021-01-01 SDOH — ECONOMIC STABILITY: INCOME INSECURITY: IN THE LAST 12 MONTHS, WAS THERE A TIME WHEN YOU WERE NOT ABLE TO PAY THE MORTGAGE OR RENT ON TIME?: NO

## 2021-01-01 SDOH — HEALTH STABILITY: PHYSICAL HEALTH: ON AVERAGE, HOW MANY MINUTES DO YOU ENGAGE IN EXERCISE AT THIS LEVEL?: 0 MIN

## 2021-01-01 SDOH — HEALTH STABILITY: PHYSICAL HEALTH: ON AVERAGE, HOW MANY DAYS PER WEEK DO YOU ENGAGE IN MODERATE TO STRENUOUS EXERCISE (LIKE A BRISK WALK)?: 0 DAYS

## 2021-01-01 SDOH — ECONOMIC STABILITY: FOOD INSECURITY: WITHIN THE PAST 12 MONTHS, THE FOOD YOU BOUGHT JUST DIDN'T LAST AND YOU DIDN'T HAVE MONEY TO GET MORE.: NEVER TRUE

## 2021-01-01 ASSESSMENT — ACTIVITIES OF DAILY LIVING (ADL)
ADLS_ACUITY_SCORE: 8
ADLS_ACUITY_SCORE: 14
ADLS_ACUITY_SCORE: 24
ADLS_ACUITY_SCORE: 8
ADLS_ACUITY_SCORE: 23
ADLS_ACUITY_SCORE: 8
ADLS_ACUITY_SCORE: 24
ADLS_ACUITY_SCORE: 15
ADLS_ACUITY_SCORE: 24
ADLS_ACUITY_SCORE: 9
ADLS_ACUITY_SCORE: 15
ADLS_ACUITY_SCORE: 23
ADLS_ACUITY_SCORE: 15
ADLS_ACUITY_SCORE: 24
ADLS_ACUITY_SCORE: 24
ADLS_ACUITY_SCORE: 8
ADLS_ACUITY_SCORE: 9
ADLS_ACUITY_SCORE: 24
ADLS_ACUITY_SCORE: 16
ADLS_ACUITY_SCORE: 24
ADLS_ACUITY_SCORE: 9
ADLS_ACUITY_SCORE: 8
ADLS_ACUITY_SCORE: 6
ADLS_ACUITY_SCORE: 24
ADLS_ACUITY_SCORE: 8
ADLS_ACUITY_SCORE: 23
ADLS_ACUITY_SCORE: 14
ADLS_ACUITY_SCORE: 8
ADLS_ACUITY_SCORE: 8
ADLS_ACUITY_SCORE: 23
ADLS_ACUITY_SCORE: 24
ADLS_ACUITY_SCORE: 15
ADLS_ACUITY_SCORE: 13
ADLS_ACUITY_SCORE: 8
ADLS_ACUITY_SCORE: 9
ADLS_ACUITY_SCORE: 23
ADLS_ACUITY_SCORE: 15
ADLS_ACUITY_SCORE: 8
ADLS_ACUITY_SCORE: 9
ADLS_ACUITY_SCORE: 15
ADLS_ACUITY_SCORE: 8
ADLS_ACUITY_SCORE: 24
ADLS_ACUITY_SCORE: 8
ADLS_ACUITY_SCORE: 8
ADLS_ACUITY_SCORE: 9
ADLS_ACUITY_SCORE: 15
ADLS_ACUITY_SCORE: 8
ADLS_ACUITY_SCORE: 8
ADLS_ACUITY_SCORE: 9
ADLS_ACUITY_SCORE: 24
DIFFICULTY_COMMUNICATING: NO
ADLS_ACUITY_SCORE: 24
ADLS_ACUITY_SCORE: 15
ADLS_ACUITY_SCORE: 13
ADLS_ACUITY_SCORE: 18
ADLS_ACUITY_SCORE: 13
WEAR_GLASSES_OR_BLIND: YES
ADLS_ACUITY_SCORE: 24
ADLS_ACUITY_SCORE: 8
ADLS_ACUITY_SCORE: 24
DRESSING/BATHING_DIFFICULTY: NO
ADLS_ACUITY_SCORE: 15
ADLS_ACUITY_SCORE: 13
ADLS_ACUITY_SCORE: 24
ADLS_ACUITY_SCORE: 24
TOILETING_ISSUES: NO
ADLS_ACUITY_SCORE: 23
ADLS_ACUITY_SCORE: 24
ADLS_ACUITY_SCORE: 8
ADLS_ACUITY_SCORE: 8
ADLS_ACUITY_SCORE: 24
ADLS_ACUITY_SCORE: 8
ADLS_ACUITY_SCORE: 6
ADLS_ACUITY_SCORE: 8
ADLS_ACUITY_SCORE: 9
ADLS_ACUITY_SCORE: 8
ADLS_ACUITY_SCORE: 15
ADLS_ACUITY_SCORE: 8
ADLS_ACUITY_SCORE: 16
ADLS_ACUITY_SCORE: 13
ADLS_ACUITY_SCORE: 23
ADLS_ACUITY_SCORE: 8
ADLS_ACUITY_SCORE: 15
ADLS_ACUITY_SCORE: 24
ADLS_ACUITY_SCORE: 8
ADLS_ACUITY_SCORE: 8
ADLS_ACUITY_SCORE: 24
ADLS_ACUITY_SCORE: 13
ADLS_ACUITY_SCORE: 15
ADLS_ACUITY_SCORE: 7
ADLS_ACUITY_SCORE: 24
ADLS_ACUITY_SCORE: 15
ADLS_ACUITY_SCORE: 14
ADLS_ACUITY_SCORE: 8
ADLS_ACUITY_SCORE: 15
ADLS_ACUITY_SCORE: 14
ADLS_ACUITY_SCORE: 8
ADLS_ACUITY_SCORE: 24
ADLS_ACUITY_SCORE: 8
ADLS_ACUITY_SCORE: 9
ADLS_ACUITY_SCORE: 23
ADLS_ACUITY_SCORE: 24
ADLS_ACUITY_SCORE: 8
ADLS_ACUITY_SCORE: 15
ADLS_ACUITY_SCORE: 24
ADLS_ACUITY_SCORE: 8
ADLS_ACUITY_SCORE: 24
ADLS_ACUITY_SCORE: 8
DEPENDENT_IADLS:: CLEANING;COOKING;LAUNDRY;SHOPPING;MEAL PREPARATION;MEDICATION MANAGEMENT;MONEY MANAGEMENT
ADLS_ACUITY_SCORE: 24
ADLS_ACUITY_SCORE: 24
DEPENDENT_IADLS:: INDEPENDENT
ADLS_ACUITY_SCORE: 8
ADLS_ACUITY_SCORE: 6
ADLS_ACUITY_SCORE: 8
ADLS_ACUITY_SCORE: 8
ADLS_ACUITY_SCORE: 13
ADLS_ACUITY_SCORE: 8
ADLS_ACUITY_SCORE: 24
ADLS_ACUITY_SCORE: 8
ADLS_ACUITY_SCORE: 15
ADLS_ACUITY_SCORE: 15
ADLS_ACUITY_SCORE: 8
ADLS_ACUITY_SCORE: 13
ADLS_ACUITY_SCORE: 8
ADLS_ACUITY_SCORE: 24
ADLS_ACUITY_SCORE: 8
ADLS_ACUITY_SCORE: 8
ADLS_ACUITY_SCORE: 9
ADLS_ACUITY_SCORE: 8
ADLS_ACUITY_SCORE: 8
ADLS_ACUITY_SCORE: 23
ADLS_ACUITY_SCORE: 13
ADLS_ACUITY_SCORE: 9
ADLS_ACUITY_SCORE: 24
ADLS_ACUITY_SCORE: 24
ADLS_ACUITY_SCORE: 8
ADLS_ACUITY_SCORE: 23
ADLS_ACUITY_SCORE: 9
ADLS_ACUITY_SCORE: 13
ADLS_ACUITY_SCORE: 8
ADLS_ACUITY_SCORE: 13
ADLS_ACUITY_SCORE: 8
ADLS_ACUITY_SCORE: 24
ADLS_ACUITY_SCORE: 8
ADLS_ACUITY_SCORE: 8
ADLS_ACUITY_SCORE: 24
ADLS_ACUITY_SCORE: 15
ADLS_ACUITY_SCORE: 8
DIFFICULTY_EATING/SWALLOWING: NO
ADLS_ACUITY_SCORE: 6
ADLS_ACUITY_SCORE: 8

## 2021-01-01 ASSESSMENT — ENCOUNTER SYMPTOMS
CHEST TIGHTNESS: 0
VOMITING: 1
ABDOMINAL PAIN: 1
HEMATURIA: 0
DIFFICULTY URINATING: 0
NAUSEA: 0
VOMITING: 0
FEVER: 0
CONSTIPATION: 0
DYSURIA: 1
COUGH: 0
SHORTNESS OF BREATH: 0
HEADACHES: 0
MYALGIAS: 0
FEVER: 1
CHILLS: 0
HEMATURIA: 0
FEVER: 0
ANAL BLEEDING: 0
BLOOD IN STOOL: 0
FLANK PAIN: 1
FREQUENCY: 0
DYSURIA: 1
LIGHT-HEADEDNESS: 0
BLOOD IN STOOL: 1
DIARRHEA: 0
PALPITATIONS: 0
SHORTNESS OF BREATH: 1
DIZZINESS: 0
ARTHRALGIAS: 0
BACK PAIN: 1
FATIGUE: 1
ABDOMINAL PAIN: 1
CHILLS: 0
ABDOMINAL DISTENTION: 0
FLANK PAIN: 1
APPETITE CHANGE: 1

## 2021-01-01 ASSESSMENT — SOCIAL DETERMINANTS OF HEALTH (SDOH)
HOW HARD IS IT FOR YOU TO PAY FOR THE VERY BASICS LIKE FOOD, HOUSING, MEDICAL CARE, AND HEATING?: NOT HARD AT ALL
HOW OFTEN DO YOU GET TOGETHER WITH FRIENDS OR RELATIVES?: THREE TIMES A WEEK
IN A TYPICAL WEEK, HOW MANY TIMES DO YOU TALK ON THE PHONE WITH FAMILY, FRIENDS, OR NEIGHBORS?: THREE TIMES A WEEK
HOW OFTEN DO YOU ATTENT MEETINGS OF THE CLUB OR ORGANIZATION YOU BELONG TO?: NEVER
DO YOU BELONG TO ANY CLUBS OR ORGANIZATIONS SUCH AS CHURCH GROUPS UNIONS, FRATERNAL OR ATHLETIC GROUPS, OR SCHOOL GROUPS?: NO
HOW OFTEN DO YOU ATTEND CHURCH OR RELIGIOUS SERVICES?: NEVER

## 2021-01-01 ASSESSMENT — ANXIETY QUESTIONNAIRES
6. BECOMING EASILY ANNOYED OR IRRITABLE: NOT AT ALL
GAD7 TOTAL SCORE: 6
7. FEELING AFRAID AS IF SOMETHING AWFUL MIGHT HAPPEN: NOT AT ALL
3. WORRYING TOO MUCH ABOUT DIFFERENT THINGS: NOT AT ALL
GAD7 TOTAL SCORE: 6
GAD7 TOTAL SCORE: 6
1. FEELING NERVOUS, ANXIOUS, OR ON EDGE: NOT AT ALL
4. TROUBLE RELAXING: NEARLY EVERY DAY
GAD7 TOTAL SCORE: 6
2. NOT BEING ABLE TO STOP OR CONTROL WORRYING: NOT AT ALL
5. BEING SO RESTLESS THAT IT IS HARD TO SIT STILL: NEARLY EVERY DAY
7. FEELING AFRAID AS IF SOMETHING AWFUL MIGHT HAPPEN: NOT AT ALL
8. IF YOU CHECKED OFF ANY PROBLEMS, HOW DIFFICULT HAVE THESE MADE IT FOR YOU TO DO YOUR WORK, TAKE CARE OF THINGS AT HOME, OR GET ALONG WITH OTHER PEOPLE?: NOT DIFFICULT AT ALL

## 2021-01-01 ASSESSMENT — MIFFLIN-ST. JEOR
SCORE: 1150.38
SCORE: 1150.48
SCORE: 1143.22
SCORE: 1157.28
SCORE: 1152.74
SCORE: 1152.94
SCORE: 1110.3
SCORE: 1151.38
SCORE: 1116.01
SCORE: 1104.25
SCORE: 1128.71
SCORE: 1142.32
SCORE: 1193.57
SCORE: 1136.87
SCORE: 1133.7
SCORE: 1127.8
SCORE: 1126.44
SCORE: 1108.38
SCORE: 1114.19
SCORE: 1135.97
SCORE: 1127.8

## 2021-01-01 ASSESSMENT — LIFESTYLE VARIABLES
HOW OFTEN DO YOU HAVE A DRINK CONTAINING ALCOHOL: NEVER
HOW OFTEN DO YOU HAVE SIX OR MORE DRINKS ON ONE OCCASION: NEVER

## 2021-01-01 ASSESSMENT — PAIN SCALES - GENERAL
PAINLEVEL: EXTREME PAIN (8)
PAINLEVEL: NO PAIN (0)
PAINLEVEL: EXTREME PAIN (8)
PAINLEVEL: EXTREME PAIN (8)
PAINLEVEL: SEVERE PAIN (7)
PAINLEVEL: SEVERE PAIN (6)

## 2021-01-01 ASSESSMENT — PATIENT HEALTH QUESTIONNAIRE - PHQ9
SUM OF ALL RESPONSES TO PHQ QUESTIONS 1-9: 10
10. IF YOU CHECKED OFF ANY PROBLEMS, HOW DIFFICULT HAVE THESE PROBLEMS MADE IT FOR YOU TO DO YOUR WORK, TAKE CARE OF THINGS AT HOME, OR GET ALONG WITH OTHER PEOPLE: NOT DIFFICULT AT ALL
SUM OF ALL RESPONSES TO PHQ QUESTIONS 1-9: 10
SUM OF ALL RESPONSES TO PHQ QUESTIONS 1-9: 10

## 2021-01-28 NOTE — PROGRESS NOTES
MEDICAL ONCOLOGY FOLLOW UP NOTE    PATIENT NAME: Essie Guzman  ENCOUNTER DATE: 1/28/2020    Care Team  Primary Oncologist: Brennan Mccarthy MD    REASON FOR CURRENT VISIT: Metastatic gastric cancer    HISTORY OF PRESENT ILLNESS:  Mr. 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 Initially Rx for H.pylori with Abx and PPI. LFT's were mildly elevated.  11/24/20- [facility]GI endoscopy at [facility] with normal esophagus. Non-bleeding gastric ulcer in pylorus with no stigmata of bleeding.  Non-bleeding duodenal ulcer with no stigmata of bleeding. Biopsy of Stomach, pylorus, - Poorly-differentiated carcinoma; consistent with adenocarcinoma (poorly cohesive type). HER2 by FISH was non-amplified  11/24/20- CT CAP- bilateral hilar lymph nodes are indeterminate (1.4 x 1.0 cm) right infrahilar lymph node. Multiple enlarged retroperitoneal and perigastric lymph nodes (10 mm right paraduodenal lymph node, 11 mm necrotic left para-aortic lymph node and a 10 mm left para-aortic lymph node.  12/16/20-  PET/CT with metastatic disease- Hypermetabolic circumferential ulcerated mass at the gastric antrum, Multiple metastatic hypermetabolic lymph nodes:  adjacent just inferior to the caudate lobe of the liver, Multiple enlarged, centrally necrotic, and hypermetabolic retroperitoneal para-aortic and paracaval lymph nodes.    12/18/20-Saw [facility]- Due to PET CT showing retroperitoneal  lymphadenopathy not a candidate for surgical resection  12/22/20- EUS staging and biopsy- T3N3Mx - Partially-obstructing cratered pre-pyloric gastric ulcer with a clean ulcer base, fully-circumferential ulcer  Several sub-centimeter malignant appearing round, well-circumscribed, hypoechoic lymph nodes were  visualized along the aorta, from the 2nd portion of  duodenum which corresponds to the hypermetabolic nodes seen on the PET scan.       Interval Hx:  Essie Guzman Patient was called with his daughter (Benson serna helped with interpretation.     Since the last appt he has not developed any new symptoms. He is eating well and had no problems in  Dysphagia. He has no nausea or vomiting.  He says that his appetite is improving and now that he is eating better. He continues to be very active.No weakness, tingling numbness, abdominal pain, N/V/D, melena, hematochezia, headache, recent fever or infection.        REVIEW OF SYSTEMS: 14 point ROS negative other than the symptoms noted above in the HPI.    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  He lives in [place] with family, sposue, sons and grandkids  He has 6 kids, lives with 2 younger boys, 4 grand kids      FAMILY HISTORY:   Family History   Problem Relation Age of Onset     Asthma No family hx of      Diabetes No family hx of      Hypertension No family hx of      Cerebrovascular Disease No family hx of      Cancer No family hx of       Colon Cancer No family hx of      Anesthesia Reaction No family hx of      Deep Vein Thrombosis (DVT) No family hx of    No family hx any cancer      ALLERGIES: No Known Allergies    CURRENT MEDICATIONS:   Current Outpatient Medications:      omeprazole (PRILOSEC) 20 MG DR capsule, Take 1 capsule (20 mg) by mouth daily (Patient not taking: Reported on 12/30/2020), Disp: 90 capsule, Rfl: 1    PHYSICAL EXAMINATION:  Vital signs: There were no vitals taken for this visit.  ECOG performance status of 0. Fatigue 0.  GENERAL: Well-nourished healthy-appearing  male in chair, no acute distress.       LABORATORY DATA:     CBC RESULTS:   CBC RESULTS:   Recent Labs   Lab Test 10/23/20  1422 09/08/20  1410 10/12/15  0520   WBC 8.9 9.3 7.2   HGB 11.9* 13.0* 13.6   HCT 37.5* 39.4* 41.2   MCV 92 94 92   MCHC 31.7 33.0 33.0   RDW 12.6 12.9 13.0    297 201        Last Comprehensive Metabolic Panel:  Sodium   Date Value Ref Range Status   10/28/2020 136 133 - 144 mmol/L Final   09/08/2020 133 133 - 144 mmol/L Final   10/11/2015 134 133 - 144 mmol/L Final     Potassium   Date Value Ref Range Status   10/28/2020 4.3 3.4 - 5.3 mmol/L Final   09/08/2020 4.1 3.4 - 5.3 mmol/L Final   10/11/2015 4.3 3.4 - 5.3 mmol/L Final     Comment:     Specimen slightly hemolyzed, potassium may be falsely elevated     Chloride   Date Value Ref Range Status   10/28/2020 104 94 - 109 mmol/L Final   09/08/2020 101 94 - 109 mmol/L Final   10/11/2015 103 94 - 109 mmol/L Final     Carbon Dioxide   Date Value Ref Range Status   10/28/2020 28 20 - 32 mmol/L Final   09/08/2020 24 20 - 32 mmol/L Final   10/11/2015 23 20 - 32 mmol/L Final     Anion Gap   Date Value Ref Range Status   10/28/2020 4 3 - 14 mmol/L Final   09/08/2020 8 3 - 14 mmol/L Final   10/11/2015 8 3 - 14 mmol/L Final     Glucose   Date Value Ref Range Status   10/28/2020 106 (H) 70 - 99 mg/dL Final   09/08/2020 124 (H) 70 - 99 mg/dL Final   10/11/2015 126 (H) 70 - 99 mg/dL Final      Urea Nitrogen   Date Value Ref Range Status   10/28/2020 19 7 - 30 mg/dL Final   09/08/2020 14 7 - 30 mg/dL Final   10/11/2015 25 7 - 30 mg/dL Final     Creatinine   Date Value Ref Range Status   10/28/2020 0.84 0.66 - 1.25 mg/dL Final   09/08/2020 0.89 0.66 - 1.25 mg/dL Final   10/11/2015 0.96 0.66 - 1.25 mg/dL Final     GFR Estimate   Date Value Ref Range Status   10/28/2020 >90 >60 mL/min/[1.73_m2] Final     Comment:     Non  GFR Calc  Starting 12/18/2018, serum creatinine based estimated GFR (eGFR) will be   calculated using the Chronic Kidney Disease Epidemiology Collaboration   (CKD-EPI) equation.     09/08/2020 88 >60 mL/min/[1.73_m2] Final     Comment:     Non  GFR Calc  Starting 12/18/2018, serum creatinine based estimated GFR (eGFR) will be   calculated using the Chronic Kidney Disease Epidemiology Collaboration   (CKD-EPI) equation.     10/11/2015 80 >60 mL/min/1.7m2 Final     Comment:     Non  GFR Calc     Calcium   Date Value Ref Range Status   10/28/2020 9.0 8.5 - 10.1 mg/dL Final   09/08/2020 9.5 8.5 - 10.1 mg/dL Final   10/11/2015 9.0 8.5 - 10.1 mg/dL Final     Bilirubin Total   Date Value Ref Range Status   10/23/2020 0.2 0.2 - 1.3 mg/dL Final   09/08/2020 0.5 0.2 - 1.3 mg/dL Final     Alkaline Phosphatase   Date Value Ref Range Status   10/23/2020 87 40 - 150 U/L Final   09/08/2020 81 40 - 150 U/L Final     ALT   Date Value Ref Range Status   10/23/2020 48 0 - 70 U/L Final   09/08/2020 75 (H) 0 - 70 U/L Final     AST   Date Value Ref Range Status   10/23/2020 26 0 - 45 U/L Final   09/08/2020 46 (H) 0 - 45 U/L Final           ASSESSMENT AND PLAN:    Mr. Essie Guzman is a 65 year old  male with PMHx of H.pylori infection, and recently diagnosed stomach cancer. He is otherwise healthy and does not have any co morbidities.    . 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 Ideally, in this case combination of chemotherapy and immunotherpay (Nivo+FOLFOX) or Pembro+FOLFOX are both reasonable options.   He was concerned about the side effects of chemotherapy and therefore was more drawn to the option of single agent immunotherapy.   Pembrolizumab monotherpay- KEYNOTE-158 study, led to approval for treatment of a variety of advanced solid tumors, including gastric cancers, that had MSI-H or dMMR, that had progressed following prior treatment, and for which there were no satisfactory alternative treatment options. SInce he is hesitant to chemotherpay, will consider this option for him.    Because this was all new info, he said that he would need time to think about immunotherpay and  he will need 2 weeks to think about it.  He is aware that his cancer might progress even further with this potential delay in initiating treatment.    Plan  RTC in 2 weeks to discuss about immunotheraoy    #Asmptomaic/minimally symptomatic COViD  -Pt had covid test positive 8 weeks ago which was done prior to procedure. He has no symptoms currently.    #Nutrition  Pt has lost 50 lbs over 6 months, some of this was intentional due to diet changes and increased physical activity.    BILLING: I spent 30 minutes in consultation and over 50% of the time was spent on counseling and coordinating care.    Brennan Mccarthy M.D.   of Medicine  Division of Hematology, Oncology and Transplantation  [facility]  Nathen Fountain is a 65 year old who is being evaluated via a billable telephone visit.      What phone number would you like to be contacted at? [phone]   How would you like to obtain your AVS? Mail a copy     Vitals - Patient Reported  Weight (Patient Reported): 68.5 kg (151 lb)  Height (Patient Reported): 152.4 cm (5')  BMI (Based on Pt Reported Ht/Wt): 29.49  Pain Score: No Pain (0)      I have reviewed and updated patient's allergy and medication list.    Concerns: NONE  Refills: NONE      Christina Painting CMA    Phone call duration: 30 minutes

## 2021-01-28 NOTE — LETTER
1/28/2021         RE: Essie Guzman  [address]  [place] [facility] [address]        Dear Colleague,    Thank you for referring your patient, Essie Guzman, to the [facility]. Please see a copy of my visit note below.    MEDICAL ONCOLOGY FOLLOW UP NOTE    PATIENT NAME: Essie Guzman  ENCOUNTER DATE: 1/28/2020    Care Team  Primary Oncologist: Brennan Mccarthy MD    REASON FOR CURRENT VISIT: Metastatic gastric cancer    HISTORY OF PRESENT ILLNESS:  Mr. 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 Initially Rx for H.pylori with Abx and PPI. LFT's were mildly elevated.  11/24/20- UGI endoscopy at [facility] with normal esophagus. Non-bleeding gastric ulcer in pylorus with no stigmata of bleeding.  Non-bleeding duodenal ulcer with no stigmata of bleeding. Biopsy of Stomach, pylorus, - Poorly-differentiated carcinoma; consistent with adenocarcinoma (poorly cohesive type). HER2 by FISH was non-amplified  11/24/20- CT CAP- bilateral hilar lymph nodes are indeterminate (1.4 x 1.0 cm) right infrahilar lymph node. Multiple enlarged retroperitoneal and perigastric lymph nodes (10 mm right paraduodenal lymph node, 11 mm necrotic left para-aortic lymph node and a 10 mm left para-aortic lymph node.  12/16/20-  PET/CT with metastatic disease- Hypermetabolic circumferential ulcerated mass at the gastric antrum, Multiple metastatic hypermetabolic lymph  nodes:  adjacent just inferior to the caudate lobe of the liver, Multiple enlarged, centrally necrotic, and hypermetabolic retroperitoneal para-aortic and paracaval lymph nodes.    12/18/20-Saw [facility]- Due to PET CT showing retroperitoneal lymphadenopathy not a candidate for surgical resection  12/22/20- EUS staging and biopsy- T3N3Mx - Partially-obstructing cratered pre-pyloric gastric ulcer with a clean ulcer base, fully-circumferential ulcer  Several sub-centimeter malignant appearing round, well-circumscribed, hypoechoic lymph nodes were  visualized along the aorta, from the 2nd portion of  duodenum which corresponds to the hypermetabolic nodes seen on the PET scan.       Interval Hx:  Essie Guzman Patient was called with his daughter (Benson serna helped with interpretation.     Since the last appt he has not developed any new symptoms. He is eating well and had no problems in  Dysphagia. He has no nausea or vomiting.  He says that his appetite is improving and now that he is eating better. He continues to be very active.No weakness, tingling numbness, abdominal pain, N/V/D, melena, hematochezia, headache, recent fever or infection.        REVIEW OF SYSTEMS: 14 point ROS negative other than the symptoms noted above in the HPI.    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  He lives in [place] with family, sposue, sons and grandkids  He has 6 kids, lives with 2 younger boys, 4  grand kids      FAMILY HISTORY:   Family History   Problem Relation Age of Onset     Asthma No family hx of      Diabetes No family hx of      Hypertension No family hx of      Cerebrovascular Disease No family hx of      Cancer No family hx of      Colon Cancer No family hx of      Anesthesia Reaction No family hx of      Deep Vein Thrombosis (DVT) No family hx of    No family hx any cancer      ALLERGIES: No Known Allergies    CURRENT MEDICATIONS:   Current Outpatient Medications:      omeprazole (PRILOSEC) 20 MG DR capsule, Take 1 capsule (20 mg) by mouth daily (Patient not taking: Reported on 12/30/2020), Disp: 90 capsule, Rfl: 1    PHYSICAL EXAMINATION:  Vital signs: There were no vitals taken for this visit.  ECOG performance status of 0. Fatigue 0.  GENERAL: Well-nourished healthy-appearing  male in chair, no acute distress.       LABORATORY DATA:     CBC RESULTS:   CBC RESULTS:   Recent Labs   Lab Test 10/23/20  1422 09/08/20  1410 10/12/15  0520   WBC 8.9 9.3 7.2   HGB 11.9* 13.0* 13.6   HCT 37.5* 39.4* 41.2   MCV 92 94 92   MCHC 31.7 33.0 33.0   RDW 12.6 12.9 13.0    297 201        Last Comprehensive Metabolic Panel:  Sodium   Date Value Ref Range Status   10/28/2020 136 133 - 144 mmol/L Final   09/08/2020 133 133 - 144 mmol/L Final   10/11/2015 134 133 - 144 mmol/L Final     Potassium   Date Value Ref Range Status   10/28/2020 4.3 3.4 - 5.3 mmol/L Final   09/08/2020 4.1 3.4 - 5.3 mmol/L Final   10/11/2015 4.3 3.4 - 5.3 mmol/L Final     Comment:     Specimen slightly hemolyzed, potassium may be falsely elevated     Chloride   Date Value Ref Range Status   10/28/2020 104 94 - 109 mmol/L Final   09/08/2020 101 94 - 109 mmol/L Final   10/11/2015 103 94 - 109 mmol/L Final     Carbon Dioxide   Date Value Ref Range Status   10/28/2020 28 20 - 32 mmol/L Final   09/08/2020 24 20 - 32 mmol/L Final   10/11/2015 23 20 - 32 mmol/L Final     Anion Gap   Date Value Ref Range Status   10/28/2020 4 3 - 14  mmol/L Final   09/08/2020 8 3 - 14 mmol/L Final   10/11/2015 8 3 - 14 mmol/L Final     Glucose   Date Value Ref Range Status   10/28/2020 106 (H) 70 - 99 mg/dL Final   09/08/2020 124 (H) 70 - 99 mg/dL Final   10/11/2015 126 (H) 70 - 99 mg/dL Final     Urea Nitrogen   Date Value Ref Range Status   10/28/2020 19 7 - 30 mg/dL Final   09/08/2020 14 7 - 30 mg/dL Final   10/11/2015 25 7 - 30 mg/dL Final     Creatinine   Date Value Ref Range Status   10/28/2020 0.84 0.66 - 1.25 mg/dL Final   09/08/2020 0.89 0.66 - 1.25 mg/dL Final   10/11/2015 0.96 0.66 - 1.25 mg/dL Final     GFR Estimate   Date Value Ref Range Status   10/28/2020 >90 >60 mL/min/[1.73_m2] Final     Comment:     Non  GFR Calc  Starting 12/18/2018, serum creatinine based estimated GFR (eGFR) will be   calculated using the Chronic Kidney Disease Epidemiology Collaboration   (CKD-EPI) equation.     09/08/2020 88 >60 mL/min/[1.73_m2] Final     Comment:     Non  GFR Calc  Starting 12/18/2018, serum creatinine based estimated GFR (eGFR) will be   calculated using the Chronic Kidney Disease Epidemiology Collaboration   (CKD-EPI) equation.     10/11/2015 80 >60 mL/min/1.7m2 Final     Comment:     Non  GFR Calc     Calcium   Date Value Ref Range Status   10/28/2020 9.0 8.5 - 10.1 mg/dL Final   09/08/2020 9.5 8.5 - 10.1 mg/dL Final   10/11/2015 9.0 8.5 - 10.1 mg/dL Final     Bilirubin Total   Date Value Ref Range Status   10/23/2020 0.2 0.2 - 1.3 mg/dL Final   09/08/2020 0.5 0.2 - 1.3 mg/dL Final     Alkaline Phosphatase   Date Value Ref Range Status   10/23/2020 87 40 - 150 U/L Final   09/08/2020 81 40 - 150 U/L Final     ALT   Date Value Ref Range Status   10/23/2020 48 0 - 70 U/L Final   09/08/2020 75 (H) 0 - 70 U/L Final     AST   Date Value Ref Range Status   10/23/2020 26 0 - 45 U/L Final   09/08/2020 46 (H) 0 - 45 U/L Final           ASSESSMENT AND PLAN:    Mr. Essie Guzman is a 65 year old  male with PMHx of  H.pylori infection, and recently diagnosed stomach cancer. He is otherwise healthy and does not have any co morbidities.    Mr. 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 Ideally, in this case combination of chemotherapy and immunotherpay (Nivo+FOLFOX) or Pembro+FOLFOX are both reasonable options.   He was concerned about the side effects of chemotherapy and therefore was more drawn to the option of single agent immunotherapy.   Pembrolizumab monotherpay- KEYNOTE-158 study, led to approval for treatment of a variety of advanced solid tumors, including gastric cancers, that had MSI-H or dMMR, that had progressed following prior treatment, and for which there were no satisfactory alternative treatment options. SInce he is hesitant to chemotherpay, will consider this option for him.    Because this was all new info, he said that he would need time to think about immunotherpay and  he will need 2 weeks to think about it.  He is aware that his cancer might progress even further with this potential delay in initiating treatment.    Plan  RTC in 2 weeks to discuss about immunotheraoy    #Asmptomaic/minimally symptomatic COViD  -Pt had covid test positive 8 weeks ago which was done prior to procedure. He has no symptoms currently.    #Nutrition  Pt has lost 50 lbs over 6 months, some of this was intentional due to diet changes and increased physical  activity.    BILLING: I spent 30 minutes in consultation and over 50% of the time was spent on counseling and coordinating care.    Brennan Mccarthy M.D.   of Medicine  Division of Hematology, Oncology and Transplantation  [facility]    Essie is a 65 year old who is being evaluated via a billable telephone visit.      What phone number would you like to be contacted at? [phone]   How would you like to obtain your AVS? Mail a copy     Vitals - Patient Reported  Weight (Patient Reported): 68.5 kg (151 lb)  Height (Patient Reported): 152.4 cm (5')  BMI (Based on Pt Reported Ht/Wt): 29.49  Pain Score: No Pain (0)      I have reviewed and updated patient's allergy and medication list.    Concerns: NONE  Refills: NONE      Christina Painting CMA    Phone call duration: 30 minutes          Again, thank you for allowing me to participate in the care of your patient.        Sincerely,        Brennan Mccarthy MD

## 2021-01-28 NOTE — PROGRESS NOTES
FMLA (family) paperwork received via e-mail. Will be placed in provider folder for signature upon completion.     Fax: [phone]    Sona Garibay CMA

## 2021-01-29 NOTE — PROGRESS NOTES
FMLA (family) forms filled out and put in providers folder for review and signature.      Meenakshi Mcleod MA

## 2021-02-11 NOTE — PROGRESS NOTES
Since the last clinic visit, no new symptoms and no new issues.     At our last visit, we discussed the possibility of immunotherapy, especially given the MMR mutation in Mr. Guzman's cancer that would make him a good candidate for immunotherapy. He has been dealing with some personal issues in his family that have taken his attention, and he needs more time to think about whether he would like to pursue treatment. We have discussed in depth the possibility of his tumor spreading and becoming more invasive if he chooses to delay treatment. Moving forward, it would be reasonable to repeat imaging and meet to re-discuss treatment in the near future.     Plan  - Repeat CT CAP in March  - [facility] in one month following repeat imaging  - [facility] paperwork to be [id number]

## 2021-02-11 NOTE — LETTER
2/11/2021         RE: Essie Guzman  [id number]  [facility] [address]        Dear Colleague,    Thank you for referring your patient, Essie Guzman, to the [facility]. Please see a copy of my visit note below.      MEDICAL ONCOLOGY FOLLOW UP NOTE    PATIENT NAME: Essie Guzman  ENCOUNTER DATE: 2/11/2021    Care Team  Primary Oncologist: Brennan Mccarthy MD    REASON FOR CURRENT VISIT: F/u Metastatic gastric cancer    HISTORY OF PRESENT ILLNESS:  Mr. 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 Initially Rx for H.pylori with Abx and PPI. LFT's were mildly elevated.  11/24/20- UGI endoscopy at [facility] with normal esophagus. Non-bleeding gastric ulcer in pylorus with no stigmata of bleeding.  Non-bleeding duodenal ulcer with no stigmata of bleeding. Biopsy of Stomach, pylorus, - Poorly-differentiated carcinoma; consistent with adenocarcinoma (poorly cohesive type). HER2 by FISH was non-amplified  11/24/20- CT CAP- bilateral hilar lymph nodes are indeterminate (1.4 x 1.0 cm) right infrahilar lymph node. Multiple enlarged retroperitoneal and perigastric lymph nodes (10 mm right paraduodenal lymph node, 11 mm necrotic left para-aortic lymph node and a 10 mm left para-aortic lymph node.  12/16/20-  PET/CT with metastatic disease- Hypermetabolic circumferential ulcerated mass at the gastric  antrum, Multiple metastatic hypermetabolic lymph nodes:  adjacent just inferior to the caudate lobe of the liver, Multiple enlarged, centrally necrotic, and hypermetabolic retroperitoneal para-aortic and paracaval lymph nodes.    12/18/20-Saw [facility]- Due to PET CT showing retroperitoneal lymphadenopathy not a candidate for surgical resection.  12/22/20- EUS staging and biopsy- T3N3Mx - Partially-obstructing cratered pre-pyloric gastric ulcer with a clean ulcer base, fully-circumferential ulcer  Several sub-centimeter malignant appearing round, well-circumscribed, hypoechoic lymph nodes were  visualized along the aorta, from the 2nd portion of  duodenum which corresponds to the hypermetabolic nodes seen on the PET scan.       Interval Hx:  Essie Guzman Patient was called with his daughter (Osman) who helped with interpretation.     Since the last clinic visit, no new symptoms and no new issues. He is dealing with several other non-health related family issues at this time. He expressed his desire to take care of these first before considering any treatment.    REVIEW OF SYSTEMS: 14 point ROS negative other than the symptoms noted above in the HPI.    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  He lives in [place] with family, sposue, sons and grandkids  He has 6 kids, lives with 2 younger boys, 4 grand kids      FAMILY HISTORY:   Family History   Problem Relation Age of Onset      Asthma No family hx of      Diabetes No family hx of      Hypertension No family hx of      Cerebrovascular Disease No family hx of      Cancer No family hx of      Colon Cancer No family hx of      Anesthesia Reaction No family hx of      Deep Vein Thrombosis (DVT) No family hx of    No family hx any cancer      ALLERGIES: No Known Allergies    CURRENT MEDICATIONS:   Current Outpatient Medications:      omeprazole (PRILOSEC) 20 MG DR capsule, Take 1 capsule (20 mg) by mouth daily (Patient not taking: Reported on 12/30/2020), Disp: 90 capsule, Rfl: 1    PHYSICAL EXAMINATION:  Vital signs: There were no vitals taken for this visit.  ECOG performance status of 0. Fatigue 0.  GENERAL: Unable to completely assess given telephone visit. Respirations are regular, no audible stridor or wheeze.     LABORATORY DATA:     CBC RESULTS:   CBC RESULTS:   Recent Labs   Lab Test 10/23/20  1422 09/08/20  1410 10/12/15  0520   WBC 8.9 9.3 7.2   HGB 11.9* 13.0* 13.6   HCT 37.5* 39.4* 41.2   MCV 92 94 92   MCHC 31.7 33.0 33.0   RDW 12.6 12.9 13.0    297 201        Last Comprehensive Metabolic Panel:  Sodium   Date Value Ref Range Status   10/28/2020 136 133 - 144 mmol/L Final   09/08/2020 133 133 - 144 mmol/L Final   10/11/2015 134 133 - 144 mmol/L Final     Potassium   Date Value Ref Range Status   10/28/2020 4.3 3.4 - 5.3 mmol/L Final   09/08/2020 4.1 3.4 - 5.3 mmol/L Final   10/11/2015 4.3 3.4 - 5.3 mmol/L Final     Comment:     Specimen slightly hemolyzed, potassium may be falsely elevated     Chloride   Date Value Ref Range Status   10/28/2020 104 94 - 109 mmol/L Final   09/08/2020 101 94 - 109 mmol/L Final   10/11/2015 103 94 - 109 mmol/L Final     Carbon Dioxide   Date Value Ref Range Status   10/28/2020 28 20 - 32 mmol/L Final   09/08/2020 24 20 - 32 mmol/L Final   10/11/2015 23 20 - 32 mmol/L Final     Anion Gap   Date Value Ref Range Status   10/28/2020 4 3 - 14 mmol/L Final   09/08/2020 8 3 - 14 mmol/L Final    10/11/2015 8 3 - 14 mmol/L Final     Glucose   Date Value Ref Range Status   10/28/2020 106 (H) 70 - 99 mg/dL Final   09/08/2020 124 (H) 70 - 99 mg/dL Final   10/11/2015 126 (H) 70 - 99 mg/dL Final     Urea Nitrogen   Date Value Ref Range Status   10/28/2020 19 7 - 30 mg/dL Final   09/08/2020 14 7 - 30 mg/dL Final   10/11/2015 25 7 - 30 mg/dL Final     Creatinine   Date Value Ref Range Status   10/28/2020 0.84 0.66 - 1.25 mg/dL Final   09/08/2020 0.89 0.66 - 1.25 mg/dL Final   10/11/2015 0.96 0.66 - 1.25 mg/dL Final     GFR Estimate   Date Value Ref Range Status   10/28/2020 >90 >60 mL/min/[1.73_m2] Final     Comment:     Non  GFR Calc  Starting 12/18/2018, serum creatinine based estimated GFR (eGFR) will be   calculated using the Chronic Kidney Disease Epidemiology Collaboration   (CKD-EPI) equation.     09/08/2020 88 >60 mL/min/[1.73_m2] Final     Comment:     Non  GFR Calc  Starting 12/18/2018, serum creatinine based estimated GFR (eGFR) will be   calculated using the Chronic Kidney Disease Epidemiology Collaboration   (CKD-EPI) equation.     10/11/2015 80 >60 mL/min/1.7m2 Final     Comment:     Non  GFR Calc     Calcium   Date Value Ref Range Status   10/28/2020 9.0 8.5 - 10.1 mg/dL Final   09/08/2020 9.5 8.5 - 10.1 mg/dL Final   10/11/2015 9.0 8.5 - 10.1 mg/dL Final     Bilirubin Total   Date Value Ref Range Status   10/23/2020 0.2 0.2 - 1.3 mg/dL Final   09/08/2020 0.5 0.2 - 1.3 mg/dL Final     Alkaline Phosphatase   Date Value Ref Range Status   10/23/2020 87 40 - 150 U/L Final   09/08/2020 81 40 - 150 U/L Final     ALT   Date Value Ref Range Status   10/23/2020 48 0 - 70 U/L Final   09/08/2020 75 (H) 0 - 70 U/L Final     AST   Date Value Ref Range Status   10/23/2020 26 0 - 45 U/L Final   09/08/2020 46 (H) 0 - 45 U/L Final           ASSESSMENT AND PLAN:    Mr. Essie Guzman is a 65 year old  male with PMHx of H.pylori infection, and recently diagnosed  gastric cancer. He is otherwise healthy and does not have any co morbidities.    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 Ideally, in this case combination of chemotherapy and immunotherpay (Nivo+FOLFOX) or Pembro+FOLFOX are both reasonable options.   He was concerned about the side effects of chemotherapy and therefore was more drawn to the option of single agent immunotherapy.   Pembrolizumab monotherpay- KEYNOTE-158 study, led to approval for treatment of a variety of advanced solid tumors, including gastric cancers, that had MSI-H or dMMR, that had progressed following prior treatment, and for which there were no satisfactory alternative treatment options. SInce he is hesitant to chemotherpay, will consider this option for him.    At our last visit, we discussed the possibility of immunotherapy with keytruda  alone. He has been dealing with some personal issues in his family that have taken his attention, and he needs more time to think about whether he would like to pursue treatment. We have discussed in depth the possibility of his tumor spreading and becoming more invasive if he chooses to delay treatment. Moving forward, it would be reasonable to repeat imaging and meet to re-discuss treatment in the near future.      Plan  - Repeat PEt CT in March  - RTC in one month following repeat imaging  - [facility] paperwork to be completed    #Asmptomaic/minimally symptomatic COViD  -Pt had covid test positive 8 weeks ago which was done prior to procedure. He has no  symptoms currently.    #Nutrition  Pt has lost 50 lbs over 6 months, some of this was intentional due to diet changes and increased physical activity.    Patient seen via telephone and discussed with attending, Dr. Mccarthy.    Kerry Costa MD  Internal Medicine, PGY-2  Pager: [phone]    Chart review: 5 minutes  Phone visit: 15 minutes  Care coordination: 5 minutes    Brennan Mccarthy M.D.   of Medicine  Division of Hematology, Oncology and Transplantation  [facility]    Essie is a 65 year old who is being evaluated via a billable telephone visit.      What phone number would you like to be contacted at? [phone]  How would you like to obtain your AVS? Mail a copy     Phone call duration: 15 minutes    Kemi Jerez MA              Again, thank you for allowing me to participate in the care of your patient.        Sincerely,        Brennan Mccarthy MD

## 2021-02-11 NOTE — PROGRESS NOTES
MEDICAL ONCOLOGY FOLLOW UP NOTE    PATIENT NAME: Essie Guzman  ENCOUNTER DATE: 2/11/2021    Care Team  Primary Oncologist: Brennan Mccarthy MD    REASON FOR CURRENT VISIT: F/u Metastatic gastric cancer    HISTORY OF PRESENT ILLNESS:  Mr. 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 Initially Rx for H.pylori with Abx and PPI. LFT's were mildly elevated.  11/24/20- UGI endoscopy at [facility] with normal esophagus. Non-bleeding gastric ulcer in pylorus with no stigmata of bleeding.  Non-bleeding duodenal ulcer with no stigmata of bleeding. Biopsy of Stomach, pylorus, - Poorly-differentiated carcinoma; consistent with adenocarcinoma (poorly cohesive type). HER2 by FISH was non-amplified  11/24/20- CT CAP- bilateral hilar lymph nodes are indeterminate (1.4 x 1.0 cm) right infrahilar lymph node. Multiple enlarged retroperitoneal and perigastric lymph nodes (10 mm right paraduodenal lymph node, 11 mm necrotic left para-aortic lymph node and a 10 mm left para-aortic lymph node.  12/16/20-  PET/CT with metastatic disease- Hypermetabolic circumferential ulcerated mass at the gastric antrum, Multiple metastatic hypermetabolic lymph nodes:  adjacent just inferior to the caudate lobe of the liver, Multiple enlarged, centrally necrotic, and hypermetabolic retroperitoneal para-aortic and paracaval lymph nodes.    12/18/20-Saw Tre  [facility]- Due to PET CT showing retroperitoneal lymphadenopathy not a candidate for surgical resection.  12/22/20- EUS staging and biopsy- T3N3Mx - Partially-obstructing cratered pre-pyloric gastric ulcer with a clean ulcer base, fully-circumferential ulcer  Several sub-centimeter malignant appearing round, well-circumscribed, hypoechoic lymph nodes were  visualized along the aorta, from the 2nd portion of  duodenum which corresponds to the hypermetabolic nodes seen on the PET scan.       Interval Hx:  Essie Guzman Patient was called with his daughter (Osman) who helped with interpretation.     Since the last clinic visit, no new symptoms and no new issues. He is dealing with several other non-health related family issues at this time. He expressed his desire to take care of these first before considering any treatment.    REVIEW OF SYSTEMS: 14 point ROS negative other than the symptoms noted above in the HPI.    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  He lives in [place] with family, sposue, sons and grandkids  He has 6 kids, lives with 2 younger boys, 4 grand kids      FAMILY HISTORY:   Family History   Problem Relation Age of Onset     Asthma No family hx of      Diabetes No family hx of      Hypertension No family hx of      Cerebrovascular Disease No family hx of      Cancer No family hx of      Colon Cancer No family hx of      Anesthesia Reaction No family hx of      Deep Vein  Thrombosis (DVT) No family hx of    No family hx any cancer      ALLERGIES: No Known Allergies    CURRENT MEDICATIONS:   Current Outpatient Medications:      omeprazole (PRILOSEC) 20 MG DR capsule, Take 1 capsule (20 mg) by mouth daily (Patient not taking: Reported on 12/30/2020), Disp: 90 capsule, Rfl: 1    PHYSICAL EXAMINATION:  Vital signs: There were no vitals taken for this visit.  ECOG performance status of 0. Fatigue 0.  GENERAL: Unable to completely assess given telephone visit. Respirations are regular, no audible stridor or wheeze.     LABORATORY DATA:     CBC RESULTS:   CBC RESULTS:   Recent Labs   Lab Test 10/23/20  1422 09/08/20  1410 10/12/15  0520   WBC 8.9 9.3 7.2   HGB 11.9* 13.0* 13.6   HCT 37.5* 39.4* 41.2   MCV 92 94 92   MCHC 31.7 33.0 33.0   RDW 12.6 12.9 13.0    297 201        Last Comprehensive Metabolic Panel:  Sodium   Date Value Ref Range Status   10/28/2020 136 133 - 144 mmol/L Final   09/08/2020 133 133 - 144 mmol/L Final   10/11/2015 134 133 - 144 mmol/L Final     Potassium   Date Value Ref Range Status   10/28/2020 4.3 3.4 - 5.3 mmol/L Final   09/08/2020 4.1 3.4 - 5.3 mmol/L Final   10/11/2015 4.3 3.4 - 5.3 mmol/L Final     Comment:     Specimen slightly hemolyzed, potassium may be falsely elevated     Chloride   Date Value Ref Range Status   10/28/2020 104 94 - 109 mmol/L Final   09/08/2020 101 94 - 109 mmol/L Final   10/11/2015 103 94 - 109 mmol/L Final     Carbon Dioxide   Date Value Ref Range Status   10/28/2020 28 20 - 32 mmol/L Final   09/08/2020 24 20 - 32 mmol/L Final   10/11/2015 23 20 - 32 mmol/L Final     Anion Gap   Date Value Ref Range Status   10/28/2020 4 3 - 14 mmol/L Final   09/08/2020 8 3 - 14 mmol/L Final   10/11/2015 8 3 - 14 mmol/L Final     Glucose   Date Value Ref Range Status   10/28/2020 106 (H) 70 - 99 mg/dL Final   09/08/2020 124 (H) 70 - 99 mg/dL Final   10/11/2015 126 (H) 70 - 99 mg/dL Final     Urea Nitrogen   Date Value Ref Range Status    10/28/2020 19 7 - 30 mg/dL Final   09/08/2020 14 7 - 30 mg/dL Final   10/11/2015 25 7 - 30 mg/dL Final     Creatinine   Date Value Ref Range Status   10/28/2020 0.84 0.66 - 1.25 mg/dL Final   09/08/2020 0.89 0.66 - 1.25 mg/dL Final   10/11/2015 0.96 0.66 - 1.25 mg/dL Final     GFR Estimate   Date Value Ref Range Status   10/28/2020 >90 >60 mL/min/[1.73_m2] Final     Comment:     Non  GFR Calc  Starting 12/18/2018, serum creatinine based estimated GFR (eGFR) will be   calculated using the Chronic Kidney Disease Epidemiology Collaboration   (CKD-EPI) equation.     09/08/2020 88 >60 mL/min/[1.73_m2] Final     Comment:     Non  GFR Calc  Starting 12/18/2018, serum creatinine based estimated GFR (eGFR) will be   calculated using the Chronic Kidney Disease Epidemiology Collaboration   (CKD-EPI) equation.     10/11/2015 80 >60 mL/min/1.7m2 Final     Comment:     Non  GFR Calc     Calcium   Date Value Ref Range Status   10/28/2020 9.0 8.5 - 10.1 mg/dL Final   09/08/2020 9.5 8.5 - 10.1 mg/dL Final   10/11/2015 9.0 8.5 - 10.1 mg/dL Final     Bilirubin Total   Date Value Ref Range Status   10/23/2020 0.2 0.2 - 1.3 mg/dL Final   09/08/2020 0.5 0.2 - 1.3 mg/dL Final     Alkaline Phosphatase   Date Value Ref Range Status   10/23/2020 87 40 - 150 U/L Final   09/08/2020 81 40 - 150 U/L Final     ALT   Date Value Ref Range Status   10/23/2020 48 0 - 70 U/L Final   09/08/2020 75 (H) 0 - 70 U/L Final     AST   Date Value Ref Range Status   10/23/2020 26 0 - 45 U/L Final   09/08/2020 46 (H) 0 - 45 U/L Final           ASSESSMENT AND PLAN:    Mr. Essie Guzman is a 65 year old  male with PMHx of H.pylori infection, and recently diagnosed gastric cancer. He is otherwise healthy and does not have any co morbidities.    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 Ideally, in this case combination of chemotherapy and immunotherpay (Nivo+FOLFOX) or Pembro+FOLFOX are both reasonable options.   He was concerned about the side effects of chemotherapy and therefore was more drawn to the option of single agent immunotherapy.   Pembrolizumab monotherpay- KEYNOTE-158 study, led to approval for treatment of a variety of advanced solid tumors, including gastric cancers, that had MSI-H or dMMR, that had progressed following prior treatment, and for which there were no satisfactory alternative treatment options. SInce he is hesitant to chemotherpay, will consider this option for him.    At our last visit, we discussed the possibility of immunotherapy with keytruda  alone. He has been dealing with some personal issues in his family that have taken his attention, and he needs more time to think about whether he would like to pursue treatment. We have discussed in depth the possibility of his tumor spreading and becoming more invasive if he chooses to delay treatment. Moving forward, it would be reasonable to repeat imaging and meet to re-discuss treatment in the near future.      Plan  - Repeat PEt CT in March  - [facility] in one month following repeat imaging  - [facility] paperwork to be completed    #Asmptomaic/minimally symptomatic COViD  -Pt had covid test positive 8 weeks ago which was done prior to procedure. He has no symptoms currently.    #Nutrition  Pt has lost 50 lbs over 6 months, some of this was intentional due to diet changes and increased physical activity.    Patient seen via telephone and discussed with attending, Dr. Mccarthy.    Kerry Costa,  MD  Internal Medicine, PGY-2  Pager: [phone]    Chart review: 5 minutes  Phone visit: 15 minutes  Care coordination: 5 minutes    Brennan Mccarthy M.D.   of Medicine  Division of Hematology, Oncology and Transplantation  [facility]    Essie is a 65 year old who is being evaluated via a billable telephone visit.      What phone number would you like to be contacted at? [phone]  How would you like to obtain your AVS? Mail a copy     Phone call duration: 15 minutes    Kemi Jerez MA

## 2021-02-16 NOTE — PROGRESS NOTES
[facility] paperwork completed, checked for accuracy, signed and faxed to [facility] @ [phone]. A copy was made, sent to scanning and original mailed to patient at home address.    Successful transmission verified in Right Fax.      Meenakshi Mcleod MA

## 2021-02-26 NOTE — PROGRESS NOTES
RN CARE COORDINATION        Incoming Call:   Received call from Essie's daughter, Jass. She stated Essie has developed a fever of 101.3. Reports chills with fever but denies any other symptoms. Stated he was given Tylenol at 2:30 pm. Essie is currently not on treatment.     Plan:   Recommended Jass recheck temperature at 1600 and call RN Triage Line if Essie's temp is 101.3 or higher. Jass verbalized understanding to the plan.     Update:   Dr. Rodriguez recommending COVID-19  test d/t previous positive status in November. Jass will take him to local testing facility and will report results to us. She will assess her father later this afternoon when she sees him and call with any additional symptoms.       Mirian Liz, MOISÉSN, RN  RN Care Coordinator  [facility]

## 2021-03-01 NOTE — PROGRESS NOTES
"RN CARE COORDINATION        Incoming Call:  Spoke with Jass. She stated he father's fever resolved on Saturday and he is \"doing better.\" She stated he has no other signs/symptoms. She stated she did not bring him for a COVID test because there were no appointments available.     Encouraged Jass to call our Triage Nurses with any additional questions or concerns or with any new or worsening symptoms.       ISAIAS Coronel, RN  RN Care Coordinator  [facility]      "

## 2021-03-03 NOTE — TELEPHONE ENCOUNTER
Jass Guzman sent an Email to triage to have forms sent to [employer] @ [phone].  The forms faxed to this number was the [facility] Family Form for Fei Guzman.     To Whom It May Concern:    This is a [facility] request for my sister that was sent to you. [facility] her employer has not received the signed documents for her. My sister Fei Guzman has not received it either.    Thanks,    Jass Guzman

## 2021-03-11 NOTE — PROGRESS NOTES
MEDICAL ONCOLOGY FOLLOW UP NOTE    PATIENT NAME: Essie Guzman  ENCOUNTER DATE: 3/11/2021    Care Team  Primary Oncologist: Brennan Mccarthy MD    REASON FOR CURRENT VISIT: F/u Metastatic gastric cancer    HISTORY OF PRESENT ILLNESS:  Mr. 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 Initially Rx for H.pylori with Abx and PPI. LFT's were mildly elevated.  11/24/20- UGI endoscopy at [facility] with normal esophagus. Non-bleeding gastric ulcer in pylorus with no stigmata of bleeding.  Non-bleeding duodenal ulcer with no stigmata of bleeding. Biopsy of Stomach, pylorus, - Poorly-differentiated carcinoma; consistent with adenocarcinoma (poorly cohesive type). HER2 by FISH was non-amplified  11/24/20- CT CAP- bilateral hilar lymph nodes are indeterminate (1.4 x 1.0 cm) right infrahilar lymph node. Multiple enlarged retroperitoneal and perigastric lymph nodes (10 mm right paraduodenal lymph node, 11 mm necrotic left para-aortic lymph node and a 10 mm left para-aortic lymph node.  12/16/20-  PET/CT with metastatic disease- Hypermetabolic circumferential ulcerated mass at the gastric antrum, Multiple metastatic hypermetabolic lymph nodes:  adjacent just inferior to the caudate lobe of the liver, Multiple enlarged, centrally necrotic, and hypermetabolic retroperitoneal para-aortic and paracaval lymph nodes.     12/18/20-Saw [facility]- Due to PET CT showing retroperitoneal lymphadenopathy not a candidate for surgical resection.  12/22/20- EUS staging and biopsy- [id number] - Partially-obstructing cratered pre-pyloric gastric ulcer with a clean ulcer base, fully-circumferential ulcer  Several sub-centimeter malignant appearing round, well-circumscribed, hypoechoic lymph nodes were  visualized along the aorta, from the 2nd portion of  duodenum which corresponds to the hypermetabolic nodes seen on the PET scan.   3/9/21- PET/CT-Overall there has been progression in the number and size of the hypermetabolic periaortic, pericaval, and zuleyma hepatis lymph nodes compared to prior.Relatively unchanged hypermetabolic circumferential ulcerated mass at the gastric antrum.      Interval Hx:  Essie Guzman Patient was called with help of [employer] . His daughter (Osman)  Was also present.     Since the last clinic visit, no new symptoms and no new issues. He had low grade fever 3 weeks ago for 1-2 days, resolved spontaneously. He is eating and drinking well, no wt loss.    REVIEW OF SYSTEMS: 14 point ROS negative other than the symptoms noted above in the HPI.    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  He lives in [place] with family, sposue, sons and grandkids  He has 6 kids, lives with 2 younger boys, 4 grand kids      FAMILY HISTORY:   Family History   Problem Relation Age of Onset     Asthma  No family hx of      Diabetes No family hx of      Hypertension No family hx of      Cerebrovascular Disease No family hx of      Cancer No family hx of      Colon Cancer No family hx of      Anesthesia Reaction No family hx of      Deep Vein Thrombosis (DVT) No family hx of    No family hx any cancer      ALLERGIES: No Known Allergies    CURRENT MEDICATIONS:   Current Outpatient Medications:      omeprazole (PRILOSEC) 20 MG DR capsule, Take 1 capsule (20 mg) by mouth daily (Patient not taking: Reported on 12/30/2020), Disp: 90 capsule, Rfl: 1    PHYSICAL EXAMINATION:  Vital signs: There were no vitals taken for this visit.  ECOG performance status of 0. Fatigue 0.  GENERAL: Unable to completely assess given telephone visit. Respirations are regular, no audible stridor or wheeze.     LABORATORY DATA:     CBC RESULTS:   CBC RESULTS:   Recent Labs   Lab Test 10/23/20  1422 09/08/20  1410 10/12/15  0520   WBC 8.9 9.3 7.2   HGB 11.9* 13.0* 13.6   HCT 37.5* 39.4* 41.2   MCV 92 94 92   MCHC 31.7 33.0 33.0   RDW 12.6 12.9 13.0    297 201        Last Comprehensive Metabolic Panel:  Sodium   Date Value Ref Range Status   10/28/2020 136 133 - 144 mmol/L Final   09/08/2020 133 133 - 144 mmol/L Final   10/11/2015 134 133 - 144 mmol/L Final     Potassium   Date Value Ref Range Status   10/28/2020 4.3 3.4 - 5.3 mmol/L Final   09/08/2020 4.1 3.4 - 5.3 mmol/L Final   10/11/2015 4.3 3.4 - 5.3 mmol/L Final     Comment:     Specimen slightly hemolyzed, potassium may be falsely elevated     Chloride   Date Value Ref Range Status   10/28/2020 104 94 - 109 mmol/L Final   09/08/2020 101 94 - 109 mmol/L Final   10/11/2015 103 94 - 109 mmol/L Final     Carbon Dioxide   Date Value Ref Range Status   10/28/2020 28 20 - 32 mmol/L Final   09/08/2020 24 20 - 32 mmol/L Final   10/11/2015 23 20 - 32 mmol/L Final     Anion Gap   Date Value Ref Range Status   10/28/2020 4 3 - 14 mmol/L Final   09/08/2020 8 3 - 14 mmol/L Final   10/11/2015 8 3  - 14 mmol/L Final     Glucose   Date Value Ref Range Status   10/28/2020 106 (H) 70 - 99 mg/dL Final   09/08/2020 124 (H) 70 - 99 mg/dL Final   10/11/2015 126 (H) 70 - 99 mg/dL Final     Urea Nitrogen   Date Value Ref Range Status   10/28/2020 19 7 - 30 mg/dL Final   09/08/2020 14 7 - 30 mg/dL Final   10/11/2015 25 7 - 30 mg/dL Final     Creatinine   Date Value Ref Range Status   10/28/2020 0.84 0.66 - 1.25 mg/dL Final   09/08/2020 0.89 0.66 - 1.25 mg/dL Final   10/11/2015 0.96 0.66 - 1.25 mg/dL Final     GFR Estimate   Date Value Ref Range Status   10/28/2020 >90 >60 mL/min/[1.73_m2] Final     Comment:     Non  GFR Calc  Starting 12/18/2018, serum creatinine based estimated GFR (eGFR) will be   calculated using the Chronic Kidney Disease Epidemiology Collaboration   (CKD-EPI) equation.     09/08/2020 88 >60 mL/min/[1.73_m2] Final     Comment:     Non  GFR Calc  Starting 12/18/2018, serum creatinine based estimated GFR (eGFR) will be   calculated using the Chronic Kidney Disease Epidemiology Collaboration   (CKD-EPI) equation.     10/11/2015 80 >60 mL/min/1.7m2 Final     Comment:     Non  GFR Calc     Calcium   Date Value Ref Range Status   10/28/2020 9.0 8.5 - 10.1 mg/dL Final   09/08/2020 9.5 8.5 - 10.1 mg/dL Final   10/11/2015 9.0 8.5 - 10.1 mg/dL Final     Bilirubin Total   Date Value Ref Range Status   10/23/2020 0.2 0.2 - 1.3 mg/dL Final   09/08/2020 0.5 0.2 - 1.3 mg/dL Final     Alkaline Phosphatase   Date Value Ref Range Status   10/23/2020 87 40 - 150 U/L Final   09/08/2020 81 40 - 150 U/L Final     ALT   Date Value Ref Range Status   10/23/2020 48 0 - 70 U/L Final   09/08/2020 75 (H) 0 - 70 U/L Final     AST   Date Value Ref Range Status   10/23/2020 26 0 - 45 U/L Final   09/08/2020 46 (H) 0 - 45 U/L Final       Combined Report of:    PET and CT on  3/9/2021 2:15 PM :     1. PET of the neck, chest, abdomen, and pelvis.  2. PET CT Fusion for Attenuation  Correction and Anatomical  Localization:    3. Diagnostic CT scan of the chest, abdomen, and pelvis with  intravenous contrast for interpretation.  3. CT of the chest, abdomen and pelvis obtained for diagnostic  interpretation.  4. 3D MIP and PET-CT fused images were processed on an independent  workstation and archived to PACS and reviewed by a radiologist.     Technique:     1. PET: The patient received 10.15 mCi of F-18-FDG; the serum glucose  was 111 prior to administration, body weight was 66 kg. Images were  evaluated in the axial, sagittal, and coronal planes as well as the  rotational whole body MIP. Images were acquired from the Vertex to the  Feet.     UPTAKE WAS MEASURED AT 73 MINUTES.      BACKGROUND:  Liver SUV max= 2.2,   Aorta Blood SUV Max: 1.4.      2. CT: Volumetric acquisition for clinical interpretation of the  chest, abdomen, and pelvis acquired at 3 mm sections . The chest,  abdomen, and pelvis were evaluated at 5 mm sections in bone, soft  tissue, and lung windows.       The patient received 88 cc of Isovue 370 intravenously for the  examination.    --     3. 3D MIP and PET-CT fused images were processed on an independent  workstation and archived to PACS and reviewed by a radiologist.     INDICATION: Esophageal cancer, assess treatment response; Malignant  neoplasm of stomach metastatic to retroperitoneum (H); Malignant  neoplasm of stomach metastatic to retroperitoneum (H)     ADDITIONAL INFORMATION OBTAINED FROM EMR: Patient has not started  treatment     COMPARISON: 12/16/2020     FINDINGS:      HEAD/NECK:  See dedicated neuroradiology report for the results of the high  resolution PET CT of the neck.       CHEST:  There is no suspicious FDG uptake in the chest.      Small amount of debris in the left mainstem bronchus. Trace dependent  atelectasis bilaterally. There is also some streaky atelectasis in the  left lung base. No new or enlarging suspicious solid pulmonary  nodules. No  pneumothorax. No pleural effusion. No focal consolidation.     The heart is not enlarged. No large pericardial effusion. Ascending  aorta and main pulmonary artery are normal caliber. No suspicious  lymphadenopathy in the chest.     ABDOMEN AND PELVIS:     Circumferential hypermetabolic activity and thickening at the gastric  antrum with max SUV of 10.1, previously 16.2. Overall degree of  thickening is similar.     Intra-abdominal lymphadenopathy is overall progressed although some  nodes of which are relatively unchanged from 12/16/2020.  Representative examples as below:  Abelino hepatis lymph node inferior to the caudate lobe measuring 1.4 cm  (series 5 image 265), previously 1.3 cm. Max SUV of 8.5, previously  7.0.  Right para-aortic lymph node measuring 1.2 cm (series 5 image 285),  previously 0.5 cm. SUV max 11.0, previously not hypermetabolic.  Posterior left kesha iliac lymph node measuring 1.7 cm (series 5 image  324), previously 1.1 cm. Max SUV 14.8, previously 14.3.  Similar findings are seen along the para-aortic and paracaval lymph  nodes.     Tiny subcentimeter hypodensity in the right hepatic lobe on series 5  image 227 too small to arcuately characterize. The gallbladder,  pancreas, spleen, and adrenal glands are unremarkable. Unchanged  anterior left renal cyst. No hydronephrosis. The bladder is  incompletely distended. Small and large bowel are normal caliber.  Colonic diverticulosis without evidence for acute diverticulitis. No  intraabdominal free air or fluid.     No abdominal aortic aneurysm. Mild atherosclerotic calcification of  the abdominal aorta.        LOWER EXTREMITIES:   No abnormal masses or hypermetabolic lesions.     BONES:   There are no suspicious lytic or blastic osseous lesions.  There is no  abnormal FDG uptake in the skeleton. Mild degenerative changes of the  spine.                                                                         IMPRESSION: In this patient with history  of metastatic gastric  adenocarcinoma there has been disease progression:     1. Overall there has been progression in the number and size of the  hypermetabolic periaortic, pericaval, and zuleyma hepatis lymph nodes  compared to prior, although some are relatively unchanged compared to  12/16/2020. Representative examples are detailed above.  2. Relatively unchanged hypermetabolic circumferential ulcerated mass  at the gastric antrum.      ASSESSMENT AND PLAN:    Mr. Essie Guzman is a 65 year old  male with PMHx of H.pylori infection, and recently diagnosed gastric cancer. He is otherwise healthy and does not have any co morbidities.    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 Ideally, in this case combination of chemotherapy and immunotherpay (Nivo+FOLFOX) or Pembro+FOLFOX are both reasonable options.   He was concerned about the side effects of chemotherapy and therefore was more drawn to the option of single agent immunotherapy.   Pembrolizumab monotherpay- [id number] study, led to approval for treatment of a variety of advanced solid tumors, including gastric cancers, that had MSI-H or dMMR, that had progressed following prior treatment, and for which there were no satisfactory alternative treatment options.     We discussed the results of most recent PEt/CT scan March 2021 showing progression, with elarging LN in abdomen and few new lesions. At our last visit, we discussed the possibility of immunotherapy with keytruda  alone. He remains minimally symptomatic and there would  like to hold on starting immunotherapy at this time.  We have discussed in depth the possibility of his tumor spreading and becoming more invasive if he chooses to delay treatment. We briefly also discussed side effects of keytruda including fatigue and immune related events. It is failry well tolerated compared to chemo.   We will arrange for follow up in 2 months and he will contact us sooner if he changes his mind regarding treatment.    Plan  - RTC in two months with me      #Asmptomaic/minimally symptomatic COViD  -Pt had covid test positive 12 weeks ago which was done prior to procedure. He has no symptoms currently.    #Nutrition  Pt has lost 50 lbs over 6 months, some of this was intentional due to diet changes and increased physical activity.      Chart review: 5 minutes  Phone visit: 25 minutes  Care coordination: 5 minutes    Brennan Mccarthy M.D.   of Medicine  Division of Hematology, Oncology and Transplantation  [facility]      Essie is a 65 year old who is being evaluated via a billable telephone visit.      What phone number would you like to be contacted at? [phone]  How would you like to obtain your AVS? Akira+    KAYLIN June    Phone call duration: 25 minutes

## 2021-03-11 NOTE — LETTER
3/11/2021         RE: Essie Guzman  [id number]  [facility] [address]        Dear Colleague,    Thank you for referring your patient, Essie Guzman, to the [facility]. Please see a copy of my visit note below.      MEDICAL ONCOLOGY FOLLOW UP NOTE    PATIENT NAME: Essie Guzman  ENCOUNTER DATE: 3/11/2021    Care Team  Primary Oncologist: Brennan Mccarthy MD    REASON FOR CURRENT VISIT: F/u Metastatic gastric cancer    HISTORY OF PRESENT ILLNESS:  Mr. 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 Initially Rx for H.pylori with Abx and PPI. LFT's were mildly elevated.  11/24/20- UGI endoscopy at [facility] with normal esophagus. Non-bleeding gastric ulcer in pylorus with no stigmata of bleeding.  Non-bleeding duodenal ulcer with no stigmata of bleeding. Biopsy of Stomach, pylorus, - Poorly-differentiated carcinoma; consistent with adenocarcinoma (poorly cohesive type). HER2 by FISH was non-amplified  11/24/20- CT CAP- bilateral hilar lymph nodes are indeterminate (1.4 x 1.0 cm) right infrahilar lymph node. Multiple enlarged retroperitoneal and perigastric lymph nodes (10 mm right paraduodenal lymph node, 11 mm necrotic left para-aortic lymph node and a 10 mm left para-aortic lymph node.  12/16/20-  PET/CT with metastatic disease- Hypermetabolic circumferential  ulcerated mass at the gastric antrum, Multiple metastatic hypermetabolic lymph nodes:  adjacent just inferior to the caudate lobe of the liver, Multiple enlarged, centrally necrotic, and hypermetabolic retroperitoneal para-aortic and paracaval lymph nodes.    12/18/20-Saw [facility]- Due to PET CT showing retroperitoneal lymphadenopathy not a candidate for surgical resection.  12/22/20- EUS staging and biopsy- [id number] - Partially-obstructing cratered pre-pyloric gastric ulcer with a clean ulcer base, fully-circumferential ulcer  Several sub-centimeter malignant appearing round, well-circumscribed, hypoechoic lymph nodes were  visualized along the aorta, from the 2nd portion of  duodenum which corresponds to the hypermetabolic nodes seen on the PET scan.   3/9/21- PET/CT-Overall there has been progression in the number and size of the hypermetabolic periaortic, pericaval, and zuleyma hepatis lymph nodes compared to prior.Relatively unchanged hypermetabolic circumferential ulcerated mass at the gastric antrum.      Interval Hx:  Essie Guzman Patient was called with help of [employer] . His daughter (Osman)  Was also present.     Since the last clinic visit, no new symptoms and no new issues. He had low grade fever 3 weeks ago for 1-2 days, resolved spontaneously. He is eating and drinking well, no wt loss.    REVIEW OF SYSTEMS: 14 point ROS negative other than the symptoms noted above in the HPI.    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  He lives in [place] with family, sposue, sons and grandkids  He has 6 kids, lives with 2 younger boys, 4 grand kids      FAMILY HISTORY:   Family History   Problem Relation Age of Onset     Asthma No family hx of      Diabetes No family hx of      Hypertension No family hx of      Cerebrovascular Disease No family hx of      Cancer No family hx of      Colon Cancer No family hx of      Anesthesia Reaction No family hx of      Deep Vein Thrombosis (DVT) No family hx of    No family hx any cancer      ALLERGIES: No Known Allergies    CURRENT MEDICATIONS:   Current Outpatient Medications:      omeprazole (PRILOSEC) 20 MG DR capsule, Take 1 capsule (20 mg) by mouth daily (Patient not taking: Reported on 12/30/2020), Disp: 90 capsule, Rfl: 1    PHYSICAL EXAMINATION:  Vital signs: There were no vitals taken for this visit.  ECOG performance status of 0. Fatigue 0.  GENERAL: Unable to completely assess given telephone visit. Respirations are regular, no audible stridor or wheeze.     LABORATORY DATA:     CBC RESULTS:   CBC RESULTS:   Recent Labs   Lab Test 10/23/20  1422 09/08/20  1410 10/12/15  0520   WBC 8.9 9.3 7.2   HGB 11.9* 13.0* 13.6   HCT 37.5* 39.4* 41.2   MCV 92 94 92   MCHC 31.7 33.0 33.0   RDW 12.6 12.9 13.0    297 201        Last Comprehensive Metabolic Panel:  Sodium   Date Value Ref Range Status   10/28/2020 136 133 - 144 mmol/L Final   09/08/2020 133 133 - 144 mmol/L Final   10/11/2015 134 133 - 144 mmol/L Final     Potassium   Date Value Ref Range Status   10/28/2020 4.3 3.4 - 5.3 mmol/L Final   09/08/2020 4.1 3.4 - 5.3 mmol/L Final   10/11/2015 4.3 3.4 - 5.3 mmol/L Final     Comment:     Specimen slightly hemolyzed, potassium may be falsely elevated     Chloride   Date Value Ref Range Status   10/28/2020 104 94 - 109 mmol/L Final   09/08/2020 101 94 - 109 mmol/L Final   10/11/2015 103 94 - 109 mmol/L Final     Carbon Dioxide   Date Value  Ref Range Status   10/28/2020 28 20 - 32 mmol/L Final   09/08/2020 24 20 - 32 mmol/L Final   10/11/2015 23 20 - 32 mmol/L Final     Anion Gap   Date Value Ref Range Status   10/28/2020 4 3 - 14 mmol/L Final   09/08/2020 8 3 - 14 mmol/L Final   10/11/2015 8 3 - 14 mmol/L Final     Glucose   Date Value Ref Range Status   10/28/2020 106 (H) 70 - 99 mg/dL Final   09/08/2020 124 (H) 70 - 99 mg/dL Final   10/11/2015 126 (H) 70 - 99 mg/dL Final     Urea Nitrogen   Date Value Ref Range Status   10/28/2020 19 7 - 30 mg/dL Final   09/08/2020 14 7 - 30 mg/dL Final   10/11/2015 25 7 - 30 mg/dL Final     Creatinine   Date Value Ref Range Status   10/28/2020 0.84 0.66 - 1.25 mg/dL Final   09/08/2020 0.89 0.66 - 1.25 mg/dL Final   10/11/2015 0.96 0.66 - 1.25 mg/dL Final     GFR Estimate   Date Value Ref Range Status   10/28/2020 >90 >60 mL/min/[1.73_m2] Final     Comment:     Non  GFR Calc  Starting 12/18/2018, serum creatinine based estimated GFR (eGFR) will be   calculated using the Chronic Kidney Disease Epidemiology Collaboration   (CKD-EPI) equation.     09/08/2020 88 >60 mL/min/[1.73_m2] Final     Comment:     Non  GFR Calc  Starting 12/18/2018, serum creatinine based estimated GFR (eGFR) will be   calculated using the Chronic Kidney Disease Epidemiology Collaboration   (CKD-EPI) equation.     10/11/2015 80 >60 mL/min/1.7m2 Final     Comment:     Non  GFR Calc     Calcium   Date Value Ref Range Status   10/28/2020 9.0 8.5 - 10.1 mg/dL Final   09/08/2020 9.5 8.5 - 10.1 mg/dL Final   10/11/2015 9.0 8.5 - 10.1 mg/dL Final     Bilirubin Total   Date Value Ref Range Status   10/23/2020 0.2 0.2 - 1.3 mg/dL Final   09/08/2020 0.5 0.2 - 1.3 mg/dL Final     Alkaline Phosphatase   Date Value Ref Range Status   10/23/2020 87 40 - 150 U/L Final   09/08/2020 81 40 - 150 U/L Final     ALT   Date Value Ref Range Status   10/23/2020 48 0 - 70 U/L Final   09/08/2020 75 (H) 0 - 70 U/L Final      AST   Date Value Ref Range Status   10/23/2020 26 0 - 45 U/L Final   09/08/2020 46 (H) 0 - 45 U/L Final       Combined Report of:    PET and CT on  3/9/2021 2:15 PM :     1. PET of the neck, chest, abdomen, and pelvis.  2. PET CT Fusion for Attenuation Correction and Anatomical  Localization:    3. Diagnostic CT scan of the chest, abdomen, and pelvis with  intravenous contrast for interpretation.  3. CT of the chest, abdomen and pelvis obtained for diagnostic  interpretation.  4. 3D MIP and PET-CT fused images were processed on an independent  workstation and archived to PACS and reviewed by a radiologist.     Technique:     1. PET: The patient received 10.15 mCi of F-18-FDG; the serum glucose  was 111 prior to administration, body weight was 66 kg. Images were  evaluated in the axial, sagittal, and coronal planes as well as the  rotational whole body MIP. Images were acquired from the Vertex to the  Feet.     UPTAKE WAS MEASURED AT 73 MINUTES.      BACKGROUND:  Liver SUV max= 2.2,   Aorta Blood SUV Max: 1.4.      2. CT: Volumetric acquisition for clinical interpretation of the  chest, abdomen, and pelvis acquired at 3 mm sections . The chest,  abdomen, and pelvis were evaluated at 5 mm sections in bone, soft  tissue, and lung windows.       The patient received 88 cc of Isovue 370 intravenously for the  examination.    --     3. 3D MIP and PET-CT fused images were processed on an independent  workstation and archived to PACS and reviewed by a radiologist.     INDICATION: Esophageal cancer, assess treatment response; Malignant  neoplasm of stomach metastatic to retroperitoneum (H); Malignant  neoplasm of stomach metastatic to retroperitoneum (H)     ADDITIONAL INFORMATION OBTAINED FROM EMR: Patient has not started  treatment     COMPARISON: 12/16/2020     FINDINGS:      HEAD/NECK:  See dedicated neuroradiology report for the results of the high  resolution PET CT of the neck.       CHEST:  There is no suspicious  FDG uptake in the chest.      Small amount of debris in the left mainstem bronchus. Trace dependent  atelectasis bilaterally. There is also some streaky atelectasis in the  left lung base. No new or enlarging suspicious solid pulmonary  nodules. No pneumothorax. No pleural effusion. No focal consolidation.     The heart is not enlarged. No large pericardial effusion. Ascending  aorta and main pulmonary artery are normal caliber. No suspicious  lymphadenopathy in the chest.     ABDOMEN AND PELVIS:     Circumferential hypermetabolic activity and thickening at the gastric  antrum with max SUV of 10.1, previously 16.2. Overall degree of  thickening is similar.     Intra-abdominal lymphadenopathy is overall progressed although some  nodes of which are relatively unchanged from 12/16/2020.  Representative examples as below:  Abelino hepatis lymph node inferior to the caudate lobe measuring 1.4 cm  (series 5 image 265), previously 1.3 cm. Max SUV of 8.5, previously  7.0.  Right para-aortic lymph node measuring 1.2 cm (series 5 image 285),  previously 0.5 cm. SUV max 11.0, previously not hypermetabolic.  Posterior left kesha iliac lymph node measuring 1.7 cm (series 5 image  324), previously 1.1 cm. Max SUV 14.8, previously 14.3.  Similar findings are seen along the para-aortic and paracaval lymph  nodes.     Tiny subcentimeter hypodensity in the right hepatic lobe on series 5  image 227 too small to arcuately characterize. The gallbladder,  pancreas, spleen, and adrenal glands are unremarkable. Unchanged  anterior left renal cyst. No hydronephrosis. The bladder is  incompletely distended. Small and large bowel are normal caliber.  Colonic diverticulosis without evidence for acute diverticulitis. No  intraabdominal free air or fluid.     No abdominal aortic aneurysm. Mild atherosclerotic calcification of  the abdominal aorta.        LOWER EXTREMITIES:   No abnormal masses or hypermetabolic lesions.     BONES:   There are no  suspicious lytic or blastic osseous lesions.  There is no  abnormal FDG uptake in the skeleton. Mild degenerative changes of the  spine.                                                                         IMPRESSION: In this patient with history of metastatic gastric  adenocarcinoma there has been disease progression:     1. Overall there has been progression in the number and size of the  hypermetabolic periaortic, pericaval, and zuleyma hepatis lymph nodes  compared to prior, although some are relatively unchanged compared to  12/16/2020. Representative examples are detailed above.  2. Relatively unchanged hypermetabolic circumferential ulcerated mass  at the gastric antrum.      ASSESSMENT AND PLAN:    Mr. Essie Guzman is a 65 year old  male with PMHx of H.pylori infection, and recently diagnosed gastric cancer. He is otherwise healthy and does not have any co morbidities.    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 Ideally, in this case combination of chemotherapy and immunotherpay (Nivo+FOLFOX) or Pembro+FOLFOX are both reasonable options.   He was concerned about the side effects of chemotherapy and therefore was more drawn to the option of single agent immunotherapy.   Pembrolizumab monotherpay- [id number] study, led to approval for treatment of a variety of advanced solid tumors, including gastric cancers, that had MSI-H or dMMR, that had progressed following prior treatment, and for which there were no satisfactory alternative treatment options.     We discussed the  results of most recent PEt/CT scan March 2021 showing progression, with elarging LN in abdomen and few new lesions. At our last visit, we discussed the possibility of immunotherapy with keytruda  alone. He remains minimally symptomatic and there would like to hold on starting immunotherapy at this time.  We have discussed in depth the possibility of his tumor spreading and becoming more invasive if he chooses to delay treatment. We briefly also discussed side effects of keytruda including fatigue and immune related events. It is failry well tolerated compared to chemo.   We will arrange for follow up in 2 months and he will contact us sooner if he changes his mind regarding treatment.    Plan  - RTC in two months with me      #Asmptomaic/minimally symptomatic COViD  -Pt had covid test positive 12 weeks ago which was done prior to procedure. He has no symptoms currently.    #Nutrition  Pt has lost 50 lbs over 6 months, some of this was intentional due to diet changes and increased physical activity.      Chart review: 5 minutes  Phone visit: 25 minutes  Care coordination: 5 minutes    Brennan Mccarthy M.D.   of Medicine  Division of Hematology, Oncology and Transplantation  [facility]      Essie is a 65 year old who is being evaluated via a billable telephone visit.      What phone number would you like to be contacted at? [phone]  How would you like to obtain your AVS? Akira+    KAYLIN June    Phone call duration: 25 minutes

## 2021-05-11 NOTE — PROGRESS NOTES
MEDICAL ONCOLOGY FOLLOW UP NOTE    PATIENT NAME: Essie Guzman  ENCOUNTER DATE: 5/12/2021    Care Team  Primary Oncologist: Brennan Mccarthy MD    REASON FOR CURRENT VISIT: F/u Metastatic gastric cancer    HISTORY OF PRESENT ILLNESS:  Mr. 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 Initially Rx for H.pylori with Abx and PPI. LFT's were mildly elevated.  11/24/20- [facility]GI endoscopy at [facility] with normal esophagus. Non-bleeding gastric ulcer in pylorus with no stigmata of bleeding.  Non-bleeding duodenal ulcer with no stigmata of bleeding. Biopsy of Stomach, pylorus, - Poorly-differentiated carcinoma; consistent with adenocarcinoma (poorly cohesive type). HER2 by FISH was non-amplified  11/24/20- CT CAP- bilateral hilar lymph nodes are indeterminate (1.4 x 1.0 cm) right infrahilar lymph node. Multiple enlarged retroperitoneal and perigastric lymph nodes (10 mm right paraduodenal lymph node, 11 mm necrotic left para-aortic lymph node and a 10 mm left para-aortic lymph node.  12/16/20-  PET/CT with metastatic disease- Hypermetabolic circumferential ulcerated mass at the gastric antrum, Multiple metastatic hypermetabolic lymph nodes:  adjacent just inferior to the caudate lobe of the liver, Multiple enlarged, centrally necrotic, and hypermetabolic retroperitoneal para-aortic and paracaval lymph nodes.     12/18/20-Saw [facility]- Due to PET CT showing retroperitoneal lymphadenopathy not a candidate for surgical resection.  12/22/20- EUS staging and biopsy- T3N3Mx - Partially-obstructing cratered pre-pyloric gastric ulcer with a clean ulcer base, fully-circumferential ulcer  Several sub-centimeter malignant appearing round, well-circumscribed, hypoechoic lymph nodes were  visualized along the aorta, from the 2nd portion of  duodenum which corresponds to the hypermetabolic nodes seen on the PET scan.   3/9/21- PET/CT-Overall there has been progression in the number and size of the hypermetabolic periaortic, pericaval, and zuleyma hepatis lymph nodes compared to prior.Relatively unchanged hypermetabolic circumferential ulcerated mass at the gastric antrum.      Interval Hx:  Essie Guzman Patient was called with help of [facility] . His daughter (Osman)  Was also present.     Since the last clinic visit, no new symptoms and no new issues. He feels stronger and is remaining very active.  He is eating and drinking well, no wt loss. He still wants to hold off on starting treatment.   No fatigue or pain.     REVIEW OF SYSTEMS: 14 point ROS negative other than the symptoms noted above in the HPI.    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  He lives in [place] with family, sposue, sons and grandkids  He has 6 kids, lives with 2 younger boys, 4 grand kids      FAMILY HISTORY:   Family  History   Problem Relation Age of Onset     Asthma No family hx of      Diabetes No family hx of      Hypertension No family hx of      Cerebrovascular Disease No family hx of      Cancer No family hx of      Colon Cancer No family hx of      Anesthesia Reaction No family hx of      Deep Vein Thrombosis (DVT) No family hx of    No family hx any cancer      ALLERGIES: No Known Allergies    CURRENT MEDICATIONS:   Current Outpatient Medications:      omeprazole (PRILOSEC) 20 MG DR capsule, Take 1 capsule (20 mg) by mouth daily (Patient not taking: Reported on 12/30/2020), Disp: 90 capsule, Rfl: 1    PHYSICAL EXAMINATION:  Vital signs: There were no vitals taken for this visit.  ECOG performance status of 0. Fatigue 0.  GENERAL: Unable to completely assess given telephone visit. Respirations are regular, no audible stridor or wheeze.     LABORATORY DATA:     CBC RESULTS:   CBC RESULTS:   Recent Labs   Lab Test 10/23/20  1422 09/08/20  1410 10/12/15  0520   WBC 8.9 9.3 7.2   HGB 11.9* 13.0* 13.6   HCT 37.5* 39.4* 41.2   MCV 92 94 92   MCHC 31.7 33.0 33.0   RDW 12.6 12.9 13.0    297 201        Last Comprehensive Metabolic Panel:  Sodium   Date Value Ref Range Status   10/28/2020 136 133 - 144 mmol/L Final   09/08/2020 133 133 - 144 mmol/L Final   10/11/2015 134 133 - 144 mmol/L Final     Potassium   Date Value Ref Range Status   10/28/2020 4.3 3.4 - 5.3 mmol/L Final   09/08/2020 4.1 3.4 - 5.3 mmol/L Final   10/11/2015 4.3 3.4 - 5.3 mmol/L Final     Comment:     Specimen slightly hemolyzed, potassium may be falsely elevated     Chloride   Date Value Ref Range Status   10/28/2020 104 94 - 109 mmol/L Final   09/08/2020 101 94 - 109 mmol/L Final   10/11/2015 103 94 - 109 mmol/L Final     Carbon Dioxide   Date Value Ref Range Status   10/28/2020 28 20 - 32 mmol/L Final   09/08/2020 24 20 - 32 mmol/L Final   10/11/2015 23 20 - 32 mmol/L Final     Anion Gap   Date Value Ref Range Status   10/28/2020 4 3 - 14 mmol/L Final    09/08/2020 8 3 - 14 mmol/L Final   10/11/2015 8 3 - 14 mmol/L Final     Glucose   Date Value Ref Range Status   10/28/2020 106 (H) 70 - 99 mg/dL Final   09/08/2020 124 (H) 70 - 99 mg/dL Final   10/11/2015 126 (H) 70 - 99 mg/dL Final     Urea Nitrogen   Date Value Ref Range Status   10/28/2020 19 7 - 30 mg/dL Final   09/08/2020 14 7 - 30 mg/dL Final   10/11/2015 25 7 - 30 mg/dL Final     Creatinine   Date Value Ref Range Status   10/28/2020 0.84 0.66 - 1.25 mg/dL Final   09/08/2020 0.89 0.66 - 1.25 mg/dL Final   10/11/2015 0.96 0.66 - 1.25 mg/dL Final     GFR Estimate   Date Value Ref Range Status   10/28/2020 >90 >60 mL/min/[1.73_m2] Final     Comment:     Non  GFR Calc  Starting 12/18/2018, serum creatinine based estimated GFR (eGFR) will be   calculated using the Chronic Kidney Disease Epidemiology Collaboration   (CKD-EPI) equation.     09/08/2020 88 >60 mL/min/[1.73_m2] Final     Comment:     Non  GFR Calc  Starting 12/18/2018, serum creatinine based estimated GFR (eGFR) will be   calculated using the Chronic Kidney Disease Epidemiology Collaboration   (CKD-EPI) equation.     10/11/2015 80 >60 mL/min/1.7m2 Final     Comment:     Non  GFR Calc     Calcium   Date Value Ref Range Status   10/28/2020 9.0 8.5 - 10.1 mg/dL Final   09/08/2020 9.5 8.5 - 10.1 mg/dL Final   10/11/2015 9.0 8.5 - 10.1 mg/dL Final     Bilirubin Total   Date Value Ref Range Status   10/23/2020 0.2 0.2 - 1.3 mg/dL Final   09/08/2020 0.5 0.2 - 1.3 mg/dL Final     Alkaline Phosphatase   Date Value Ref Range Status   10/23/2020 87 40 - 150 U/L Final   09/08/2020 81 40 - 150 U/L Final     ALT   Date Value Ref Range Status   10/23/2020 48 0 - 70 U/L Final   09/08/2020 75 (H) 0 - 70 U/L Final     AST   Date Value Ref Range Status   10/23/2020 26 0 - 45 U/L Final   09/08/2020 46 (H) 0 - 45 U/L Final         ASSESSMENT AND PLAN:    Mr. Essie Guzman is a 65 year old  male with PMHx of H.pylori  infection, and recently diagnosed gastric cancer. He is otherwise healthy and does not have any co morbidities.    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 I had previously discussed that additional genetic testing on the tumor showed that pt tumor is dMMR which makes it susceptible to immunotherapy. Ideally, in this case combination of chemotherapy and immunotherpay (Nivo+FOLFOX) or Pembro+FOLFOX are both reasonable options.   He was concerned about the side effects of chemotherapy and therefore was more drawn to the option of single agent immunotherapy.   Pembrolizumab monotherpay- KEYNOTE-158 study, led to approval for treatment of a variety of advanced solid tumors, including gastric cancers, that had MSI-H or dMMR, that had progressed following prior treatment, and for which there were no satisfactory alternative treatment options.     Last PEt/CT scan March 2021 showing progression, with elarging LN in abdomen and few new lesions. At our last visit, we discussed the possibility of immunotherapy with keytruda  alone. He remains minimally symptomatic and there would like to hold on starting immunotherapy at this time.  We have discussed in depth the possibility of his tumor spreading and becoming more invasive if he chooses to delay treatment. We will arrange for follow up in 3 months and he will contact us sooner if he changes his mind regarding treatment. I will do next imaging just prior to when he wants to start treatment.      Plan  - RTC in three months with me      #Asmptomaic/minimally symptomatic COViD  -Pt had covid test positive in Dec 2020. Ok to get COVID  vaccine.    #Nutrition  Pt has lost 50 lbs over 6 months previously, some of this was intentional due to diet changes and increased physical activity. His weight has now remained stable and appetite is good.      Chart review: 5 minutes  Phone visit: 20 minutes  Care coordination: 5 minutes    Brennan Mccarthy M.D.   of Medicine  Division of Hematology, Oncology and Transplantation  [facility]    Essie is a 65 year old who is being evaluated via a billable video visit.      How would you like to obtain your AVS? MyChart  If the video visit is dropped, the invitation should be resent by: Text to cell phone: [phone]  Will anyone else be joining your video visit? Yes- daughter sean   Vitals - Patient Reported  Weight (Patient Reported): 60.3 kg (133 lb)  Height (Patient Reported): 152.4 cm (5')  BMI (Based on Pt Reported Ht/Wt): 25.97  Pain Score: No Pain (0)      Riri Mercado CMA on 5/12/2021 at 8:52 AM        Video Start Time: 8:56 AM  Video-Visit Details    Type of service:  Video Visit    Video End Time:9:12 AM    Originating Location (pt. Location): Home    Distant Location (provider location):  [facility]CER [facility]     Platform used for Video Visit: [employer]

## 2021-05-12 NOTE — LETTER
5/12/2021         RE: Essie Guzman  [id number]  [place] [address]        Dear Colleague,    Thank you for referring your patient, Essie Guzman, to the [facility]. Please see a copy of my visit note below.        MEDICAL ONCOLOGY FOLLOW UP NOTE    PATIENT NAME: Essie Guzman  ENCOUNTER DATE: 5/12/2021    Care Team  Primary Oncologist: Brennan Mccarthy MD    REASON FOR CURRENT VISIT: F/u Metastatic gastric cancer    HISTORY OF PRESENT ILLNESS:  Mr. 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 Initially Rx for H.pylori with Abx and PPI. LFT's were mildly elevated.  11/24/20- UGI endoscopy at [facility] with normal esophagus. Non-bleeding gastric ulcer in pylorus with no stigmata of bleeding.  Non-bleeding duodenal ulcer with no stigmata of bleeding. Biopsy of Stomach, pylorus, - Poorly-differentiated carcinoma; consistent with adenocarcinoma (poorly cohesive type). HER2 by FISH was non-amplified  11/24/20- CT CAP- bilateral hilar lymph nodes are indeterminate (1.4 x 1.0 cm) right infrahilar lymph node. Multiple enlarged retroperitoneal and perigastric lymph nodes (10 mm right paraduodenal lymph node, 11 mm necrotic left para-aortic lymph node and a 10 mm left para-aortic lymph node.  12/16/20-  PET/CT with metastatic disease- Hypermetabolic circumferential  ulcerated mass at the gastric antrum, Multiple metastatic hypermetabolic lymph nodes:  adjacent just inferior to the caudate lobe of the liver, Multiple enlarged, centrally necrotic, and hypermetabolic retroperitoneal para-aortic and paracaval lymph nodes.    12/18/20-Saw [facility]- Due to PET CT showing retroperitoneal lymphadenopathy not a candidate for surgical resection.  12/22/20- EUS staging and biopsy- T3N3Mx - Partially-obstructing cratered pre-pyloric gastric ulcer with a clean ulcer base, fully-circumferential ulcer  Several sub-centimeter malignant appearing round, well-circumscribed, hypoechoic lymph nodes were  visualized along the aorta, from the 2nd portion of  duodenum which corresponds to the hypermetabolic nodes seen on the PET scan.   3/9/21- PET/CT-Overall there has been progression in the number and size of the hypermetabolic periaortic, pericaval, and zuleyma hepatis lymph nodes compared to prior.Relatively unchanged hypermetabolic circumferential ulcerated mass at the gastric antrum.      Interval Hx:  Essie Guzman Patient was called with help of [employer] . His daughter (Osman)  Was also present.     Since the last clinic visit, no new symptoms and no new issues. He feels stronger and is remaining very active.  He is eating and drinking well, no wt loss. He still wants to hold off on starting treatment.   No fatigue or pain.     REVIEW OF SYSTEMS: 14 point ROS negative other than the symptoms noted above in the HPI.    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  He lives in [place] with family, sposue, sons and grandkids  He has 6 kids, lives with 2 younger boys, 4 grand kids      FAMILY HISTORY:   Family History   Problem Relation Age of Onset     Asthma No family hx of      Diabetes No family hx of      Hypertension No family hx of      Cerebrovascular Disease No family hx of      Cancer No family hx of      Colon Cancer No family hx of      Anesthesia Reaction No family hx of      Deep Vein Thrombosis (DVT) No family hx of    No family hx any cancer      ALLERGIES: No Known Allergies    CURRENT MEDICATIONS:   Current Outpatient Medications:      omeprazole (PRILOSEC) 20 MG DR capsule, Take 1 capsule (20 mg) by mouth daily (Patient not taking: Reported on 12/30/2020), Disp: 90 capsule, Rfl: 1    PHYSICAL EXAMINATION:  Vital signs: There were no vitals taken for this visit.  ECOG performance status of 0. Fatigue 0.  GENERAL: Unable to completely assess given telephone visit. Respirations are regular, no audible stridor or wheeze.     LABORATORY DATA:     CBC RESULTS:   CBC RESULTS:   Recent Labs   Lab Test 10/23/20  1422 09/08/20  1410 10/12/15  0520   WBC 8.9 9.3 7.2   HGB 11.9* 13.0* 13.6   HCT 37.5* 39.4* 41.2   MCV 92 94 92   MCHC 31.7 33.0 33.0   RDW 12.6 12.9 13.0    297 201        Last Comprehensive Metabolic Panel:  Sodium   Date Value Ref Range Status   10/28/2020 136 133 - 144 mmol/L Final   09/08/2020 133 133 - 144 mmol/L Final   10/11/2015 134 133 - 144 mmol/L Final     Potassium   Date Value Ref Range Status   10/28/2020 4.3 3.4 - 5.3 mmol/L Final   09/08/2020 4.1 3.4 - 5.3 mmol/L Final   10/11/2015 4.3 3.4 - 5.3 mmol/L Final     Comment:     Specimen slightly hemolyzed, potassium may be falsely elevated     Chloride   Date Value Ref Range Status   10/28/2020 104 94 - 109 mmol/L Final   09/08/2020 101 94 - 109 mmol/L Final   10/11/2015 103 94 -  109 mmol/L Final     Carbon Dioxide   Date Value Ref Range Status   10/28/2020 28 20 - 32 mmol/L Final   09/08/2020 24 20 - 32 mmol/L Final   10/11/2015 23 20 - 32 mmol/L Final     Anion Gap   Date Value Ref Range Status   10/28/2020 4 3 - 14 mmol/L Final   09/08/2020 8 3 - 14 mmol/L Final   10/11/2015 8 3 - 14 mmol/L Final     Glucose   Date Value Ref Range Status   10/28/2020 106 (H) 70 - 99 mg/dL Final   09/08/2020 124 (H) 70 - 99 mg/dL Final   10/11/2015 126 (H) 70 - 99 mg/dL Final     Urea Nitrogen   Date Value Ref Range Status   10/28/2020 19 7 - 30 mg/dL Final   09/08/2020 14 7 - 30 mg/dL Final   10/11/2015 25 7 - 30 mg/dL Final     Creatinine   Date Value Ref Range Status   10/28/2020 0.84 0.66 - 1.25 mg/dL Final   09/08/2020 0.89 0.66 - 1.25 mg/dL Final   10/11/2015 0.96 0.66 - 1.25 mg/dL Final     GFR Estimate   Date Value Ref Range Status   10/28/2020 >90 >60 mL/min/[1.73_m2] Final     Comment:     Non  GFR Calc  Starting 12/18/2018, serum creatinine based estimated GFR (eGFR) will be   calculated using the Chronic Kidney Disease Epidemiology Collaboration   (CKD-EPI) equation.     09/08/2020 88 >60 mL/min/[1.73_m2] Final     Comment:     Non  GFR Calc  Starting 12/18/2018, serum creatinine based estimated GFR (eGFR) will be   calculated using the Chronic Kidney Disease Epidemiology Collaboration   (CKD-EPI) equation.     10/11/2015 80 >60 mL/min/1.7m2 Final     Comment:     Non  GFR Calc     Calcium   Date Value Ref Range Status   10/28/2020 9.0 8.5 - 10.1 mg/dL Final   09/08/2020 9.5 8.5 - 10.1 mg/dL Final   10/11/2015 9.0 8.5 - 10.1 mg/dL Final     Bilirubin Total   Date Value Ref Range Status   10/23/2020 0.2 0.2 - 1.3 mg/dL Final   09/08/2020 0.5 0.2 - 1.3 mg/dL Final     Alkaline Phosphatase   Date Value Ref Range Status   10/23/2020 87 40 - 150 U/L Final   09/08/2020 81 40 - 150 U/L Final     ALT   Date Value Ref Range Status   10/23/2020 48 0 - 70  U/L Final   09/08/2020 75 (H) 0 - 70 U/L Final     AST   Date Value Ref Range Status   10/23/2020 26 0 - 45 U/L Final   09/08/2020 46 (H) 0 - 45 U/L Final         ASSESSMENT AND PLAN:    Mr. Essie Guzman is a 65 year old  male with PMHx of H.pylori infection, and recently diagnosed gastric cancer. He is otherwise healthy and does not have any co morbidities.    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 I had previously discussed that additional genetic testing on the tumor showed that pt tumor is dMMR which makes it susceptible to immunotherapy. Ideally, in this case combination of chemotherapy and immunotherpay (Nivo+FOLFOX) or Pembro+FOLFOX are both reasonable options.   He was concerned about the side effects of chemotherapy and therefore was more drawn to the option of single agent immunotherapy.   Pembrolizumab monotherpay- KEYNOTE-158 study, led to approval for treatment of a variety of advanced solid tumors, including gastric cancers, that had MSI-H or dMMR, that had progressed following prior treatment, and for which there were no satisfactory alternative treatment options.     Last PEt/CT scan March 2021 showing progression, with elarging LN in abdomen and few new lesions. At our last visit, we discussed the possibility of immunotherapy with keytruda  alone. He remains minimally symptomatic and there would like to hold on starting immunotherapy at this time.  We have discussed in depth the possibility of his tumor spreading and becoming more invasive if he chooses to delay treatment. We will arrange for follow up in 3 months and he will contact us sooner if he changes his  mind regarding treatment. I will do next imaging just prior to when he wants to start treatment.      Plan  - RTC in three months with me      #Asmptomaic/minimally symptomatic COViD  -Pt had covid test positive in Dec 2020. Ok to get COVID vaccine.    #Nutrition  Pt has lost 50 lbs over 6 months previously, some of this was intentional due to diet changes and increased physical activity. His weight has now remained stable and appetite is good.      Chart review: 5 minutes  Phone visit: 20 minutes  Care coordination: 5 minutes    Brennan Mccarthy M.D.   of Medicine  Division of Hematology, Oncology and Transplantation  [facility]    Essie is a 65 year old who is being evaluated via a billable video visit.      How would you like to obtain your AVS? MyChart  If the video visit is dropped, the invitation should be resent by: Text to cell phone: [phone]  Will anyone else be joining your video visit? Yes- daughter sean   Vitals - Patient Reported  Weight (Patient Reported): 60.3 kg (133 lb)  Height (Patient Reported): 152.4 cm (5')  BMI (Based on Pt Reported Ht/Wt): 25.97  Pain Score: No Pain (0)      Riri Mercado CMA on 5/12/2021 at 8:52 AM        Video Start Time: 8:56 AM  Video-Visit Details    Type of service:  Video Visit    Video End Time:9:12 AM    Originating Location (pt. Location): Home    Distant Location (provider location):  [facility]     Platform used for Video Visit: Well            Again, thank you for allowing me to participate in the care of your patient.        Sincerely,        Brennan Mccarthy MD
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
## 2021-09-10 PROBLEM — K92.1 GASTROINTESTINAL HEMORRHAGE WITH MELENA: Status: ACTIVE | Noted: 2021-01-01

## 2021-09-10 NOTE — CONSULTS
Oncology  Consult Note   Date of Service: 09/10/2021    Patient: Essie Guzman  MRN: [id number]  Admission Date: 9/10/2021  Hospital Day # 0  Cancer Diagnosis: Metastatic Gastric Cancer   Primary Outpatient Oncologist: Brennan Mccarthy  Current Treatment Plan: Not on active Rx currently, Anticipating Pembro alone in future    Reason for Consult: Gastric cancer with hgb=6.2      History of Present Illness:    Essie Guzamn is a 66 year old year old male with h/o Hy pylori and metastatic Gastric cancer admitted to the hospital for lower abdominal pain, nausea, generalized weakness .     He was having worsening symptoms since the past 3 weeks, he is very tired and fatigued through out the day. He has lower abdominal pain with nausea. Denies any vomiting or hematemesis. He has intermittent dark colored stools, no diarrhea or constipation. Also reports of lightheadedness and dizziness. No fever or chills . No cough or shortness of breath. In the ED vitals were stable, hemoglobin was found to be 6.8 concern for GI bleed. His creatinine was elevated at 1.24     CT abdomen was done showed  ,Wall thickening of the distal stomach consistent with the history of gastric cancer. There are multiple enlarged upper abdominal and retroperitoneal lymph nodes consistent with metastases and significantly progressed since the previous exam.  Right hydronephrosis and  obstruction.     He was admitted to Medicine was started on IV fluids, given blood transfusion, Urology was consulted , and recommended stent placement for right flank pain secondary to hydronephrosis.       Oncologic History:  09/2020- Epigastric burning abdominal pain for 2 months. Initially Rx for H.pylori with Abx and PPI. LFT's were mildly elevated.  11/24/20- UGI endoscopy at [facility] with normal esophagus. Non-bleeding gastric ulcer in pylorus with no stigmata of bleeding.  Non-bleeding duodenal ulcer with no stigmata of bleeding. Biopsy of Stomach, pylorus, -  Poorly-differentiated carcinoma; consistent with adenocarcinoma (poorly cohesive type). HER2 by FISH was non-amplified  11/24/20- CT CAP- bilateral hilar lymph nodes are indeterminate (1.4 x 1.0 cm) right infrahilar lymph node. Multiple enlarged retroperitoneal and perigastric lymph nodes (10 mm right paraduodenal lymph node, 11 mm necrotic left para-aortic lymph node and a 10 mm left para-aortic lymph node.  12/16/20-  PET/CT with metastatic disease- Hypermetabolic circumferential ulcerated mass at the gastric antrum, Multiple metastatic hypermetabolic lymph nodes:  adjacent just inferior to the caudate lobe of the liver, Multiple enlarged, centrally necrotic, and hypermetabolic retroperitoneal para-aortic and paracaval lymph nodes.    12/18/20-Saw [facility]- Due to PET CT showing retroperitoneal lymphadenopathy not a candidate for surgical resection.  12/22/20- EUS staging and biopsy- T3N3Mx - Partially-obstructing cratered pre-pyloric gastric ulcer with a clean ulcer base, fully-circumferential ulcer  Several sub-centimeter malignant appearing round, well-circumscribed, hypoechoic lymph nodes were  visualized along the aorta, from the 2nd portion of  duodenum which corresponds to the hypermetabolic nodes seen on the PET scan.   3/9/21- PET/CT-Overall there has been progression in the number and size of the hypermetabolic periaortic, pericaval, and zuleyma hepatis lymph nodes compared to prior.Relatively unchanged hypermetabolic circumferential ulcerated mass at the gastric antrum.    5/12/21, immunotherapy with single-agent keytruda was offered. He was minimally symptomatic at that time and wanted to hold off on starting immunotherapy.       8/11/2021: constitutional symptoms due to disease progression , was recommended to strart treatment soon. However, the patient continued to be hesitant, citing no particular reason, and deferred until October/November      Review of Systems:  A comprehensive ROS was performed  and found to be negative or non-contributory with the exception of that noted in the HPI above.    Past Medical History:  History reviewed. No pertinent past medical history.    Past Surgical History:  Past Surgical History:   Procedure Laterality Date     COLONOSCOPY N/A 11/24/2015    Procedure: COLONOSCOPY;  Surgeon: Clyde Mosher MD;  Location:  GI     ESOPHAGOSCOPY, GASTROSCOPY, DUODENOSCOPY (EGD), COMBINED N/A 11/24/2020    Procedure: ESOPHAGOGASTRODUODENOSCOPY with biopsies using cold forceps;  Surgeon: Clyde Mosher MD;  Location:  GI     ESOPHAGOSCOPY, GASTROSCOPY, DUODENOSCOPY (EGD), COMBINED N/A 12/22/2020    Procedure: ESOPHAGOGASTRODUODENOSCOPY, WITH FINE NEEDLE ASPIRATION BIOPSY, WITH ENDOSCOPIC ULTRASOUND GUIDANCE;  Surgeon: Guru Teri Pedraza MD;  Location:  GI       Social History:  Social History     Socioeconomic History     Marital status:      Spouse name: None     Number of children: None     Years of education: None     Highest education level: None   Occupational History     None   Tobacco Use     Smoking status: Never Smoker     Smokeless tobacco: Never Used   Substance and Sexual Activity     Alcohol use: No     Drug use: No     Sexual activity: Yes     Partners: Female   Other Topics Concern     Parent/sibling w/ CABG, MI or angioplasty before 65F 55M? No   Social History Narrative     None     Social Determinants of Health     Financial Resource Strain:      Difficulty of Paying Living Expenses:    Food Insecurity:      Worried About Running Out of Food in the Last Year:      Ran Out of Food in the Last Year:    Transportation Needs:      Lack of Transportation (Medical):      Lack of Transportation (Non-Medical):    Physical Activity:      Days of Exercise per Week:      Minutes of Exercise per Session:    Stress:      Feeling of Stress :    Social Connections:      Frequency of Communication with Friends and Family:      Frequency of Social  "Gatherings with Friends and Family:      Attends [employer] Services:      Active Member of Clubs or Organizations:      Attends Club or Organization Meetings:      Marital Status:    Intimate Partner Violence:      Fear of Current or Ex-Partner:      Emotionally Abused:      Physically Abused:      Sexually Abused:         Family History  Family History   Problem Relation Age of Onset     Asthma No family hx of      Diabetes No family hx of      Hypertension No family hx of      Cerebrovascular Disease No family hx of      Cancer No family hx of      Colon Cancer No family hx of      Anesthesia Reaction No family hx of      Deep Vein Thrombosis (DVT) No family hx of        Outpatient Medications:  No current facility-administered medications on file prior to encounter.  omeprazole (PRILOSEC) 20 MG DR capsule, Take 1 capsule (20 mg) by mouth daily  ondansetron (ZOFRAN-ODT) 8 MG ODT tab, Take 1 tablet (8 mg) by mouth every 8 hours as needed for nausea         Physical Exam:    Blood pressure (!) 142/90, pulse 63, temperature 98.9  F (37.2  C), temperature source Oral, resp. rate 16, height 1.575 m (5' 2\"), weight 54.4 kg (120 lb), SpO2 100 %.  General: tired and cacectic   HEENT: sclera anicteric  Neck: supple, normal ROM  CV: RRR, no murmurs  Resp: CTAB, normal respiratory effort on ambient air  GI: soft, tenderness ++  MSK: warm and well-perfused, normal tone  Skin: no rashes on limited exam, no jaundice  Neuro: Alert and interactive    Labs & Studies: I personally reviewed the following studies:  ROUTINE LABS (Last four results):  CMP  Recent Labs   Lab 09/10/21  1248 09/10/21  0448   NA  --  134   POTASSIUM  --  4.5   CHLORIDE  --  99   CO2  --  26   ANIONGAP  --  9   GLC 77 90   BUN  --  20   CR  --  1.24   GFRESTIMATED  --  59*   HAWA  --  9.1   PROTTOTAL  --  8.1   ALBUMIN  --  3.1*   BILITOTAL  --  0.2   ALKPHOS  --  68   AST  --  13   ALT  --  14     CBC  Recent Labs   Lab 09/10/21  0448   WBC 8.7   RBC 2.80* "   HGB 6.8*   HCT 22.9*   MCV 82   MCH 24.3*   MCHC 29.7*   RDW 14.6   *     INR  Recent Labs   Lab 09/10/21  0448   INR 1.10       Assessment & Plan:   Essie Guzman is a 66 year old male with HPylori and metastatic gastric carcinoma is admitted with generalized weakness, right lower abdominal pain and dizziness. He was found to have acute blood loss anemia from gastric cancer and right hydronephrosis.     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 HER2 by FISH was non-amplified  --dMMR,  deficinet in MLH1 and PMS2 by IHC- MLH1 promoter hyermethylation positive--consistent with sporadic microsatellite unstable tumors    --PD-L1 CPS- 50-60% (TPS 50%)    - Patient has been hesitant to start treatment. He was offered chemo+Immunotherapy in March 2021, Essie was not interested in chemotherapy due to the toxicity profile. He was inclined to get treated with single agent Immunotherapy with Pembrolizumab.  But he wanted to hold off and was hesitant to start , and deferred to start in Oct-Nov.     Recommendations:   -With the worsening symptoms and overall decline , ideally  would need aggressive chemotherapy with FOLFOX +nivo or FOLFOX +Pembro. But with his poor functional status , he might not be able to tolerate chemotherapy. If that's the case he can get Pembrolizumab as planned .   - Since patient was rushed to the OR today for stent placement , was unable to do a detailed discussion regarding this.         We will continue to follow this patient. Please do not hesitate to page with any questions or concerns.    Patient was seen and plan of care was discussed with attending physician  Dr. Diggs.    Matthew Hernandez MD  Hematology/Oncology/Transplant Fellow   Pgr :: [phone]

## 2021-09-10 NOTE — CONSULTS
Urology Consult History and Physical    Name: Essie Guzman    MRN: [id number]   YOB: 1955       We were asked to see Essie Guzman at the request of Dr. Daley for evaluation and treatment of the following chief complaint.          Chief Complaint:   Hydronephrosis, concern for malignant obstruction  History is obtained from patient and review of records    His daughter, Jass, was at his bedside and served as an .  She works for [employer] in [place]- services.  The patient consented to his daughter interpreting and it was apparently that she was translating every word I said.  The patient also understood ~90% of my English and conversed in English with me for a large portion of our consultation.          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 Mccarthy, and at that time Prembro initiation was discussed but the patient elected to defer treatment, thus return visit in october was planned.       Today he is being admitted through the ED today with anemia (hgb 6.8g/dL), abdominal pain, nausea, weakness, and weight loss.  He has also been having right flank pain which is dull and unrelenting as well as right groin pain that is intermittent and he primarily notes when his bladder is full and he has urge to void. He otherwise feels he is voiding normally- emptying fully with no incontinence or dysuria. He has no left flank pain, fevers, or chills.  Vitals are normal. Labs were significant for anemia, otherwise normal. Serum creatinine is 1.24mg/dL, which may be slightly up from previous (0.9 - 1.0mg/dL).  Urinalysis is negative.     The patient does have a CT AP with contrast from this morning.  This shows gastric thickening consistent with malignancy, progressive upper abdominal and RP lymphadenopathy and right hydronephrosis with  delayed nephrogram and no apparent cause.  Urology is being consulted for the latter.     Of note, he has never had hematuria or UTI or stones.  He has never had this type of right back pain previously.  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      He is NPO.          Past Medical History:   History reviewed. No pertinent past medical history.         Past Surgical History:     Past Surgical History:   Procedure Laterality Date     COLONOSCOPY N/A 11/24/2015    Procedure: COLONOSCOPY;  Surgeon: Clyde Mosher MD;  Location:  GI     ESOPHAGOSCOPY, GASTROSCOPY, DUODENOSCOPY (EGD), COMBINED N/A 11/24/2020    Procedure: ESOPHAGOGASTRODUODENOSCOPY with biopsies using cold forceps;  Surgeon: Clyde Mosher MD;  Location:  GI     ESOPHAGOSCOPY, GASTROSCOPY, DUODENOSCOPY (EGD), COMBINED N/A 12/22/2020    Procedure: ESOPHAGOGASTRODUODENOSCOPY, WITH FINE NEEDLE ASPIRATION BIOPSY, WITH ENDOSCOPIC ULTRASOUND GUIDANCE;  Surgeon: Guru Teri Pedraza MD;  Location:  GI            Social History:     Social History     Tobacco Use     Smoking status: Never Smoker     Smokeless tobacco: Never Used   Substance Use Topics     Alcohol use: No   Never smoked         Family History:     Family History   Problem Relation Age of Onset     Asthma No family hx of      Diabetes No family hx of      Hypertension No family hx of      Cerebrovascular Disease No family hx of      Cancer No family hx of      Colon Cancer No family hx of      Anesthesia Reaction No family hx of      Deep Vein Thrombosis (DVT) No family hx of             Allergies:   No Known Allergies         Medications:     Current Facility-Administered Medications   Medication     HYDROmorphone (DILAUDID) injection 1 mg     Current Outpatient Medications   Medication Sig     ondansetron (ZOFRAN-ODT) 8 MG ODT tab Take 1 tablet (8 mg) by mouth every 8 hours as needed  for nausea     omeprazole (PRILOSEC) 20 MG DR capsule Take 1 capsule (20 mg) by mouth daily (Patient not taking: Reported on 8/11/2021)             Review of Systems:    ROS: See HPI for pertinent details.  Remainder of 10-point ROS negative.         Physical Exam:   VS:  T: 98.2    HR: 69    BP: 122/87    RR: 16   GEN:  AOx3.  NAD.  Pleasant.  HEENT:  Sclerae anicteric.  Conjunctivae pink.  Moist mucous membranes  NECK:  Supple.  Tiny anterior cervical nodes, nontender and mobile L.   LUNGS: Non-labored breathing.  BACK:  + right CVAT.  Also endorsed very mild left flank pain.   ABD:  Soft.  NT.  ND.  No rebound or guarding.  No surgical scars. No palpable masses.       :      Inguinal: no hernias or palpable lymph nodes     circumcised penis, no penile plaques or lesions. Orthotopic location of the urethral meatus.       Scrotum normal.      Testicles of normal firmness and consistency, no masses.      Epididymes bilaterally without masses or tenderness.         Vas and cord normal bilaterally.    EXT:  Warm, well perfused.  Trace lower extremity edema bilaterally  SKIN:  Warm.  Dry.  No rashes.  NEURO:  CN grossly intact.      URINE: Yellow          Data:   All laboratory data reviewed:    Recent Labs   Lab 09/10/21  0448   WBC 8.7   HGB 6.8*   *     Recent Labs   Lab 09/10/21  0448      POTASSIUM 4.5   CHLORIDE 99   CO2 26   BUN 20   CR 1.24   GLC 90   HAWA 9.1     No lab results found in last 7 days.    Invalid input(s): URINEBLOOD  No results found for this or any previous visit.    All pertinent imaging reviewed:  EXAM: CT ABDOMEN PELVIS W CONTRAST  LOCATION: [facility]  DATE/TIME: 9/10/2021 5:31 AM     INDICATION: Lower abdominal pain radiating to the back. History of gastric cancer.  COMPARISON: 11/24/2020.  TECHNIQUE: CT scan of the abdomen and pelvis was performed following injection of IV contrast. Multiplanar reformats were obtained. Dose  reduction techniques were used.  CONTRAST: 75 mL Isovue-370.     FINDINGS:   LOWER CHEST: Small left pleural effusion and associated dependent atelectasis.     HEPATOBILIARY: The gallbladder is very distended. No calcified gallstone.     PANCREAS: There is an irregularly-shaped low-attenuation mass posterior to the body of the pancreas measuring approximately 2.1 x 3.8 x 2.6 cm and consistent with a lymph node. The pancreas otherwise appears normal.     SPLEEN: Normal.     ADRENAL GLANDS: Normal.     KIDNEYS/BLADDER: There is mild dilatation of right renal collecting system as well as a delayed nephrogram on the right consistent with obstruction. No cause of obstruction is seen.     BOWEL: Stomach is not well-distended but there appears to be wall thickening of the distal stomach. No bowel obstruction or inflammation. The appendix is normal.     LYMPH NODES: There are multiple enlarged upper abdominal and retroperitoneal lymph nodes. A node along the lesser curve of the stomach measures 2.5 x 2.3 cm.     VASCULATURE: Unremarkable.     PELVIC ORGANS: Normal.     MUSCULOSKELETAL: Normal.                                                                      IMPRESSION:   1.  Wall thickening of the distal stomach consistent with the history of gastric cancer. There are multiple enlarged upper abdominal and retroperitoneal lymph nodes consistent with metastases and significantly progressed since the previous exam.  2.  Right hydronephrosis and delayed nephrogram consistent with obstruction. The cause of obstruction is not evident.  3.  Small left pleural effusion and associated atelectasis.  4.  No bowel obstruction or inflammation.           Impression and Plan:   Impression / Plan:   Essie Guzman is a 66 year old male with metastatic gastric cancer presenting with progressive malignancy, weight loss, anemia, and new right hydronephrosis (since last CT in March) associated with right flank pain and nausea.      He is  stable and nontoxic-appearing, without leukocytosis, fevers, chills.  His UA is normal as are his vitals.        - Discussed stent placement to help with right flank pain which is likely secondary to hydronephrosis.  Reviewed risks which include: continued right flank pain, stent pain, bladder irritative symptoms (frequency, urgency, dysuria), hematuria.  Discussed infection risk, and surgical risks such as  injury and injury to adjacent structures.  Discussed possible need for PNT should stenting fail.  Discussed need for routine stent exchanges should malignancy persist.   - Mr. Guzman wanted to move forward with stenting - consent signed  - Please keep patient NPO.  Per d/w with OR, this add on case will be likely to occur today.     Urology will follow   Discussed with Dr. Friedman    Thank you for the opportunity to participate in the care of Essie Guzman.     CRISTOBAL ThomasC  Urology Physician Assistant  Personal Pager: [phone]    Please call Job Code:   [phone] to reach the Urology resident or PA on call - Weekdays  [phone] to reach the Urology resident or PA on call - Weeknights and weekends

## 2021-09-10 NOTE — CONSULTS
GASTROENTEROLOGY CONSULTATION      Date of Admission:  9/10/2021          ASSESSMENT AND RECOMMENDATIONS:   Assessment:  Essie Guzman is a 66 year old male PMH history of H pylori and metastatic gastric cancer who presents with lower abdominal/pelvic pain, anorexia and weakness, found to have hemoglobin of 6.8 and CT abdomen showing hydronephrosis with significant progression of metastatic disease. GI consulted for blood loss anemia.    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 Bleeding almost certainly 2/2 known fully circumferential, partially obstructing pyloric adenocarcinoma. Given likely oozing mass, therapeutic options via endoscopy likely of low yield (limited to temporizing hemospray).      Recommendations  -BID PPI  -NPO for time being  -Will reevaluate imaging and potential utility of repeat EGD with further recommendations this pm  -DVT prophylaxis per oncology/primary    GI will continue to follow      Thank you for involving us in this patient's care. Please do not hesitate to contact the GI service with any questions or concerns.     Pt care plan discussed with Dr. Escamilla, GI staff physician.    SYLVIE Hardwick CNP  Text page  -------------------------------------------------------------------------------------------------------------------          Chief Complaint:   We were asked by Dr Becerra of medicine to evaluate this patient with anemia 2/2 gastric cancer    History is obtained from the patient and the medical record, his daughter served as an  for the visit         History of Present Illness:   Essie Guzman is a 66 year old male PMH history of H pylori and metastatic gastric cancer who presents with lower abdominal/pelvic pain, anorexia and weakness, found to have hemoglobin of 6.8 and CT abdomen showing  hydronephrosis with significant progression of metastatic disease. GI consulted for blood loss anemia.    Patient reports about 1 week of intermittent pain in his pelvis and low back which is not changed via bm or PO. He denies loss of appetite, and early satiety. However, smaller more frequent meals work better for him. If he eats meat or large meals he feels like he wants to spit the food up, though he denies nausea. Weight loss of >50 lb in 6 months, though he reports part of this was intentional via diet and exercise. Oncology note from 8/11 describes nausea with any intake. . Emesis x 1, and loss of appetite. Not eating much protein but still fruits and vegetables. Only having bm once per week. These have been yellow or tan. No PPI at home. Denies odynophagia and dysphagia. Also weak over past few weeks with some lightheadedness.    He has metastatic disease to retroperitoneal nodes ([id number])-Stage LUDMILA. Last endoscopy was EUS 12/22/21 which demonstrated Partially-obstructing cratered pre-pyloric gastric ulcer with a clean ulcer base, fully-circumferential ulcer consistent with previously diagnosed gastric adenocarcinoma.     He has declined chemotherapy to date. Oncology note 8/11 notes that they were anticipating starting Pembro alone in the near future (d/t concern for chemo side effects). Wants to start in October or November.             Past Medical History:   Reviewed and edited as appropriate  History reviewed. No pertinent past medical history.    See hpi       Past Surgical History:   Reviewed and edited as appropriate   Past Surgical History:   Procedure Laterality Date     COLONOSCOPY N/A 11/24/2015    Procedure: COLONOSCOPY;  Surgeon: Clyde Mosher MD;  Location:  GI     ESOPHAGOSCOPY, GASTROSCOPY, DUODENOSCOPY (EGD), COMBINED N/A 11/24/2020    Procedure: ESOPHAGOGASTRODUODENOSCOPY with biopsies using cold forceps;  Surgeon: Clyde Mosher MD;  Location:  GI     ESOPHAGOSCOPY,  GASTROSCOPY, DUODENOSCOPY (EGD), COMBINED N/A 12/22/2020    Procedure: ESOPHAGOGASTRODUODENOSCOPY, WITH FINE NEEDLE ASPIRATION BIOPSY, WITH ENDOSCOPIC ULTRASOUND GUIDANCE;  Surgeon: Guru Teri Pedraza MD;  Location: [facility]            Previous Endoscopy:     Results for orders placed or performed during the hospital encounter of 11/24/15   COLONOSCOPY   Result Value Ref Range    COLONOSCOPY       [facility]  _______________________________________________________________________________  Patient Name: Essie Guzman               Procedure Date: 11/24/2015 8:07 AM  MRN: [id number]                       Account Number: [id number]  YOB: 1955              Admit Type: Outpatient  Age: 60                               Gender: Male  Attending MD: Clyde Mosher MD    Instrument Name: 138  _______________________________________________________________________________     Procedure:                Colonoscopy  Indications:              Screening for colorectal malignant neoplasm  Providers:                Clyde Mosher MD, Taisha Pace RN (Nurse)  Referring MD:             Serge Kimble MD  Medicines:                Midazolam 2 mg IV, Fentanyl 100 micrograms IV  Complications:            No immediate complications.  _______________________________________________________________________________  Procedure:                Pre-Anesthesia Assessment :                            - Prior to the procedure, a History and Physical                             was performed, and patient medications and                             allergies were reviewed. The patient is competent.                             The risks and benefits of the procedure and the                             sedation options and risks were discussed with the                             patient. All questions were answered and informed                             consent was obtained. Patient  identification and                             proposed procedure were verified by the physician                             in the procedure room. Mental Status Examination:                             alert and oriented. Airway Examination: normal                             oropharyngeal airway and neck mobility. Respiratory                             Examination: clear to auscultation. CV Examination:                             normal. Prophylactic Antibiotics: The patie nt does                             not require prophylactic antibiotics. Prior                             Anticoagulants: The patient has taken no previous                             anticoagulant or antiplatelet agents. ASA Grade                             Assessment: I - A normal, healthy patient. After                             reviewing the risks and benefits, the patient was                             deemed in satisfactory condition to undergo the                             procedure. The anesthesia plan was to use moderate                             sedation / analgesia (conscious sedation).                             Immediately prior to administration of medications,                             the patient was re-assessed for adequacy to receive                             sedatives. The heart rate, respiratory rate, oxygen                             saturations, blood pressure, adequacy of pulmonary                             ventilation, and response to care were  monitored                             throughout the procedure. The physical status of                             the patient was re-assessed after the procedure.                            After obtaining informed consent, the colonoscope                             was passed under direct vision. Throughout the                             procedure, the patient's blood pressure, pulse, and                             oxygen saturations were monitored  continuously. The                             [id number] was introduced through the anus and                             advanced to the cecum, identified by appendiceal                             orifice and ileocecal valve. The colonoscopy was                             performed without difficulty. The patient tolerated                             the procedure well. The quality of the bowel                             preparation was good.                                                                                   Findings :       The perianal and digital rectal examinations were normal.       The entire examined colon appeared normal on direct and retroflexion        views.                                                                                   Impression:               - The entire examined colon is normal on direct and                             retroflexion views.  Recommendation:           - Repeat colonoscopy in 10 years for screening                             purposes.                                                                                   Procedure Code(s):       --- Professional ---       , Colorectal cancer screening; colonoscopy on individual not        meeting criteria for high risk  Diagnosis Code(s):       --- Professional ---       Z12.11, Encounter for screening for malignant neoplasm of colon    CPT copyright 2013 [employer] Medical Association. All rights reserved.    The codes documented in this report are preliminary and upon  review may   be revised to  meet current compliance requirements.    Electronically signed by Clyde Mosher MD  ____________________  Clyde Mosher MD  11/24/2015 8:23 AM  I was physically present for the entire viewing portion of the exam.  __________________________  Number of Addenda: 0    Note Initiated On: 11/24/2015 8:07 AM  MRN:                      [id number]  Procedure Date:           11/24/2015 8:07:22 AM  Scope  Withdrawal Time: 0 hours 6 minutes 26 seconds   Total Procedure Duration: 0 hours 12 minutes 25 seconds   Estimated Blood Loss:       Scope In: 8:09:15 AM  Scope Out: 8:21:40 AM              Social History:   Reviewed and edited as appropriate  Social History     Socioeconomic History     Marital status:      Spouse name: Not on file     Number of children: Not on file     Years of education: Not on file     Highest education level: Not on file   Occupational History     Not on file   Tobacco Use     Smoking status: Never Smoker     Smokeless tobacco: Never Used   Substance and Sexual Activity     Alcohol use: No     Drug use: No     Sexual activity: Yes     Partners: Female   Other Topics Concern     Parent/sibling w/ CABG, MI or angioplasty before 65F 55M? No   Social History Narrative     Not on file     Social Determinants of Health     Financial Resource Strain:      Difficulty of Paying Living Expenses:    Food Insecurity:      Worried About Running Out of Food in the Last Year:      Ran Out of Food in the Last Year:    Transportation Needs:      Lack of Transportation (Medical):      Lack of Transportation (Non-Medical):    Physical Activity:      Days of Exercise per Week:      Minutes of Exercise per Session:    Stress:      Feeling of Stress :    Social Connections:      Frequency of Communication with Friends and Family:      Frequency of Social Gatherings with Friends and Family:      Attends [employer] Services:      Active Member of Clubs or Organizations:      Attends Club or Organization Meetings:      Marital Status:    Intimate Partner Violence:      Fear of Current or Ex-Partner:      Emotionally Abused:      Physically Abused:      Sexually Abused:             Family History:   Reviewed and edited as appropriate  Family History   Problem Relation Age of Onset     Asthma No family hx of      Diabetes No family hx of      Hypertension No family hx of      Cerebrovascular Disease No family hx  "of      Cancer No family hx of      Colon Cancer No family hx of      Anesthesia Reaction No family hx of      Deep Vein Thrombosis (DVT) No family hx of             Allergies:   Reviewed and edited as appropriate   No Known Allergies         Medications:     Current Facility-Administered Medications   Medication     acetaminophen (TYLENOL) tablet 650 mg     HYDROmorphone (DILAUDID) injection 0.2 mg     HYDROmorphone (DILAUDID) injection 1 mg     lidocaine (LMX4) cream     lidocaine 1 % 0.1-1 mL     melatonin tablet 1 mg     ondansetron (ZOFRAN-ODT) ODT tab 4 mg     pantoprazole (PROTONIX) IV push injection 40 mg     sodium chloride (PF) 0.9% PF flush 3 mL     sodium chloride (PF) 0.9% PF flush 3 mL     Current Outpatient Medications   Medication Sig     ondansetron (ZOFRAN-ODT) 8 MG ODT tab Take 1 tablet (8 mg) by mouth every 8 hours as needed for nausea     omeprazole (PRILOSEC) 20 MG DR capsule Take 1 capsule (20 mg) by mouth daily (Patient not taking: Reported on 8/11/2021)             Review of Systems:     A complete review of systems was performed and is negative except as noted in the HPI           Physical Exam:   /87   Pulse 69   Temp 98.2  F (36.8  C) (Oral)   Resp 16   Ht 1.575 m (5' 2\")   Wt 54.4 kg (120 lb)   SpO2 100%   BMI 21.95 kg/m    Wt:   Wt Readings from Last 2 Encounters:   09/10/21 54.4 kg (120 lb)   12/22/20 77.1 kg (170 lb)      Constitutional: cooperative, pleasant, not dyspneic/diaphoretic, no acute distress  Eyes: Sclera anicteric/injected  Ears/nose/mouth/throat: Normal oropharynx without ulcers or exudate, mucus membranes moist, hearing intact  Neck: supple without obvious mass  CV: No edema  Respiratory: Unlabored breathing  Lymph: No submandibular, supraclavicular or inguinal lymphadenopathy  Abd: Nondistended, +bs, no hepatosplenomegaly, nontender, no peritoneal signs  Skin: warm, perfused, no jaundice  Neuro: AAO x 3  Psych: Normal affect  MSK: No gross deformities      "    Data:   Labs and imaging below were independently reviewed and interpreted    BMP  Recent Labs   Lab 09/10/21  0448      POTASSIUM 4.5   CHLORIDE 99   HAWA 9.1   CO2 26   BUN 20   CR 1.24   GLC 90     CBC  Recent Labs   Lab 09/10/21  0448   WBC 8.7   RBC 2.80*   HGB 6.8*   HCT 22.9*   MCV 82   MCH 24.3*   MCHC 29.7*   RDW 14.6   *     INR  Recent Labs   Lab 09/10/21  0448   INR 1.10     LFTs  Recent Labs   Lab 09/10/21  0448   ALKPHOS 68   AST 13   ALT 14   BILITOTAL 0.2   PROTTOTAL 8.1   ALBUMIN 3.1*      PANC  Recent Labs   Lab 09/10/21  0448   LIPASE 102       Imagin/10/2021 ct abp    IMPRESSION:   1.  Wall thickening of the distal stomach consistent with the history of gastric cancer. There are multiple enlarged upper abdominal and retroperitoneal lymph nodes consistent with metastases and significantly progressed since the previous exam.  2.  Right hydronephrosis and delayed nephrogram consistent with obstruction. The cause of obstruction is not evident.  3.  Small left pleural effusion and associated atelectasis.  4.  No bowel obstruction or inflammation.

## 2021-09-10 NOTE — ANESTHESIA PROCEDURE NOTES
Airway       Patient location during procedure: OR       Procedure Start/Stop Times: 9/10/2021 2:04 PM  Staff -        Anesthesiologist:  Hannah Rodriguez MD       Resident/Fellow: Johnny Goldberg MD       Performed By: resident  Consent for Airway        Urgency: elective  Indications and Patient Condition       Indications for airway management: kesha-procedural       Induction type:intravenous       Mask difficulty assessment: 2 - vent by mask + OA or adjuvant +/- NMBA    Final Airway Details       Final airway type: endotracheal airway       Successful airway: ETT - single  Endotracheal Airway Details        ETT size (mm): 7.0       Cuffed: yes       Successful intubation technique: direct laryngoscopy       DL Blade Type: MAC 3       Grade View of Cords: 1       Adjucts: stylet       Position: Right       Measured from: gums/teeth       Secured at (cm): 21       Bite block used: None    Post intubation assessment        Placement verified by: capnometry, equal breath sounds and chest rise        Number of attempts at approach: 1       Number of other approaches attempted: 0       Secured with: pink tape       Ease of procedure: easy       Dentition: Intact and Unchanged

## 2021-09-10 NOTE — ANESTHESIA PREPROCEDURE EVALUATION
Anesthesia Pre-Procedure Evaluation    Patient: Essie Guzman   MRN: [id number] : 1955        Preoperative Diagnosis: Hydronephrosis [N13.30]   Procedure : Procedure(s):  CYSTOSCOPY, WITH URETERAL STENT INSERTION, Possible retrograde pyleogram     History reviewed. No pertinent past medical history.   Past Surgical History:   Procedure Laterality Date     COLONOSCOPY N/A 2015    Procedure: COLONOSCOPY;  Surgeon: Clyde Mosher MD;  Location: [facility]     ESOPHAGOSCOPY, GASTROSCOPY, DUODENOSCOPY (EGD), COMBINED N/A 2020    Procedure: ESOPHAGOGASTRODUODENOSCOPY with biopsies using cold forceps;  Surgeon: Clyde Mosher MD;  Location: [facility]     ESOPHAGOSCOPY, GASTROSCOPY, DUODENOSCOPY (EGD), COMBINED N/A 2020    Procedure: ESOPHAGOGASTRODUODENOSCOPY, WITH FINE NEEDLE ASPIRATION BIOPSY, WITH ENDOSCOPIC ULTRASOUND GUIDANCE;  Surgeon: Guru Teri Pedraza MD;  Location: [facility]      No Known Allergies   Social History     Tobacco Use     Smoking status: Never Smoker     Smokeless tobacco: Never Used   Substance Use Topics     Alcohol use: No      Wt Readings from Last 1 Encounters:   09/10/21 54.4 kg (120 lb)        Anesthesia Evaluation   Pt has had prior anesthetic. Type: MAC.    No history of anesthetic complications       ROS/MED HX  ENT/Pulmonary:  - neg pulmonary ROS     Neurologic:  - neg neurologic ROS     Cardiovascular:     (+) -----Previous cardiac testing   Echo: Date: Results:    Stress Test: Date: Results:    ECG Reviewed: Date:  Results:  - sinus bradycardia  Cath: Date: Results:      METS/Exercise Tolerance:     Hematologic:     (+) anemia (2U pRBC today, Hgb 6.8 (9/10/2021)),     Musculoskeletal:  - neg musculoskeletal ROS     GI/Hepatic: Comment: - Recent upper GI bleed with melena      Renal/Genitourinary: Comment: - R. 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        Other:            Physical Exam    Airway  airway exam normal      Mallampati: II   TM distance: > 3 FB   Neck ROM: full   Mouth opening: > 3 cm    Respiratory Devices and Support         Dental  no notable dental history         Cardiovascular   cardiovascular exam normal          Pulmonary   pulmonary exam normal                OUTSIDE LABS:  CBC:   Lab Results   Component Value Date    WBC 8.7 09/10/2021    WBC 8.9 10/23/2020    HGB 6.8 (LL) 09/10/2021    HGB 11.9 (L) 10/23/2020    HCT 22.9 (L) 09/10/2021    HCT 37.5 (L) 10/23/2020     (H) 09/10/2021     10/23/2020     BMP:   Lab Results   Component Value Date     09/10/2021     10/28/2020    POTASSIUM 4.5 09/10/2021    POTASSIUM 4.3 10/28/2020    CHLORIDE 99 09/10/2021    CHLORIDE 104 10/28/2020    CO2 26 09/10/2021    CO2 28 10/28/2020    BUN 20 09/10/2021    BUN 19 10/28/2020    CR 1.24 09/10/2021    CR 0.84 10/28/2020    GLC 90 09/10/2021     (H) 10/28/2020     COAGS:   Lab Results   Component Value Date    PTT 27 10/11/2015    INR 1.10 09/10/2021     POC: No results found for: BGM, HCG, HCGS  HEPATIC:   Lab Results   Component Value Date    ALBUMIN 3.1 (L) 09/10/2021    PROTTOTAL 8.1 09/10/2021    ALT 14 09/10/2021    AST 13 09/10/2021    ALKPHOS 68 09/10/2021    BILITOTAL 0.2 09/10/2021     OTHER:   Lab Results   Component Value Date    LACT 0.6 (L) 09/10/2021    A1C 5.6 09/08/2020    HAWA 9.1 09/10/2021    LIPASE 102 09/10/2021    AMYLASE 72 09/08/2020    TSH 1.80 09/08/2020    SED 10 04/08/2014       Anesthesia Plan    ASA Status:  3      Anesthesia Type: General.   Induction: Intravenous.   Maintenance: Inhalation.   Techniques and Equipment:     - Lines/Monitors: 2nd IV, BIS     Consents         - Extended Intubation/Ventilatory Support Discussed: Yes.      -  Patient is DNR/DNI Status: No    Use of blood products discussed: Yes.     Postoperative Care    Pain management: Oral pain medications, IV analgesics.   PONV prophylaxis: Ondansetron (or other 5HT-3), Dexamethasone or Solumedrol     Comments:                Johnny Goldberg MD

## 2021-09-10 NOTE — OP NOTE
OPERATIVE REPORT    PREOPERATIVE DIAGNOSIS: Right ureteral obstruction    POSTOPERATIVE DIAGNOSIS:  Same    PROCEDURES PERFORMED:   1. Cystourethroscopy with right retrograde pyelography  2. Placement of right ureteral stent.  3. Intraoperative interpretation of fluoroscopic imaging.     STAFF SURGEON: Jez Friedman MD    RESIDENT: Madhu Sagastume MD    ANESTHESIA: General    ESTIMATED BLOOD LOSS: 0 mL.     DRAINS:  6x24 fr double J ureteral stent - right      OPERATIVE INDICATIONS:   Essie Guzmna is a 66 year old male who presented with a right obstructed ureter presumably from malignancy. The patient was counseled on the alternatives, risks, and benefits and elected to proceed with the above stated procedure.    DESCRIPTION OF PROCEDURE:    After informed consent was obtained, the patient was taken to the operating room, and moved to the operating table.  After adequate anesthesia was induced, the patient was repositioned in dorsal lithotomy position and prepped and draped in the usual sterile fashion. A timeout was taken to confirm correct patient, procedure and laterality.     A 22-Czech cystoscope was inserted into a well lubricated urethra. The urethra was unremarkable.  The bladder was free of tumors, stones or diverticuli.  The media was clear.  Bilateral ureteral orifices were orthotopic.  A curved guidewire was utilized to cannulate the right ureteral orifice with the assistance of a 5-Fr open-ended catheter (could not quite get the angle with the straight Sensor).  A retrograde pyelogram demonstrated a normal caliber distal 2/3rd of the ureter.  At the junction of the proximal and middle thirds, there was an area ~2cm in length that was unable to be opacified well by contrast- the ureter appeared narrowed or tortuous.  The wire was replaced and a 6x24 ureteral stent was advanced over the guidewire under fluoroscopic guidance with a good curl in the renal pelvis and bladder.  The stent passed up easily  with no appreciable resistance.  The bladder was then drained.    The patient tolerated the procedure well.  There were no complications.         PLAN:   - Return to medicine team tongight  - Start flomax for stent discomfort  - Will arrange for definitive ureteral obstruction workup in the coming weeks    Madhu Sagastume, PGY-2  [phone]    I was present for the entire procedure and scrubbed for the sands portions.  Jez Friedman MD  Urology Staff

## 2021-09-10 NOTE — PROGRESS NOTES
Afternoon update to AM H&P    Discussed at bedside with patient this morning with assistance of [employer] . Patient denied hematemesis, melena, or hematochezia. No orthostasis, dizziness, light headedness, chest pain or shortness of breath. He did reports swallowing difficulties especially with meat.     Anemia, likely iron deficiency from gastric cancer bleeding  - Likely indolent and no acute GI bleed, stable vital signs  - GI consulted  -transfuse blood, repeat H&H this evening  -IV PPI BID    Dysphagia  -Upper GI series, if size of narrowing, stricture then possible EGD  -GI assisting with work up    RIght hydronephrosis  -likely from retroperitoneal lymphadenopathy  -urology consulted  -plan for right ureteral stent in OR today    Metastatic gastric cancer  -Oncology consulted  -patient now agreeable to chemotherapy per our discussion    Homar Becerra MD

## 2021-09-10 NOTE — H&P
[facility]    History and Physical - Hospitalist Service, Gold Night       Date of Admission:  9/10/2021    Assessment & Plan      Essie Guzman is a 66 year old male PMH history of H pylori and gastric cancer who presents for 3 weeks lower abdominal/pelvic pain, nausea and weakness, found to have hemoglobin of 6.8 and CT abdomen showing hydronephrosis with significant progression of metastatic disease.       Poorly-differentiated adenocarcinoma of gastric pylorus   Anemia, suspected chronic   Diagnosed November 2020. Patient's preference was to hold off on chemo. Anticipated regimen was Nivo+chemo or Pembro alone. At time of diagnosis workup revealed metastatic to retroperitoneal LN ([id number])-Stage LUDMILA. HER2 by FISH was non-amplified with dMMR,  deficinet in MLH1 and PMS2 by IHC- MLH1 promoter hyermethylation positive--consistent with sporadic microsatellite unstable tumors.  Patient reports recent lightheadedness, which could be an indicator/rhythm of acute GI bleeding.  However, in emergency department he was well compensated for anemia and denied black or bloody stools, suspect chronic bleeding.  -oncology consult  -GI consult   -N.p.o.  -IV PPI daily  -Follow-up add on INR  -S/p 2U pRBC in ED   +follow up hgb ordered for after transfusion      Pelvic pain  Right hydronephrosis suspected 2/2 malignant obstruction  Likely constipation  Right hydronephrosis and delayed nephrogram consistent with obstruction. Pain suspected secondary to hydronephrosis and possibly due to mesenteric LN. SMA syndrome could also be considered given dramatic weight loss, but not classic location. No obstruction on imaging.   -Urology consult   -consider Miralax when taking PO   -prn Tylenol, IV dilaudid       Dramatic weight loss  Patient believes he has lost 70-80 pounds in the last year and this is supported by redundant soft tissue of abdomen. Likely secondary to mestatic gastric  cancer, though has also has had trouble swallowing meat, poor appetite and recent nausea.   -Speech consult   -Nutrition consult  -zofran prn  -consider marinol   -Consider PT OT once stabilized    Lightheadedness  Likely associated with anemia and poor PO intake.   -orthostatics   -up with assist    Social    Lives at home with wife, sons, daughter and grandchildren. Speaks Hmong as preferred language.        Diet:  NPO  DVT Prophylaxis: Pneumatic Compression Devices given suspected bleed  Hamm Catheter: Not present  Central Lines: None  Code Status:   Full, discussed with patient on admission via daughter as , as he requested daughter as  over professional .       Disposition Plan   Expected discharge: Patient recommended to prior living arrangement once adequate pain management/ tolerating PO medications and anemia/possible GI bleed and urinary obstruction addressed as well as cancer plan developed..         Chayo Daley MD  [facility]  Securely message with the Vocera Web Console (learn more here)  Text page via [employer] Paging/Directory      ______________________________________________________________________    Chief Complaint   Weakness and lower abdominal pain    History is obtained from the patient  And chart    Patient indicated he wanted his daughter to translate, declined professional . He speaks Hmong.     History of Present Illness   Essie Guzman is a 66 year old male PMH gastric cancer who presents for 3 weeks lower abdominal/pelvic pain, nausea and weakness.    Gastric cancer was diagnosed November 2020. Patient has been followed by oncology but did not want to start chemo in February 2021 when it was discussed.  He reported that he had some things to take care of and wanted to spend the summer with his family.  Over the last year patient estimates he has lost 70 to 80 pounds and his family agrees with  this estimation.  He also notes that has been hard to swallow meat over the last year, especially tougher meats like beef.    Then 3 weeks ago patient began to have intermittent lower pelvic pain and lower lumbar/sacral back pain that was not severe accompanied by some increased nausea. No scrotal or testicular pain and no bulging/hernias. Patient has had poor p.o. intake and appetite over the last year with acute exacerbation recently.  No vomiting, red or black stools, new numbness weakness or tingling, incontinence or changes in gait.  He has 1 hard stool per week.  He decided to come into the emergency department when lower pelvic pain became significantly worse and he felt lightheaded.    In the emergency department he was afebrile with heart rates in the 60s and good blood pressure, satting well on room air.  CBC revealed normal WBC with hemoglobin of 6.8 and robust platelets of 502.  Hemoglobin last year was 11.9.  He had reassuring BMP, LFT, lipase, lactic acid.  Covid pending at the time of this note.  Given his lower pelvic pain, emergency room ordered CT abd/pelvis which showed wall thickening of the distal stomach consistent with the history of gastric cancer. There are multiple enlarged upper abdominal and retroperitoneal lymph nodes consistent with metastases and significantly progressed since the previous exam. Also noted was right hydronephrosis and delayed nephrogram consistent with obstruction. The cause of obstruction is not evident. Small left pleural effusion and associated atelectasis. No bowel obstruction or inflammation.     He was admitted to medicine for workup and monitoring of anemia and CT result follow up.       Review of Systems    The 10 point Review of Systems is negative other than noted in the HPI or here.     Past Medical History    I have reviewed this patient's medical history and updated it with pertinent information if needed.   History reviewed. No pertinent past medical  history.    Past Surgical History   I have reviewed this patient's surgical history and updated it with pertinent information if needed.  Past Surgical History:   Procedure Laterality Date     COLONOSCOPY N/A 11/24/2015    Procedure: COLONOSCOPY;  Surgeon: Clyde Mosher MD;  Location:  GI     ESOPHAGOSCOPY, GASTROSCOPY, DUODENOSCOPY (EGD), COMBINED N/A 11/24/2020    Procedure: ESOPHAGOGASTRODUODENOSCOPY with biopsies using cold forceps;  Surgeon: Clyde Mosher MD;  Location:  GI     ESOPHAGOSCOPY, GASTROSCOPY, DUODENOSCOPY (EGD), COMBINED N/A 12/22/2020    Procedure: ESOPHAGOGASTRODUODENOSCOPY, WITH FINE NEEDLE ASPIRATION BIOPSY, WITH ENDOSCOPIC ULTRASOUND GUIDANCE;  Surgeon: Guru Teri Pedraza MD;  Location:  GI       Social History   I have reviewed this patient's social history and updated it with pertinent information if needed.  Social History     Tobacco Use     Smoking status: Never Smoker     Smokeless tobacco: Never Used   Substance Use Topics     Alcohol use: No     Drug use: No   He is now retired. 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  He lives in [place] with family, sposue, sons and grandkids  He has 6 kids, lives with 2 younger boys, 4 grand kids       Family History   No significant family history.    Prior to Admission Medications   Prior to Admission Medications   Prescriptions Last Dose Informant Patient Reported? Taking?   omeprazole (PRILOSEC) 20 MG DR capsule   No No   Sig: Take 1 capsule (20 mg) by mouth daily   Patient not taking: Reported on 8/11/2021   ondansetron (ZOFRAN-ODT) 8 MG ODT tab Unknown at Unknown time  No Yes   Sig: Take 1 tablet (8 mg) by mouth every 8 hours as needed for nausea      Facility-Administered Medications: None     Allergies   No Known Allergies    Physical Exam   Vital Signs: Temp: 98.2  F (36.8  C) Temp src: Oral BP: 128/83 Pulse: 68   Resp: 16 SpO2: 100 % O2 Device: None (Room air)    Weight: 120 lbs 0  oz    Constitutional: Cachectic, pleasant man, awake, alert, cooperative, no apparent distress, and appears stated age  Eyes: Lids and lashes normal, pupils equal, round and reactive to light, extra ocular muscles intact, sclera clear, conjunctiva normal  ENT: Normocephalic, without obvious abnormality, atraumatic, sinuses nontender on palpation, external ears without lesions, oral pharynx with moist mucous membranes, tonsils without erythema or exudates, gums normal and good dentition.  Hematologic / Lymphatic: no cervical lymphadenopathy  Respiratory: No increased work of breathing, good air exchange, clear to auscultation bilaterally, no crackles or wheezing  Cardiovascular: Normal apical impulse, regular rate and rhythm, normal S1 and S2, no S3 or S4, and no murmur noted  GI: No scars, normal bowel sounds, soft, non-distended even with deep palpation over pelvis, region of pain, no inguinal hernias noted, redundant skin on abdomen consistent with rapid weight loss, non-tender, no masses palpated, no hepatosplenomegally  Skin: no bruising or bleeding  Musculoskeletal: There is no redness, warmth, or swelling of the joints.  Full range of motion noted. Tone is normal.  Neurologic: Awake, alert, oriented to name, place and time.  Cranial nerves II-XII are grossly intact.   Neuropsychiatric: General: normal, calm and normal eye contact    Data   Data reviewed today: I reviewed all medications, new labs and imaging results over the last 24 hours. I personally reviewed the CT abdomen/pelvis image(s) showing see HPI.    Recent Labs   Lab 09/10/21  0448   WBC 8.7   HGB 6.8*   MCV 82   *   INR 1.10      POTASSIUM 4.5   CHLORIDE 99   CO2 26   BUN 20   CR 1.24   ANIONGAP 9   HAWA 9.1   GLC 90   ALBUMIN 3.1*   PROTTOTAL 8.1   BILITOTAL 0.2   ALKPHOS 68   ALT 14   AST 13   LIPASE 102

## 2021-09-10 NOTE — PLAN OF CARE
SLP: Swallow evaluation order received. Pt currently NPO for GI concerns. Per documentation he has difficulty swallowing meat but no additional concern for oropharyngeal dysphagia or aspiration. Will follow via chart review to determine if SLP evaluation is warranted once pt is no longer NPO.

## 2021-09-10 NOTE — ANESTHESIA POSTPROCEDURE EVALUATION
Patient: Essie Guzman    Procedure(s):  CYSTOSCOPY, WITH right URETERAL STENT INSERTION, Possible retrograde pyleogram    Diagnosis:Hydronephrosis [N13.30]  Diagnosis Additional Information: No value filed.    Anesthesia Type:  General    Note:  Disposition: Outpatient   Postop Pain Control: Uneventful            Sign Out: Well controlled pain   PONV: No   Neuro/Psych: Uneventful            Sign Out: Acceptable/Baseline neuro status   Airway/Respiratory: Uneventful            Sign Out: Acceptable/Baseline resp. status   CV/Hemodynamics: Uneventful            Sign Out: Acceptable CV status; No obvious hypovolemia; No obvious fluid overload   Other NRE: NONE   DID A NON-ROUTINE EVENT OCCUR? No           Last vitals:  Vitals Value Taken Time   /91 09/10/21 1630   Temp 36.7  C (98.1  F) 09/10/21 1600   Pulse 70 09/10/21 1632   Resp 16 09/10/21 1500   SpO2 96 % 09/10/21 1632   Vitals shown include unvalidated device data.    Electronically Signed By: Carlyn Shankar MD  September 10, 2021  4:48 PM

## 2021-09-10 NOTE — BRIEF OP NOTE
[facility]    Brief Operative Note    Pre-operative diagnosis: Hydronephrosis [N13.30]  Post-operative diagnosis Same as pre-operative diagnosis    Procedure: Procedure(s):  CYSTOSCOPY, WITH right URETERAL STENT INSERTION, Possible retrograde pyleogram  Surgeon: Surgeon(s) and Role:     * Jze Friedman MD - Primary  Anesthesia: General   Estimated blood loss: 1cc  Drains: None  Specimens: * No specimens in log *  Findings:   None.  Complications: None.  Implants:   Implant Name Type Inv. Item Serial No.  Lot No. LRB No. Used Action   STENT URETERAL PERCUFLEX PLUS [id number] - [id number] Stent STENT URETERAL PERCUFLEX PLUS [id number]  [employer] [place] [id number] Right 1 Implanted         - Transfer back to medicine  - Start Flomax  - Will arrange for URS/hydronephrosis w/u as outpatient   - Urology will see in AM

## 2021-09-10 NOTE — OR NURSING
Dr Shankar called at 1602 for sign out on pt, reports he will put in. Pt resting comfortably in bed in no acute distress, able to make needs known.

## 2021-09-10 NOTE — ED PROVIDER NOTES
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  And generalized weakness and lightheadedness which has been increasing steadily for approximately 3 weeks.  Also noted intermittent dark stools, but no lynn diarrhea.  No nausea or vomiting.  Has had no traumatic falls or injuries recently.    Patient's has known stomach cancer as noted above however has elected to defer further therapy until after the summer is over as he wanted to spend time with his family without being weak and tired as he anticipated with chemotherapy.      Denies shortness of breath, chest pain, rashes, fever    Past Medical History  History reviewed. No pertinent past medical history.  Past Surgical History:   Procedure Laterality Date     COLONOSCOPY N/A 11/24/2015    Procedure: COLONOSCOPY;  Surgeon: Clyde Mosher MD;  Location:  GI     ESOPHAGOSCOPY, GASTROSCOPY, DUODENOSCOPY (EGD), COMBINED N/A 11/24/2020    Procedure: ESOPHAGOGASTRODUODENOSCOPY with biopsies using cold forceps;  Surgeon: Clyde Mosher MD;  Location:  GI     ESOPHAGOSCOPY, GASTROSCOPY, DUODENOSCOPY (EGD), COMBINED N/A 12/22/2020    Procedure: ESOPHAGOGASTRODUODENOSCOPY, WITH FINE NEEDLE ASPIRATION BIOPSY, WITH ENDOSCOPIC ULTRASOUND GUIDANCE;  Surgeon: Guru Teri Pedraza MD;  Location:  GI     ondansetron (ZOFRAN-ODT) 8 MG ODT tab  omeprazole (PRILOSEC) 20 MG DR capsule      No Known Allergies  Family History  Family History   Problem Relation Age of Onset     Asthma No family hx of      Diabetes No family hx of      Hypertension No family hx of      Cerebrovascular Disease No family hx of      Cancer No family hx of      Colon Cancer No family hx of      Anesthesia Reaction  "No family hx of      Deep Vein Thrombosis (DVT) No family hx of      Social History   Social History     Tobacco Use     Smoking status: Never Smoker     Smokeless tobacco: Never Used   Substance Use Topics     Alcohol use: No     Drug use: No      Past medical history, past surgical history, medications, allergies, family history, and social history were reviewed with the patient. No additional pertinent items.       Review of Systems   10 point review of symptoms was performed and is negative except as noted above.   Physical Exam   BP: (!) 137/91  Pulse: 65  Temp: 98.2  F (36.8  C)  Resp: 16  Height: 157.5 cm (5' 2\")  Weight: 54.4 kg (120 lb)  SpO2: 100 %  Physical Exam  GEN: Chronically ill-appearing, non toxic, cooperative and conversant.   HEENT: The head is normocephalic and atraumatic. Pupils are equal round and reactive to light. Extraocular motions are intact. There is no facial swelling. The neck is nontender and supple.   CV: Regular rate and rhythm . 2+ radial pulses bilaterally.  PULM: Clear to auscultation bilaterally.  ABD: Soft, suprapubic tenderness to palpation, nondistended.   EXT: Full range of motion.  No edema.  NEURO: Cranial nerves II through XII are intact and symmetric. Bilateral upper and lower extremities grossly show full range of motion without any focal deficits.   PSYCH: Calm and cooperative, interactive.     ED Course      Procedures         Results for orders placed or performed during the hospital encounter of 09/10/21   CT Abdomen Pelvis w Contrast     Status: None    Narrative    EXAM: CT ABDOMEN PELVIS W CONTRAST  LOCATION: [facility]  DATE/TIME: 9/10/2021 5:31 AM    INDICATION: Lower abdominal pain radiating to the back. History of gastric cancer.  COMPARISON: 11/24/2020.  TECHNIQUE: CT scan of the abdomen and pelvis was performed following injection of IV contrast. Multiplanar reformats were obtained. Dose reduction techniques " were used.  CONTRAST: 75 mL Isovue-370.    FINDINGS:   LOWER CHEST: Small left pleural effusion and associated dependent atelectasis.    HEPATOBILIARY: The gallbladder is very distended. No calcified gallstone.    PANCREAS: There is an irregularly-shaped low-attenuation mass posterior to the body of the pancreas measuring approximately 2.1 x 3.8 x 2.6 cm and consistent with a lymph node. The pancreas otherwise appears normal.    SPLEEN: Normal.    ADRENAL GLANDS: Normal.    KIDNEYS/BLADDER: There is mild dilatation of right renal collecting system as well as a delayed nephrogram on the right consistent with obstruction. No cause of obstruction is seen.    BOWEL: Stomach is not well-distended but there appears to be wall thickening of the distal stomach. No bowel obstruction or inflammation. The appendix is normal.    LYMPH NODES: There are multiple enlarged upper abdominal and retroperitoneal lymph nodes. A node along the lesser curve of the stomach measures 2.5 x 2.3 cm.    VASCULATURE: Unremarkable.    PELVIC ORGANS: Normal.    MUSCULOSKELETAL: Normal.      Impression    IMPRESSION:   1.  Wall thickening of the distal stomach consistent with the history of gastric cancer. There are multiple enlarged upper abdominal and retroperitoneal lymph nodes consistent with metastases and significantly progressed since the previous exam.  2.  Right hydronephrosis and delayed nephrogram consistent with obstruction. The cause of obstruction is not evident.  3.  Small left pleural effusion and associated atelectasis.  4.  No bowel obstruction or inflammation.              CBC with Platelets & Differential     Status: Abnormal    Narrative    The following orders were created for panel order CBC with Platelets & Differential.  Procedure                               Abnormality         Status                     ---------                               -----------         ------                     CBC with platelets and  oscar..[[id number]]  Abnormal            Final result                 Please view results for these tests on the individual orders.   Basic metabolic panel     Status: Abnormal   Result Value Ref Range    Sodium 134 133 - 144 mmol/L    Potassium 4.5 3.4 - 5.3 mmol/L    Chloride 99 94 - 109 mmol/L    Carbon Dioxide (CO2) 26 20 - 32 mmol/L    Anion Gap 9 3 - 14 mmol/L    Urea Nitrogen 20 7 - 30 mg/dL    Creatinine 1.24 0.66 - 1.25 mg/dL    Calcium 9.1 8.5 - 10.1 mg/dL    Glucose 90 70 - 99 mg/dL    GFR Estimate 59 (L) >60 mL/min/1.73m2   Hepatic function panel     Status: Abnormal   Result Value Ref Range    Bilirubin Total 0.2 0.2 - 1.3 mg/dL    Bilirubin Direct 0.1 0.0 - 0.2 mg/dL    Protein Total 8.1 6.8 - 8.8 g/dL    Albumin 3.1 (L) 3.4 - 5.0 g/dL    Alkaline Phosphatase 68 40 - 150 U/L    AST 13 0 - 45 U/L    ALT 14 0 - 70 U/L   Lipase     Status: Normal   Result Value Ref Range    Lipase 102 73 - 393 U/L   Extra Blue Top Tube     Status: None   Result Value Ref Range    Hold Specimen JIC    Extra Red Top Tube     Status: None   Result Value Ref Range    Hold Specimen JIC    CBC with platelets and differential     Status: Abnormal   Result Value Ref Range    WBC Count 8.7 4.0 - 11.0 10e3/uL    RBC Count 2.80 (L) 4.40 - 5.90 10e6/uL    Hemoglobin 6.8 (LL) 13.3 - 17.7 g/dL    Hematocrit 22.9 (L) 40.0 - 53.0 %    MCV 82 78 - 100 fL    MCH 24.3 (L) 26.5 - 33.0 pg    MCHC 29.7 (L) 31.5 - 36.5 g/dL    RDW 14.6 10.0 - 15.0 %    Platelet Count 502 (H) 150 - 450 10e3/uL    % Neutrophils 69 %    % Lymphocytes 17 %    % Monocytes 12 %    % Eosinophils 1 %    % Basophils 1 %    % Immature Granulocytes 0 %    NRBCs per 100 WBC 0 <1 /100    Absolute Neutrophils 6.0 1.6 - 8.3 10e3/uL    Absolute Lymphocytes 1.5 0.8 - 5.3 10e3/uL    Absolute Monocytes 1.0 0.0 - 1.3 10e3/uL    Absolute Eosinophils 0.1 0.0 - 0.7 10e3/uL    Absolute Basophils 0.1 0.0 - 0.2 10e3/uL    Absolute Immature Granulocytes 0.0 <=0.0 10e3/uL    Absolute NRBCs  0.0 10e3/uL   Lactic acid whole blood     Status: Abnormal   Result Value Ref Range    Lactic Acid 0.6 (L) 0.7 - 2.0 mmol/L   Asymptomatic COVID-19 Virus (Coronavirus) by PCR Nasopharyngeal     Status: Normal    Specimen: Nasopharyngeal; Swab   Result Value Ref Range    SARS CoV2 PCR Negative Negative    Narrative    Testing was performed using the Xpert Xpress SARS-CoV-2 Assay on the  Cepheid Gene-Xpert Instrument Systems. Additional information about  this Emergency Use Authorization (EUA) assay can be found via the Lab  Guide. This test should be ordered for the detection of SARS-CoV-2 in  individuals who meet SARS-CoV-2 clinical and/or epidemiological  criteria. Test performance is unknown in asymptomatic patients. This  test is for in vitro diagnostic use under the FDA EUA for  laboratories certified under CLIA to perform high complexity testing.  This test has not been FDA cleared or approved. A negative result  does not rule out the presence of PCR inhibitors in the specimen or  target RNA in concentration below the limit of detection for the  assay. The possibility of a false negative should be considered if  the patient's recent exposure or clinical presentation suggests  COVID-19. This test was validated by the [facility]. This laboratory is certified under  the Clinical Laboratory Improvement Amendments of 1988 (CLIA-88) as  qualified to perform high complexity laboratory testing.     Extra Purple Top Tube     Status: None   Result Value Ref Range    Hold Specimen JIC    INR     Status: Normal   Result Value Ref Range    INR 1.10 0.85 - 1.15   [place] Draw     Status: None    Narrative    The following orders were created for panel order [place] Draw.  Procedure                               Abnormality         Status                     ---------                               -----------         ------                     Extra Blue Top Tube[[id number]]                               Final result               Extra Red Top Tube[[id number]]                               Final result                 Please view results for these tests on the individual orders.   ABO/Rh type and screen *Canceled*     Status: None ()    Narrative    The following orders were created for panel order ABO/Rh type and screen.  Procedure                               Abnormality         Status                     ---------                               -----------         ------                       Please view results for these tests on the individual orders.   ABO/Rh type and screen *Canceled*     Status: None ()    Narrative    The following orders were created for panel order ABO/Rh type and screen.  Procedure                               Abnormality         Status                     ---------                               -----------         ------                     Adult Type and Screen[[id number]]                                                         Please view results for these tests on the individual orders.   [place] Draw     Status: None    Narrative    The following orders were created for panel order [place] Draw.  Procedure                               Abnormality         Status                     ---------                               -----------         ------                     Extra Purple Top Tube[[id number]]                            Final result                 Please view results for these tests on the individual orders.     Medications   HYDROmorphone (DILAUDID) injection 1 mg (1 mg Intravenous Given 9/10/21 0606)   lidocaine 1 % 0.1-1 mL (has no administration in time range)   lidocaine (LMX4) cream (has no administration in time range)   sodium chloride (PF) 0.9% PF flush 3 mL (has no administration in time range)   sodium chloride (PF) 0.9% PF flush 3 mL (has no administration in time range)   melatonin tablet 1 mg (has no administration in time range)    ondansetron (ZOFRAN-ODT) ODT tab 4 mg (has no administration in time range)   pantoprazole (PROTONIX) IV push injection 40 mg (has no administration in time range)   acetaminophen (TYLENOL) tablet 650 mg (has no administration in time range)   HYDROmorphone (DILAUDID) injection 0.2 mg (has no administration in time range)   iopamidol (ISOVUE-370) solution 75 mL (75 mLs Intravenous Given 9/10/21 0622)   sodium chloride (PF) 0.9% PF flush 70 mL (70 mLs Intravenous Given 9/10/21 0622)   pantoprazole (PROTONIX) IV push injection 40 mg (40 mg Intravenous Given 9/10/21 0554)        Assessments & Plan (with Medical Decision Making)   66-year-old male with history of metastatic gastric cancer as described, stage IV.  Clinically somewhat ill-appearing and weak lightheaded progressively severe worsening symptoms.  DDx includes uremia, CHF, dysrhythmia, sepsis, UTI, pneumonia, dehydration, anemia, GI bleed, among other causes    Laboratories notable for hemoglobin of 6.8 consistent with suspicion of GI bleed with his history.  CT scan was performed to evaluate for any internal bleeding and none was identified, however extensive metastatic disease was seen.  Creatinine 1.24.  Lactic acid 0.6.    Patient was ordered 2 units of blood to be transfused for GI bleed.  Additionally given Protonix 40 mg IV x1    Discussed and admitted to internal medicine    I have reviewed the nursing notes. I have reviewed the findings, diagnosis, plan and need for follow up with the patient.    New Prescriptions    No medications on file       Final diagnoses:   Gastrointestinal hemorrhage with melena   Metastasis from gastric cancer (H)   Anemia, unspecified type       --  Victorino Alfaro MD    [facility] EMERGENCY DEPARTMENT  9/10/2021     Victorino Alfaro MD  09/10/21 0802

## 2021-09-10 NOTE — ANESTHESIA CARE TRANSFER NOTE
Patient: Essie Guzman    Procedure(s):  CYSTOSCOPY, WITH right URETERAL STENT INSERTION, Possible retrograde pyleogram    Diagnosis: Hydronephrosis [N13.30]  Diagnosis Additional Information: No value filed.    Anesthesia Type:   General     Note:    Oropharynx: oropharynx clear of all foreign objects  Level of Consciousness: drowsy  Oxygen Supplementation: face mask    Independent Airway: airway patency satisfactory and stable  Dentition: dentition unchanged  Vital Signs Stable: post-procedure vital signs reviewed and stable  Report to RN Given: handoff report given  Patient transferred to: PACU    Handoff Report: Identifed the Patient, Identified the Reponsible Provider, Reviewed the pertinent medical history, Discussed the surgical course, Reviewed Intra-OP anesthesia mangement and issues during anesthesia, Set expectations for post-procedure period and Allowed opportunity for questions and acknowledgement of understanding      Vitals:  Vitals Value Taken Time   BP     Temp     Pulse 72 09/10/21 1501   Resp     SpO2 100 % 09/10/21 1501   Vitals shown include unvalidated device data.    Electronically Signed By: Hannah Herring MD  September 10, 2021  3:03 PM

## 2021-09-10 NOTE — ED TRIAGE NOTES
Per daughter as  for triage, pt was diagnosed with gastric cancer last fall, not currently under any treatment plan but followed by oncology, reports lower abdominal pain that radiates to his back that is new as of yesterday morning with no appetite. Denies N/V. Last BM yesterday morning.

## 2021-09-10 NOTE — OR NURSING
Urology service paged for Admission/Transfer orders. Per MD, patient will be admitted per Medicine service, not Urology.    Page sent to Dr. Becerra of Medicine department:    TRACI Guzman in PACU bay 22, is he going to be admitted? Per Urology, Medicine is admitting patient if needed. If so, needs Admit/Transfer orders.

## 2021-09-11 NOTE — PROGRESS NOTES
CLINICAL NUTRITION SERVICES - ASSESSMENT NOTE     Nutrition Prescription    RECOMMENDATIONS FOR MDs/PROVIDERS TO ORDER:  --Strongly recommend starting enteral nutrition vs TPN due to severe malnutrition with 32% weight loss x 1 year (if aligns with GOC/patient's wishes).        --Recommend pt consistently consume at least baseline needs (1620 kcal and 70 g pro daily). If unable to meet this goal, recommend more aggressive nutrition interventions as above.     --Pt is at very high refeeding syndrome risk. Recommend monitoring K+, Mg++, and Phos closely with high electrolyte protocol in place.     --Recommend starting an appetite stimulant if appropriate (re: Remeron, Marinol, or Megace).     Malnutrition Status:    Severe malnutrition in the context of acute on chronic illness    Recommendations already ordered by Registered Dietitian (RD):  --Add-on Mg++ and Phos.   --Thera-vit-M daily.   --Thiamine* daily.   --Calorie counts.   --Ensure Clear Apple BID, Vanilla WebPT BID.     Future/Additional Recommendations:  --If EN becomes POC:  --GOAL: WebPT Peptide 1.5 @ 55 mL/hr (1320 mL/day) to provide 2030 kcal (37 kcal/kg), 98 g protein (1.8 g/kg), 182 g CHO, 12 g fiber, 102 g fat (40% from MCTs), and 924 mL free water daily   --Start TF @ 15 ml/hr and advance by 10 ml q 12 hrs until goal rate.  --Do not start or advance TF rate unless K+ >3.0, Mg++ > 1.5,  and Phos > 1.9.  --Minimum 30 ml q 4 hrs water flushes for tube patency.  --If gastric enteral access: HOB > 30 degrees    --If TPN becomes POC:  --Ensure central venous access  --Use dosing weight of 54 kg  --Start TPN - volume minimal per PharmD with initial 90g Dex (GIR 1.1 mg/kg/min), 95g AA + 250 ml 20% IV lipids 5x/week.   --Advance PN dex by 30-60g every 1-2 days if K+ >3.0, Mg++ >1.5, and phos >1.9 and BGs stable (per Pharm D/RD discretion) to goal of 210g Dex (GIR 2.7 mg/kg/min) to provide 1451 kcals (27 kcal/kg), 1.8 g PRO/kg, with 25% kcals from  "Fat. Micronutrient supplementation per PharmD.       REASON FOR ASSESSMENT  Essie Guzman is a/an 66 year old male assessed by the dietitian for Provider Order - cancer related cachexia /weight loss.    NUTRITION HISTORY  PMH H pylori and gastric cancer (diagnosed November 2020) who presents for 3 weeks lower abdominal/pelvic pain, nausea and weakness, found to have hemoglobin of 6.8 and CT abdomen showing hydronephrosis with significant progression of metastatic disease.     Per H&P, \"Patient has been followed by oncology but did not want to start chemo in February 2021 when it was discussed.  Over the last year patient estimates he has lost 70 to 80 pounds and his family agrees with this estimation [also supported by redundant soft tissue of abdomen. Likely secondary to mestatic gastric cancer, though has also has had trouble swallowing meat, poor appetite and recent nausea].\" Per MD note, more so pt does not like the taste of meat lately.     Pt is not familiar to Clinical Nutrition Services. Visited with pt and his youngest daughter at bedside (daughter provides some translation for pt). Both report pt was eating well and started intentionally losing weight in August 2020 (about 10 lb) by choosing healthy options (eating less meat and stopped drinking pop). Since cancer diagnosis, pt has lost a significant amount of weight (>30% x 1 year). Pt reports not liking meat and he does not tolerate dairy (suspected lactose intolerance). Pt tried drinking Ensure but it made him feel sick. Pt agreeable to try Ensure Clear and [employer] (plant-based). Discussed the importance of nutrition (kcal/protein), eating small/frequent meals/snacks. Also discussed additional options re: appetite stimulant, enteral nutrition, TPN. Pt declined an appetite stimulant now, but \"maybe later.\" Paged provider with recommendations.     CURRENT NUTRITION ORDERS  Diet: Regular  Intake/Tolerance: no PO intake documented  SLP eval 9/11: No " "oropharyngeal dysphagia. Pt is appropriate to continue a regular diet and thin liquids with general safe swallow precautions.     LABS  K+ 4.9 (WNL)  Cr 1.03 (WNL)  No Mg++ or Phos    MEDICATIONS  Iron sucrose    ANTHROPOMETRICS  Height: 157.5 cm (5' 2\")  Most Recent Weight: 54.4 kg (120 lb)    IBW: 53.6 kg  BMI: Normal BMI  Weight History: 32% weight loss x 1 year - severe  Wt Readings from Last 30 Encounters:   09/10/21 54.4 kg (120 lb)   12/22/20 77.1 kg (170 lb)   12/17/20 77.1 kg (170 lb)   10/23/20 77.5 kg (170 lb 14.4 oz)   09/21/20 78.8 kg (173 lb 12.8 oz)   09/08/20 80.3 kg (177 lb)   06/05/19 97.5 kg (215 lb)   01/16/19 95 kg (209 lb 8 oz)   06/28/18 97 kg (213 lb 13.5 oz)   03/06/18 96.6 kg (213 lb)   01/22/18 94.8 kg (208 lb 14.4 oz)   11/24/15 86.2 kg (190 lb)   10/16/15 87.5 kg (193 lb)   10/11/15 81.6 kg (180 lb)   04/08/14 93 kg (205 lb)     Dosing Weight: 54 kg admission weight    ASSESSED NUTRITION NEEDS  Estimated Energy Needs: 0220-0544 kcals/day (35 - 40 kcals/kg)  Justification: Increased needs, Repletion and Underweight        --If TPN, recommend 7892-7418 kcal/day (25-30 kcal/kg)  Estimated Protein Needs: 70-97+ grams protein/day (1.3 - 1.8+ grams of pro/kg)  Justification: Increased needs and Repletion  Estimated Fluid Needs: (1 mL/kcal)   Justification: Maintenance and Per provider pending fluid status    PHYSICAL FINDINGS  See malnutrition section below.    MALNUTRITION  % Intake: </=75% for >/= 1 month (severe)  % Weight Loss: > 30% in 1 year (severe)  Subcutaneous Fat Loss: Facial region:  severe, Upper arm:  severe, Lower arm:  Moderate to severe and Thoracic/intercostal:  Moderate to severe  Muscle Loss: Temporal:  severe, Thoracic region (clavicle, acromium bone, deltoid, trapezius, pectoral):  severe, Upper arm (bicep, tricep):  severe, Lower arm  (forearm):  severe and Dorsal hand:  Moderate to severe  *did not observe LE.   Fluid Accumulation/Edema: None noted  Malnutrition " Diagnosis: Severe malnutrition in the context of acute on chronic illness    NUTRITION DIAGNOSIS  Malnutrition related to poor appetite, suspected hypermetabolism 2/2 diagnosis as evidenced by severe 32% weight loss x 1 year with severe muscle/fat wasting on exam and pt report of poor PO intake.       INTERVENTIONS  Implementation  Nutrition education for recommended modifications   Calorie counts  MVI and Thiamine  Collaboration with other providers - paged provider with recs  Enteral Nutrition - recs  Medical food supplement therapy  Parenteral Nutrition/IV Fluids - recs     Goals  Total avg nutritional intake to meet a minimum of 30-35 kcal/kg and 1.3 g PRO/kg daily (per dosing wt 54 kg).     Monitoring/Evaluation  Progress toward goals will be monitored and evaluated per protocol.    Chayo Brown, MS, RD, LD, CNSC  Weekend Pager: [phone]  5C/BMT pager: [phone]

## 2021-09-11 NOTE — PROGRESS NOTES
09/11/21 1032   General Information   Onset of Illness/Injury or Date of Surgery 09/10/21   Referring Physician Homar Becerra MD   Patient/Family Therapy Goal Statement (SLP) none stated   Pertinent History of Current Problem Essie Guzman is a 66 year old male PMH history of H pylori and gastric cancer who presents for 3 weeks lower abdominal/pelvic pain, nausea and weakness, found to have hemoglobin of 6.8 and CT abdomen showing hydronephrosis with significant progression of metastatic disease.    General Observations Pt is alert and agreeable. Accompanied by his daughter who assists in interpretation. Pt denies any difficulty swallowing with the exception of meats. After further questioning pt and his daughter state, it isn't that he is unable to swallow meat but rather he doesn't have the desire. He states secondary to his medical condition and treatment he has lost his apetite to certain things, primarily meat    Past History of Dysphagia None    Type of Evaluation   Type of Evaluation Swallow Evaluation   Oral Motor   Oral Musculature generally intact   Structural Abnormalities none present   Mucosal Quality adequate   Dentition (Oral Motor)   Dentition (Oral Motor) dental appliance/dentures   Dental Appliance/Denture (Oral Motor) upper and lower   Facial Symmetry (Oral Motor)   Facial Symmetry (Oral Motor) WNL   Lip Function (Oral Motor)   Lip Range of Motion (Oral Motor) WNL   Tongue Function (Oral Motor)   Tongue ROM (Oral Motor) WNL   Vocal Quality/Secretion Management (Oral Motor)   Vocal Quality (Oral Motor) WFL   Secretion Management (Oral Motor) WNL   General Swallowing Observations   Current Diet/Method of Nutritional Intake (General Swallowing Observations, NIS) regular diet;thin liquids (level 0)   Respiratory Support (General Swallowing Observations) none   Swallowing Evaluation Clinical swallow evaluation   Clinical Swallow Evaluation   Feeding Assistance no assistance needed   Clinical  Swallow Evaluation Textures Trialed thin liquids;pureed;solid foods   Clinical Swallow Eval: Thin Liquid Texture Trial   Mode of Presentation, Thin Liquids cup;self-fed   Volume of Liquid or Food Presented 4 oz    Oral Phase of Swallow WFL   Pharyngeal Phase of Swallow intact   Diagnostic Statement No overt s/sx of aspiration    Clinical Swallow Evaluation: Puree Solid Texture Trial   Mode of Presentation, Puree spoon;self-fed   Volume of Puree Presented 2 oz    Oral Phase, Puree WFL   Pharyngeal Phase, Puree intact   Diagnostic Statement No overt s/sx of aspiration    Clinical Swallow Evaluation: Solid Food Texture Trial   Mode of Presentation self-fed   Volume Presented 2 crackers    Oral Phase WFL   Pharyngeal Phase intact   Diagnostic Statement Adequate oral manipulation, no overt s/sx of aspiration. Pt denies any difficulty, pain, or globus sensation    Esophageal Phase of Swallow   Patient reports or presents with symptoms of esophageal dysphagia Yes   Swallowing Recommendations   Diet Consistency Recommendations regular diet;thin liquids (level 0)   Supervision Level for Intake patient independent   Mode of Delivery Recommendations bolus size, small;slow rate of intake   Swallowing Maneuver Recommendations alternate food and liquid intake   Monitoring/Assistance Required (Eating/Swallowing) stop eating activities when fatigue is present   Recommended Feeding/Eating Techniques (Swallow Eval) encourage use of dentures;maintain upright sitting position for eating;maintain upright posture during/after eating for 30 minutes   Medication Administration Recommendations, Swallowing (SLP) As per pt preference    Instrumental Assessment Recommendations instrumental evaluation not recommended at this time   SLP Therapy Assessment/Plan   Comment, Therapy Assessment/Plan (SLP) Pt seen for clinical swallow evaluation. He consumed thin liquids, puree, and regular textured solids with adequate mastication, bolus formation,  and oral clearance. He also consumed these items without overt s/sx of aspiration and he denied any pain or globus sensation. Pt reports that his concern with meat is that he primarily would eat meat often. Now, since his gastric cancer and treatment he has lost his desire to eat meat, unrelated to his actual ability to swallow it. No oropharyngeal dysphagia. Pt is appropriate to continue a regular diet and thin liquids with general safe swallow precautions. No SLP services warranted, recommend nutrition/dietician consult to discuss additional protein options for the patient.     Therapy Plan Review/Discharge Plan (SLP)   Therapy Plan Review (SLP) evaluation/treatment results reviewed;participants voiced agreement with care plan;participants included;patient;daughter   Demonstrates Need for Referral to Another Service (SLP) clinical nutrition services/dietitian   Recommendation for Referral Discussed with MD (SLP) Yes   SLP Discharge Planning    SLP Discharge Recommendation (DC Rec) home   SLP Rationale for DC Rec No SLP services warranted   SLP Brief overview of current status  No oropharyngeal dysphagia. Pt is appropriate to continue a regular diet and thin liquids with general safe swallow precautions. No SLP services warranted, recommend nutrition/dietician consult to discuss additional protein options for the patient.      Total Evaluation Time   Total Evaluation Time (Minutes) 13

## 2021-09-11 NOTE — PLAN OF CARE
"/85 (BP Location: Right arm)   Pulse 64   Temp 98.9  F (37.2  C) (Oral)   Resp 18   Ht 1.575 m (5' 2\")   Wt 54.4 kg (120 lb)   SpO2 99%   BMI 21.95 kg/m    Pt's AVSS, maintaining sat's in the upper 90's and c/o pain, burning and spasm in his bladder. Pt's urine color is still pink with small clots at times. Oxycodone and Dilaudid IV given as ordered. Pt is up independently in room and PIV intact and Sl'd. Pt's last Hgb reads 7.8. Keep monitoring pt as ordered and notify MD with any new changes.   Problem: Adult Inpatient Plan of Care  Goal: Plan of Care Review  9/11/2021 0549 by Wendy Devlin RN  Outcome: No Change  9/10/2021 2247 by Wendy Devlin RN  Outcome: No Change  Goal: Patient-Specific Goal (Individualized)  9/11/2021 0549 by Wendy Devlin RN  Outcome: No Change  9/10/2021 2247 by Wendy Devlin RN  Outcome: No Change  Goal: Absence of Hospital-Acquired Illness or Injury  9/11/2021 0549 by Wendy Devlin RN  Outcome: No Change  9/10/2021 2247 by Wendy Devlin RN  Outcome: No Change  Intervention: Identify and Manage Fall Risk  Recent Flowsheet Documentation  Taken 9/11/2021 0015 by Wendy Devlin RN  Safety Promotion/Fall Prevention: activity supervised  Taken 9/10/2021 1648 by Wendy Devlin RN  Safety Promotion/Fall Prevention: activity supervised  Intervention: Prevent Skin Injury  Recent Flowsheet Documentation  Taken 9/11/2021 0015 by Wendy Devlin RN  Body Position: position changed independently  Taken 9/10/2021 1648 by Wendy Devlin RN  Body Position: position changed independently  Intervention: Prevent and Manage VTE (Venous Thromboembolism) Risk  Recent Flowsheet Documentation  Taken 9/11/2021 0015 by Wendy Devlin RN  VTE Prevention/Management: ambulation promoted  Taken 9/10/2021 1648 by Wendy Devlin RN  VTE Prevention/Management: ambulation promoted  Goal: Optimal Comfort and Wellbeing  9/11/2021 0549 by Quita" Wendy DYKES RN  Outcome: No Change  9/10/2021 2247 by Wendy Devlin RN  Outcome: No Change  Goal: Readiness for Transition of Care  9/11/2021 0549 by Wendy Devlin RN  Outcome: No Change  9/10/2021 2247 by Wendy Devlin RN  Outcome: No Change     Problem: Adult Inpatient Plan of Care  Goal: Optimal Comfort and Wellbeing  9/11/2021 0549 by Wendy Devlin RN  Outcome: No Change  9/10/2021 2247 by Wendy Devlin RN  Outcome: No Change     Problem: Adult Inpatient Plan of Care  Goal: Readiness for Transition of Care  9/11/2021 0549 by Wendy Devlin RN  Outcome: No Change  9/10/2021 2247 by Wendy Devlin RN  Outcome: No Change

## 2021-09-11 NOTE — PLAN OF CARE
"/86   Pulse 70   Temp 99.4  F (37.4  C) (Oral)   Resp 18   Ht 1.575 m (5' 2\")   Wt 54.4 kg (120 lb)   SpO2 90%   BMI 21.95 kg/m    Pt's new admit to unit from PACU s/p Cystoscopy with right ureteral stent placement. Pt come to the unit on liter and daughter at bedside. Pt was able to walk to bathroom with standby assist and voiding via urinal. Pt continue to have pinkish color urine output and c/o pain when voiding. MD was notified and PRN pain medication was ordered. X1 dose of IV dilaudid and oral oxycodone given. Pt stated pain at comfortable level. PIV intact and Sl'd. Pt is npo except for medication and ice-chips. Double skin check done by ADIN Schneider. Keep monitoring pt as ordered and notified MD with any new changes.   Problem: Adult Inpatient Plan of Care  Goal: Plan of Care Review  Outcome: No Change  Goal: Patient-Specific Goal (Individualized)  Outcome: No Change  Goal: Absence of Hospital-Acquired Illness or Injury  Outcome: No Change  Intervention: Identify and Manage Fall Risk  Recent Flowsheet Documentation  Taken 9/10/2021 1648 by Wendy Devlin RN  Safety Promotion/Fall Prevention: activity supervised  Intervention: Prevent Skin Injury  Recent Flowsheet Documentation  Taken 9/10/2021 1648 by Wendy Devlin RN  Body Position: position changed independently  Intervention: Prevent and Manage VTE (Venous Thromboembolism) Risk  Recent Flowsheet Documentation  Taken 9/10/2021 1648 by Wendy Devlin RN  VTE Prevention/Management: ambulation promoted  Goal: Optimal Comfort and Wellbeing  Outcome: No Change  Goal: Readiness for Transition of Care  Outcome: No Change     Problem: Adult Inpatient Plan of Care  Goal: Optimal Comfort and Wellbeing  Outcome: No Change     Problem: Adult Inpatient Plan of Care  Goal: Readiness for Transition of Care  Outcome: No Change     "

## 2021-09-11 NOTE — PROGRESS NOTES
[facility]    Medicine Progress Note - Hospitalist Service, Gold 8       Date of Admission:  9/10/2021    Assessment & Plan          Essie Guzman is a 66 year old male PMH history of H pylori and gastric cancer who presents for 3 weeks lower abdominal/pelvic pain, nausea and weakness, found to have hemoglobin of 6.8 and CT abdomen showing hydronephrosis with significant progression of metastatic disease.     Todays encounter completed with daughter as  per preference of the patient,  offered    Interval Changes:  -IV iron  -advance diet  -SLP consult  -switch PPI to PO BID    #Iron deficiency anemia, chronic blood loss anemia from metastatic gastric cancer  #GI bleed  -Hgb 6.8 on admission, likely in setting of untreated gastric cancer, hemodynamically stable  -s/p 2 units pRBC in ER  -Ferritin < 20  Plan:  >GI consulted, given clinical stability, no melena holding on endoscopy as unlikely to be able to intervene on cancer associated oozing  >CBC daily  >IV iron    #Right hydrnonephrosis secondary to malignant obstruction  -CT abd/pelvis (9/11) - with right hydronephrosis and retropertioneal adenopathy as likely cause  -underwent right ureteral stenting on 9/10 with urology  Plan:  >Urology consulted  >flomax  >[place] BID prn for bladder spasms  >Follow up in urology clinic    #Metastatic gastric cancer  -advanced gastric cancer MSI high  -patient declined single agent pembrolizumab for treatemtn on 5/21  Plan:  >Oncology consulted, appreciate assistance  >Either folfox+ nivolumab vs FOLFOX +pembrolizumab     #Dysphagia  -Patient reported difficulty eeating meat on admission, on repeat interview he states his taste for meat is changed and he doesn't want it rather than it gets stuck  Plan:  >SLP consult  >If concern for obstruction will pursue upper GI series    #Cancer related weight loss - nutrition consult    Malnutrition:    - Level of  malnutrition: Severe   - Based on: weight loss, reduced intake, moderate/severe subcutaneous fat loss, moderate/severe muscle loss       Diet: Regular Diet Adult    DVT Prophylaxis: concern for active GI bleed   Hamm Catheter: Not present  Central Lines: None  Code Status: Full Code      Disposition Plan   Expected discharge:  2-3 days recommended to prior living arrangement once adequate pain management/ tolerating PO medications and hemoglobin stable.     The patient's care was discussed with the Patient and Patient's Family.    Homar Becerra MD  Hospitalist Service, [address]  Securely message with the Vocera Web Console (learn more here)  Text page via [facility] Paging/Directory  Please see sign in/sign out for up to date coverage information    Clinically Significant Risk Factors Present on Admission                   ______________________________________________________________________    Interval History   Patient states he is feeling much better today. He reports that he has not had any hematochezia or melena. No fever or chills. No chest pain. No shortness of breath. Reports that his issues with eating meat are he will chew it but not want to swallow. He does not feel as though it actually get stucks. He is not having post prandial nausea or vomiting.     Data reviewed today: I reviewed all medications, new labs and imaging results over the last 24 hours. I personally reviewed no images or EKG's today.    Physical Exam   Vital Signs: Temp: 98.9  F (37.2  C) Temp src: Oral BP: 123/85 Pulse: 64   Resp: 18 SpO2: 99 % O2 Device: None (Room air) Oxygen Delivery: 10 LPM  Weight: 120 lbs 0 oz  Constitutional: alert, orietned, no acute distress  ENT: supple, no JVD  Respiratory: CTAB, no wheezing or crackles  Cardiovascular: RRR, normal s1, s2, no murmur or rub  GI: soft, non-tender, non-distended, bowel sounds present  Skin: no rash  Musculoskeletal: no  lower extremity edema    Data   Recent Labs   Lab 09/11/21  0643 09/10/21  1932 09/10/21  1333 09/10/21  1248 09/10/21  0448   WBC 5.9  --   --   --  8.7   HGB 7.5* 7.8* 7.5*  --  6.8*   MCV 79  --   --   --  82   *  --   --   --  502*   INR  --   --   --   --  1.10     --   --   --  134   POTASSIUM 4.9  --   --   --  4.5   CHLORIDE 100  --   --   --  99   CO2 27  --   --   --  26   BUN 17  --   --   --  20   CR 1.03  --   --   --  1.24   ANIONGAP 6  --   --   --  9   HAWA 9.0  --   --   --  9.1   *  --   --  77 90   ALBUMIN 2.9*  --   --   --  3.1*   PROTTOTAL 7.6  --   --   --  8.1   BILITOTAL 0.4  --   --   --  0.2   ALKPHOS 66  --   --   --  68   ALT 12  --   --   --  14   AST 11  --   --   --  13   LIPASE  --   --   --   --  102

## 2021-09-11 NOTE — PLAN OF CARE
VSS. Patient reports abdominal pain. IV Dilaudid x3 per patient request. Eating well. Urine with slight pink tinge. Patient denies bladder spasms. Reports irritation in urethra with urination. Patient received IV iron infusion and tolerated well. Patient eating well, mostly soft foods. Continue plan of care.    Problem: Adult Inpatient Plan of Care  Goal: Plan of Care Review  Outcome: No Change     Problem: Adult Inpatient Plan of Care  Goal: Absence of Hospital-Acquired Illness or Injury  Outcome: No Change     Problem: Adult Inpatient Plan of Care  Goal: Optimal Comfort and Wellbeing  Outcome: No Change     Problem: Adult Inpatient Plan of Care  Goal: Readiness for Transition of Care  Outcome: No Change     Problem: Pain Acute  Goal: Acceptable Pain Control and Functional Ability  Outcome: No Change

## 2021-09-11 NOTE — PLAN OF CARE
[id number]  VSS, A&Ox4. Up independently. Voiding, dark kirsten urine. No BM. PIV SL'd. On regular diet with brielle counts. Abd pain controlled with IV dilaudid.

## 2021-09-11 NOTE — PROGRESS NOTES
"Urology  Progress Note    NAEO  + bladder spasms overnight, some burning w/ urination  Pain well controlled, feeling better this AM     Exam  /85 (BP Location: Right arm)   Pulse 64   Temp 98.9  F (37.2  C) (Oral)   Resp 18   Ht 1.575 m (5' 2\")   Wt 54.4 kg (120 lb)   SpO2 99%   BMI 21.95 kg/m    No acute distress  Unlabored breathing  Abdomen soft, nontender, nondistended.    /800 + unmeasured    Labs  WBC 5.9  Hgb 7.5 (6.8)  Cr 1.03 (1.24)      Assessment/Plan    Essie Guzman is a 66 year old male with metastatic gastric cancer presenting with progressive malignancy, weight loss, anemia, and new right hydronephrosis presumed 2/2 malignant obstruction who is now POD 1 s/p Right ureteral stent insertion.    - Recommend B&O suppository bid prn for bladder spasms while inpatient  - Recommend pyridium for dysuria and can send with short course (if appropriate when considering other comorbidities)  - Continue Flomax for stent discomfort on discharge  - Will arrange for follow up w/ Urology for management of this ureteral obstruction - diagnostic URS vs stent exchanges  - Urology to sign off, please page Urology on call for any questions/concerns    Seen and examined with the chief resident. Will discuss with Dr. Elder Sagastume MD, PGY-2  Urology Resident  [phone]     Contacting the Urology Team     Please use the following job codes to reach the Urology Team. Note that you must use an in house phone and that job codes cannot receive text pages.     On weekdays, dial [phone] (or star-star-star [phone] on the new CellTran telephones) then [phone] to reach the Adult Urology resident or PA on call    On weekdays, dial [phone] (or star-star-star [phone] on the new CellTran telephones) then [phone] to reach the Pediatric Urology resident    On weeknights and weekends, dial [phone] (or star-star-star [phone] on the new Cell[employer] telephones) then [phone] to reach the Urology resident on call (for both Adult and Pediatrics)              "

## 2021-09-11 NOTE — DISCHARGE INSTRUCTIONS
" Activity  - Do not strain with bowel movements.  - Do not drive until you can press the brake pedal quickly and fully without pain.   - Do not operate a motor vehicle while taking narcotic pain medications.     Stents  - You have a right ureteral stent in place. Stents can cause some discomfort, including some blood in your urine (which is normal), the feeling or urge to urinate frequently, burning with urination and pressure/discomfort in your back while voiding. Stay well hydrated to keep your urine as clear as possible.    Medications  - Flomax - to decrease stent discomfort   - Transition from narcotic pain medications to tylenol (acetaminophen) as you are able.  Wean yourself off all pain medications as you are able.  - Some pain medications contain both tylenol (acetaminophen) and a narcotic (Norco, vicodin, percocet), do not take more than 4,000mg of Tylenol (acetaminophen) from all sources in any 24 hour period.  - Narcotics can make you constipated.  Take over the counter fiber (metamucil or benefiber) and stool softeners (miralax, docusate or senna) while taking narcotic pain medications, but stop if you develop diarrhea.  - No driving or operating machinery while taking narcotic pain medications     Follow-Up:  - Follow up with Urology in 2-6 weeks for a clinic visit to plan your next procedure. Our clinic will reach out to schedule this appointment.  - Call your primary care provider to touch base regarding your recent procedure.  - Call or return sooner than your regularly scheduled visit if you develop any of the following: fever (greater than 101.5), uncontrolled pain, uncontrolled nausea or vomiting, as well as increased redness, swelling, or drainage from your wound.     Phone numbers:   - Monday through Friday 8am to 4:30pm: Call [phone] with questions or to schedule or confirm appointment.    - Nights or weekends: call the after hours emergency pager - [phone] and tell the  \"I " "would like to page the Urology Resident on call.\"  - For emergencies, call 911      "

## 2021-09-12 NOTE — PROGRESS NOTES
Calorie Count  Intake recorded for: 9/11  Total Kcals: 0 Total Protein: 0g  Kcals from Hospital Food: 0   Protein: 0g  Kcals from Outside Food (average):0 Protein: 0g  # Meals Recorded: 1 meal ordered, no food intake recorded  # Supplements Recorded: 0

## 2021-09-12 NOTE — PROGRESS NOTES
Gastroenterology Inpatient Sign Off Note    Inpatient GI consults service will sign off. No further recommendations at this time. If primary team has addition questions, please page consult fellow listed in Jose.    Current GI Consult Staff: Francine     Follow up recommendations:   No outpatient GI clinic follow-up indicated. Follow-up with primary care provider at timing determined by discharge physician.    Tolerating diet. Hemoglobin stable.     Frantz Escamilla MD    [facility]  Division of Gastroenterology  [phone]

## 2021-09-12 NOTE — PROVIDER NOTIFICATION
MD #[id number], notified about possible increase oral pain medication dose. Pt would like to try increase dose and decrease taking the IV dose.

## 2021-09-12 NOTE — PLAN OF CARE
"BP (!) 134/92 (BP Location: Right arm)   Pulse 66   Temp 99.3  F (37.4  C) (Oral)   Resp 16   Ht 1.575 m (5' 2\")   Wt 54.4 kg (120 lb)   SpO2 98%   BMI 21.95 kg/m    Pt's BP slightly elevated, with Tmax of 99.3,and continue to c/o increase in bed in the abd. MD notified and oral dose increased. Pt received x1 dose of dilaudid IV and oral oxycodone given q4. Pt is up independently in room and voiding good amount. Pt encourage to walk more and get involve with his care.  Am lab stable with Hgb 7.8, Plt 508, WBC 8.1, Mag 2.3, Phos 3.2, K+ 4.4 and Hgb 7.8. Keep monitoring pt as ordered and notify MD with any new changes.   Problem: Adult Inpatient Plan of Care  Goal: Plan of Care Review  Outcome: No Change  Goal: Patient-Specific Goal (Individualized)  Outcome: No Change  Goal: Absence of Hospital-Acquired Illness or Injury  Outcome: No Change  Intervention: Identify and Manage Fall Risk  Recent Flowsheet Documentation  Taken 9/12/2021 0013 by Wendy Devlin RN  Safety Promotion/Fall Prevention: fall prevention program maintained  Intervention: Prevent Skin Injury  Recent Flowsheet Documentation  Taken 9/12/2021 0013 by Wendy Devlin RN  Body Position: position changed independently  Goal: Optimal Comfort and Wellbeing  Outcome: No Change  Goal: Readiness for Transition of Care  Outcome: No Change     Problem: Pain Acute  Goal: Acceptable Pain Control and Functional Ability  Outcome: No Change  Intervention: Develop Pain Management Plan  Recent Flowsheet Documentation  Taken 9/12/2021 0208 by Wendy Devlin RN  Pain Management Interventions: medication (see MAR)  Taken 9/11/2021 2056 by Wendy Devlin RN  Pain Management Interventions: medication (see MAR)     "

## 2021-09-12 NOTE — PROGRESS NOTES
Hematology / Oncology  Daily Progress Note   Date of Service: 09/12/2021  Patient: Essie Guzman  MRN: [id number]  Admission Date: 9/10/2021  Hospital Day # 2  Cancer Diagnosis: Metastatic Gastric Cancer   Primary Outpatient Oncologist: Brennan Mccarthy  Current Treatment Plan: Not on active Rx currently, Anticipating Pembro alone in future    Summary & Recommendations:   - We will set up Outpatient follow up with  sooner , prior to discharge.  - We discussed with patient and his daughter about the disease progression and treatment options.     Assessment & Plan:   Essie Guzman is a 66 year old male with HPylori and metastatic gastric carcinoma is admitted with generalized weakness, right lower abdominal pain and dizziness. He was found to have acute blood loss anemia from gastric cancer and right hydronephrosis.      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 HER2 by FISH was non-amplified  --dMMR,  deficinet in MLH1 and PMS2 by IHC- MLH1 promoter hyermethylation positive--consistent with sporadic microsatellite unstable tumors    --PD-L1 CPS- 50-60% (TPS 50%)     - Patient has been hesitant to start treatment previously. He was offered chemo+Immunotherapy in March 2021. He was inclined to get treated with single agent Immunotherapy with Pembrolizumab.        Recommendations:   -With the worsening symptoms and overall decline , ideally  would need aggressive chemotherapy with FOLFOX +nivo or FOLFOX +Pembro. But with his poor functional status , he might not be able to tolerate chemotherapy. If that's the case he can get Pembrolizumab as planned .       Oncologic  "History:  Refer to the initial note on 9/10     Patient was seen and plan of care was discussed with attending physician Dr. Diggs.    Thank you for the opportunity to partake in this patient's plan of care. Please do not hesitate to page with questions. We will continue to follow.     Matthew Chauhan MD  Hematology/Oncology/Transplant [facility]   Pager: [phone]  ___________________________________________________________________    Subjective & Interval History:    No acute events noted overnight. Feeling better today, back pain improved. And has a very good appetite today     Physical Exam:    Blood pressure 111/84, pulse 76, temperature 99.3  F (37.4  C), temperature source Oral, resp. rate 16, height 1.575 m (5' 2\"), weight 50.3 kg (111 lb), SpO2 98 %.    General: lying in bed, no acute distress  HEENT: sclera anicteric, EOMI, MMM  Neck: supple, normal ROM  CV: RRR, normal S1/S2, no m/r/g  Resp: CTAB, no wheezing/crackles, normal respiratory effort on ambient air  GI: soft, non-tender, non-distended, bowel sounds present and normoactive  MSK: warm and well-perfused, normal tone  Skin: no rashes on limited exam, no jaundice  Neuro: Alert and interactive, moves all extremities equally, no focal deficits    Labs & Studies: I personally reviewed the following studies:  ROUTINE LABS (Last four results):  CMP  Recent Labs   Lab 09/12/21  0313 09/11/21  0643 09/10/21  1248 09/10/21  0448    133  --  134   POTASSIUM 4.4 4.9  --  4.5   CHLORIDE 100 100  --  99   CO2 28 27  --  26   ANIONGAP 5 6  --  9   * 112* 77 90   BUN 16 17  --  20   CR 0.95 1.03  --  1.24   GFRESTIMATED 82 74  --  59*   HAWA 8.9 9.0  --  9.1   MAG 2.3 2.5*  --   --    PHOS 3.2 4.1  --   --    PROTTOTAL  --  7.6  --  8.1   ALBUMIN  --  2.9*  --  3.1*   BILITOTAL  --  0.4  --  0.2   ALKPHOS  --  66  --  68   AST  --  11  --  13   ALT  --  12  --  14     CBC  Recent Labs   Lab 09/12/21  0313 09/11/21  0643 09/10/21  1932 09/10/21  1333 " 09/10/21  0448   WBC 8.1 5.9  --   --  8.7   RBC 3.23* 3.05*  --   --  2.80*   HGB 7.8* 7.5* 7.8* 7.5* 6.8*   HCT 25.7* 24.0*  --  25.2* 22.9*   MCV 80 79  --   --  82   MCH 24.1* 24.6*  --   --  24.3*   MCHC 30.4* 31.3*  --   --  29.7*   RDW 14.7 14.6  --   --  14.6   * 473*  --   --  502*     INR  Recent Labs   Lab 09/10/21  0448   INR 1.10     Medications list for reference:  Current Facility-Administered Medications   Medication     acetaminophen (TYLENOL) tablet 650 mg     diphenhydrAMINE (BENADRYL) injection 50 mg     EPINEPHrine (ADRENALIN) kit 0.3 mg     famotidine (PEPCID) injection 20 mg     HYDROmorphone (PF) (DILAUDID) injection 0.3-0.5 mg     iron sucrose (VENOFER) 300 mg in sodium chloride 0.9 % 265 mL intermittent infusion     lidocaine (LMX4) cream     lidocaine 1 % 0.1-1 mL     melatonin tablet 1 mg     methylPREDNISolone sodium succinate (solu-MEDROL) injection 125 mg     multivitamin w/minerals (THERA-VIT-M) tablet 1 tablet     naloxone (NARCAN) injection 0.2 mg    Or     naloxone (NARCAN) injection 0.4 mg    Or     naloxone (NARCAN) injection 0.2 mg    Or     naloxone (NARCAN) injection 0.4 mg     ondansetron (ZOFRAN-ODT) ODT tab 4 mg     opium-belladonna (B&O SUPPRETTES) 30-16.2 MG per suppository 0.5 suppository     oxyCODONE (ROXICODONE) tablet 5-10 mg     pantoprazole (PROTONIX) EC tablet 40 mg     sodium chloride (PF) 0.9% PF flush 3 mL     sodium chloride (PF) 0.9% PF flush 3 mL     tamsulosin (FLOMAX) capsule 0.4 mg     thiamine (B-1) tablet 100 mg

## 2021-09-12 NOTE — PLAN OF CARE
Patient eager to return home no new complaints offered. Patient and daughter stated understanding of discharge medications and instructions. They will  his prescriptions in discharge pharmacy. All personal belongings packed by family He left via wheelchair accompanied by daughter.  Problem: Adult Inpatient Plan of Care  Goal: Plan of Care Review  9/12/2021 1403 by Wyatt Bagley, RN  Outcome: Adequate for Discharge  9/12/2021 1402 by Wyatt Bagley, RN  Outcome: No Change  Goal: Patient-Specific Goal (Individualized)  Outcome: Adequate for Discharge  Goal: Absence of Hospital-Acquired Illness or Injury  Outcome: Adequate for Discharge  Intervention: Prevent Skin Injury  Recent Flowsheet Documentation  Taken 9/12/2021 0800 by Wyatt Bagley, RN  Body Position: position changed independently  Goal: Optimal Comfort and Wellbeing  Outcome: Adequate for Discharge  Goal: Readiness for Transition of Care  Outcome: Adequate for Discharge

## 2021-09-15 NOTE — PROGRESS NOTES
MEDICAL ONCOLOGY FOLLOW UP NOTE    PATIENT NAME: Essie Guzman  ENCOUNTER DATE: 9/15/2021    Care Team  Primary Oncologist: Brennan Mccarthy MD    REASON FOR CURRENT VISIT: F/u Metastatic gastric cancer    HISTORY OF PRESENT ILLNESS:  Mr. 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 Initially Rx for H.pylori with Abx and PPI. LFT's were mildly elevated.  11/24/20- UGI endoscopy at [facility] with normal esophagus. Non-bleeding gastric ulcer in pylorus with no stigmata of bleeding.  Non-bleeding duodenal ulcer with no stigmata of bleeding. Biopsy of Stomach, pylorus, - Poorly-differentiated carcinoma; consistent with adenocarcinoma (poorly cohesive type). HER2 by FISH was non-amplified  11/24/20- CT CAP- bilateral hilar lymph nodes are indeterminate (1.4 x 1.0 cm) right infrahilar lymph node. Multiple enlarged retroperitoneal and perigastric lymph nodes (10 mm right paraduodenal lymph node, 11 mm necrotic left para-aortic lymph node and a 10 mm left para-aortic lymph node.  12/16/20-  PET/CT with metastatic disease- Hypermetabolic circumferential ulcerated mass at the gastric antrum, Multiple metastatic hypermetabolic lymph nodes:  adjacent just inferior to the caudate lobe of the liver, Multiple enlarged, centrally necrotic, and hypermetabolic retroperitoneal para-aortic and paracaval lymph nodes.    12/18/20-Saw [facility]- Due to PET CT showing  retroperitoneal lymphadenopathy not a candidate for surgical resection.  12/22/20- EUS staging and biopsy- [id number] - Partially-obstructing cratered pre-pyloric gastric ulcer with a clean ulcer base, fully-circumferential ulcer  Several sub-centimeter malignant appearing round, well-circumscribed, hypoechoic lymph nodes were  visualized along the aorta, from the 2nd portion of  duodenum which corresponds to the hypermetabolic nodes seen on the PET scan.   3/9/21- PET/CT-Overall there has been progression in the number and size of the hypermetabolic periaortic, pericaval, and zuleyma hepatis lymph nodes compared to prior.Relatively unchanged hypermetabolic circumferential ulcerated mass at the gastric antrum.  March through Aug 2021- Declined systemic Rx with pembrolizumab as he was minimally symptomatic  9/10/21 to 9/12/21- [facility] admission for - Iron deficiency anemia from chronic blood loss anemia 2/2 progressive metastatic gastric cancer, Right hydrnonephrosis secondary to malignant obstruction s/p ureteric stenting and ongoing Cancer related weight loss- Rcd 1 unit PRBC  9/10/21- CT abd/pelvis-  Wall thickening of the distal stomach consistent with the history of gastric cancer. There are multiple enlarged upper abdominal and retroperitoneal lymph nodes consistent with metastases and significantly progressed since the previous exam. Right hydronephrosis and delayed nephrogram consistent with obstruction. (There is an irregularly-shaped low-attenuation mass posterior to the body of the pancreas measuring approximately 2.1 x 3.8 x 2.6 cm and consistent with a lymph node.      Interval Hx:  Essie Guzman was with his daughters (Osman) who did the translation.    Patient was recently admitted for generalized weakness, right lower abdominal pain and dizziness. He was found to have acute blood loss anemia from gastric cancer and right hydronephrosis. He got a unit of blood transfusion and Right ureteral stent  placement.    Since discharge from hospital he has been having right lower back and flank pain. Not controlled with tylenol and oxycodone.    He states appetite is good, no nausea or vomiting. No blood in the stool. No diarrhea or constipation. No decreased urine output . No blood in the urine.      He has gradually lost about 20 lbs of weight in the recent times and has a recent decline in his performance status. He is more sleepy and tired most of the time during the day, and mainly from pain in the right flank and lower back.     PAST MEDICAL AND SURGICAL HISTORY:   Active Ambulatory Problems     Diagnosis Date Noted     Malignant neoplasm of stomach metastatic to retroperitoneum (H) 12/21/2020     Gastrointestinal hemorrhage with melena 09/10/2021     Resolved Ambulatory Problems     Diagnosis Date Noted     Hematochezia 10/11/2015     No Additional Past Medical History   No surgeeies    SOCIAL HISTORY:   Social History     Tobacco Use     Smoking status: Never Smoker     Smokeless tobacco: Never Used   Substance Use Topics     Alcohol use: No     Drug use: No   Non-smoker/non-drinker  He is now retired. 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  He lives in [place] with family, sposue, sons and grandkids  He has 6 kids, lives with 2 younger boys, 4 grand kids      FAMILY HISTORY:   Family History   Problem Relation Age of Onset     Asthma No family hx of      Diabetes No family hx of      Hypertension No family hx of      Cerebrovascular Disease No family hx of      Cancer No family hx of      Colon Cancer No family hx of      Anesthesia Reaction No family hx of      Deep Vein Thrombosis (DVT) No family hx of    No family hx any cancer      ALLERGIES: No Known Allergies    CURRENT MEDICATIONS:   Current Outpatient Medications:      acetaminophen (TYLENOL) 325 MG tablet, Take 2 tablets (650 mg) by mouth every 4 hours as needed for mild pain or fever, Disp: 100 tablet, Rfl: 0     ferrous gluconate  (FERGON) 324 (38 Fe) MG tablet, Take 1 tablet (324 mg) by mouth daily (with breakfast), Disp: 100 tablet, Rfl: 1     ondansetron (ZOFRAN-ODT) 4 MG ODT tab, Take 1 tablet (4 mg) by mouth every 6 hours as needed for nausea or vomiting, Disp: 30 tablet, Rfl: 0     ondansetron (ZOFRAN-ODT) 8 MG ODT tab, Take 1 tablet (8 mg) by mouth every 8 hours as needed for nausea, Disp: 20 tablet, Rfl: 1     oxyCODONE (ROXICODONE) 5 MG tablet, Take 1 tablet (5 mg) by mouth every 6 hours as needed for moderate to severe pain, Disp: 60 tablet, Rfl: 0     pantoprazole (PROTONIX) 40 MG EC tablet, Take 1 tablet (40 mg) by mouth daily, Disp: 60 tablet, Rfl: 1     tamsulosin (FLOMAX) 0.4 MG capsule, Take 1 capsule (0.4 mg) by mouth daily, Disp: 30 capsule, Rfl: 1     thiamine (B-1) 100 MG tablet, Take 1 tablet (100 mg) by mouth daily, Disp: 100 tablet, Rfl: 1     opium-belladonna (B&O SUPPRETTES) 30-16.2 MG per suppository, Place 0.5 suppositories rectally 2 times daily as needed for bladder spasms (Patient not taking: Reported on 9/15/2021), Disp: 30 suppository, Rfl: 0    PHYSICAL EXAMINATION:  Vital signs: /84   Pulse 89   Temp 99.3  F (37.4  C) (Oral)   Wt 52.5 kg (115 lb 11.2 oz)   SpO2 99%   BMI 21.16 kg/m      General:  Well appearing adult female in NAD.  Alert and oriented.  Eyes:  No erythema or discharge  Respiratory:  Breathing comfortably on room air.  No wheezing or distress.  Musculoskeletal:  tenderness in the Right flank and lower back ++  Skin:  No visible concerning skin rashes or lesions  Neuro:  No notable tremor and dyskinetic movements.  Psych:  Mood and affect appear normal.     The rest of a comprehensive physical examination is deferred due to PHE (public health emergency) video visit restrictions.    LABORATORY DATA:     CBC RESULTS:   CBC RESULTS:   Recent Labs   Lab Test 10/23/20  1422 09/08/20  1410 10/12/15  0520   WBC 8.9 9.3 7.2   HGB 11.9* 13.0* 13.6   HCT 37.5* 39.4* 41.2   MCV 92 94 92   MCHC  31.7 33.0 33.0   RDW 12.6 12.9 13.0    297 201        Last Comprehensive Metabolic Panel:  Sodium   Date Value Ref Range Status   09/12/2021 133 133 - 144 mmol/L Final   09/11/2021 133 133 - 144 mmol/L Final   09/10/2021 134 133 - 144 mmol/L Final   10/28/2020 136 133 - 144 mmol/L Final   09/08/2020 133 133 - 144 mmol/L Final   10/11/2015 134 133 - 144 mmol/L Final     Potassium   Date Value Ref Range Status   09/12/2021 4.4 3.4 - 5.3 mmol/L Final   09/11/2021 4.9 3.4 - 5.3 mmol/L Final   09/10/2021 4.5 3.4 - 5.3 mmol/L Final   10/28/2020 4.3 3.4 - 5.3 mmol/L Final   09/08/2020 4.1 3.4 - 5.3 mmol/L Final   10/11/2015 4.3 3.4 - 5.3 mmol/L Final     Comment:     Specimen slightly hemolyzed, potassium may be falsely elevated     Chloride   Date Value Ref Range Status   09/12/2021 100 94 - 109 mmol/L Final   09/11/2021 100 94 - 109 mmol/L Final   09/10/2021 99 94 - 109 mmol/L Final   10/28/2020 104 94 - 109 mmol/L Final   09/08/2020 101 94 - 109 mmol/L Final   10/11/2015 103 94 - 109 mmol/L Final     Carbon Dioxide   Date Value Ref Range Status   10/28/2020 28 20 - 32 mmol/L Final   09/08/2020 24 20 - 32 mmol/L Final   10/11/2015 23 20 - 32 mmol/L Final     Carbon Dioxide (CO2)   Date Value Ref Range Status   09/12/2021 28 20 - 32 mmol/L Final   09/11/2021 27 20 - 32 mmol/L Final   09/10/2021 26 20 - 32 mmol/L Final     Anion Gap   Date Value Ref Range Status   09/12/2021 5 3 - 14 mmol/L Final   09/11/2021 6 3 - 14 mmol/L Final   09/10/2021 9 3 - 14 mmol/L Final   10/28/2020 4 3 - 14 mmol/L Final   09/08/2020 8 3 - 14 mmol/L Final   10/11/2015 8 3 - 14 mmol/L Final     Glucose   Date Value Ref Range Status   09/12/2021 105 (H) 70 - 99 mg/dL Final   09/11/2021 112 (H) 70 - 99 mg/dL Final   09/10/2021 90 70 - 99 mg/dL Final   10/28/2020 106 (H) 70 - 99 mg/dL Final   09/08/2020 124 (H) 70 - 99 mg/dL Final   10/11/2015 126 (H) 70 - 99 mg/dL Final     GLUCOSE BY METER POCT   Date Value Ref Range Status   09/10/2021 77  70 - 99 mg/dL Final     Comment:     Dr/RN Notified     Urea Nitrogen   Date Value Ref Range Status   09/12/2021 16 7 - 30 mg/dL Final   09/11/2021 17 7 - 30 mg/dL Final   09/10/2021 20 7 - 30 mg/dL Final   10/28/2020 19 7 - 30 mg/dL Final   09/08/2020 14 7 - 30 mg/dL Final   10/11/2015 25 7 - 30 mg/dL Final     Creatinine   Date Value Ref Range Status   09/12/2021 0.95 0.66 - 1.25 mg/dL Final   09/11/2021 1.03 0.66 - 1.25 mg/dL Final   09/10/2021 1.24 0.66 - 1.25 mg/dL Final   10/28/2020 0.84 0.66 - 1.25 mg/dL Final   09/08/2020 0.89 0.66 - 1.25 mg/dL Final   10/11/2015 0.96 0.66 - 1.25 mg/dL Final     GFR Estimate   Date Value Ref Range Status   09/12/2021 82 >60 mL/min/1.73m2 Final     Comment:     As of July 11, 2021, eGFR is calculated by the CKD-EPI creatinine equation, without race adjustment. eGFR can be influenced by muscle mass, exercise, and diet. The reported eGFR is an estimation only and is only applicable if the renal function is stable.   09/11/2021 74 >60 mL/min/1.73m2 Final     Comment:     As of July 11, 2021, eGFR is calculated by the CKD-EPI creatinine equation, without race adjustment. eGFR can be influenced by muscle mass, exercise, and diet. The reported eGFR is an estimation only and is only applicable if the renal function is stable.   09/10/2021 59 (L) >60 mL/min/1.73m2 Final     Comment:     As of July 11, 2021, eGFR is calculated by the CKD-EPI creatinine equation, without race adjustment. eGFR can be influenced by muscle mass, exercise, and diet. The reported eGFR is an estimation only and is only applicable if the renal function is stable.   10/28/2020 >90 >60 mL/min/[1.73_m2] Final     Comment:     Non  GFR Calc  Starting 12/18/2018, serum creatinine based estimated GFR (eGFR) will be   calculated using the Chronic Kidney Disease Epidemiology Collaboration   (CKD-EPI) equation.     09/08/2020 88 >60 mL/min/[1.73_m2] Final     Comment:     Non  GFR  Calc  Starting 12/18/2018, serum creatinine based estimated GFR (eGFR) will be   calculated using the Chronic Kidney Disease Epidemiology Collaboration   (CKD-EPI) equation.     10/11/2015 80 >60 mL/min/1.7m2 Final     Comment:     Non  GFR Calc     Calcium   Date Value Ref Range Status   09/12/2021 8.9 8.5 - 10.1 mg/dL Final   09/11/2021 9.0 8.5 - 10.1 mg/dL Final   09/10/2021 9.1 8.5 - 10.1 mg/dL Final   10/28/2020 9.0 8.5 - 10.1 mg/dL Final   09/08/2020 9.5 8.5 - 10.1 mg/dL Final   10/11/2015 9.0 8.5 - 10.1 mg/dL Final     Bilirubin Total   Date Value Ref Range Status   09/11/2021 0.4 0.2 - 1.3 mg/dL Final   09/10/2021 0.2 0.2 - 1.3 mg/dL Final   10/23/2020 0.2 0.2 - 1.3 mg/dL Final   09/08/2020 0.5 0.2 - 1.3 mg/dL Final     Alkaline Phosphatase   Date Value Ref Range Status   09/11/2021 66 40 - 150 U/L Final   09/10/2021 68 40 - 150 U/L Final   10/23/2020 87 40 - 150 U/L Final   09/08/2020 81 40 - 150 U/L Final     ALT   Date Value Ref Range Status   09/11/2021 12 0 - 70 U/L Final   09/10/2021 14 0 - 70 U/L Final   10/23/2020 48 0 - 70 U/L Final   09/08/2020 75 (H) 0 - 70 U/L Final     AST   Date Value Ref Range Status   09/11/2021 11 0 - 45 U/L Final   09/10/2021 13 0 - 45 U/L Final   10/23/2020 26 0 - 45 U/L Final   09/08/2020 46 (H) 0 - 45 U/L Final         ASSESSMENT AND PLAN:    Mr. Essie Guzman is a 65 year old  male with PMHx of H.pylori infection, and recently diagnosed gastric cancer. He is otherwise healthy and does not have any co morbidities.    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 Ideally, in this case combination of chemotherapy and immunotherpay (Nivo+FOLFOX) or Pembro+FOLFOX are both reasonable options. Patient was concerned about the side effects of chemotherapy and therefore was more drawn to the option of single agent immunotherapy. Pembrolizumab monotherapy- KEYNOTE-158 study, led to approval for treatment of a variety of advanced solid tumors, including gastric cancers, that had MSI-H or dMMR, that had progressed following prior treatment, and for which there were no satisfactory alternative treatment options.   He deferred treatment from March 2021 to now as he was minimally symptomatic .He developed symptoms (anemia), weakness in 9/2021 requiring hospital admission and CT CAP in 9/10 showed disease progression , Wall thickening of the distal stomach consistent with the history of gastric cancer. There are multiple enlarged upper abdominal and retroperitoneal lymph nodes consistent with metastases and significantly progressed since the previous exam. He is now interested in startign treatment. After discussing available options, he was inclined to start pembro alone and did not want any cytotoxic chemotherapy like treatments. I discussed the risks and benefits of immunotherapy and briefly discussed common and immune related side-effects. He agreed to start Rx soon    Plan  - Ct CAP in the next few days (new baseline)  -Infusion appts for pembro   -Appt with NP/PA prior to cycle 1, cycle 2   -CT CAP prior to cycle 3   -Appt with me prior to cycle 3    # Right Hydronephrosis s/p Ureteral stent placement   # Right Flank pain   Patient with mor epain afeter stent placement, no hematuria or dusuria. DIscussed repat CT scan to look at if stent is misplaced or dislodged. Will also start pt on pain Rx with oxycodone.    --Ct scan as above  -start oxycodone prn for pain    # Asmptomaic/minimally symptomatic COVID  - Pt had covid test positive  in Dec 2020 and is now status post COVID vaccination.    # Nutrition  Patient lost 50 lbs over 6 months previously, some of this was intentional due to diet changes and increased physical activity. His weight is lately stable with a recent decrease in appetite as above.   - Encouraged nutritional supplements.  - Encouraged daily multivitamin.     Patient was seen and plan of care developed with Dr. Mccarthy.    Matthew Chauhan MD  Fellow    The pt was evaluated with resident/fellow and the note was edited to relflect the combined assessment and plan      On the day of service  Chart review: 5 minutes  Visit duration: 30 minutes  Care coordination: 5 minutes    Brennan Mccarthy MD    Hematology, Oncology and Transplantation       [facility]

## 2021-09-15 NOTE — NURSING NOTE
"Oncology Rooming Note    September 15, 2021 8:33 AM   Essie Guzman is a 66 year old male who presents for:    Chief Complaint   Patient presents with     Oncology Clinic Visit     gastric cancer     Initial Vitals: /84   Pulse 89   Temp 99.3  F (37.4  C) (Oral)   Wt 52.5 kg (115 lb 11.2 oz)   SpO2 99%   BMI 21.16 kg/m   Estimated body mass index is 21.16 kg/m  as calculated from the following:    Height as of 9/10/21: 1.575 m (5' 2\").    Weight as of this encounter: 52.5 kg (115 lb 11.2 oz). Body surface area is 1.52 meters squared.  Severe Pain (7) Comment: Data Unavailable   No LMP for male patient.  Allergies reviewed: Yes  Medications reviewed: Yes    Medications: Medication refills not needed today.  Pharmacy name entered into Plerts:    [id number], [place] - [address]  [id number], [place] - [address]  [facility], [place] - [address]    Clinical concerns: would like help getting opium-belladonna suppositories. There was insurance issues. Also complaining of no pain control even with oxycodone and acetaminophen.      Carolina Rider CMA            "

## 2021-09-15 NOTE — LETTER
9/15/2021         RE: Essie Guzman  [id number]  [facility] [address]        Dear Colleague,    Thank you for referring your patient, Essie Guzman, to the [facility]. Please see a copy of my visit note below.    MEDICAL ONCOLOGY FOLLOW UP NOTE    PATIENT NAME: Essie Guzman  ENCOUNTER DATE: 9/15/2021    Care Team  Primary Oncologist: Brennan Mccarthy MD    REASON FOR CURRENT VISIT: F/u Metastatic gastric cancer    HISTORY OF PRESENT ILLNESS:  Mr. 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 Initially Rx for H.pylori with Abx and PPI. LFT's were mildly elevated.  11/24/20- UGI endoscopy at [facility] with normal esophagus. Non-bleeding gastric ulcer in pylorus with no stigmata of bleeding.  Non-bleeding duodenal ulcer with no stigmata of bleeding. Biopsy of Stomach, pylorus, - Poorly-differentiated carcinoma; consistent with adenocarcinoma (poorly cohesive type). HER2 by FISH was non-amplified  11/24/20- CT CAP- bilateral hilar lymph nodes are indeterminate (1.4 x 1.0 cm) right infrahilar lymph node. Multiple enlarged retroperitoneal and perigastric lymph nodes (10 mm right paraduodenal lymph node, 11 mm necrotic left para-aortic lymph node and a 10 mm left para-aortic lymph node.  12/16/20-  PET/CT with metastatic disease- Hypermetabolic circumferential ulcerated mass at the gastric antrum, Multiple metastatic  hypermetabolic lymph nodes:  adjacent just inferior to the caudate lobe of the liver, Multiple enlarged, centrally necrotic, and hypermetabolic retroperitoneal para-aortic and paracaval lymph nodes.    12/18/20-Saw [facility]- Due to PET CT showing retroperitoneal lymphadenopathy not a candidate for surgical resection.  12/22/20- EUS staging and biopsy- [id number] - Partially-obstructing cratered pre-pyloric gastric ulcer with a clean ulcer base, fully-circumferential ulcer  Several sub-centimeter malignant appearing round, well-circumscribed, hypoechoic lymph nodes were  visualized along the aorta, from the 2nd portion of  duodenum which corresponds to the hypermetabolic nodes seen on the PET scan.   3/9/21- PET/CT-Overall there has been progression in the number and size of the hypermetabolic periaortic, pericaval, and zuleyma hepatis lymph nodes compared to prior.Relatively unchanged hypermetabolic circumferential ulcerated mass at the gastric antrum.  March through Aug 2021- Declined systemic Rx with pembrolizumab as he was minimally symptomatic  9/10/21 to 9/12/21- [facility] admission for - Iron deficiency anemia from chronic blood loss anemia 2/2 progressive metastatic gastric cancer, Right hydrnonephrosis secondary to malignant obstruction s/p ureteric stenting and ongoing Cancer related weight loss- Rcd 1 unit PRBC  9/10/21- CT abd/pelvis-  Wall thickening of the distal stomach consistent with the history of gastric cancer. There are multiple enlarged upper abdominal and retroperitoneal lymph nodes consistent with metastases and significantly progressed since the previous exam. Right hydronephrosis and delayed nephrogram consistent with obstruction. (There is an irregularly-shaped low-attenuation mass posterior to the body of the pancreas measuring approximately 2.1 x 3.8 x 2.6 cm and consistent with a lymph node.      Interval Hx:  Essie Guzman was with his daughters (Osman) who did the translation.    Patient was  recently admitted for generalized weakness, right lower abdominal pain and dizziness. He was found to have acute blood loss anemia from gastric cancer and right hydronephrosis. He got a unit of blood transfusion and Right ureteral stent placement.    Since discharge from hospital he has been having right lower back and flank pain. Not controlled with tylenol and oxycodone.    He states appetite is good, no nausea or vomiting. No blood in the stool. No diarrhea or constipation. No decreased urine output . No blood in the urine.      He has gradually lost about 20 lbs of weight in the recent times and has a recent decline in his performance status. He is more sleepy and tired most of the time during the day, and mainly from pain in the right flank and lower back.     PAST MEDICAL AND SURGICAL HISTORY:   Active Ambulatory Problems     Diagnosis Date Noted     Malignant neoplasm of stomach metastatic to retroperitoneum (H) 12/21/2020     Gastrointestinal hemorrhage with melena 09/10/2021     Resolved Ambulatory Problems     Diagnosis Date Noted     Hematochezia 10/11/2015     No Additional Past Medical History   No surgeeies    SOCIAL HISTORY:   Social History     Tobacco Use     Smoking status: Never Smoker     Smokeless tobacco: Never Used   Substance Use Topics     Alcohol use: No     Drug use: No   Non-smoker/non-drinker  He is now retired. 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  He lives in [place] with family, sposue, sons and grandkids  He has 6 kids, lives with 2 younger boys, 4 grand kids      FAMILY HISTORY:   Family History   Problem Relation Age of Onset     Asthma No family hx of      Diabetes No family hx of      Hypertension No family hx of      Cerebrovascular Disease No family hx of      Cancer No family hx of      Colon Cancer No family hx of      Anesthesia Reaction No family hx of      Deep Vein Thrombosis (DVT) No family hx of    No family hx any cancer      ALLERGIES: No Known  Allergies    CURRENT MEDICATIONS:   Current Outpatient Medications:      acetaminophen (TYLENOL) 325 MG tablet, Take 2 tablets (650 mg) by mouth every 4 hours as needed for mild pain or fever, Disp: 100 tablet, Rfl: 0     ferrous gluconate (FERGON) 324 (38 Fe) MG tablet, Take 1 tablet (324 mg) by mouth daily (with breakfast), Disp: 100 tablet, Rfl: 1     ondansetron (ZOFRAN-ODT) 4 MG ODT tab, Take 1 tablet (4 mg) by mouth every 6 hours as needed for nausea or vomiting, Disp: 30 tablet, Rfl: 0     ondansetron (ZOFRAN-ODT) 8 MG ODT tab, Take 1 tablet (8 mg) by mouth every 8 hours as needed for nausea, Disp: 20 tablet, Rfl: 1     oxyCODONE (ROXICODONE) 5 MG tablet, Take 1 tablet (5 mg) by mouth every 6 hours as needed for moderate to severe pain, Disp: 60 tablet, Rfl: 0     pantoprazole (PROTONIX) 40 MG EC tablet, Take 1 tablet (40 mg) by mouth daily, Disp: 60 tablet, Rfl: 1     tamsulosin (FLOMAX) 0.4 MG capsule, Take 1 capsule (0.4 mg) by mouth daily, Disp: 30 capsule, Rfl: 1     thiamine (B-1) 100 MG tablet, Take 1 tablet (100 mg) by mouth daily, Disp: 100 tablet, Rfl: 1     opium-belladonna (B&O SUPPRETTES) 30-16.2 MG per suppository, Place 0.5 suppositories rectally 2 times daily as needed for bladder spasms (Patient not taking: Reported on 9/15/2021), Disp: 30 suppository, Rfl: 0    PHYSICAL EXAMINATION:  Vital signs: /84   Pulse 89   Temp 99.3  F (37.4  C) (Oral)   Wt 52.5 kg (115 lb 11.2 oz)   SpO2 99%   BMI 21.16 kg/m      General:  Well appearing adult female in NAD.  Alert and oriented.  Eyes:  No erythema or discharge  Respiratory:  Breathing comfortably on room air.  No wheezing or distress.  Musculoskeletal:  tenderness in the Right flank and lower back ++  Skin:  No visible concerning skin rashes or lesions  Neuro:  No notable tremor and dyskinetic movements.  Psych:  Mood and affect appear normal.     The rest of a comprehensive physical examination is deferred due to PHE (public health  emergency) video visit restrictions.    LABORATORY DATA:     CBC RESULTS:   CBC RESULTS:   Recent Labs   Lab Test 10/23/20  1422 09/08/20  1410 10/12/15  0520   WBC 8.9 9.3 7.2   HGB 11.9* 13.0* 13.6   HCT 37.5* 39.4* 41.2   MCV 92 94 92   MCHC 31.7 33.0 33.0   RDW 12.6 12.9 13.0    297 201        Last Comprehensive Metabolic Panel:  Sodium   Date Value Ref Range Status   09/12/2021 133 133 - 144 mmol/L Final   09/11/2021 133 133 - 144 mmol/L Final   09/10/2021 134 133 - 144 mmol/L Final   10/28/2020 136 133 - 144 mmol/L Final   09/08/2020 133 133 - 144 mmol/L Final   10/11/2015 134 133 - 144 mmol/L Final     Potassium   Date Value Ref Range Status   09/12/2021 4.4 3.4 - 5.3 mmol/L Final   09/11/2021 4.9 3.4 - 5.3 mmol/L Final   09/10/2021 4.5 3.4 - 5.3 mmol/L Final   10/28/2020 4.3 3.4 - 5.3 mmol/L Final   09/08/2020 4.1 3.4 - 5.3 mmol/L Final   10/11/2015 4.3 3.4 - 5.3 mmol/L Final     Comment:     Specimen slightly hemolyzed, potassium may be falsely elevated     Chloride   Date Value Ref Range Status   09/12/2021 100 94 - 109 mmol/L Final   09/11/2021 100 94 - 109 mmol/L Final   09/10/2021 99 94 - 109 mmol/L Final   10/28/2020 104 94 - 109 mmol/L Final   09/08/2020 101 94 - 109 mmol/L Final   10/11/2015 103 94 - 109 mmol/L Final     Carbon Dioxide   Date Value Ref Range Status   10/28/2020 28 20 - 32 mmol/L Final   09/08/2020 24 20 - 32 mmol/L Final   10/11/2015 23 20 - 32 mmol/L Final     Carbon Dioxide (CO2)   Date Value Ref Range Status   09/12/2021 28 20 - 32 mmol/L Final   09/11/2021 27 20 - 32 mmol/L Final   09/10/2021 26 20 - 32 mmol/L Final     Anion Gap   Date Value Ref Range Status   09/12/2021 5 3 - 14 mmol/L Final   09/11/2021 6 3 - 14 mmol/L Final   09/10/2021 9 3 - 14 mmol/L Final   10/28/2020 4 3 - 14 mmol/L Final   09/08/2020 8 3 - 14 mmol/L Final   10/11/2015 8 3 - 14 mmol/L Final     Glucose   Date Value Ref Range Status   09/12/2021 105 (H) 70 - 99 mg/dL Final   09/11/2021 112 (H) 70 -  99 mg/dL Final   09/10/2021 90 70 - 99 mg/dL Final   10/28/2020 106 (H) 70 - 99 mg/dL Final   09/08/2020 124 (H) 70 - 99 mg/dL Final   10/11/2015 126 (H) 70 - 99 mg/dL Final     GLUCOSE BY METER POCT   Date Value Ref Range Status   09/10/2021 77 70 - 99 mg/dL Final     Comment:     Dr/RN Notified     Urea Nitrogen   Date Value Ref Range Status   09/12/2021 16 7 - 30 mg/dL Final   09/11/2021 17 7 - 30 mg/dL Final   09/10/2021 20 7 - 30 mg/dL Final   10/28/2020 19 7 - 30 mg/dL Final   09/08/2020 14 7 - 30 mg/dL Final   10/11/2015 25 7 - 30 mg/dL Final     Creatinine   Date Value Ref Range Status   09/12/2021 0.95 0.66 - 1.25 mg/dL Final   09/11/2021 1.03 0.66 - 1.25 mg/dL Final   09/10/2021 1.24 0.66 - 1.25 mg/dL Final   10/28/2020 0.84 0.66 - 1.25 mg/dL Final   09/08/2020 0.89 0.66 - 1.25 mg/dL Final   10/11/2015 0.96 0.66 - 1.25 mg/dL Final     GFR Estimate   Date Value Ref Range Status   09/12/2021 82 >60 mL/min/1.73m2 Final     Comment:     As of July 11, 2021, eGFR is calculated by the CKD-EPI creatinine equation, without race adjustment. eGFR can be influenced by muscle mass, exercise, and diet. The reported eGFR is an estimation only and is only applicable if the renal function is stable.   09/11/2021 74 >60 mL/min/1.73m2 Final     Comment:     As of July 11, 2021, eGFR is calculated by the CKD-EPI creatinine equation, without race adjustment. eGFR can be influenced by muscle mass, exercise, and diet. The reported eGFR is an estimation only and is only applicable if the renal function is stable.   09/10/2021 59 (L) >60 mL/min/1.73m2 Final     Comment:     As of July 11, 2021, eGFR is calculated by the CKD-EPI creatinine equation, without race adjustment. eGFR can be influenced by muscle mass, exercise, and diet. The reported eGFR is an estimation only and is only applicable if the renal function is stable.   10/28/2020 >90 >60 mL/min/[1.73_m2] Final     Comment:     Non  GFR Calc  Starting  12/18/2018, serum creatinine based estimated GFR (eGFR) will be   calculated using the Chronic Kidney Disease Epidemiology Collaboration   (CKD-EPI) equation.     09/08/2020 88 >60 mL/min/[1.73_m2] Final     Comment:     Non  GFR Calc  Starting 12/18/2018, serum creatinine based estimated GFR (eGFR) will be   calculated using the Chronic Kidney Disease Epidemiology Collaboration   (CKD-EPI) equation.     10/11/2015 80 >60 mL/min/1.7m2 Final     Comment:     Non  GFR Calc     Calcium   Date Value Ref Range Status   09/12/2021 8.9 8.5 - 10.1 mg/dL Final   09/11/2021 9.0 8.5 - 10.1 mg/dL Final   09/10/2021 9.1 8.5 - 10.1 mg/dL Final   10/28/2020 9.0 8.5 - 10.1 mg/dL Final   09/08/2020 9.5 8.5 - 10.1 mg/dL Final   10/11/2015 9.0 8.5 - 10.1 mg/dL Final     Bilirubin Total   Date Value Ref Range Status   09/11/2021 0.4 0.2 - 1.3 mg/dL Final   09/10/2021 0.2 0.2 - 1.3 mg/dL Final   10/23/2020 0.2 0.2 - 1.3 mg/dL Final   09/08/2020 0.5 0.2 - 1.3 mg/dL Final     Alkaline Phosphatase   Date Value Ref Range Status   09/11/2021 66 40 - 150 U/L Final   09/10/2021 68 40 - 150 U/L Final   10/23/2020 87 40 - 150 U/L Final   09/08/2020 81 40 - 150 U/L Final     ALT   Date Value Ref Range Status   09/11/2021 12 0 - 70 U/L Final   09/10/2021 14 0 - 70 U/L Final   10/23/2020 48 0 - 70 U/L Final   09/08/2020 75 (H) 0 - 70 U/L Final     AST   Date Value Ref Range Status   09/11/2021 11 0 - 45 U/L Final   09/10/2021 13 0 - 45 U/L Final   10/23/2020 26 0 - 45 U/L Final   09/08/2020 46 (H) 0 - 45 U/L Final         ASSESSMENT AND PLAN:    Mr. Essie Guzman is a 65 year old  male with PMHx of H.pylori infection, and recently diagnosed gastric cancer. He is otherwise healthy and does not have any co morbidities.    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 Ideally, in this case combination of chemotherapy and immunotherpay (Nivo+FOLFOX) or Pembro+FOLFOX are both reasonable options. Patient was concerned about the side effects of chemotherapy and therefore was more drawn to the option of single agent immunotherapy. Pembrolizumab monotherapy- [id number]-158 study, led to approval for treatment of a variety of advanced solid tumors, including gastric cancers, that had MSI-H or dMMR, that had progressed following prior treatment, and for which there were no satisfactory alternative treatment options.   He deferred treatment from March 2021 to now as he was minimally symptomatic .He developed symptoms (anemia), weakness in 9/2021 requiring hospital admission and CT CAP in 9/10 showed disease progression , Wall thickening of the distal stomach consistent with the history of gastric cancer. There are multiple enlarged upper abdominal and retroperitoneal lymph nodes consistent with metastases and significantly progressed since the previous exam. He is now interested in startign treatment. After discussing available options, he was inclined to start pembro alone and did not want any cytotoxic chemotherapy like treatments. I discussed the risks and benefits of immunotherapy and briefly discussed common and immune related side-effects. He agreed to start Rx soon    Plan  - Ct CAP in the next few days (new baseline)  -Infusion appts for pembro   -Appt with NP/PA prior to cycle 1, cycle 2   -CT CAP prior to cycle 3   -Appt with me prior to cycle 3    # Right Hydronephrosis s/p Ureteral stent placement   # Right Flank pain   Patient with mor epain afeter stent placement, no hematuria  or dusuria. DIscussed repat CT scan to look at if stent is misplaced or dislodged. Will also start pt on pain Rx with oxycodone.    --Ct scan as above  -start oxycodone prn for pain    # Asmptomaic/minimally symptomatic COVID  - Pt had covid test positive in Dec 2020 and is now status post COVID vaccination.    # Nutrition  Patient lost 50 lbs over 6 months previously, some of this was intentional due to diet changes and increased physical activity. His weight is lately stable with a recent decrease in appetite as above.   - Encouraged nutritional supplements.  - Encouraged daily multivitamin.     Patient was seen and plan of care developed with Dr. Mccarthy.    Matthew Chauhan MD  Fellow    The pt was evaluated with resident/fellow and the note was edited to relflect the combined assessment and plan      On the day of service  Chart review: 5 minutes  Visit duration: 30 minutes  Care coordination: 5 minutes    Brennan Mccarthy MD    Hematology, Oncology and Transplantation          Again, thank you for allowing me to participate in the care of your patient.        Sincerely,        Brennan Mccarthy MD

## 2021-09-21 NOTE — TELEPHONE ENCOUNTER
[facility] Call Center    Phone Message    May a detailed message be left on voicemail: yes     Reason for Call: Other: Youa called in stating that pt is having some symptoms that seem like he has an infection. Wondering if he should be seen sooner in Uro, please call back to discuss.      Action Taken: Message routed to:  [facility] ([facility] uro    Travel Screening: Not Applicable

## 2021-09-21 NOTE — PATIENT INSTRUCTIONS
1. Increase fluid intake  2. Complete antibiotic regimen as prescribed. You will be notified if the treatment plan needs to be changed based on the urine culture results.   3. For the discomfort: You may take an over the counter medication called pyridium (AZO) up three times daily for up to 2 days.  4. Follow up if you have a fever of 100.4 F (38 C) or higher, no improvement after three days of treatment, trouble urinating because of pain, rash or joint pain, Increased back or abdominal pain.    Go to ER for fever or significant difficulty urinating. Otherwise, follow up with oncology tomorrow.

## 2021-09-21 NOTE — PROGRESS NOTES
Assessment/Plan:    UA with 5-10 WBCs, 10-25 WBCs, bacteria, and small leuk est. Will treat for UTI with 7 day course of Bactrim. No perineal pain, CVAT, flank pain, or fever/chills to suggest acute prostatitis or pyelonephritis.   CBC without leukocytosis. Hgb improved from 9 days ago.   Discussed with pt that his pain may be related to his gastric cancer mets as recent CT showed disease progression. He is concerned there may be an issue with the ureteral stent that was placed in the hospital. He has a CT abd/pelvis scheduled for 9/23. He is able to urinate without difficulty, so I feel it is okay to wait for this CT. He should go to the ER if he becomes unable to urinate or develops a fever.  He has f/u with oncology tomorrow and can discuss pain management at that time.    See patient instructions below.    At the end of the encounter, I discussed results, diagnosis, medications. Discussed red flags for immediate return to clinic/ER, as well as indications for follow up if no improvement. Patient understood and agreed to plan. Patient was stable for discharge.    40 minutes was spent preparing for the visit and reviewing the patient's chart; getting a history and performing an exam; counseling and providing education to the patient, family, or caregiver; ordering medicines, tests, or procedures; communicating with other healthcare professionals; documenting information in the medical record; interpreting results and sharing that information with the patient, family, or caregiver; and care coordination.      ICD-10-CM    1. Painful urination  R30.9 UA Macro with Reflex to Micro and Culture - lab collect     UA Macro with Reflex to Micro and Culture - lab collect     Urine Microscopic   2. Abdominal pain, lower  R10.30 CBC with platelets and differential     CBC with platelets and differential   3. Acute UTI  N39.0 sulfamethoxazole-trimethoprim (BACTRIM DS) 800-160 MG tablet   4. 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    BERNARDA Howe, ZORA  [facility]  -----------------------------------------------------------------------------------------------------------------------------------------------------    HPI:  Essie Guzman is a 66 year old male with history of gastric cancer with metastasis to retroperitoneum who presents for evaluation of suprapubic pain that radiates through to midline of his lower back, and sometimes to bilateral testicles/inguinal areas, onset 1.5 weeks ago. Pain is intermittent, occurs primarily in the evenings and at night. He also notes dysuria today. He has no melena, hematochezia, difficulty urinating, hematuria, penile discharge, fever, CP, or SOB.    Patient's gastric cancer was diagnosed in Nov 2020. Surgery is not an option for treatment due to retroperitoneal mets. He was admitted to [facility] 9/10-9/12/21 with acute blood loss anemia from gastric cancer, R hydronephrosis secondary to malignant obstruction, and ongoing cancer related weight loss. A CT chest/abd/pelvis showed disease progression. He had a blood transfusion and underwent R ureteral stent placement.    Since discharge from hospital, pain is not as severe as it was when he was admitted to the hospital but is still present. He followed up with oncology on 9/15, and elected to begin immunotherapy soon, which he had previously declined due to being minimally symptomatic. His oncologist ordered a CT abd/pelvis to evaluate his ureteral stent for displacement; this is scheduled for 9/23. He also has another f/u appt with oncology tomorrow.     He takes oxycodone for pain, was also recently prescribed opium-belladonna suppositories and he tried this last night but it did not help with pain.    No past medical history on file.    Vitals:    09/21/21 1404   BP: 123/84   Pulse: 82    Resp: 16   Temp: 98.3  F (36.8  C)   TempSrc: Tympanic   SpO2: 100%       Physical Exam  Vitals and nursing note reviewed.   Constitutional:       Appearance: He is underweight.   Pulmonary:      Effort: Pulmonary effort is normal.   Abdominal:      General: Bowel sounds are normal.      Palpations: Abdomen is soft.      Tenderness: There is abdominal tenderness in the periumbilical area and suprapubic area. There is no right CVA tenderness, left CVA tenderness, guarding or rebound.   Musculoskeletal:      Thoracic back: Normal. No tenderness or bony tenderness.      Lumbar back: Normal. No tenderness or bony tenderness.   Neurological:      Mental Status: He is alert.         Labs/Imaging:  Results for orders placed or performed in visit on 09/21/21 (from the past 24 hour(s))   UA Macro with Reflex to Micro and Culture - lab collect    Specimen: Urine, Midstream   Result Value Ref Range    Color Urine Yellow Colorless, Straw, Light Yellow, Yellow    Appearance Urine Clear Clear    Glucose Urine Negative Negative mg/dL    Bilirubin Urine Negative Negative    Ketones Urine Negative Negative mg/dL    Specific Gravity Urine 1.015 1.003 - 1.035    Blood Urine Moderate (A) Negative    pH Urine 6.0 5.0 - 7.0    Protein Albumin Urine Trace (A) Negative mg/dL    Urobilinogen Urine 0.2 0.2, 1.0 E.U./dL    Nitrite Urine Negative Negative    Leukocyte Esterase Urine Small (A) Negative   Urine Microscopic   Result Value Ref Range    Bacteria Urine Few (A) None Seen /HPF    RBC Urine 10-25 (A) 0-2 /HPF /HPF    WBC Urine 5-10 (A) 0-5 /HPF /HPF    Squamous Epithelials Urine None Seen None Seen /LPF    Narrative    Urine Culture not indicated   CBC with platelets and differential    Narrative    The following orders were created for panel order CBC with platelets and differential.  Procedure                               Abnormality         Status                     ---------                               -----------          ------                     CBC with platelets and d...[[id number]]  Abnormal            Final result                 Please view results for these tests on the individual orders.   CBC with platelets and differential   Result Value Ref Range    WBC Count 8.3 4.0 - 11.0 10e3/uL    RBC Count 3.43 (L) 4.40 - 5.90 10e6/uL    Hemoglobin 8.5 (L) 13.3 - 17.7 g/dL    Hematocrit 26.9 (L) 40.0 - 53.0 %    MCV 78 78 - 100 fL    MCH 24.8 (L) 26.5 - 33.0 pg    MCHC 31.6 31.5 - 36.5 g/dL    RDW 15.5 (H) 10.0 - 15.0 %    Platelet Count 573 (H) 150 - 450 10e3/uL    % Neutrophils 74 %    % Lymphocytes 15 %    % Monocytes 11 %    % Eosinophils 1 %    % Basophils 0 %    Absolute Neutrophils 6.1 1.6 - 8.3 10e3/uL    Absolute Lymphocytes 1.3 0.8 - 5.3 10e3/uL    Absolute Monocytes 0.9 0.0 - 1.3 10e3/uL    Absolute Eosinophils 0.1 0.0 - 0.7 10e3/uL    Absolute Basophils 0.0 0.0 - 0.2 10e3/uL         Patient Instructions   1. Increase fluid intake  2. Complete antibiotic regimen as prescribed. You will be notified if the treatment plan needs to be changed based on the urine culture results.   3. For the discomfort: You may take an over the counter medication called pyridium (AZO) up three times daily for up to 2 days.  4. Follow up if you have a fever of 100.4 F (38 C) or higher, no improvement after three days of treatment, trouble urinating because of pain, rash or joint pain, Increased back or abdominal pain.    Go to ER for fever or significant difficulty urinating. Otherwise, follow up with oncology tomorrow.

## 2021-09-21 NOTE — PROGRESS NOTES
Pre-Visit Planning   Next 5 appointments (look out 90 days)    Sep 29, 2021  1:00 PM  (Arrive by 12:45 PM)  Return Visit with Lashawn Wright PA-C  [facility] ([facility] ) [address] [place] [id number] [phone]   Oct 20, 2021  1:00 PM  (Arrive by 12:45 PM)  Return Visit with Lashawn Wright PA-C  [facility] ([facility] ) [address] [place] [id number]  [phone]   Nov 10, 2021 12:00 PM  (Arrive by 11:45 AM)  Return Visit with Brennan Mccarthy MD  [facility] ([facility] ) [address] [place] [address] [phone]        Appointment Notes for this encounter:      tyler tapia 9/12/21  For an , please use iPad or phone [phone] option 3    Questionnaires Reviewed/Assigned  No additional questionnaires are needed      Patient preferred phone number: [phone]    Unable to reach. Left voicemail. Advised patient to call clinic back at [phone].     Norah Zee/
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
## 2021-09-22 NOTE — PATIENT INSTRUCTIONS
Good to see you again! I will review your studies if able through "[employer]".     Patient Education     A1C  Does this test have other names?  Hemoglobin A1c; HbA1c; glycosylated hemoglobin; glycohemoglobin; Glycated hemoglobin  What is this test?  A1C is a blood test that shows average blood sugar (glucose) levels over the last 3 months. The test is done to find out if a person has diabetes or prediabetes. It's also used to see how well a person with diabetes controls their blood sugar. The test can help guide diabetes treatment over time.  Why do I need this test?  You may need this test to check for prediabetes or diabetes.  If you have diabetes or prediabetes, you may need this test to see how well you control your blood sugar. People with diabetes need to track their blood sugar (glucose) levels every day to make sure they aren t too high or too low. The A1C test gives results for a longer period of time. It shows if your blood sugar has been too high on average over the last 3 months.   Glucose sticks to hemoglobin in the blood. Hemoglobin is a protein in red blood cells that carries oxygen. When blood sugar is high, more glucose builds up and sticks to the hemoglobin. The A1C test measures how much of the hemoglobin is coated with sugar.  You may have the test when a healthcare provider first works with you to treat your diabetes. You may then need to have the A1C test 2 or more times a year. This depends on the type of diabetes you have and how well it s controlled. The American Diabetes Association (ADA) advises an A1C test at least 2 times a year if you are meeting your blood sugar goals. If you aren t meeting your goals or your medicine has changed, you should have the A1C test more often.  What other tests might I have along with this test?   If your healthcare provider tests you for diabetes, you may also have any of these tests:    Fasting plasma glucose blood test (FPG)    Oral glucose tolerance test  (OGTT)    Urine test to check for sugar, ketones, or protein  What do my test results mean?  Test results may vary depending on your age, gender, health history, the method used for the test, and other things. Your test results may not mean you have a problem. Ask your healthcare provider what your test results mean for you.   A1C results are reported as a percentage. Here are what the results mean:    A1C below 5.7%. This is normal.    A1C from 5.7% to 6.4%. You may have prediabetes. This means you have a higher risk for diabetes in the future.    A1C of 6.5% or above on 2 separate tests. You may have diabetes.   The ADA says that people with diabetes should keep an A1C below 7%. The American Association of Clinical Endocrinologists advises an A1C of 6.5% or less. Your healthcare provider may give you other advice. This is based on your age, health conditions, and other things.    How is this test done?  The test is done with a blood sample. A needle is used to draw blood from a vein in your arm or hand.   Does this test pose any risks?  Having a blood test with a needle carries some risks. These include bleeding, infection, bruising, and feeling lightheaded. When the needle pricks your arm or hand, you may feel a slight sting or pain. Afterward, the site may be sore.  What might affect my test results?  Your blood sugar levels change throughout the day. This won't affect the A1C test result.  If you have sickle cell anemia or other blood disorders, an A1C test may be less useful for diagnosing or watching diabetes. Your healthcare provider may tell you to use a different test that will work better for you.  The test results may be less accurate if you have any of the below:    Anemia    Heavy bleeding    Iron deficiency    Kidney failure    Liver disease  How do I get ready for this test?  You don't need to get ready for the test.    Megan last reviewed this educational content on 4/1/2019 2000-2021 [employer]  [employer]. All rights reserved. This information is not intended as a substitute for professional medical care. Always follow your healthcare professional's instructions.

## 2021-09-27 NOTE — CONSULTS
Urology Consult History and Physical    Name: Essie Guzman    MRN: [id number]   YOB: 1955       We were asked to see Essie Guzman at the request of Dr. Castro for evaluation and treatment of the following chief complaint:          Chief Complaint:   Right flank pain    History is obtained from patient and review of records          History of Present Illness:   Essie Guzman is a 66 year old male with pmh significant for gastric cancer with retroperitoneal mets (stage LUDMILA) who is followed in Oncology by Dr. Mccarthy, last seen 9/15/21.  At that time there were plans for initiating PEMBRO.      From a Urologic standpoint he was recently admitted 9/10 - 9/12/21 with anemia, abdominal pain, nausea, weakness, weight loss and right flank pain in the setting of a CT scan showing progressive malignancy and right hydronephrosis / delayed nephrogram.  His UA and vitals were normal at that time.  His sCr was mildly elevated (1.24) up from baseline of 0.9mg/dL.  After discussion of risks and benefits he underwent same day stent placement with Dr. Friedman.     Since then has had had constant pain since the stent was placed - localizes to the right flank, right low back.  He has needed additional narcotic pain medications to manage.  No fevers, chills, dysuria, hematuria.  He saw his oncologist about 1 week ago who gave him a course of bactrim in case of possible UTI. UAs were checked on 9/21 (WBC 5-10, RBC 10-25, neg nitrites), 9/22 (WBC 5-10, RBC 25-50, neg nitrites).  The urine culture from 9/21 was negative.       CT since then on 9/23/21 showed small/moderate left pleural effusion and a single tiny nodule as well as wall thickening of the distal stomach with mild to moderate gastric distension and enlarged upper abdominal and RP LNs, similar to previous.  Right stent was in appropriate position without hydronephrosis and there was a 2mm nonobstructing stone in the right kidney.  Bladder normal.     Two days ago  he also developed BL groin pain and a feeling of swelling in the testicular area.   He presented to the ED for further mgmt. Here:  - Vitals are normal  - Labs are normal with exception of marginal hyponatremia (130mmoL/L) and anemia (hgb 9.0g/dL).  His UA shows RBC 3, RBC 3, neg nitrites.    - There is no new imaging  - He denies constipation, nausea, emesis            Past Medical History:   History reviewed. No pertinent past medical history.         Past Surgical History:     Past Surgical History:   Procedure Laterality Date     COLONOSCOPY N/A 11/24/2015    Procedure: COLONOSCOPY;  Surgeon: Clyde Mosher MD;  Location:  GI     COMBINED CYSTOSCOPY, INSERT STENT URETER(S) Right 9/10/2021    Procedure: CYSTOSCOPY, WITH right URETERAL STENT INSERTION, retrograde pyleogram;  Surgeon: Jez Friedman MD;  Location: [facility]     ESOPHAGOSCOPY, GASTROSCOPY, DUODENOSCOPY (EGD), COMBINED N/A 11/24/2020    Procedure: ESOPHAGOGASTRODUODENOSCOPY with biopsies using cold forceps;  Surgeon: Clyde Mosher MD;  Location:  GI     ESOPHAGOSCOPY, GASTROSCOPY, DUODENOSCOPY (EGD), COMBINED N/A 12/22/2020    Procedure: ESOPHAGOGASTRODUODENOSCOPY, WITH FINE NEEDLE ASPIRATION BIOPSY, WITH ENDOSCOPIC ULTRASOUND GUIDANCE;  Surgeon: Guru Teri Pedraza MD;  Location: [facility] GI            Social History:     Social History     Tobacco Use     Smoking status: Never Smoker     Smokeless tobacco: Never Used   Substance Use Topics     Alcohol use: No            Family History:     Family History   Problem Relation Age of Onset     Asthma No family hx of      Diabetes No family hx of      Hypertension No family hx of      Cerebrovascular Disease No family hx of      Cancer No family hx of      Colon Cancer No family hx of      Anesthesia Reaction No family hx of      Deep Vein Thrombosis (DVT) No family hx of             Allergies:   No Known Allergies         Medications:     No current facility-administered  medications for this encounter.     Current Outpatient Medications   Medication Sig     acetaminophen (TYLENOL) 325 MG tablet Take 2 tablets (650 mg) by mouth every 4 hours as needed for mild pain or fever     ferrous gluconate (FERGON) 324 (38 Fe) MG tablet Take 1 tablet (324 mg) by mouth daily (with breakfast)     ondansetron (ZOFRAN-ODT) 4 MG ODT tab Take 1 tablet (4 mg) by mouth every 6 hours as needed for nausea or vomiting     oxyCODONE (ROXICODONE) 5 MG tablet Take 1 tablet (5 mg) by mouth every 6 hours as needed for moderate to severe pain     pantoprazole (PROTONIX) 40 MG EC tablet Take 1 tablet (40 mg) by mouth daily     sulfamethoxazole-trimethoprim (BACTRIM DS) 800-160 MG tablet Take 1 tablet by mouth 2 times daily for 7 days     tamsulosin (FLOMAX) 0.4 MG capsule Take 1 capsule (0.4 mg) by mouth daily     thiamine (B-1) 100 MG tablet Take 1 tablet (100 mg) by mouth daily             Review of Systems:    ROS: See HPI for pertinent details.  Remainder of 10-point ROS negative.         Physical Exam:   VS:  T: 98.4    HR: 82    BP: 134/98    RR: 16   GEN:  AOx3.  NAD.  Pleasant. Thin frail  HEENT:  Sclerae anicteric.  Conjunctivae pink.  Moist mucous membranes  NECK:  Supple.  No lymphadenopathy.  LUNGS: Non-labored breathing.  BACK:  No CVAT but points to CVA and low back as being tender  ABD:  Soft.  NT.  ND.  No rebound or guarding.  no masses.    :  Phallus circumcised.  Meatus patent. Normal penile shaft without plaques.  Testicles descended bilaterally, no nodules or tenderness.  Epididymes mildly tender on both sideswithout masses. No inguinal hernias.  JASPER:  Normal sphincteric tone. Prostate boggy but symmetric, no nodules.   EXT:  Warm, well perfused.  lower extremity edema bilaterally  SKIN:  Warm.  Dry.  No rashes.  NEURO:  CN grossly intact.           Data:   All laboratory data reviewed:    No results found for: PSA  Recent Labs   Lab 09/22/21  1126 09/21/21  1449   WBC 8.8 8.3   HGB  9.0* 8.5*   * 573*     Recent Labs   Lab 09/22/21  1126   *   POTASSIUM 4.3   CHLORIDE 100   CO2 26   BUN 13   CR 0.82   *   HAWA 8.4*     Recent Labs   Lab 09/27/21  0930 09/22/21  1126 09/22/21  1126   COLOR Light Yellow   < > Yellow   APPEARANCE Clear   < > Clear   URINEGLC Negative   < > Negative   URINEBILI Negative   < > Negative   URINEKETONE Negative   < > Negative   SG 1.019   < > 1.020   URINEPH 6.0   < > 6.5   PROTEIN 20 *   < > Trace*   UROBILINOGEN  --   --  0.2   NITRITE Negative   < > Negative   LEUKEST Small*   < > Trace*   RBCU 3*  --  25-50*   WBCU 3  --  5-10*    < > = values in this interval not displayed.     Results for orders placed or performed in visit on 09/21/21   Urine Culture Aerobic Bacterial - lab collect    Specimen: Urine, Midstream   Result Value Ref Range    Culture <10,000 CFU/mL Mixture of urogenital machelle        All pertinent imaging reviewed:  CT CHEST ABDOMEN PELVIS WITHOUT CONTRAST 9/23/2021 10:10 AM     CLINICAL HISTORY: Malignant neoplasm of stomach metastatic to  retroperitoneum (H).  Nausea and vomiting, intractability of vomiting  not specified, unspecified vomiting type.     TECHNIQUE: CT scan of the chest, abdomen, and pelvis was performed  without IV contrast. Multiplanar reformats were obtained. Dose  reduction techniques were used.   CONTRAST: None.     COMPARISON: 9/10/2021, 3/9/2021.     FINDINGS:   LUNGS AND PLEURA: 3 mm right lower lobe nodule (series 12, image 159)  is unchanged. Small to moderate size left pleural effusion with  compressive atelectasis in the left lower lobe.     MEDIASTINUM/AXILLAE: Normal thyroid. Normal caliber thoracic aorta. No  mediastinal, hilar, or axillary lymphadenopathy.     CORONARY ARTERY CALCIFICATION: Moderate.     HEPATOBILIARY: Unenhanced liver without focal lesion. Gallbladder  appears hydropic, similar to prior.     PANCREAS: Low-density abnormality that appears to displace the  pancreas inferiorly and  anteriorly is less well-visualized on the  current nonenhanced study, measuring approximately 3.0 x 2.3 cm, not  likely significantly changed compared to recent prior study.     SPLEEN: Normal.     ADRENAL GLANDS: Normal.     KIDNEYS/BLADDER: Right double-J ureteral stent in place. No  hydronephrosis. There is a nonobstructing 2 mm calculus in the right  renal collecting system. Left renal cyst is unchanged. Urinary bladder  unremarkable.     BOWEL: Thickening of the distal stomach was better demonstrated on the  prior study. Mild to moderate gastric distention. No evidence of small  bowel distention.     LYMPH NODES: A large gastrohepatic lymph node is again demonstrated,  measuring approximately 3.2 x 2.5 cm (series 4, image 116), not  significantly changed. Other enlarged upper abdominal and  retroperitoneal lymph nodes also similar to prior.     VASCULATURE: Mild nonaneurysmal aortic atherosclerosis.     PELVIC ORGANS: Normal.     OTHER: No ascites.     MUSCULOSKELETAL: No destructive bone lesions.                                                                      IMPRESSION:  1.  Small to moderate left pleural effusion with compressive  atelectasis in the left lower lobe similar to prior. There is a single  tiny pulmonary nodule that is unchanged and of doubtful clinical  significance.  2.  Wall thickening in the distal stomach was better demonstrated on  the prior study. There is mild to moderate gastric distention. No  small bowel obstruction demonstrated.  3.  Enlarged upper abdominal and retroperitoneal lymph nodes are  similar to recent prior.           Impression and Plan:   Impression / Plan:   Essie Guzman is a 66 year old male with metastatic gastric malignancy, planning to start Pembro.   He has h/o right hydronephrosis presumed secondary to malignancy (retroperitoneal adenopathy), status post stent on 9/10/21 with Dr. Friedman.  He is tolerating the stent poorly and has constant flank pain.  He also  has 2d h/o BL groin pain.       - Please check bladderscan postvoid residual to assure he is emptying.  Incomplete emptying would cause right hydronephrosis and flank pain  - Does have some sensitivity of BL epididymus but no testicular swelling or other signs of testicular infection (such as fevers, leukocytosis, etc).  Typically I would trial a 10 day course of antibiotics for this (quinolone vs bactrim vs 3rd gen cephalosporin) but he has already taken 7 days of bactrim and has noted no benefit, therefore would not anticipate that more antibiotics would help. UA and UCx are negative.  Prostate is boggy but also completely nontender and not suggestive of acute prostatitis.  Furthermore he has no voiding symptoms. This groin pain is therefore unlikely to be infectious and may be also secondary to chronic stent pain vs malignancy.    - Given that he is failing the stent due to pain AND he continues to have untreated metastatic cancer with retroperitoneal adenopathy, counseled him on possible right PNT placement with IR.  He and his family will consider this and let me know what they decide.  I would be happy to place the order for this is the patient wants to move forward. Spoke with Dr. Friedman who would be happy to then remove the ureteral stent.    - For the groin pain recommend 1) ibuprofen, 2) heat or cold, 3) wearing supportive underwear.     Urology will sign off.  The daughter was given my pager number today and will let me know by [place] or [place] if they want to schedule PNT    Discussed with Dr. Friedman and Dr. Castro.     Thank you for the opportunity to participate in the care of Essie Guzman.     Kerry Gaitan PA-C  Urology Physician Assistant  Personal Pager: [phone]    Please call Job Code:   [phone] to reach the Urology resident or PA on call - Weekdays  [phone] to reach the Urology resident or PA on call - Weeknights and weekends

## 2021-09-27 NOTE — ED TRIAGE NOTES
"Triage Assessment & Note:    BP (!) 134/98   Pulse 82   Temp 98.4  F (36.9  C) (Temporal)   Resp 16   Ht 1.575 m (5' 2\")   Wt 50.8 kg (112 lb)   SpO2 99%   BMI 20.49 kg/m        Patient presents with: Oncology pt comes to triage with reports of abdominal pain, testicle swelling, flank pain, and pain with urination. Pt currently being treated for UTI. No reports of fever, cough, SOB, CP, or travel.     Home Treatments/Remedies: home medications    Febrile / Afebrile: afebrile    Duration of C/o: testicle swelling and painful urination < 12 hrs    Chiquis Rm RN  September 27, 2021        "

## 2021-09-27 NOTE — ED PROVIDER NOTES
[facility] DEPARTMENT ([facility])  9/27/21  History     Chief Complaint   Patient presents with     Abdominal Pain     Flank Pain     Groin Swelling     testicles     The history is provided by the patient and medical records.     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  He reports he has been having pain in the suprapubic area which radiates to his right back which prompted him to go to an urgent care where he was diagnosed with a urinary tract infection, placed on antibiotics in late September.  Despite the antibiotics he continues to have pain.  He denies fevers, chills, or rigors, or blood in his urine.  He continues to have pain and had tried to get in to see urology but was unable to get an appointment.  He also reports that both testicles feel swollen and that this began last night.    This part of the medical record was transcribed by Amy Levine Scribe, from a dictation done by Harrison Castro MD.       History reviewed. No pertinent past medical history.    Past Surgical History:   Procedure Laterality Date     COLONOSCOPY N/A 11/24/2015    Procedure: COLONOSCOPY;  Surgeon: Clyde Mosher MD;  Location:  GI     COMBINED CYSTOSCOPY, INSERT STENT URETER(S) Right 9/10/2021    Procedure: CYSTOSCOPY, WITH right URETERAL STENT INSERTION, retrograde pyleogram;  Surgeon: Jez Friedman MD;  Location: [facility] OR     ESOPHAGOSCOPY, GASTROSCOPY, DUODENOSCOPY (EGD), COMBINED N/A 11/24/2020    Procedure: ESOPHAGOGASTRODUODENOSCOPY with biopsies using cold forceps;  Surgeon: Clyde Mosher MD;  Location:  GI     ESOPHAGOSCOPY, GASTROSCOPY, DUODENOSCOPY (EGD), COMBINED N/A 12/22/2020    Procedure: ESOPHAGOGASTRODUODENOSCOPY, WITH FINE NEEDLE  "ASPIRATION BIOPSY, WITH ENDOSCOPIC ULTRASOUND GUIDANCE;  Surgeon: Guru Teri Pedraza MD;  Location: [facility] GI       Family History   Problem Relation Age of Onset     Asthma No family hx of      Diabetes No family hx of      Hypertension No family hx of      Cerebrovascular Disease No family hx of      Cancer No family hx of      Colon Cancer No family hx of      Anesthesia Reaction No family hx of      Deep Vein Thrombosis (DVT) No family hx of        Social History     Tobacco Use     Smoking status: Never Smoker     Smokeless tobacco: Never Used   Substance Use Topics     Alcohol use: No       No current facility-administered medications for this encounter.     Current Outpatient Medications   Medication     acetaminophen (TYLENOL) 325 MG tablet     ferrous gluconate (FERGON) 324 (38 Fe) MG tablet     ondansetron (ZOFRAN-ODT) 4 MG ODT tab     oxybutynin ER (DITROPAN-XL) 5 MG 24 hr tablet     pantoprazole (PROTONIX) 40 MG EC tablet     sulfamethoxazole-trimethoprim (BACTRIM DS) 800-160 MG tablet     tamsulosin (FLOMAX) 0.4 MG capsule     thiamine (B-1) 100 MG tablet     oxyCODONE (ROXICODONE) 5 MG tablet      No Known Allergies      I have reviewed the Medications, Allergies, Past Medical and Surgical History, and Social History in the Epic system.    Review of Systems   Constitutional: Negative for chills and fever.        Negative for rigors   Genitourinary: Negative for hematuria.        Positive for suprapubic pain radiating to R back  Positive for testicular swelling   All other systems reviewed and are negative.    A complete review of systems was performed with pertinent positives and negatives noted in the HPI, and all other systems negative.    Physical Exam   BP: (!) 134/98  Pulse: 82  Temp: 98.4  F (36.9  C)  Resp: 16  Height: 157.5 cm (5' 2\")  Weight: 50.8 kg (112 lb)  SpO2: 99 %      Physical Exam  Vitals and nursing note reviewed.   Constitutional:       General: He is not in acute " distress.     Appearance: He is well-developed. He is not ill-appearing, toxic-appearing or diaphoretic.   HENT:      Head: Normocephalic and atraumatic.      Mouth/Throat:      Lips: Pink.      Mouth: Mucous membranes are moist.      Pharynx: Oropharynx is clear. No oropharyngeal exudate.   Eyes:      General: Lids are normal. No scleral icterus.     Extraocular Movements: Extraocular movements intact.      Right eye: No nystagmus.      Left eye: No nystagmus.      Conjunctiva/sclera: Conjunctivae normal.      Pupils: Pupils are equal, round, and reactive to light.   Neck:      Thyroid: No thyromegaly.      Vascular: No JVD.      Trachea: No tracheal deviation.   Cardiovascular:      Rate and Rhythm: Normal rate and regular rhythm.      Pulses: Normal pulses.      Heart sounds: Normal heart sounds. No murmur heard.   No friction rub. No gallop.    Pulmonary:      Effort: Pulmonary effort is normal. No respiratory distress.      Breath sounds: Normal breath sounds.   Abdominal:      General: Bowel sounds are normal. There is no distension.      Palpations: Abdomen is soft. There is no mass.      Tenderness: There is no abdominal tenderness. There is no right CVA tenderness, left CVA tenderness, guarding or rebound.      Hernia: There is no hernia in the left inguinal area or right inguinal area.   Genitourinary:     Penis: Normal.       Testes: Normal. Cremasteric reflex is present.         Right: Tenderness or swelling not present. Cremasteric reflex is present.          Left: Tenderness or swelling not present. Cremasteric reflex is present.       Epididymis:      Right: No tenderness.      Left: No tenderness.   Musculoskeletal:         General: No tenderness. Normal range of motion.      Cervical back: Normal range of motion and neck supple. No erythema or rigidity.      Right lower leg: No edema.      Left lower leg: No edema.   Lymphadenopathy:      Cervical: No cervical adenopathy.   Skin:     General: Skin  is warm and dry.      Capillary Refill: Capillary refill takes less than 2 seconds.      Coloration: Skin is not pale.      Findings: No erythema or rash.   Neurological:      Mental Status: He is alert and oriented to person, place, and time.      Cranial Nerves: No cranial nerve deficit.      Sensory: No sensory deficit.      Motor: Motor function is intact.   Psychiatric:         Mood and Affect: Mood and affect normal.         Speech: Speech normal.         Behavior: Behavior normal.         ED Course          Procedures           Results for orders placed or performed during the hospital encounter of 09/27/21   UA with Microscopic reflex to Culture     Status: Abnormal    Specimen: Urine, Midstream   Result Value Ref Range    Color Urine Light Yellow Colorless, Straw, Light Yellow, Yellow    Appearance Urine Clear Clear    Glucose Urine Negative Negative mg/dL    Bilirubin Urine Negative Negative    Ketones Urine Negative Negative mg/dL    Specific Gravity Urine 1.019 1.003 - 1.035    Blood Urine Small (A) Negative    pH Urine 6.0 5.0 - 7.0    Protein Albumin Urine 20  (A) Negative mg/dL    Urobilinogen Urine Normal Normal, 2.0 mg/dL    Nitrite Urine Negative Negative    Leukocyte Esterase Urine Small (A) Negative    Sperm Urine Present (A) None Seen /HPF    RBC Urine 3 (H) <=2 /HPF    WBC Urine 3 <=5 /HPF    Transitional Epithelials Urine <1 <=1 /HPF    Narrative    Urine Culture not indicated   [place] Draw *Canceled*     Status: None ()    Narrative    The following orders were created for panel order [place] Draw.  Procedure                               Abnormality         Status                     ---------                               -----------         ------                       Please view results for these tests on the individual orders.     Medications   0.9% sodium chloride BOLUS (0 mLs Intravenous Stopped 9/27/21 1526)   oxyCODONE (ROXICODONE) tablet 5 mg (5 mg Oral Given 9/27/21 1243)              Assessments & Plan (with Medical Decision Making)     This patient presented to the emergency department with continued pain with urination despite being on antibiotics. He was treated for presumed urinary tract infection but review of urine culture demonstrates a negative culture. Suspect that abnormality of urinalysis have been secondary to patient's stent placement. He is afebrile and is not presenting with symptoms suggestive of an acute infection. He did complain of testicular pain and swelling but testicular exam is normal. Postvoid residual demonstrates no evidence of urinary retention. This may be pain related to the patient's cancer diagnosis, but there are aspects of his pain suggestive of discomfort secondary to the stent and ureteral reflux. I did consult with urology who saw the patient in the emergency department. They spoke with the patient and discussed percutaneous nephrostomy tube placement. Patient is interested in this and urology will schedule this as an outpatient procedure. Patient will be placed on oxybutynin in hopes that this helps his symptoms in the meanwhile. He was discharged home in good condition.    I have reviewed the nursing notes.    I have reviewed the findings, diagnosis, plan and need for follow up with the patient.    Discharge Medication List as of 9/27/2021  4:17 PM      START taking these medications    Details   oxybutynin ER (DITROPAN-XL) 5 MG 24 hr tablet Take 1 tablet (5 mg) by mouth daily, Disp-30 tablet, R-0, Local Print      oxyCODONE (ROXICODONE) 5 MG tablet Take 1 tablet (5 mg) by mouth every 6 hours as needed for moderate to severe pain, Disp-60 tablet, R-0, E-Prescribe             Final diagnoses:   Flank pain       Emi RUSSELL am serving as a trained medical scribe to document services personally performed by Harrison Castro MD, based on the provider's statements to me.      I, Harrison Castro MD, was physically present and have  reviewed and verified the accuracy of this note documented by Emi Cruz.     Harrison Castro MD  9/27/2021   [facility] EMERGENCY DEPARTMENT     Harrison Castro MD  09/28/21 1802

## 2021-09-27 NOTE — TELEPHONE ENCOUNTER
Refill Request    Date of most recent appointment:  9/15/21  Next upcoming appointment:   9/29 LIANNA BAEZ    Medication requested:  oxyCODONE (ROXICODONE) 5 MG tablet  Quantity:  60  Last fill date:  9/17  Person requesting refill:  Pt daughter  Notes:  Caller states Pt is taking 3 pills/day. States pain is well managed and medication begins to wear off before 6 hours pass. States Pt has 1 pill left. If Math is correct Pt should have around 30 left.  shows:    Prescribing provider(s):  Fabio Sorto approved/denied:  Approved    Disposition of prescription:  Caller requests discharge pharmacy due to work location.

## 2021-09-27 NOTE — DISCHARGE INSTRUCTIONS
Follow-up with urology clinic. They will order an outpatient percutaneous nephrostomy tube as discussed.    Oxybutynin as directed while you have the current stent in place. This may help with pain if your pain is stent related.    Return to the emergency department for any problems.

## 2021-09-27 NOTE — TELEPHONE ENCOUNTER
S: Jass, daughter, calling to report pt has continued burning on urination, possible fever, decreased appetite and fluid intake, and pain in lower abd and lower back. Oxyicodone is not helping.     B: Pt was hospitalized on 9/ for stent placement.  Developed buring with urination, night sweats, chills.  Saw provider on 9/21 and was dx w/UTI. Started on abx. Night sweats went away.  Today, complaining of pain in mid lower abd and back, oxy is not helping. Tried to sleep it off, but won't go away. Pain comes and goes, possibly related to muscle spasms, but medication he took--suppository did not work.  Today, pt still has pain and burning with urination.   Pt's appetite and fluid intake is decreased. Unsure if pt has fever. No chills, no sob, no chest pain, no dizziness, no cough.     A: Pt states that urology is difficult to get in touch with. Phone number for urology given to pt.    R: Jass states that they are currently in the ED. Advised pt that was appropriate. Pt should stay in ED and be evaluated.    Routed to Dr. Mccarthy and care team.

## 2021-09-29 NOTE — TELEPHONE ENCOUNTER
Reason for Call:  Medication or medication refill:    Do you use a [facility]?  Name of the pharmacy and phone number for the current request: Discharge pharmacy at [facility]--or [facility] in   [place]   Name of the medication requested: oxycotin    Other request: Dc'ed from ED on 9/27 and was unable to sleep last night and in a lot of pain    Can we leave a detailed message on this number?   YES    Phone number patient can be reached at: Other phone number:  [phone] daughter Jass    Best Time:       Call taken on 9/29/2021 at 8:18 AM by Geri Oliveira PTA

## 2021-10-01 NOTE — PROGRESS NOTES
Outpatient IR Referral    Patient is a 65 y/o male with a PMH of gastric cancer with significant progression of disease, new Right hydronephrosis presumed to be due to retroperitoneal adenopathy, malignancy.  Patient had a right ureteral stent placed 9/10/21 by Urology and has had ongoing flank pain since stent placement, new groin pain 9/25/21 with ED visit.  Patient's symptoms were not relieved with prior trial of antibiotics, exam 9/27/21 negative for prostatitis. Patient is planning to start chemotherapy followed by Dr. Ruby.  IR has been asked to place a Right percutaneous nephrostomy tube placement.     Case and imaging CT 9/23/21 was reviewed with Dr. Vallecillo from IR Right PNT is recommended. Series 4 Image 140. To note the right kidney is now decompressed with a patent double J stent.             Procedure order placed by Cielo LANGE. To note IR is placing the PNT.  Urology to remove the double J stent.      Primary team Cielo LANGE Urology made aware of IR recommendations via epic messaging.     SYLVIE Sánchez CNP  Interventional Radiology   IR on-call pager: [phone]

## 2021-10-04 PROBLEM — G89.3 CANCER ASSOCIATED PAIN: Status: ACTIVE | Noted: 2021-01-01

## 2021-10-04 NOTE — IR NOTE
Patient Name: Essie Guzman  Medical Record Number: [id number]  Today's Date: 10/4/2021    Procedure: image guided right percutaneous nephrostomy tube placement  Proceduralist: Dr SASHA Olmedo, Dr ALLISON Segura    Sedation start time: 1208  Sedation end time: 1231  Sedation medications administered: 3 mg versed, 150 mcg fentanyl  Total sedation time: 23 min  Sedation Notes: none    Procedure start time: 1208  Procedure end time: 1231    Report given to: Letty OAKLEY   : none    Other Notes: Pt arrived to IR room 4 from . Consent reviewed, pt confirmed. Pt denies any questions or concerns regarding procedure. Pt positioned side lying - right side up and monitored per protocol. Site cleansed and dressed per protocol. Pt tolerated procedure without any noted complications. Pt transferred back to .

## 2021-10-04 NOTE — LETTER
[facility] ED UNIT [address] [address]  Phone: [phone]     To whom it may concern,    Lin Guzman may need additional consideration to her work schedule as she has been an active member in the care team for her father, Essie Guzman during his medical care. He was admitted on 10/4/2021 and remains admitted at this time. Please give accomodation as possible.       Thank you,            Dr. Lynn Waggoner  Internal Medicine Hospitalist

## 2021-10-04 NOTE — LETTER
[facility]        October 4, 2021           To Whom It May Concern:    Essie Guzman was seen in hospital today under the care of Mau Segura and Jimmy Olmedo.        TULIO Maria, PA-C  Physician Assistant - Certified  Interventional Radiology

## 2021-10-04 NOTE — PROVIDER NOTIFICATION
Paged Dr. Olmedo. Family would like a MD note so that they can notify work that they were at the hospital to help translate for their father.

## 2021-10-04 NOTE — PROCEDURES
[facility]    Procedure: IR Procedure Note    Date/Time: 10/4/2021 12:35 PM  Performed by: Jimmy Olmedo DO  Authorized by: Jimmy Olmedo DO   IR Fellow Physician: Honorio    UNIVERSAL PROTOCOL   Site Marked: NA  Prior Images Obtained and Reviewed:  Yes  Required items: Required blood products, implants, devices and special equipment available    Patient identity confirmed:  Verbally with patient, arm band, provided demographic data and hospital-assigned identification number  Patient was reevaluated immediately before administering moderate or deep sedation or anesthesia  Confirmation Checklist:  Patient's identity using two indicators, relevant allergies, procedure was appropriate and matched the consent or emergent situation and correct equipment/implants were available  Time out: Immediately prior to the procedure a time out was called    Universal Protocol: the Joint Commission Universal Protocol was followed    Preparation: Patient was prepped and draped in usual sterile fashion           ANESTHESIA    Anesthesia: Local infiltration  Local Anesthetic:  Lidocaine 1% without epinephrine      SEDATION    Patient Sedated: Yes    Sedation Type:  Moderate (conscious) sedation  Sedation:  Fentanyl and midazolam  Vital signs: Vital signs monitored during sedation    See dictated procedure note for full details.  Findings: Uncomplicated placement of right-sided 8 Ethiopian locking skater pigtail catheter for percutaneous nephrostomy tube.  Sample sent for urinary analysis    Specimens: fluid and/or tissue for gram stain and culture    Complications: None    Condition: Stable    Plan: Bedrest for 2 hours.  Catheter site to gravity drainage.  Follow-up with urology regarding ureteral stent removal.  Routine tube change in 3 months.    PROCEDURE   Patient Tolerance:  Patient tolerated the procedure well with no immediate complications    Length of time  physician/provider present for 1:1 monitoring during sedation: 25

## 2021-10-04 NOTE — DISCHARGE SUMMARY
Patient discharged at 3:15 PM to home.  IV was discontinued. Belongings returned to patient.  Discharge instructions and medications reviewed with patient and daughter at the bedside.  Patient verbalized understanding and all questions were answered. Extra supplies given to pt at time of discharge. Tolerated some juice, but denied food. At time of discharge, patient condition was stable and left the unit via wheelchair to  by RN and family.     Clear bilaterally, pupils equal, round and reactive to light.

## 2021-10-04 NOTE — SEDATION DOCUMENTATION
[facility] Procedure Note        Sedation:      Performed by: Jimmy Olmedo DO  Authorized by: Jimmy Olmedo DO    Pre-Procedure Assessment done at 1030.    Expected Level:  Moderate Sedation    Indication:  Sedation is required to allow for Right PNT placement     Consent obtained from patient after discussing the risks, benefits and alternatives.    PO Intake:  Appropriately NPO for procedure    ASA Class:  Class 2 - MILD SYSTEMIC DISEASE, NO ACUTE PROBLEMS, NO FUNCTIONAL LIMITATIONS.    Mallampati:  Grade 2:  Soft palate, base of uvula, tonsillar pillars, and portion of posterior pharyngeal wall visible    Lungs: Lungs Clear with good breath sounds bilaterally.     Heart: Normal heart sounds and rate    History and physical reviewed and no updates needed. I have reviewed the lab findings, diagnostic data, medications, and the plan for sedation. I have determined this patient to be an appropriate candidate for the planned sedation and procedure and have reassessed the patient IMMEDIATELY PRIOR to sedation and procedure.

## 2021-10-04 NOTE — TELEPHONE ENCOUNTER
[facility] Telephone Triage Note    Assessment:   Jass (EC)reporting the following symptoms: Pain  Onset: Just got discharge from outpatient nephrostomy being changed.   Duration/Frequency:Constant  Location: bladder and lower abdomen  Rates: 9-10/10  Quality:sharp  Current med(s) used:Last Oxycodone at 8:00am this morning, was given IV pain meds for outpatient procedure, now pain returning.   Since 9/12 pt has been using 1 tablet every 4 hours, averaging 6 tablets in a 24hr period. Is also giving 2 tylenol (total of 650mg) every 4 hours as well.     Caregivers have been up with pt since last ER visit 9/27 r/t pain management issues.     Denies headaches fevers/chills, cough, sore throat, SOB, n/v or other issues.     Best pharmacy is [facility] pharmacy in [place] on [facility].     Jass is hoping that something else can be added to the pain regimen for tonight since pt not getting lots of sleep. Family declined want to go to ED for pain management as just was in ED on 9/27.      Recommendations: Paged provider  Dr. Liz on call for Dr. Mccarthy.   4:03pm Per Dr. Liz send pt to Emergency Department for uncontrolled pain and since had recent procedure and concern for post procedural complications.     Follow-Up:  Instructed Youdevi in agreement with returning pt to the [place] ED.     This writer gave handoff report [facility] 4:42pm [facility]

## 2021-10-04 NOTE — PROGRESS NOTES
Pt arrived for a R nephrostomy tube placement. Vitally stable, ex HTN. C/o pain to R flank area, asking MD if we can give PRN pain meds. H&P reviewed. Labs not ordered, verified with MD and OK. Consent signed. Daughter Jass at the bedside to interpret (she works in the healthcare field). Vascular consult for IV access, pre-procedure meds and fluids ready to infuse once IV access obtained.

## 2021-10-04 NOTE — PROGRESS NOTES
Patient learning representative here to review with patient and daughter discharge instructions, review supplies, and answer questions regarding the new neph tube placement.

## 2021-10-04 NOTE — DISCHARGE INSTRUCTIONS
"[facility]  Discharge Instructions for   Percutaneous Nephrostomy   Tube Placement    After you go home:    Have an adult stay with you for 24 hours.    Drink plenty of fluids.    Resume a regular diet unless otherwise ordered by your physician.      For 24 Hours:    Relax and take it easy.    Do not drive or operate machines at home or at work.    No alcohol for 24 hours.    Do not make any important or legal decisions.    Do not do any strenuous exercise or lifting greater that 10 lbs for at least 2 days following your procedure.    CALL THE PHYSICIAN IF:    You start bleeding from the procedure site. If you do start to bleed from the site lie down and hold some pressure on the site. Your physician will tell you if you need to return to the hospital.    You develop nausea or vomiting.    You develop hives or a rash or any unexplained itching.    ADDITIONAL INSTRUCTIONS:  Please call for the following problems:  1. No urine draining from the nephrostomy tube. Check that tube is not kinked.  2. Urine leaking around tube.  3. Urine becomes very foul smelling or new or fresh blood in urine  4. The skin around the tube is red, painful, or has drainage.  5. You have pain in your back, over your kidney.  6. You have a fever of 100.5 F and chills  7. You feel nauseated and \"just not right.\"    Change the dressing initially the next day to check the insertion site.  After that change every other day.  Clean around tube site with washcloth and antibacterial soap.      [facility] INTERVENTIONAL RADIOLOGY DEPARTMENT  Procedure Physician: Dr. Olmedo   Date:October 4, 2021  Telephone Numbers:  [phone]     Monday-Friday 8:00AM-4:30PM                       [phone]     After 4:30 PM Monday-Friday, Weekends and Holidays. Ask for Interventional Radiologist on Call. Someone is available 24 hours a day.  [facility] toll free number: [phone] Monday- Friday 8:00AM -4:30PM.    I  "

## 2021-10-04 NOTE — CONSULTS
10/04/21 1519 Rebecca Sumner, RN     Patient, daughter and wife seen at bedside. Daughter states she did not need an  for herself. Pt. Slept throughout class. Daughter RD correctly on a model with bandage change, anchoring tube, and emptying bag. Discussed when to call care team. Daughter states she understands all information presented. Literature given: Handwashing and Skin Care, Drainage Tube Home Care Instructions, Flushing Your Drain with Saline.

## 2021-10-04 NOTE — PROGRESS NOTES
Reached Nolberto Lilly MD and asked if he would be comfortable writing a MD note for the family stating that they were here to help translate for their father. He was comfortable in doing so.     Also scheduled f/u IR visit for new neph tube placement today. F/u apt is scheduled for Tuesday, January 4th at 9:30 am.   right rail up/left rail down

## 2021-10-04 NOTE — ED TRIAGE NOTES
BIBA from home with c/o 10/10 abdominal pain. Pt has home oxycodone but has not taken any since this morning. VSS in route, 50 mcg fentanyl IN given in route. Discharged earlier today from in patient, hx kidney cancer.   Chayo Rodriguez RN on 10/4/2021 at 6:49 PM

## 2021-10-05 NOTE — ED PROVIDER NOTES
"    [facility] ([facility])  10/04/21 ED10  History     Chief Complaint   Patient presents with     Pain     The history is provided by the patient, a relative and medical records. A  was used (daughter).     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  Daughter reports the patient's pain is a 8/10.  He did take 1 tablet of oxycodone at home prior to arrival.  Patient had a right ureteral stent insertion done on 9/10 for right kidney blockages.  The stent is creating a lot of hypogastric abdominal and groin pain.  He was supposed to have his stent removed but has not been able to schedule the removal.  He subsequently received a right nephrostomy tube today.  Patient is still experiencing dysuria despite stent and nephrostomy.  He states the pain feels like \"cramping\" and comes and goes.  Daughter reports they tried a rectal pain medication but it was too painful for the patient.  She denies any fever, cough, or chest pain in the patient.  Patient endorses faint flank pain on the right side.  Daughter reports that the patient has been unable to sleep at home due to the pain.  They are waiting on insurance to approve of a new cancer treatment, he is not in any current chemotherapies or radiation.  Patient is not on hospice.  Patient takes oxycodone at home every 4 hours 5 mg.    Past Medical History  No past medical history on file.  Past Surgical History:   Procedure Laterality Date     COLONOSCOPY N/A 11/24/2015    Procedure: COLONOSCOPY;  Surgeon: [facility] Mosher MD;  Location:  GI     COMBINED CYSTOSCOPY, INSERT STENT URETER(S) " Right 9/10/2021    Procedure: CYSTOSCOPY, WITH right URETERAL STENT INSERTION, retrograde pyleogram;  Surgeon: Jez Friedman MD;  Location: [facility] OR     ESOPHAGOSCOPY, GASTROSCOPY, DUODENOSCOPY (EGD), COMBINED N/A 11/24/2020    Procedure: ESOPHAGOGASTRODUODENOSCOPY with biopsies using cold forceps;  Surgeon: [facility] Mosher MD;  Location: [facility] GI     ESOPHAGOSCOPY, GASTROSCOPY, DUODENOSCOPY (EGD), COMBINED N/A 12/22/2020    Procedure: ESOPHAGOGASTRODUODENOSCOPY, WITH FINE NEEDLE ASPIRATION BIOPSY, WITH ENDOSCOPIC ULTRASOUND GUIDANCE;  Surgeon: Guru Teri Pedraza MD;  Location: [facility] GI     acetaminophen (TYLENOL) 325 MG tablet  ferrous gluconate (FERGON) 324 (38 Fe) MG tablet  ondansetron (ZOFRAN-ODT) 4 MG ODT tab  oxybutynin ER (DITROPAN-XL) 5 MG 24 hr tablet  oxyCODONE (ROXICODONE) 5 MG tablet  pantoprazole (PROTONIX) 40 MG EC tablet  tamsulosin (FLOMAX) 0.4 MG capsule  thiamine (B-1) 100 MG tablet      No Known Allergies  Family History  Family History   Problem Relation Age of Onset     Asthma No family hx of      Diabetes No family hx of      Hypertension No family hx of      Cerebrovascular Disease No family hx of      Cancer No family hx of      Colon Cancer No family hx of      Anesthesia Reaction No family hx of      Deep Vein Thrombosis (DVT) No family hx of      Social History   Social History     Tobacco Use     Smoking status: Never Smoker     Smokeless tobacco: Never Used   Substance Use Topics     Alcohol use: No     Drug use: No      Past medical history, past surgical history, medications, allergies, family history, and social history were reviewed with the patient. No additional pertinent items.       Review of Systems   Constitutional: Positive for appetite change. Negative for fever.   Respiratory: Negative for cough.    Cardiovascular: Negative for chest pain.   Gastrointestinal: Positive for abdominal pain (hypogastric and groin pain).   Genitourinary: Positive for dysuria  and flank pain.   All other systems reviewed and are negative.    A complete review of systems was performed with pertinent positives and negatives noted in the HPI, and all other systems negative.    Physical Exam   BP: (!) 124/108  Pulse: 98  Temp: 98.7  F (37.1  C)  Resp: 20  SpO2: 98 %  Physical Exam  Vitals and nursing note reviewed.   Constitutional:       General: He is awake. He is not in acute distress.     Appearance: He is well-developed and underweight. He is ill-appearing. He is not toxic-appearing or diaphoretic.   HENT:      Head: Normocephalic and atraumatic.      Nose: Nose normal.      Mouth/Throat:      Mouth: Mucous membranes are dry.   Eyes:      General: No scleral icterus.     Conjunctiva/sclera: Conjunctivae normal.   Cardiovascular:      Rate and Rhythm: Normal rate.   Pulmonary:      Effort: Pulmonary effort is normal. No respiratory distress.      Breath sounds: No stridor.   Abdominal:      General: There is no distension.      Palpations: Abdomen is soft.      Tenderness: There is abdominal tenderness in the suprapubic area. There is no guarding or rebound.      Comments: PNT on right flank. Dressings in place. C/d/i. Clear pink-tinged urine in PNT bag.   Musculoskeletal:         General: No deformity or signs of injury. Normal range of motion.      Cervical back: Normal range of motion and neck supple. No rigidity.      Right lower leg: No edema.      Left lower leg: No edema.   Skin:     General: Skin is warm and dry.      Coloration: Skin is not jaundiced or pale.      Findings: No rash.   Neurological:      General: No focal deficit present.      Mental Status: He is alert and oriented to person, place, and time.   Psychiatric:         Mood and Affect: Mood normal.         Behavior: Behavior normal. Behavior is cooperative.         Thought Content: Thought content normal.         ED Course     11:36 PM  The patient was seen and examined by Myrna Busby MD in Room 10.    Procedures               Results for orders placed or performed during the hospital encounter of 10/04/21   Basic metabolic panel     Status: Abnormal   Result Value Ref Range    Sodium 126 (L) 133 - 144 mmol/L    Potassium 4.6 3.4 - 5.3 mmol/L    Chloride 92 (L) 94 - 109 mmol/L    Carbon Dioxide (CO2) 27 20 - 32 mmol/L    Anion Gap 7 3 - 14 mmol/L    Urea Nitrogen 40 (H) 7 - 30 mg/dL    Creatinine 1.03 0.66 - 1.25 mg/dL    Calcium 9.4 8.5 - 10.1 mg/dL    Glucose 148 (H) 70 - 99 mg/dL    GFR Estimate 74 >60 mL/min/1.73m2   Hepatic function panel     Status: Abnormal   Result Value Ref Range    Bilirubin Total 0.4 0.2 - 1.3 mg/dL    Bilirubin Direct <0.1 0.0 - 0.2 mg/dL    Protein Total 8.3 6.8 - 8.8 g/dL    Albumin 3.2 (L) 3.4 - 5.0 g/dL    Alkaline Phosphatase 75 40 - 150 U/L    AST 12 0 - 45 U/L    ALT 20 0 - 70 U/L   Lipase     Status: Normal   Result Value Ref Range    Lipase 155 73 - 393 U/L   UA with Microscopic reflex to Culture     Status: Abnormal    Specimen: Urine, Midstream   Result Value Ref Range    Color Urine Light Yellow Colorless, Straw, Light Yellow, Yellow    Appearance Urine Clear Clear    Glucose Urine Negative Negative mg/dL    Bilirubin Urine Negative Negative    Ketones Urine Negative Negative mg/dL    Specific Gravity Urine 1.024 1.003 - 1.035    Blood Urine Negative Negative    pH Urine 5.0 5.0 - 7.0    Protein Albumin Urine 30  (A) Negative mg/dL    Urobilinogen Urine Normal Normal, 2.0 mg/dL    Nitrite Urine Negative Negative    Leukocyte Esterase Urine Small (A) Negative    Bacteria Urine Few (A) None Seen /HPF    Mucus Urine Present (A) None Seen /LPF    Sperm Urine Present (A) None Seen /HPF    RBC Urine 4 (H) <=2 /HPF    WBC Urine 5 <=5 /HPF    Transitional Epithelials Urine <1 <=1 /HPF    Hyaline Casts Urine 11 (H) <=2 /LPF    Narrative    Urine Culture not indicated   CBC with platelets and differential     Status: Abnormal   Result Value Ref Range    WBC Count 7.5 4.0 - 11.0 10e3/uL    RBC Count  2.99 (L) 4.40 - 5.90 10e6/uL    Hemoglobin 7.4 (L) 13.3 - 17.7 g/dL    Hematocrit 23.8 (L) 40.0 - 53.0 %    MCV 80 78 - 100 fL    MCH 24.7 (L) 26.5 - 33.0 pg    MCHC 31.1 (L) 31.5 - 36.5 g/dL    RDW 17.4 (H) 10.0 - 15.0 %    Platelet Count 454 (H) 150 - 450 10e3/uL    % Neutrophils 78 %    % Lymphocytes 12 %    % Monocytes 9 %    % Eosinophils 0 %    % Basophils 1 %    % Immature Granulocytes 0 %    NRBCs per 100 WBC 0 <1 /100    Absolute Neutrophils 5.8 1.6 - 8.3 10e3/uL    Absolute Lymphocytes 0.9 0.8 - 5.3 10e3/uL    Absolute Monocytes 0.7 0.0 - 1.3 10e3/uL    Absolute Eosinophils 0.0 0.0 - 0.7 10e3/uL    Absolute Basophils 0.0 0.0 - 0.2 10e3/uL    Absolute Immature Granulocytes 0.0 <=0.0 10e3/uL    Absolute NRBCs 0.0 10e3/uL   Asymptomatic COVID-19 Virus (Coronavirus) by PCR Nasopharyngeal     Status: Normal    Specimen: Nasopharyngeal; Swab   Result Value Ref Range    SARS CoV2 PCR Negative Negative    Narrative    Testing was performed using the Xpert Xpress SARS-CoV-2 Assay on the  Cepheid Gene-Xpert Instrument Systems. Additional information about  this Emergency Use Authorization (EUA) assay can be found via the Lab  Guide. This test should be ordered for the detection of SARS-CoV-2 in  individuals who meet SARS-CoV-2 clinical and/or epidemiological  criteria. Test performance is unknown in asymptomatic patients. This  test is for in vitro diagnostic use under the FDA EUA for  laboratories certified under CLIA to perform high complexity testing.  This test has not been FDA cleared or approved. A negative result  does not rule out the presence of PCR inhibitors in the specimen or  target RNA in concentration below the limit of detection for the  assay. The possibility of a false negative should be considered if  the patient's recent exposure or clinical presentation suggests  COVID-19. This test was validated by the [facility]. This laboratory is certified  under  the Clinical Laboratory Improvement Amendments of 1988 (CLIA-88) as  qualified to perform high complexity laboratory testing.     Osmolality     Status: Normal   Result Value Ref Range    Osmolality Blood 282 280 - 301 mmol/kg    Narrative    Greater than 385 mmol/kg relates to stupor in hyperglycemia   Greater than 400 mmol/kg can relate to seizures   Greater than 420 mmol/kg can be lethal    Serum Osmalar Gap:   Normal <10   Larger suggest unmeasured substances present in serum (ethanol, methanol, isopropanol, mannitol, ethylene glycol).   Osmolality urine     Status: Normal   Result Value Ref Range    Osmolality Urine 569 100-1,200 mmol/kg    Narrative    Reference Ranges depend on patient's hydration status and renal function.   Neonates:  mmol/kg   2 years and older, random specimens: 100-1200 mmol/kg; Greater than 850 mmol/kg after 12 hour fluid restriction  Urine/serum osmolality ratio: 2 years and older: 1.0-3.0; 3.0-4.7 after 12 hour fluid restriction   Sodium random urine     Status: None   Result Value Ref Range    Sodium Urine mmol/L 18 mmol/L   CBC with Platelets & Differential     Status: Abnormal    Narrative    The following orders were created for panel order CBC with Platelets & Differential.  Procedure                               Abnormality         Status                     ---------                               -----------         ------                     CBC with platelets and d...[[id number]]  Abnormal            Final result                 Please view results for these tests on the individual orders.     Medications   ondansetron (ZOFRAN) injection 4 mg (4 mg Intravenous Not Given 10/5/21 0104)   0.9% sodium chloride BOLUS (1,000 mLs Intravenous New Bag 10/5/21 0049)     Followed by   sodium chloride 0.9% infusion (has no administration in time range)   HYDROmorphone (PF) (DILAUDID) injection 0.5 mg (has no administration in time range)   acetaminophen (TYLENOL) tablet 650  mg (has no administration in time range)   ondansetron (ZOFRAN-ODT) ODT tab 4 mg (has no administration in time range)   oxybutynin ER (DITROPAN-XL) 24 hr tablet 5 mg (has no administration in time range)   oxyCODONE (ROXICODONE) tablet 5 mg (has no administration in time range)   pantoprazole (PROTONIX) EC tablet 40 mg (has no administration in time range)   tamsulosin (FLOMAX) capsule 0.4 mg (has no administration in time range)   melatonin tablet 1 mg (has no administration in time range)   ondansetron (ZOFRAN-ODT) ODT tab 4 mg (has no administration in time range)     Or   ondansetron (ZOFRAN) injection 4 mg (has no administration in time range)   polyethylene glycol (MIRALAX) Packet 17 g (has no administration in time range)        Assessments & Plan (with Medical Decision Making)   Essie Guzman is a ong-speaking 66 year old male with past medical history of 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    Ddx: Ureteral spasm, painful right ureteral stent, Cancer-related pain, UTI, pyelonephritis, dehydration, failure to thrive, inadequate pain regimen    Patient currently on 5 mg oxycodone every 4 hours for management of cancer pain.  He has had inadequate pain control and is unable to sleep or get any kind of relief at home.  Patient states he has tried a rectal pain medication in the past for what sounds like ureteral spasm.  This caused more pain and discomfort and was not effective.  Patient had a nephrostomy tube placed on the right flank today.  He is having minimal discomfort in the flank area.  Urine output is clear with a slight pink tinge in the nephrostomy bag.  Patient is also having urine output from his bladder.  Urinalysis was sent and does  not look distant with an infection.  Treated patient's pain with as needed Dilaudid at 0.5 mg.  Will titrate dose, as necessary, for adequate pain control.  Patient will require a aggressive pain regimen for home.  Labs notable for stable hyponatremia and hypochloremia.  Normal creatinine.  Normal LFTs.  Given IV fluids.  Admitted to medicine cross cover for oncology on obs status for ongoing pain control.  ED obs deferred due to patient complexity.    I have reviewed the nursing notes. I have reviewed the findings, diagnosis, plan and need for follow up with the patient.    New Prescriptions    No medications on file       Final diagnoses:   Cancer associated pain     IKorin, am serving as a trained medical scribe to document services personally performed by Myrna Busby MD based on the provider's statements to me on October 5, 2021.  This document has been checked and approved by the attending provider.    I, Myrna Busby MD, was physically present and have reviewed and verified the accuracy of this note documented by Korin Cochran medical scribe.      Myrna Busby MD    --  Myrna Busby MD  [facility] EMERGENCY DEPARTMENT  10/4/2021     Myrna Busby MD  10/05/21 0212

## 2021-10-05 NOTE — PROVIDER NOTIFICATION
"Provider notified @  #1058:    \"Patient has been admitted to unit. Thanks! Mariajose [id number]\"    Nursing Focus: Admission  D: Arrived at 1605 from ED via stretcher. Patient accompanied by spouse and daughter. Admitted for PMH of metastatic gastric cancer. Complains of severe pelvic pain.      I: Admission process began.  Patient oriented to room, enviroment, call light.  MD notified of patient's arrival on unit.     A: Vital signs stable, afebrile.  Patient stable at this time.  Complaining of pelvic pain.     P: Implement plan of care when available. Continue to monitor patient. Nursing interventions as appropriate. Notify MD with changes in pt status.    "

## 2021-10-05 NOTE — PROGRESS NOTES
CLINICAL NUTRITION SERVICES - ASSESSMENT NOTE     Nutrition Prescription    RECOMMENDATIONS FOR MDs/PROVIDERS TO ORDER:  Replace electrolytes if below normal     Malnutrition Status:    Severe malnutrition in the context of chronic illness     Recommendations already ordered by Registered Dietitian (RD):  Ordered Mansi Geofusion daily + ensure clear   MVI daily   Add on Po4 and Mg     Future/Additional Recommendations:  Monitor tolerance of oral intake, use of supplements    If able to tolerate enteral nutrition and needs more aggressive nutrition support, recommend:   Mansi Geofusion Peptide 1.5 @ 45 mL/hr (1080 mL/day) to provide 1661 kcal (32  kcal/kg), 80 g protein (1.5 g/kg), 149 g CHO, 10 g fiber, 83 g fat (40% from MCTs), and 756 mL free water daily   Start TF @ 15 ml/hr and advance by 10 ml q 12 hrs until goal rate.  --Do not start or advance TF rate unless K+ >3.0, Mg++ > 1.5,  and Phos > 1.9.  --Minimum 30 ml q 4 hrs water flushes for tube patency.  --If gastric enteral access: HOB > 30 degrees    If not able to tolerate oral intake or enteral nutrition, consider TPN:   Dosing weight 51 kg   Access: Central   TPN: 1080 ml/day (45 ml/hr), Initial dex 95 g Dex (GIR = 1.3 mg/kg/min), AA 80 g and 250 m 20% lipid 5x per week = 1016 kcal (~20 kcal/kg) and 35% kcal from fat   Advance dex by 40-50 g/day to goal 215 g (GIR= 2.92 mg/kg/min) pending K>/=3, Mg>/= 1.5 and Po4 >/= 1.9   Goal TPN provisions 215 g dex, 80 g AA and 250 mL 20% lipid 5x per week = 1408 kcal (27 kcal/kg), 1.5 g/kg AA and 25% kcal from fat   -Recommend 10 mL infuvite daily + MTE-4  Electrolytes/additives per pharmD      REASON FOR ASSESSMENT  Essie Guzman is a/an 66 year old male assessed by the dietitian for Provider Order - weight loss, cancer, provide recs on supplementation    CLINICAL HISTORY   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 At that time, reported ensure made him feel ill, agreeable to try other supplements at that time.     Patient laying in bed, sleeping at time of visit- two family members in room, one provided interpreting. Reported that his appetite has been unchanged since early September- largest barrier to eating is pain.  Meat is still difficult for him to eat but he was able to tolerate some breakfast (ordered scrambled eggs and pancake)     CURRENT NUTRITION ORDERS  Diet: Regular  Intake/Tolerance: No intakes documented     LABS  Na 129 (L)  K+ 4.4 (WNL)  Glucose 112 (H)    MEDICATIONS  Protonix   Miralax     ANTHROPOMETRICS  Height: 152.4 cm (5'), previous height documented 5' 2\"  Most Recent Weight: 51 kg (112 lb 8 oz)  IBW: 48.2 kg vs 53.6 kg  BMI: Normal BMI (lower end)   Weight History:   Wt Readings from Last 15 Encounters:   10/04/21 51 kg (112 lb 8 oz)   09/27/21 50.8 kg (112 lb)   09/22/21 50.8 kg (112 lb)   09/15/21 52.5 kg (115 lb 11.2 oz)   09/12/21 50.3 kg (111 lb)   12/22/20 77.1 kg (170 lb)   12/17/20 77.1 kg (170 lb)   10/23/20 77.5 kg (170 lb 14.4 oz)   09/21/20 78.8 kg (173 lb 12.8 oz)   09/08/20 80.3 kg (177 lb)   06/05/19 97.5 kg (215 lb)   01/16/19 95 kg (209 lb 8 oz)   06/28/18 97 kg (213 lb 13.5 oz)   03/06/18 96.6 kg (213 lb)   01/22/18 94.8 kg (208 lb 14.4 oz)   33% weight loss in less than one year     Dosing Weight: 51 kg    ASSESSED NUTRITION NEEDS  Estimated Energy Needs:2714-7639 kcals/day (30-35+ kcals/kg)  Justification: Maintenance  Estimated Protein Needs: 61-76+ grams protein/day (1.2 - 1.5+ grams of pro/kg)  Justification: Increased needs  Estimated Fluid Needs: 2425-0102 mL/day (25-30 mL/kcal)   Justification: Maintenance and Per provider pending fluid status    PHYSICAL FINDINGS  See malnutrition " section below.    MALNUTRITION  % Intake: < 75% for >/= 3 months (non-severe)  % Weight Loss: > 20% in 1 year (severe)  Subcutaneous Fat Loss: Facial region:  Severe, Upper arm: Severe   Muscle Loss: Temporal:  severe, Upper arm (bicep, tricep):  severe, Lower arm  (forearm):  severe and Dorsal hand:  severe  Fluid Accumulation/Edema: None noted  Malnutrition Diagnosis: Severe malnutrition in the context of chronic illness    NUTRITION DIAGNOSIS  Unintended weight loss related to decreased oral intake and hypermetabolism 2/2 cancer as evidenced by 33% weight loss in less than one year    INTERVENTIONS  Implementation  Nutrition Education: RD role in care    Medical food supplement therapy  Multivitamin/mineral supplement therapy     Goals  Patient to consume % of nutritionally adequate meal trays TID, or the equivalent with supplements/snacks.     Monitoring/Evaluation  Progress toward goals will be monitored and evaluated per protocol.    Lulu Martinez RD, LD  6B pager: [phone]

## 2021-10-05 NOTE — TELEPHONE ENCOUNTER
Reason for visit: stent removal     Relevant information: right hydronephrosis    Problem list   Patient Active Problem List   Diagnosis     Malignant neoplasm of stomach metastatic to retroperitoneum (H)     Gastrointestinal hemorrhage with melena     Cancer associated pain       Records/imaging/labs/orders: in epic    Pt called: n/a    At Rooming: collect urine?

## 2021-10-05 NOTE — CONSULTS
"[facility] Oncology Consultation    Essie Guzman MRN# [id number]   Age: 66 year old YOB: 1955     Date of Admission: 10/4/2021    Reason for consult: Metastatic gastric cancer       Requesting physician: Dr. Kennedy       Level of consult: Consult, follow and place orders           Assessment and Plan:   Assessment:    Mr. Essie Guzman is a 65yo M with PMH including metastatic gastric cancer without prior treatment and complications including malignant ureteral obstruction s/p R ureteral stent and percutaneous nephrostomy, recurrent GIB, and malnutrition who presents for persistent abdominopelvic pain, for whom oncology is consulted given gastric cancer.    Diagnoses:  # Metastatic gastric cancer, Stage LUDMILA ([id number])  # Malignant ureteral obstruction  # Acute on chronic normocytic anemia, c/f ongoing UGIB  # Hyponatremia    Patient follows with Dr. Mccarthy, last seen 9/15/21. His malignancy notably is dMMR and chemotherapy + immunotherapy has been recommended previously. He was initially diagnosed in 11/2020 and after formal staging and subsequent interval imaging was completed through 03/2021, he declined cancer-directed therapies from 03/21 through 08/21. He then presented in 9/2021 with GIB and R hydronephrosis d/t malignant obstruction for which he received PRBCs and ureteral stenting. On discussion on 9/15/21 with Dr. Mccarthy, he declined chemotherapy, but was willing to start pembrolizumab monotherapy. Imaging this admission continues to show progressive disease notable for \"extensive necrotic retroperitoneal adenopathy, minimally increased since 9/23/21\".    He presents now with ongoing pain (which started after ureteral stenting) and also acute on chronic anemia concerning for ongoing UGIB likely from his primary gastric malignancy. Though his pain may be related to his stenting and percutaneous nephrostomy, suspect there is at least an element of pain related to his diffuse retroperitoneal " adenopathy.    Recommendations:  - Agree with urology evaluation, appreciated  - Agree with palliative care consultation, appreciated  - GIB management per primary team, transfuse for Hgb < 7, appreciated  - Analgesia per primary team, appreciated  - Patient should maintain planned oncology follow-up for initiation of pembrolizumab    Patient was seen by and discussed with Dr. Bajwa.    James Last MD PhD  Hematology/Oncology Fellow  Pgr: [phone]         History of Present Illness:   Mr. Essie Guzman is a 65yo M with PMH including metastatic gastric cancer without prior treatment and complications including malignant ureteral obstruction s/p R ureteral stent and percutaneous nephrostomy, recurrent GIB, and malnutrition who presents for persistent abdominopelvic pain, for whom oncology is consulted given gastric cancer.    History obtained with daughter serving as .     Mr. Guzman has been suffering from ongoing suprapubic pain since placement of his ureteral stent on 9/10/21. It has been quite debilitating and thus far poorly controlled with oxycodone. The pain has led to poor appetite with ongoing weight loss and generalized weakness. He wonders whether the pain is related to the stent and his daughter wonders whether he requires both the stent and the percutaneous nephrostomy.    He is uncertain whether he has had darker or frankly bloody stools. His wife notes that she had to help him from the bathroom recently, and there was dark stool coating the toilet, with a diarrhea-like consistency.     He has no additional complaints and states that his goal is for pain control. He endorses a desire to continue with his and Dr. Mccarthy's plan to start pembrolizumab monotherapy.            Cancer Treatment History:   Diagnosis: Metastatic gastric cancer (Stage LUDMILA, [id number])    - dMMR, HER-2 non-amplified, PD-L1 50-60% (50% by TPS)  Treatment: None  Oncologist: Dr. Mccarthy    Oncologic history, copied  "from Dr. Mccarthy's last note on 9/15/2021:  \" 09/2020- Epigastric burning abdominal pain for 2 months. Initially Rx for H.pylori with Abx and PPI. LFT's were mildly elevated.  11/24/20- UGI endoscopy at [facility] with normal esophagus. Non-bleeding gastric ulcer in pylorus with no stigmata of bleeding.  Non-bleeding duodenal ulcer with no stigmata of bleeding. Biopsy of Stomach, pylorus, - Poorly-differentiated carcinoma; consistent with adenocarcinoma (poorly cohesive type). HER2 by FISH was non-amplified  11/24/20- CT CAP- bilateral hilar lymph nodes are indeterminate (1.4 x 1.0 cm) right infrahilar lymph node. Multiple enlarged retroperitoneal and perigastric lymph nodes (10 mm right paraduodenal lymph node, 11 mm necrotic left para-aortic lymph node and a 10 mm left para-aortic lymph node.  12/16/20-  PET/CT with metastatic disease- Hypermetabolic circumferential ulcerated mass at the gastric antrum, Multiple metastatic hypermetabolic lymph nodes:  adjacent just inferior to the caudate lobe of the liver, Multiple enlarged, centrally necrotic, and hypermetabolic retroperitoneal para-aortic and paracaval lymph nodes.    12/18/20-Saw [facility]- Due to PET CT showing retroperitoneal lymphadenopathy not a candidate for surgical resection.  12/22/20- EUS staging and biopsy- T3N3Mx - Partially-obstructing cratered pre-pyloric gastric ulcer with a clean ulcer base, fully-circumferential ulcer  Several sub-centimeter malignant appearing round, well-circumscribed, hypoechoic lymph nodes were  visualized along the aorta, from the 2nd portion of  duodenum which corresponds to the hypermetabolic nodes seen on the PET scan.   3/9/21- PET/CT-Overall there has been progression in the number and size of the hypermetabolic periaortic, pericaval, and zuleyma hepatis lymph nodes compared to prior.Relatively unchanged hypermetabolic circumferential ulcerated mass at the gastric antrum.  March through Aug 2021- Declined systemic Rx " "with pembrolizumab as he was minimally symptomatic  9/10/21 to 9/12/21- [facility] admission for - Iron deficiency anemia from chronic blood loss anemia 2/2 progressive metastatic gastric cancer, Right hydrnonephrosis secondary to malignant obstruction s/p ureteric stenting and ongoing Cancer related weight loss- Rcd 1 unit PRBC  9/10/21- CT abd/pelvis-  Wall thickening of the distal stomach consistent with the history of gastric cancer. There are multiple enlarged upper abdominal and retroperitoneal lymph nodes consistent with metastases and significantly progressed since the previous exam. Right hydronephrosis and delayed nephrogram consistent with obstruction. (There is an irregularly-shaped low-attenuation mass posterior to the body of the pancreas measuring approximately 2.1 x 3.8 x 2.6 cm and consistent with a lymph node.\"    Since this point, he has had percutaneous nephrostomy tube placement by IR as recommended by urology after deemed to be failing his ureteral stent on ED consultation on 9/27.          Past Medical History:   Metastatic gastric cancer          Past Surgical History:     Past Surgical History:   Procedure Laterality Date     COLONOSCOPY N/A 11/24/2015    Procedure: COLONOSCOPY;  Surgeon: Clyde Mosher MD;  Location:  GI     COMBINED CYSTOSCOPY, INSERT STENT URETER(S) Right 9/10/2021    Procedure: CYSTOSCOPY, WITH right URETERAL STENT INSERTION, retrograde pyleogram;  Surgeon: Jez Friedman MD;  Location:  OR     ESOPHAGOSCOPY, GASTROSCOPY, DUODENOSCOPY (EGD), COMBINED N/A 11/24/2020    Procedure: ESOPHAGOGASTRODUODENOSCOPY with biopsies using cold forceps;  Surgeon: Clyde Mosher MD;  Location:  GI     ESOPHAGOSCOPY, GASTROSCOPY, DUODENOSCOPY (EGD), COMBINED N/A 12/22/2020    Procedure: ESOPHAGOGASTRODUODENOSCOPY, WITH FINE NEEDLE ASPIRATION BIOPSY, WITH ENDOSCOPIC ULTRASOUND GUIDANCE;  Surgeon: Guru Teri Pedraza MD;  Location:  GI     IR NEPHROSTOMY " TUBE PLACEMENT RIGHT  10/4/2021             Social History:     Social History     Tobacco Use     Smoking status: Never Smoker     Smokeless tobacco: Never Used   Substance Use Topics     Alcohol use: No             Family History:     Family History   Problem Relation Age of Onset     Asthma No family hx of      Diabetes No family hx of      Hypertension No family hx of      Cerebrovascular Disease No family hx of      Cancer No family hx of      Colon Cancer No family hx of      Anesthesia Reaction No family hx of      Deep Vein Thrombosis (DVT) No family hx of      Family history reviewed          Immunizations:     There is no immunization history on file for this patient.          Allergies:   No Known Allergies          Medications:     Current Facility-Administered Medications   Medication     acetaminophen (TYLENOL) tablet 650 mg     HYDROmorphone (PF) (DILAUDID) injection 0.5 mg     melatonin tablet 1 mg     morphine (MS CONTIN) 12 hr tablet 15 mg     ondansetron (ZOFRAN-ODT) ODT tab 4 mg    Or     ondansetron (ZOFRAN) injection 4 mg     oxybutynin ER (DITROPAN-XL) 24 hr tablet 5 mg     oxyCODONE (ROXICODONE) tablet 5-10 mg     pantoprazole (PROTONIX) EC tablet 40 mg     polyethylene glycol (MIRALAX) Packet 17 g     sodium chloride 0.9% infusion     tamsulosin (FLOMAX) capsule 0.4 mg     Current Outpatient Medications   Medication Sig     acetaminophen (TYLENOL) 325 MG tablet Take 2 tablets (650 mg) by mouth every 4 hours as needed for mild pain or fever     ferrous gluconate (FERGON) 324 (38 Fe) MG tablet Take 1 tablet (324 mg) by mouth daily (with breakfast)     ondansetron (ZOFRAN-ODT) 4 MG ODT tab Take 1 tablet (4 mg) by mouth every 6 hours as needed for nausea or vomiting     oxybutynin ER (DITROPAN-XL) 5 MG 24 hr tablet Take 1 tablet (5 mg) by mouth daily     pantoprazole (PROTONIX) 40 MG EC tablet Take 1 tablet (40 mg) by mouth daily     tamsulosin (FLOMAX) 0.4 MG capsule Take 1 capsule (0.4 mg) by  mouth daily     thiamine (B-1) 100 MG tablet Take 1 tablet (100 mg) by mouth daily     oxyCODONE (ROXICODONE) 5 MG tablet Take 1 tablet (5 mg) by mouth every 4 hours as needed for moderate to severe pain             Review of Systems:   The Review of Systems is negative other than noted in the HPI          Physical Exam:   Temp: 98  F (36.7  C) Temp src: Oral BP: (!) 136/96 Pulse: 97   Resp: 18 SpO2: 98 % O2 Device: None (Room air)    General: Thin, borderline cachectic M, in mild distress  HEENT: NCAT. Anicteric sclera. Hearing grossly intact. No nasal congestion or erythema. MMM, no oral lesions.  Neck: Supple.   Lungs: Full and equal expansion. CTAB, no wheezes, rhonchi or rales.  CV: Tachycardic with regular rhythm. Normal S1, S2. No m/r/g.  Abd: BS+, soft, non-distended. Mildly tender in the suprapubic region without rebound or guarding.  Extremities: Warm and well-perfused. No gross malformations. No joint swelling. No edema.  Skin: No rashes or lesions.  Neuro: Alert, answers questions appropriately (via ). Face symmetric. Moves extremities equally and independently.            Data:     Lab Results   Component Value Date    WBC 6.5 10/05/2021    HGB 6.3 (LL) 10/05/2021    HCT 20.0 (L) 10/05/2021     10/05/2021     (L) 10/05/2021    POTASSIUM 4.4 10/05/2021    CHLORIDE 97 10/05/2021    CO2 28 10/05/2021    BUN 38 (H) 10/05/2021    CR 1.02 10/05/2021     (H) 10/05/2021    SED 10 04/08/2014    AST 12 10/04/2021    ALT 20 10/04/2021    ALKPHOS 75 10/04/2021    BILITOTAL 0.4 10/04/2021    INR 1.10 09/10/2021     Imaging:    Examination:  CT ABDOMEN PELVIS W CONTRAST 10/5/2021 9:03 AM                                                        Impression:   In this patient with metastatic gastric adenocarcinoma:  1a. Mild asymmetrical hypoperfusion of the right kidney with new mild  hydronephrosis. Renal arteries appear patent. Question nephrostomy  tube dysfunction.  1b. Right  percutaneous nephrostomy tube and ureteral stent appear  appropriately positioned.  2a. Stable circumferential thickening of the gastric antrum/pylorus  known gastric adenocarcinoma.  2b. Extensive necrotic retroperitoneal adenopathy, minimally increased  since 9/23/2021.  3. Mild diffuse mesenteric edema, likely secondary to fluid  rehydration.  4. Small left pleural effusion with overlying atelectasis. Right lower  lobe pulmonary nodule is unchanged since 11/24/2020.

## 2021-10-05 NOTE — H&P
[facility]    History and Physical - Hospitalist Service, Gold night       Date of Admission:  10/4/2021    Assessment & Plan      Essie Guzman is a 66 year old male admitted on 10/4/2021. Thomas is a Hmong-speaking 66 year old male with past medical history of neoplasm of the stomach metastatic to retroperitoneum, gastrointestinal hemorrhage with melena, cancer related weight loss, severe malnutrition, and right hydronephrosis secondary to malignant obstruction s/p ureteric stenting who presents to the emergency department for evaluation of pain    Subacute Hyponatremia:  Low 130s in September, 10/1 and day of admission (10/4), 126.  Appears slightly dry, Suspect hypovolemic hyponatremia, due to poor appetite and decreased po intake with malignancy.  - Urine and serum Osms, urine electrolytes pending.    - gently hydration, and reassess    Acute on chronic pain:  Possible constipation  Right hydrononephrosis secondary to malignant obstruction  Suspect multifactorial, malignancy related pain, recent malignant ureteral obstruction with stenting.  complicated by recent nephrostomy tube.  Nephrostomy tube placed earlier today, ureteral stent placed in September.  Prescribed oxycodone at home, but with worsening pain, admitted due to poorly controlled pain.  Prn IV dilaudid, oral oxycodone.  - consulted urology, based on family request, due to recently placed stent and concern it's not reliving the pain (Did NOT call overnight).  May need imaging to assess current stent.  - would recommendation palliative care consult in morning, to assist with symptom management, and develop relationship if goals of care change over time.    #Metastatic gastric cancer:  Per prior notes (see below HPI for details): Diagnosed November 2020. Patient's preference was to hold off on chemo. Anticipated regimen was Nivo+chemo or Pembro alone. At time of diagnosis workup revealed metastatic to  retroperitoneal LN ([id number])-Stage LUDMILA. 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  Oncology consulted     #Iron deficiency anemia, chronic blood loss anemia from metastatic gastric cancer  #recent GI bleed  Hemoglobin on admit 7.4.  Will monitor    weight loss  Patient believes he has lost 70-80 pounds in the last year and this is supported by redundant soft tissue of abdomen. Likely secondary to mestatic gastric cancer, though has also has had trouble swallowing meat, poor appetite and recent nausea.   -Nutrition consult  -zofran prn  -Consider PT OT once stabilized      Hyperglycemia: not previously diabetic  - Will monitor blood glucoses       Diet: Regular Diet Adult    DVT Prophylaxis: VTE Prophylaxis contraindicated due to recent GI bleeding from cancer.  PCDs  Hamm Catheter: Not present  Central Lines: None  Code Status:   full    Clinically Significant Risk Factors Present on Admission         # Hyponatremia: Na = 126 mmol/L (Ref range: 133 - 144 mmol/L) on admission, will monitor as appropriate           Disposition Plan   Expected discharge:  1-2 days recommended to prior living arrangement once adequate pain management/ tolerating PO medications.     The patient's care was discussed with the Bedside Nurse, Patient and Patient's Family.    Garett Kennedy MD  [facility]  Securely message with the Vocera Web Console (learn more here)  Text page via [facility] Paging/Directory      ______________________________________________________________________    Chief Complaint   pain    History is obtained from the patient    History of Present Illness   Essie Guzman is a 66 year old male admitted on 10/4/2021. Thomas is a Hmong-speaking 66 year old male with past medical history of neoplasm of the stomach metastatic to  "retroperitoneum, gastrointestinal hemorrhage with melena, cancer related weight loss, severe malnutrition, and right hydronephrosis secondary to malignant obstruction s/p ureteric stenting who presented to the emergency department for evaluation of pain.      He has been struggling with pain for a few weeks.  Had a R nephrostomy tube placed 10/4.      He also had a right ureteral stent insertion done on 9/10 for right kidney obstruction.  Since the stent was placed, family says he has been having more lower abdominal and groin pain.  He was supposed to have his stent removed but has not been able to schedule the removal.     In addition, he is  experiencing dysuria despite stent and nephrostomy.  He states the pain feels like \"cramping\" and comes and goes.  Daughter reports they tried a rectal pain medication but it was too painful for the patient.  She denies any fever, cough, or chest pain in the patient.  Patient endorses faint flank pain on the right side.  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 Initially Rx for H.pylori with Abx and PPI. LFT's were mildly elevated.  11/24/20- [facility] endoscopy at [facility] with normal esophagus. Non-bleeding gastric ulcer in pylorus with no stigmata of bleeding.  Non-bleeding duodenal ulcer with no stigmata of bleeding. Biopsy of Stomach, pylorus, - Poorly-differentiated " carcinoma; consistent with adenocarcinoma (poorly cohesive type). HER2 by FISH was non-amplified  11/24/20- CT CAP- bilateral hilar lymph nodes are indeterminate (1.4 x 1.0 cm) right infrahilar lymph node. Multiple enlarged retroperitoneal and perigastric lymph nodes (10 mm right paraduodenal lymph node, 11 mm necrotic left para-aortic lymph node and a 10 mm left para-aortic lymph node.  12/16/20-  PET/CT with metastatic disease- Hypermetabolic circumferential ulcerated mass at the gastric antrum, Multiple metastatic hypermetabolic lymph nodes:  adjacent just inferior to the caudate lobe of the liver, Multiple enlarged, centrally necrotic, and hypermetabolic retroperitoneal para-aortic and paracaval lymph nodes.    12/18/20-Saw [facility]- Due to PET CT showing retroperitoneal lymphadenopathy not a candidate for surgical resection.  12/22/20- EUS staging and biopsy- T3N3Mx - Partially-obstructing cratered pre-pyloric gastric ulcer with a clean ulcer base, fully-circumferential ulcer  Several sub-centimeter malignant appearing round, well-circumscribed, hypoechoic lymph nodes were  visualized along the aorta, from the 2nd portion of  duodenum which corresponds to the hypermetabolic nodes seen on the PET scan.   3/9/21- PET/CT-Overall there has been progression in the number and size of the hypermetabolic periaortic, pericaval, and zuleyma hepatis lymph nodes compared to prior.Relatively unchanged hypermetabolic circumferential ulcerated mass at the gastric antrum.  March through Aug 2021- Declined systemic Rx with pembrolizumab as he was minimally symptomatic  9/10/21 to 9/12/21- [facility] admission for - Iron deficiency anemia from chronic blood loss anemia 2/2 progressive metastatic gastric cancer, Right hydrnonephrosis secondary to malignant obstruction s/p ureteric stenting and ongoing Cancer related weight loss- Rcd 1 unit PRBC  9/10/21- CT abd/pelvis-  Wall thickening of the distal stomach consistent with the history  of gastric cancer. There are multiple enlarged upper abdominal and retroperitoneal lymph nodes consistent with metastases and significantly progressed since the previous exam. Right hydronephrosis and delayed nephrogram consistent with obstruction. (There is an irregularly-shaped low-attenuation mass posterior to the body of the pancreas measuring approximately 2.1 x 3.8 x 2.6 cm and consistent with a lymph node.       Review of Systems    The 10 point Review of Systems is negative other than noted in the HPI or here.     Past Medical History    I have reviewed this patient's medical history and updated it with pertinent information if needed.   No past medical history on file.    Past Surgical History   I have reviewed this patient's surgical history and updated it with pertinent information if needed.  Past Surgical History:   Procedure Laterality Date     COLONOSCOPY N/A 11/24/2015    Procedure: COLONOSCOPY;  Surgeon: Clyde Mosher MD;  Location:  GI     COMBINED CYSTOSCOPY, INSERT STENT URETER(S) Right 9/10/2021    Procedure: CYSTOSCOPY, WITH right URETERAL STENT INSERTION, retrograde pyleogram;  Surgeon: Jez Friedman MD;  Location:  OR     ESOPHAGOSCOPY, GASTROSCOPY, DUODENOSCOPY (EGD), COMBINED N/A 11/24/2020    Procedure: ESOPHAGOGASTRODUODENOSCOPY with biopsies using cold forceps;  Surgeon: Clyde Mosher MD;  Location:  GI     ESOPHAGOSCOPY, GASTROSCOPY, DUODENOSCOPY (EGD), COMBINED N/A 12/22/2020    Procedure: ESOPHAGOGASTRODUODENOSCOPY, WITH FINE NEEDLE ASPIRATION BIOPSY, WITH ENDOSCOPIC ULTRASOUND GUIDANCE;  Surgeon: Guru Teri Pedraza MD;  Location:  GI       Social History   Non-smoker/non-drinker  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  He lives in [place] with family, sposue, sons and grandkids  He has 6 kids, lives with 2 younger boys, 4 grand kids  Social History     Tobacco Use     Smoking status: Never Smoker     Smokeless  tobacco: Never Used   Substance Use Topics     Alcohol use: No     Drug use: No       Family History    No family hx any cancer    Prior to Admission Medications   Prior to Admission Medications   Prescriptions Last Dose Informant Patient Reported? Taking?   acetaminophen (TYLENOL) 325 MG tablet   No No   Sig: Take 2 tablets (650 mg) by mouth every 4 hours as needed for mild pain or fever   ferrous gluconate (FERGON) 324 (38 Fe) MG tablet   No No   Sig: Take 1 tablet (324 mg) by mouth daily (with breakfast)   ondansetron (ZOFRAN-ODT) 4 MG ODT tab   No No   Sig: Take 1 tablet (4 mg) by mouth every 6 hours as needed for nausea or vomiting   oxyCODONE (ROXICODONE) 5 MG tablet   No No   Sig: Take 1 tablet (5 mg) by mouth every 4 hours as needed for moderate to severe pain   oxybutynin ER (DITROPAN-XL) 5 MG 24 hr tablet   No No   Sig: Take 1 tablet (5 mg) by mouth daily   pantoprazole (PROTONIX) 40 MG EC tablet   No No   Sig: Take 1 tablet (40 mg) by mouth daily   tamsulosin (FLOMAX) 0.4 MG capsule   No No   Sig: Take 1 capsule (0.4 mg) by mouth daily   thiamine (B-1) 100 MG tablet   No No   Sig: Take 1 tablet (100 mg) by mouth daily      Facility-Administered Medications: None     Allergies   No Known Allergies    Physical Exam   Vital Signs: Temp: 98.7  F (37.1  C) Temp src: Oral BP: (!) 124/108 Pulse: 98   Resp: 20 SpO2: 98 % O2 Device: None (Room air)    Weight: 0 lbs 0 oz  Gen: NAD after IV dilaudid, thin, alert  HEENT: EOMI, PERRL, MMM  CV: extremities warm and well perfused  Resp: breathing comfortably on RA  : deferred  Msk: no LE edema  Skin: no rashes  Neuro: nonfocal exam        Data   Data reviewed today: I reviewed all medications, new labs and imaging results over the last 24 hours.

## 2021-10-05 NOTE — TELEPHONE ENCOUNTER
I spoke to patient daughter. She informed me that pt is currently admitted. She believe that he wont be discharged by tomorrow. I sent message to shekhar.

## 2021-10-05 NOTE — CONSULTS
Urology Consult History and Physical    Name: Essie Guzman    MRN: [id number]   YOB: 1955       We were asked to see Essie Guzman at the request of Dr. Kennedy for evaluation and treatment of the following chief complaint:          Chief Complaint:   Ureteral stent pain    History is obtained from patient and his daughter.  At the patient's permission, his daughter served as an           History of Present Illness:   Essie Guzman is a 66 year old male with pmh significant for gastric cancer with retroperitoneal mets (stage LUDMILA) who is followed in Oncology by Dr. Mccarthy, last seen 9/15/21.  At that time there were plans for initiating PEMBRO.      From a Urologic standpoint:  - 9/10 - 9/12/21 admitted with anemia, abdominal pain, nausea, weakness, weight loss and right flank pain in the setting of a CT scan showing progressive malignancy and right hydronephrosis / delayed nephrogram.  His UA and vitals were normal at that time.  His sCr was mildly elevated (1.24) up from baseline of 0.9mg/dL.  After discussion of risks and benefits he underwent same day stent placement with Dr. Friedman.   - 9/27/21 - ED visit for flank pain which he attributed to the stent.  UCx on that date was negative.  He decided to pursue PNT placement and stent removal   - 10/4/21 - right PNT placed by IR.  Urine culture - no growth.     Today he is being admitted through ED with acute on chronic pain.  The stent continues to bother him.  Pain is uncontrolled by scheduled oxycodone.  The R PNT output was pink yesterday but today has cleared to yellow.  The output is abundant and the tube seems to be working well.  He is also voiding and endorses dysuria and bladder pain and groin pain with voiding.   Vitals are normal with exception of hypertension  Labs show sCr 1.02.  Sodium is 129. Hemoglobin is 6.3 today down from 7.4 yesterday. He is receiving transfusions for anemia. UA from yesterday showed WBC 5, RBC 4, neg  nitrites.     Ct imaging showed:  1a. Mild asymmetrical hypoperfusion of the right kidney with new mild  hydronephrosis. Renal arteries appear patent. Question nephrostomy  tube dysfunction.  1b. Right percutaneous nephrostomy tube and ureteral stent appear  appropriately positioned.  2a. Stable circumferential thickening of the gastric antrum/pylorus  known gastric adenocarcinoma.  2b. Extensive necrotic retroperitoneal adenopathy, minimally increased  since 9/23/2021.  3. Mild diffuse mesenteric edema, likely secondary to fluid  rehydration.  4. Small left pleural effusion with overlying atelectasis. Right lower  lobe pulmonary nodule is unchanged since 11/24/2020.            Past Medical History:   No past medical history on file.         Past Surgical History:     Past Surgical History:   Procedure Laterality Date     COLONOSCOPY N/A 11/24/2015    Procedure: COLONOSCOPY;  Surgeon: Clyde Mosher MD;  Location:  GI     COMBINED CYSTOSCOPY, INSERT STENT URETER(S) Right 9/10/2021    Procedure: CYSTOSCOPY, WITH right URETERAL STENT INSERTION, retrograde pyleogram;  Surgeon: Jez Friedman MD;  Location: [facility] OR     ESOPHAGOSCOPY, GASTROSCOPY, DUODENOSCOPY (EGD), COMBINED N/A 11/24/2020    Procedure: ESOPHAGOGASTRODUODENOSCOPY with biopsies using cold forceps;  Surgeon: Clyde Mosher MD;  Location:  GI     ESOPHAGOSCOPY, GASTROSCOPY, DUODENOSCOPY (EGD), COMBINED N/A 12/22/2020    Procedure: ESOPHAGOGASTRODUODENOSCOPY, WITH FINE NEEDLE ASPIRATION BIOPSY, WITH ENDOSCOPIC ULTRASOUND GUIDANCE;  Surgeon: Guru Teri Pedraza MD;  Location: [facility] GI     IR NEPHROSTOMY TUBE PLACEMENT RIGHT  10/4/2021            Social History:     Social History     Tobacco Use     Smoking status: Never Smoker     Smokeless tobacco: Never Used   Substance Use Topics     Alcohol use: No            Family History:     Family History   Problem Relation Age of Onset     Asthma No family hx of      Diabetes No family  hx of      Hypertension No family hx of      Cerebrovascular Disease No family hx of      Cancer No family hx of      Colon Cancer No family hx of      Anesthesia Reaction No family hx of      Deep Vein Thrombosis (DVT) No family hx of             Allergies:   No Known Allergies         Medications:     Current Facility-Administered Medications   Medication     acetaminophen (TYLENOL) tablet 650 mg     HYDROmorphone (PF) (DILAUDID) injection 0.5 mg     melatonin tablet 1 mg     morphine (MS CONTIN) 12 hr tablet 15 mg     ondansetron (ZOFRAN-ODT) ODT tab 4 mg    Or     ondansetron (ZOFRAN) injection 4 mg     oxybutynin ER (DITROPAN-XL) 24 hr tablet 5 mg     oxyCODONE (ROXICODONE) tablet 5-10 mg     pantoprazole (PROTONIX) EC tablet 40 mg     polyethylene glycol (MIRALAX) Packet 17 g     sodium chloride 0.9% infusion     tamsulosin (FLOMAX) capsule 0.4 mg     Current Outpatient Medications   Medication Sig     acetaminophen (TYLENOL) 325 MG tablet Take 2 tablets (650 mg) by mouth every 4 hours as needed for mild pain or fever     ferrous gluconate (FERGON) 324 (38 Fe) MG tablet Take 1 tablet (324 mg) by mouth daily (with breakfast)     ondansetron (ZOFRAN-ODT) 4 MG ODT tab Take 1 tablet (4 mg) by mouth every 6 hours as needed for nausea or vomiting     oxybutynin ER (DITROPAN-XL) 5 MG 24 hr tablet Take 1 tablet (5 mg) by mouth daily     pantoprazole (PROTONIX) 40 MG EC tablet Take 1 tablet (40 mg) by mouth daily     tamsulosin (FLOMAX) 0.4 MG capsule Take 1 capsule (0.4 mg) by mouth daily     thiamine (B-1) 100 MG tablet Take 1 tablet (100 mg) by mouth daily     oxyCODONE (ROXICODONE) 5 MG tablet Take 1 tablet (5 mg) by mouth every 4 hours as needed for moderate to severe pain             Review of Systems:    ROS: See HPI for pertinent details.  Remainder of 10-point ROS negative.         Physical Exam:   VS:  T: 98    HR: 97    BP: 136/96    RR: 18   GEN:  Appears uncomfortable. Thin, Frail  HEENT:  Sclerae  anicteric.  Conjunctivae pink.  Moist mucous membranes  LUNGS: Non-labored breathing.  BACK:  Right PNT site is bandaged and appropriately tender  ABD:  Soft.  ND.  + mild diffuse tenderness.     :  Phallus circumcised.  Meatus patent. Normal penile shaft without plaques.  Testicles descended bilaterally  JASPER:  Deferred  EXT:  Warm, well perfused.    SKIN:  Warm.  Dry.  No rashes.  NEURO:  CN grossly intact.      URINE: yellow in right PNT drainage bag          Data:   All laboratory data reviewed:    No results found for: PSA  Recent Labs   Lab 10/05/21  0528 10/04/21  2024   WBC 6.5 7.5   HGB 6.3* 7.4*    454*     Recent Labs   Lab 10/05/21  0528 10/04/21  2024 10/01/21  1558   * 126* 126*   POTASSIUM 4.4 4.6 5.1   CHLORIDE 97 92* 93*   CO2 28 27 25   BUN 38* 40* 22   CR 1.02 1.03 1.21   * 148* 138*   HAWA 8.9 9.4 9.1     Recent Labs   Lab 10/04/21  2036   COLOR Light Yellow   APPEARANCE Clear   URINEGLC Negative   URINEBILI Negative   URINEKETONE Negative   SG 1.024   URINEPH 5.0   PROTEIN 30 *   NITRITE Negative   LEUKEST Small*   RBCU 4*   WBCU 5     Results for orders placed or performed during the hospital encounter of 10/04/21   Urine Culture    Specimen: Urine, NOS   Result Value Ref Range    Culture No Growth    Results for orders placed or performed in visit on 09/21/21   Urine Culture Aerobic Bacterial - lab collect    Specimen: Urine, Midstream   Result Value Ref Range    Culture <10,000 CFU/mL Mixture of urogenital machelle        All pertinent imaging reviewed:  Examination:  CT ABDOMEN PELVIS W CONTRAST 10/5/2021 9:03 AM      History: Abdominal pain, acute, nonlocalized. History of metastatic  gastric cancer with subsequent right-sided hydronephrosis secondary to  malignant obstruction.     Comparison: CT CAP 9/23/2021, PET CT 3/9/2021.     Technique: CT of the abdomen and pelvis were obtained with contrast.  Sagittal and coronal reconstructions created and reviewed.     Findings:       Abdomen and pelvis: Normal hepatic parenchyma without focal lesions.  Gallbladder is distended without evidence of acute cholecystitis. Mild  intrahepatic biliary dilatation. The common bile duct is within normal  limits. Mild thickening of the left adrenal gland without focal  nodule. Right adrenal gland is unremarkable. The spleen and pancreas  are unremarkable.      Right-sided percutaneous nephrostomy tube coiled in the right renal  pelvis. Right ureteral stent also coiled within the renal pelvis,  inferior end within the urinary bladder. Small amount of air within  the right renal pelvis, presumably iatrogenic. Asymmetric  hypoperfusion of the right renal parenchyma relative to the left.  Multiple left renal cysts.     The stomach is distended. Circumferential wall thickening of the  gastric antrum/pylorus. No evidence of small bowel obstruction. Normal  caliber appendix (series 3 image 29). The colon is unremarkable.     Extensive centrally-hypoattenuating retroperitoneal adenopathy with  necrotic lymph nodes measuring up to 3.0 cm and 3.8 cm along the  lesser gastric curvature (series 3 image 68 and 91), slightly  increased since prior. No free fluid. Diffuse mild to moderate  mesenteric edema, notably in the mesorectum.      Abdominal aorta is normal in caliber and patent. Celiac and mesenteric  artery origins are unremarkable. The renal arteries appear patent.  Portal veins and IVC are patent.     Lower thorax: Left pleural effusion with overlying atelectasis. Solid  3 mm nodule in the lateral right lower lobe is unchanged since  11/24/2020 (series 3, image 8). Stable tiny 2 mm nodule in the right  lung base is unchanged since 9/23/2021, possibly representing because  plugging (series 3, image 46).     Bones and soft tissues: No acute or suspicious osseous abnormality.                                                                         Impression:   In this patient with metastatic gastric  adenocarcinoma:  1a. Mild asymmetrical hypoperfusion of the right kidney with new mild  hydronephrosis. Renal arteries appear patent. Question nephrostomy  tube dysfunction.  1b. Right percutaneous nephrostomy tube and ureteral stent appear  appropriately positioned.  2a. Stable circumferential thickening of the gastric antrum/pylorus  known gastric adenocarcinoma.  2b. Extensive necrotic retroperitoneal adenopathy, minimally increased  since 9/23/2021.  3. Mild diffuse mesenteric edema, likely secondary to fluid  rehydration.  4. Small left pleural effusion with overlying atelectasis. Right lower  lobe pulmonary nodule is unchanged since 11/24/2020.         Impression and Plan:   Impression / Plan:    Essie Guzman is a 66 year old male with metastatic gastric malignancy, planning to start Pembro.   He has h/o right hydronephrosis secondary to malignancy (retroperitoneal adenopathy), status post stent on 9/10/21 with Dr. Friedman.  He is tolerating the stent poorly and has constant flank pain. He underwent right PNT yesterday with plans to have stent removed in clinic with Dr. Ingram on 10/12/21           - Will plan to remove stent in the hospital tomorrow.  This can be done via bedside cystoscopy, using urojet lidocaine for topical anesthetic.  No need for NPO.  He will need a dose of prophylactic antibiotics periprocedurally, but this can be ordered by Urology  - Continue chronic right PNT.  Will need q3 month changes with IR.     Urology will follow  Discussed with Dr. Fernandes.  Previously discussed with Dr. Friedman.     Thank you for the opportunity to participate in the care of Essie Guzman.     Kerry Gaitan PA-C  Urology Physician Assistant  Personal Pager: [phone]    Please call Job Code:   [phone] to reach the Urology resident or PA on call - Weekdays  [phone] to reach the Urology resident or PA on call - Weeknights and weekends

## 2021-10-05 NOTE — PROGRESS NOTES
[facility]    Medicine Progress Note - Hospitalist Service, Gold 11    Essie Guzman is a 66 year old male admitted on 10/4/2021. Thomas is a Hmong-speaking 66 year old male with past medical history of metastatic gastric cancer with disease in the retroperitoneum, gastrointestinal hemorrhage with melena, cancer related weight loss, severe malnutrition, and right hydronephrosis secondary to malignant obstruction s/p ureteric stenting who presents to the emergency department for evaluation of pain who was found to have CT imaging with worsening right-sided hydronephrosis, right-sided renal hypoperfusion and some fluid in the right renal pelvis.    Right-sided hydronephrosis secondary to malignant obstruction, worsening on CT imaging this admission with ureteral stent in place  Right-sided renal hypoperfusion on CT imaging this admission  Patient presents with abdominal pain that is been subacute in nature and present since early September. He did have a nephrostomy tube placed on 10/4/2021. The nephrostomy tube currently has output. CT imaging today obtained to evaluate the patient's ureteral stent and renal collecting system did illustrate right-sided hydronephrosis which is new from his previous imaging on 9/24, as well as right-sided renal hypoperfusion of unclear significance. The right percutaneous nephrostomy tube and ureteral stent appear appropriately positioned. He has gastric adenocarcinoma was redemonstrated with known adenopathy which is increased from last scan on 9/23. He does have diffuse mesenteric edema and a small left pleural effusion with some atelectasis.  -Appreciate urology consultation regarding ureteral stent, hydronephrosis and right-sided renal findings. Unclear significance of renal hypoperfusion on imaging. He does have some mild anemia but I think this is more likely secondary to his metastatic gastric cancer and likely slow bleeding related to  that.    Hyponatremia, improved  Sodium is chronically in the low 130s. On admission his sodium was 126 but improved to 129 with IV fluids. His hyponatremia is likely secondary to hypovolemia from poor intake.  -Continue gentle IV fluids.    Acute anemia, likely secondary to blood loss in the setting of metastatic gastric cancer with slow GI bleed  Iron deficiency anemia  -Hemoglobin is chronically been from 7-9. 7.4 admission dropping to 6.3 this morning. Patient is asymptomatic. He has been having some dark stools which appear chronic for him he is also on iron supplementation.  -Transfuse and trend hemoglobin daily.  -He is at risk for brisk GI bleed given his cancer but likely has slow oozing and some dark stools related to his iron supplement.  -He was seen by gastroenterology during during previous admissions and the thought would be that endoscopy would likely be of little benefit with how advanced his disease would be and that there is unlikely to be identifiable and target lesion for blood loss that could be remedied.    Severe malnutrition in the context of chronic illness  Weight loss  Deconditioning  -Appreciate nutrition consult  -Daily multivitamin  -Trend potassium, magnesium and phosphate  -Ensured supplements daily  -PT/OT    Gastric cancer with metastasis to retroperitoneum  -Appreciate oncology consultation. Last seen by Dr. Mccarthy on 9/15/2021. See oncology note for summary of disease and treatment.  - Patient should maintain plan oncology follow-up for initiation of pembrolizumab.     Diet: Regular Diet Adult  Snacks/Supplements Adult: Mansi Beard Standard Oral Supplement; Between Meals  Snacks/Supplements Adult: Ensure Clear; Between Meals    DVT Prophylaxis: Contraindicated secondary to anemia  Hamm Catheter: Not present  Central Lines: None  Code Status: Full Code      Disposition Plan   Expected discharge: 2-3 days recommended to prior living arrangement once Sodium improved, pain  controlled, definitive urologic plans in place.     The patient's care was discussed with the Bedside Nurse, Patient, Patient's Family and Oncology, urology, palliative care Consultant.    Earl Cruz MD  Hospitalist Service, [address]  Securely message with the Vocera Web Console (learn more here)  Text page via [facility] Paging/Directory  Please see sign in/sign out for up to date coverage information    ______________________________________________________________________    Interval History   I visited with the patient and his family had bedside. His family provided interpreting services per their request for the patient. On my interview with the patient this morning he continues to have abdominal pain. He endorses having a poor appetite. He does endorse having some dark stools recently. He denies lightheadedness or dizziness.    I spoke to the patient and his family in regards to the plan today which would include oncology, urology and palliative care consultations. We would obtain CT of his abdomen and pelvis to evaluate the ureteral stent and his kidneys and bladder. We will also give him a unit of blood and he was consented and this was provided.    Data reviewed today: I reviewed all medications, new labs and imaging results over the last 24 hours. I personally reviewed no images or EKG's today.    Physical Exam   Vital Signs: Temp: 98  F (36.7  C) Temp src: Oral BP: (!) 144/98 Pulse: 75   Resp: 18 SpO2: 100 % O2 Device: None (Room air)    Weight: 0 lbs 0 oz  Exam  Gen: awake and alert, appears uncomfortable 2/2 pain  HEENT: NCAT, sclerae anicteric  CV: RRR, S1/S2, extremities warm and well perfused, no lower extremity edema  Pulm: normal work of breathing, lungs clear to auscultation, no crackles or wheezing  GI: mild tenderness throughout  Skin: warm, no jaundice  Neuro: Aox3, speech normal, moves all extremities symmetrically and equally  Psych: mood  is good, affect is congruent      Data   Recent Labs   Lab 10/05/21  0528 10/04/21  2024 10/01/21  1558   WBC 6.5 7.5  --    HGB 6.3* 7.4*  --    MCV 79 80  --     454*  --    * 126* 126*   POTASSIUM 4.4 4.6 5.1   CHLORIDE 97 92* 93*   CO2 28 27 25   BUN 38* 40* 22   CR 1.02 1.03 1.21   ANIONGAP 4 7 8   HAWA 8.9 9.4 9.1   * 148* 138*   ALBUMIN  --  3.2* 3.2*   PROTTOTAL  --  8.3 8.2   BILITOTAL  --  0.4 0.2   ALKPHOS  --  75 81   ALT  --  20 18   AST  --  12 14   LIPASE  --  155  --

## 2021-10-05 NOTE — TELEPHONE ENCOUNTER
Action 10/04/2021   Action Taken ALL RECORDS IN [facility]  ED VISIT 10/04/2021  CK

## 2021-10-05 NOTE — CONSULTS
[facility]  Palliative Care Consultation Note    Patient: Essie Guzman  Date of Admission:  10/4/2021    Requesting Clinician / Team: Stephanie Hoyt NP  Reason for consult: Pain management    Recommendations:    Ordered for you:    Changed oxycodone to 5-10 mg q3h PRN    Added MS Contin 15 mg BID    Would also continue IV hydromorphone for now    I did not order the following but please consider and may need to also discuss with urology and oncology to ensure these do not conflict with treatment plans:    Toradol 15-30 mg x3 doses and reassess benefit    Pyridium 100 mg TID    Restarting and encourage use of B&O suppository if able, did not tolerate in the past    Diazepam 5-10 mg suppository q8h PRN    Encourage good bowel regimen ongoing    Goals of care: did not discuss today given high symptom burden. Previously plan was to start immunotherapy.    These recommendations have been discussed with primary team.       Thank you for the opportunity to participate in the care of this patient and family. Our team: will continue to follow.     During regular M-F work hours -- if you are not sure who specifically to contact -- please contact us by sending a text page to our team consult pager at [phone].    After regular work hours and on weekends/holidays, you can call our answering service at [phone]. Also, who's on call for us is available in [facility] Smart Web.       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 He presents on 10/4 for severe pain not managed on home regimen. He recently underwent PNT placement on 10/4.    Today, the patient was seen for:  Cancer related pain  Bladder spasms  Metastatic gastric cancer    Prognosis, Goals, & Planning:      Functional Status just prior to  hospitalization: 2 (Ambulatory and capable of all selfcare but unable to carry out any work activities; may need help with IADLs up and about > 50% of waking hours)      Prognosis, Goals, and/or Advance Care Planning were addressed today: Yes        Summary/Comments: met with patient and family, goal is to evaluate and explore options for managing pain currently. Discussed with oncology plan was to start keytruda soon.       Patient's decision making preferences: shared with support from loved ones          Patient has decision-making capacity today for complex decisions: Yes            I have concerns about the patient/family's health literacy today: Yes           Patient has a completed Health Care Directive: No.       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 He presents on 10/4 for severe pain not managed on home regimen. He recently underwent PNT placement on 10/4.    At home the pain has been persistent, its sharp. He is taking oxycodone and it was doing nothing around the clock (5 mg q4h). Since being admitted the IV hydromorphone has been effective for him. At home he also tired a B&O supp which was more painful and did not want to use again. Urination seems to make the pain worse. Nothing really helped the pain at home. Received IV hydromorphone which is helping his pain better.     Palliative care consulted 10/5 for symptom management    Key Palliative Symptom Data:          ROS:  Comprehensive ROS is reviewed and is negative except as here & per  HPI:     Past Medical History:  No past medical history on file.     Past Surgical History:  Past Surgical History:   Procedure Laterality Date     COLONOSCOPY N/A 11/24/2015    Procedure: COLONOSCOPY;  Surgeon: Clyde Mosher MD;  Location: [facility]     COMBINED CYSTOSCOPY, INSERT STENT URETER(S) Right 9/10/2021    Procedure: CYSTOSCOPY, WITH right URETERAL STENT INSERTION, retrograde pyleogram;  Surgeon: Jez Friedman MD;  Location: [facility]     ESOPHAGOSCOPY, GASTROSCOPY, DUODENOSCOPY (EGD), COMBINED N/A 11/24/2020    Procedure: ESOPHAGOGASTRODUODENOSCOPY with biopsies using cold forceps;  Surgeon: Clyde Mosher MD;  Location: [facility]     ESOPHAGOSCOPY, GASTROSCOPY, DUODENOSCOPY (EGD), COMBINED N/A 12/22/2020    Procedure: ESOPHAGOGASTRODUODENOSCOPY, WITH FINE NEEDLE ASPIRATION BIOPSY, WITH ENDOSCOPIC ULTRASOUND GUIDANCE;  Surgeon: Guru Teri Pedraza MD;  Location: [facility]     IR NEPHROSTOMY TUBE PLACEMENT RIGHT  10/4/2021         Family History:  Family History   Problem Relation Age of Onset     Asthma No family hx of      Diabetes No family hx of      Hypertension No family hx of      Cerebrovascular Disease No family hx of      Cancer No family hx of      Colon Cancer No family hx of      Anesthesia Reaction No family hx of      Deep Vein Thrombosis (DVT) No family hx of         Allergies:  No Known Allergies     Medications:  I have reviewed this patient's medication profile and medications from this hospitalization.   Noted:  oxybutyin 5 mg daily  protonix daily  miralax daily  Hydromorphone IV PRN- x4  Melatonin PRN  Oxycodone PRN- x1    Physical Exam:  Vital Signs: Temp: 98.6  F (37  C) Temp src: Oral BP: (!) 136/98 Pulse: 80   Resp: 18 SpO2: 100 % O2 Device: None (Room air)    Weight: 0 lbs 0 oz  Constitutional: Awake, alert, cooperative, no apparent distress, cachectic, chronically ill apperaing  ENT: Normocephalic, without obvious abnormality, atramatic  Lungs: No increased work  of breathing, good air exchange  Neurologic: Awake, alert, oriented to name, place and time.    Neuropsychiatric: Normal affect, mood  Skin: No rashes, erythema    Data reviewed:  Recent imaging reviewed, my comments on pertinents:   Impression:   In this patient with metastatic gastric adenocarcinoma:   1a. Mild asymmetrical hypoperfusion of the right kidney with new mild   hydronephrosis. Renal arteries appear patent. Question nephrostomy   tube dysfunction.   1b. Right percutaneous nephrostomy tube and ureteral stent appear   appropriately positioned.   2a. Stable circumferential thickening of the gastric antrum/pylorus   known gastric adenocarcinoma.   2b. Extensive necrotic retroperitoneal adenopathy, minimally increased   since 9/23/2021.   3. Mild diffuse mesenteric edema, likely secondary to fluid   rehydration.   4. Small left pleural effusion with overlying atelectasis. Right lower   lobe pulmonary nodule is unchanged since 11/24/2020.     Recent lab data reviewed, my comments on pertinents:   Sodium 129  Potassium 4.4  Creatinine 1.02  GFR 75  WBC 6.5  platelets 406  Hemoglobin 6.3    SYLVIE Guerrier CNS  Palliative Care Consult Team  Pager: [phone]    85 minutes spent. This includes 45 minutes face to face with patient and family discussing current complex health conditions and symptom management. Coordination of care with the primary team, hem/onc, palliative  and KARYN RN regarding symptom managment.

## 2021-10-06 NOTE — PROVIDER NOTIFICATION
"[facility] provider paged #[phone]:    \"Pt c/o increased abd distention & feeling very \"bubbly\" - cant stop belching. Abd is more rounded. BS active. Passing gas. No pain w palpation, but upper abd is more rigid than lower. Can pt have order for simethicone? Thanks! Mariajose [phone]\"  "

## 2021-10-06 NOTE — PLAN OF CARE
A&Ox4.  VSS on RA ex hypertensive.  Calling appropriately.  Up w/ SBA.  L PIV infusing NS @ 125 ml/hr.  R PIV SL.  C/o abd pain radiating to back sometimes 10/10, tolerable w/ PRN dose IV dilaudid & ibuprofen & oxy along with a 1x dose of IV dilaudid.  Denies nausea.  Drinking fluids.  Void w/o difficulty via urinal.  PNT patent w/ 150 ml pink OP. Monitor hgb this AM, pt got PRBC 10/5.  Continue to monitor & follow pOC.

## 2021-10-06 NOTE — PROGRESS NOTES
Pt prepped and draped in usual sterile fashion. 20cc urojet instilled into urethra. A 16Fr flexible cystoscope was inserted into the bladder, the urethra was unremarkable, bladder unremarkable and media clear. Indwelling stent identified and removed using flexible biopsy forceps while Dr. Tijerina secured the PNT to prevent migration. The stent was removed in its entirety. He tolerated the procedure well without immediate complications.    - Given 500mg PO Cipro for urinary instrumentation  - Continue chronic right PNT.  Will need q3 month changes with IR  - Urology to sign off

## 2021-10-06 NOTE — PROGRESS NOTES
BRIEF UROLOGY NOTE    The urology team saw the patient this evening for stent removal. He received 500 mg Cipro pre-procedure.    PROCEDURE:  The patient was prepped and draped in the usual sterile fashion. A 16 Fr flexible cystoscope was inserted into a well lubricated urethra and advanced into the bladder the right ureteral stent was seen emitting from the right UO. This was grasped with a flexible stent grasper and removed. Simultaneously, we held the right PNT in place at the skin level as we removed the stent.     The patient tolerated the procedure well. There were no complications.    Plan:  -Continue q3 month PNT exchanges with IR    Urology will sign off.     Alaina Tijerina MD  PGY-4 Urology  Pager [phone]

## 2021-10-06 NOTE — PROGRESS NOTES
Hematology / Oncology  Daily Progress Note   Date of Service: 10/06/2021  Patient: Essie Guzman  MRN: [id number]  Admission Date: 10/4/2021  Hospital Day # 2  Cancer Diagnosis: Metastatic Gastric Cancer ([id number])  Primary Outpatient Oncologist: Dr Brennan Mccarthy   Current Treatment Plan: Plan to initiate Keytruda     Assessment & Plan:   Mr. Essie Guzman is a 65yo M with PMH including metastatic gastric cancer without prior treatment and complications including malignant ureteral obstruction s/p R ureteral stent and percutaneous nephrostomy, recurrent GIB, and malnutrition who presents for persistent abdominopelvic pain, for whom oncology is consulted given gastric cancer.    # Metastatic Gastric Cancer ([id number]); dMMR, HER-2 non-amplified, PD-L1 50-60% (50% by TPS)  Please review initial oncology consult note from 10/5 for full oncologic history. In summary, patient presented 9/2020 with abdominal pain and at that time treated for H pylori. EGD 11/2020 with stomach pylorus biopsy +Poorly-differentiated carcinoma; consistent with adenocarcinoma (poorly cohesive type). HER2 by FISH was non-amplified. MSI high. PET/CT (12/2020) with ulcerated mass at the gastric antrum, multiple metastatic hypermetabolic lymph nodes adjacent just inferior to the caudate lobe of the liver, multiple enlarged, centrally necrotic, and hypermetabolic retroperitoneal para-aortic and paracaval lymph nodes. Seen in consultation by Dr Tre Amaya, found not to be a candidate for surgical resection. EUS staging (12/22/20) biopsy T3N3Mx. PET/CT 3/2021 with progression in number/size lymphadenopathy. March-Aug 2021 patient declined systemic Rx with pembrolizumab as he was minimally symptomatic. Admitted to [facility] 9/2021 with GI bleed and R hydronephrosis 2/2 malignancy s/p PNT. CT A/P (9/2021) with disease progression. Seen by Dr Mccarthy for consultation 9/15/21 whom recommended chemotherapy which patient declined, but he was open to  immunotherapy.   - CT A/P (10/5) on admission showed new R hydronephrosis, stable circumferential thickening of the gastric antrum/pylorus known gastric adenocarcinoma. Extensive necrotic RP adenopathy, minimally increased.   - Keytruda and labs requested outpatient the week of 10/11     # Cancer and Ureteral Stent Related Pain   Presented with pain likely 2/2 diffuse adenopathy and R percutaneous nephrostomy tube placed by IR on 10/4. PTA pain not well controlled despite scheduled Oxy.   - Urology consulted; plan to remove stent inpatient 10/6. Will continue chronic R PNT with IR exchange q3 months.   - Pall care consulted, started on MS Contin 15 mg BID in addition to PRN Oxy and IV Dilaudid    # Acute on Chronic Anemia   Likely UGIB 2/2 primary gastric malignancy.   - Transfuse for Hgb <7   - Requested labs and possible pRBC transfusion the week of 10/11    Recommendations:   - Agree with stent removal per Urology, though patient was cautioned that this may not resolve his pain and it may be due to the malignancy  - Currently scheduled for Keytruda outpatient 10/20, will try to move up to next week. Also requested to add on labs and poss pRBC transfusion the week of 10/11    Patient was seen and plan of care was discussed with attending physician Dr. Montanez.    Thank you for the opportunity to partake in this patients plan of care. Please do not hesitate to page with questions. We will continue to follow.     Deanne Peralta PA-C    Hematology/Oncology   Pager: [phone]  ___________________________________________________________________    Subjective & Interval History:    Overnight afebrile and HD stable. His pain is so much better controlled and he feels great today. Using PRN Dilaudid and initiated on MS Contin yesterday. He is used to being a very active person and while being in so much pain this limited his ability to be active and to desire to eat, however now that his pain is manageable he feels like he  "could be active again and increase his PO intake. Was having pancakes for breakfast. Per nursing notes, good PNT output with pink urine.     Physical Exam:    Blood pressure 115/82, pulse 87, temperature 98.5  F (36.9  C), temperature source Oral, resp. rate 18, height 1.549 m (5' 1\"), weight 48.3 kg (106 lb 6.4 oz), SpO2 100 %.    General: sitting up in bed, no acute distress and eating breakfast  HEENT: sclera anicteric, EOMI  Resp: normal respiratory effort on ambient air  MSK: warm and well-perfused, decreased muscular tone  Skin: no rashes on limited exam, no jaundice  Neuro: Alert and interactive, moves all extremities equally, no focal deficits    Labs & Studies: I personally reviewed the following studies:  ROUTINE LABS (Last four results):  CMP  Recent Labs   Lab 10/06/21  0659 10/05/21  1431 10/05/21  0528 10/04/21  2024 10/01/21  1558     --  129* 126* 126*   POTASSIUM 4.2  --  4.4 4.6 5.1   CHLORIDE 101  --  97 92* 93*   CO2 26  --  28 27 25   ANIONGAP 6  --  4 7 8   *  --  112* 148* 138*   BUN 27  --  38* 40* 22   CR 0.85  --  1.02 1.03 1.21   GFRESTIMATED 90  --  75 74 61   HAWA 8.7  --  8.9 9.4 9.1   MAG  --  2.2  --   --   --    PHOS  --  3.8  --   --   --    PROTTOTAL  --   --   --  8.3 8.2   ALBUMIN  --   --   --  3.2* 3.2*   BILITOTAL  --   --   --  0.4 0.2   ALKPHOS  --   --   --  75 81   AST  --   --   --  12 14   ALT  --   --   --  20 18     CBC  Recent Labs   Lab 10/06/21  0659 10/05/21  0528 10/04/21  2024   WBC 6.3 6.5 7.5   RBC 2.66* 2.52* 2.99*   HGB 6.9* 6.3* 7.4*   HCT 21.8* 20.0* 23.8*   MCV 82 79 80   MCH 25.9* 25.0* 24.7*   MCHC 31.7 31.5 31.1*   RDW 17.8* 17.4* 17.4*    406 454*     INRNo lab results found in last 7 days.    Medications list for reference:  Current Facility-Administered Medications   Medication     acetaminophen (TYLENOL) tablet 650 mg     HYDROmorphone (PF) (DILAUDID) injection 0.5 mg     ibuprofen (ADVIL/MOTRIN) tablet 200 mg     lidocaine " (XYLOCAINE) 2 % external gel     melatonin tablet 1 mg     morphine (MS CONTIN) 12 hr tablet 15 mg     multivitamin, therapeutic (THERA-VIT) tablet 1 tablet     naloxone (NARCAN) injection 0.2 mg    Or     naloxone (NARCAN) injection 0.4 mg    Or     naloxone (NARCAN) injection 0.2 mg    Or     naloxone (NARCAN) injection 0.4 mg     ondansetron (ZOFRAN-ODT) ODT tab 4 mg    Or     ondansetron (ZOFRAN) injection 4 mg     oxybutynin ER (DITROPAN-XL) 24 hr tablet 5 mg     oxyCODONE (ROXICODONE) tablet 5-10 mg     pantoprazole (PROTONIX) EC tablet 40 mg     polyethylene glycol (MIRALAX) Packet 17 g     sodium chloride 0.9% infusion     tamsulosin (FLOMAX) capsule 0.4 mg

## 2021-10-06 NOTE — PLAN OF CARE
VSS on RA, afebrile. Denies N/V, however has poor appetite r/t GI cancer & pain. Pt has lost over 100lbs in the past year unintentionally. C/o 10/10 lower abdominal/suprapubic pain, managed with PRN 0.5mg IV Dilaudid Q3H, scheduled PO morphine Q12H, and PRN oxycodone. Right nephrostomy tube placed yesterday. Good PNT output, initially urine was clear yellow, but later was light pink in color. Pt also voids spontaneously, saving in urinal. LBM was early this morning down in ED. Per report, stools are very dark in color, pt takes iron supplements but is at risk for GI bleed. Pt's Hgb this AM was 6.3, 1 unit of RBC's was transfused down in ED before arrival to unit. PIV now infusing NS @ 125mL/hr. Order for additional PIV placed d/t pt's high risk for GI bleed. Up with SBA. Continue to monitor & with POC.

## 2021-10-06 NOTE — PROGRESS NOTES
[facility]    Medicine Progress Note - Hospitalist Service, Gold 11    Essie Guzman is a 66 year old male admitted on 10/4/2021. Thomas is a Hmong-speaking 66 year old male with past medical history of metastatic gastric cancer with disease in the retroperitoneum, gastrointestinal hemorrhage with melena, cancer related weight loss, severe malnutrition, and right hydronephrosis secondary to malignant obstruction s/p ureteric stenting who presents to the emergency department for evaluation of pain who was found to have CT imaging with worsening right-sided hydronephrosis, right-sided renal hypoperfusion and some fluid in the right renal pelvis.    Right-sided hydronephrosis secondary to malignant obstruction, worsening on CT imaging this admission with ureteral stent in place  Right-sided renal hypoperfusion on CT imaging this admission  Patient presents with abdominal pain that is been subacute in nature and present since early September. He did have a nephrostomy tube placed on 10/4/2021. The nephrostomy tube currently has output. CT imaging today obtained to evaluate the patient's ureteral stent and renal collecting system did illustrate right-sided hydronephrosis which is new from his previous imaging on 9/24, as well as right-sided renal hypoperfusion of unclear significance. The right percutaneous nephrostomy tube and ureteral stent appear appropriately positioned. He has gastric adenocarcinoma was redemonstrated with known adenopathy which is increased from last scan on 9/23. He does have diffuse mesenteric edema and a small left pleural effusion with some atelectasis.  -Appreciate urology consultation  - plan to remove ureteral stent at bedside today given ongoing pain attributed to stent     Hyponatremia, resolved  Sodium is chronically in the low 130s. On admission his sodium was 126 but improved to 129 with IV fluids. His hyponatremia is likely secondary to  hypovolemia from poor intake.  -discharge IVF    Acute anemia, likely secondary to blood loss in the setting of metastatic gastric cancer with slow GI bleed  Iron deficiency anemia  - prn transfusion orders for hgb <7 (has rec'd 2 U this admission)  -He is at risk for brisk GI bleed given his cancer but likely has slow oozing and some dark stools related to his iron supplement.  -He was seen by gastroenterology during during previous admissions and the thought would be that endoscopy would likely be of little benefit with how advanced his disease would be and that there is unlikely to be identifiable and target lesion for blood loss that could be remedied.    Severe malnutrition in the context of chronic illness  Weight loss  Deconditioning  -Appreciate nutrition consult  -Daily multivitamin  -Trend potassium, magnesium and phosphate  -Ensured supplements daily  -PT/OT    Gastric cancer with metastasis to retroperitoneum  -Appreciate oncology consultation. Last seen by Dr. Mccarthy on 9/15/2021. See oncology note for summary of disease and treatment.  - Patient should maintain plan oncology follow-up for initiation of pembrolizumab.     Diet: Regular Diet Adult  Snacks/Supplements Adult: [employer] Standard Oral Supplement; Between Meals  Snacks/Supplements Adult: Ensure Clear; Between Meals    DVT Prophylaxis: Contraindicated secondary to anemia  Hamm Catheter: Not present  Central Lines: None  Code Status: Full Code      Disposition Plan   Expected discharge: 2-3 days recommended to prior living arrangement once Sodium improved, pain controlled, definitive urologic plans in place.     The patient's care was discussed with the Bedside Nurse, Patient, Patient's Family and Oncology, urology, palliative care Consultant.    Hawa Priest MD  Hospitalist Service, [address]  Securely message with the Vocera Web Console (learn more here)  Text page via  [facility] Paging/Directory  Please see sign in/sign out for up to date coverage information    ______________________________________________________________________    Interval History   Pain is better controlleed this am.  He is anxious to have stent removed at bedside with urology.  Denies N/V.  No cp, sob    Data reviewed today: I reviewed all medications, new labs and imaging results over the last 24 hours. I personally reviewed no images or EKG's today.    Physical Exam   Vital Signs: Temp: 98.6  F (37  C) Temp src: Oral BP: 125/74 Pulse: 79   Resp: 18 SpO2: 100 % O2 Device: None (Room air)    Weight: 106 lbs 6.4 oz  Exam  Gen: awake and alert, appears comfortable  HEENT: NCAT, sclerae anicteric  CV: RRR, S1/S2, extremities warm and well perfused, no lower extremity edema  Pulm: normal work of breathing, lungs clear to auscultation, no crackles or wheezing  GI: mild tenderness throughout  Skin: warm, no jaundice  Neuro: Aox3, speech normal, moves all extremities symmetrically and equally  Psych: mood is good, affect is congruent      Data   Recent Labs   Lab 10/06/21  0659 10/05/21  0528 10/04/21  2024 10/01/21  1558 10/01/21  1558   WBC 6.3 6.5 7.5  --   --    HGB 6.9* 6.3* 7.4*  --   --    MCV 82 79 80  --   --     406 454*  --   --     129* 126*   < > 126*   POTASSIUM 4.2 4.4 4.6   < > 5.1   CHLORIDE 101 97 92*   < > 93*   CO2 26 28 27   < > 25   BUN 27 38* 40*   < > 22   CR 0.85 1.02 1.03   < > 1.21   ANIONGAP 6 4 7   < > 8   HAWA 8.7 8.9 9.4   < > 9.1   * 112* 148*   < > 138*   ALBUMIN  --   --  3.2*  --  3.2*   PROTTOTAL  --   --  8.3  --  8.2   BILITOTAL  --   --  0.4  --  0.2   ALKPHOS  --   --  75  --  81   ALT  --   --  20  --  18   AST  --   --  12  --  14   LIPASE  --   --  155  --   --     < > = values in this interval not displayed.

## 2021-10-06 NOTE — PROGRESS NOTES
Provider Notification    Re: Patient continues to have persistent groin pain. PO meds ineffective. Not due for IV Dilaudid. Is he able to get a one-time dose of IV Dilaudid?    Action: Notified Dr. Fink at 1166    Response: One time dose of IV Dilaudid 0.5mg ordered

## 2021-10-06 NOTE — PROGRESS NOTES
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 He presents on 10/4 for severe pain not managed on home regimen. He recently underwent PNT placement on 10/4.     Today, the patient was seen for:  Cancer related pain  Bladder spasms  Metastatic gastric cancer       Prognosis, Goals, or Advance Care Planning was addressed today with: Yes.    Did discuss generally around his prognosis likely not being a full life span when discussing use of opioids and why they are best for his type of pain.    Mood, coping, and/or meaning in the context of serious illness were addressed today: Yes.  Summary/Comments: patient reports feeling happy today            Interval History:     Chart review/discussion with unit or clinical team members:   Plan for stent removal today. Pain tolerable with regimen overnight.     Per patient or family/caregivers today:  Patient feeling good, able to sleep, pain controlled, feeling happy.    Key Palliative Symptoms:  # Pain severity the last 12 hours: moderate    Patient is on opioids: assessed and bowels ok/no needed changes to plan of care today.           Review of Systems:     Besides above,  ROS was reviewed and is unremarkable          Medications:     I have reviewed this patient's medication profile and medications during this hospitalization.    Noted meds:    Morphine 15 mg BID  Oxycodone PRN- 25 mg over last 24 hours  Hydromorphone IV PRN- 4  mg over last 24 hours             Physical Exam:   Vitals were reviewed  Temp: 98.3  F (36.8  C) Temp src: Oral BP: 126/86 Pulse: 75   Resp: 18 SpO2: 99 % O2 Device: None (Room air)      Intake/Output Summary (Last 24 hours) at 10/6/2021 0915  Last data filed at 10/6/2021 0700  Gross per 24 hour   Intake 660 ml   Output 1665 ml   Net -1005 ml     Constitutional: Awake, alert, cooperative, no apparent distress, cachectic, chronically ill apperaing  ENT: Normocephalic, without obvious abnormality, atramatic  Lungs: No increased work of breathing, good air exchange  Neurologic: Awake, alert, oriented to name, place and time.    Neuropsychiatric: Normal affect, mood  Skin: No rashes, erythema           Data Reviewed:     Reviewed recent pertinent imaging, comments:   Impression:   In this patient with metastatic gastric adenocarcinoma:   1a. Mild asymmetrical hypoperfusion of the right kidney with new mild   hydronephrosis. Renal arteries appear patent. Question nephrostomy   tube dysfunction.   1b. Right percutaneous nephrostomy tube and ureteral stent appear   appropriately positioned.   2a. Stable circumferential thickening of the gastric antrum/pylorus   known gastric adenocarcinoma.   2b. Extensive necrotic retroperitoneal adenopathy, minimally increased   since 9/23/2021.   3. Mild diffuse mesenteric edema, likely secondary to fluid   rehydration.   4. Small left pleural effusion with overlying atelectasis. Right lower   lobe pulmonary nodule is unchanged since 11/24/2020.     Reviewed recent labs, comments:   Sodium 133  Potassium 4.2  Creatinine 0.85  GFR 90  WBC 6.3  Hemoglobin 6.9  Platelets 374    SYLVIE Guerrier CNS  Palliative Care Consult Team  Pager: [phone].    35 minutes spent. This includes 20 minutes face to face with patient and family discussing current complex health conditions and symptom management. Coordination of care with the primary team, palliative  and SW regarding symptom  management.

## 2021-10-06 NOTE — PLAN OF CARE
VSS, afebrile. Complaints of abdominal/back pain, IV dilaudid given x1. Pt reports pain has improved. 1 unit of RBCs given for level of 6.9. PNT w/ slightly pink output. Urology to remove stent at bedside this evening.  utilized when needed. Daughter at bedside. Continue to monitor & w/ POC.

## 2021-10-07 NOTE — PROGRESS NOTES
[facility]    Medicine Progress Note - Hospitalist Service, Gold 11    Essie Guzman is a 66 year old male admitted on 10/4/2021. Thomas is a Hmong-speaking 66 year old male with past medical history of metastatic gastric cancer with disease in the retroperitoneum, gastrointestinal hemorrhage with melena, cancer related weight loss, severe malnutrition, and right hydronephrosis secondary to malignant obstruction s/p ureteric stenting who presents to the emergency department for evaluation of pain who was found to have CT imaging with worsening right-sided hydronephrosis, right-sided renal hypoperfusion and some fluid in the right renal pelvis.    Changes today:   - Worsening pain despite stent removal, discussed with urology, is normal to have some renal/uretral colic after stent removal and should improve with tylenol and ibuprofen.   -     Right-sided hydronephrosis secondary to malignant obstruction, worsening on CT imaging this admission with ureteral stent in place  Right-sided renal hypoperfusion on CT imaging this admission  Patient presents with abdominal pain that is been subacute in nature and present since early September. He did have a nephrostomy tube placed on 10/4/2021. The nephrostomy tube currently has output. CT imaging today obtained to evaluate the patient's ureteral stent and renal collecting system did illustrate right-sided hydronephrosis which is new from his previous imaging on 9/24, as well as right-sided renal hypoperfusion of unclear significance. The right percutaneous nephrostomy tube and ureteral stent appear appropriately positioned. He has gastric adenocarcinoma was redemonstrated with known adenopathy which is increased from last scan on 9/23. He does have diffuse mesenteric edema and a small left pleural effusion with some atelectasis. S/p R renal stent, still with PNTs in place.    - Urology consulted, appreciate assistance      Hyponatremia, resolved  Sodium is chronically in the low 130s. On admission his sodium was 126 but improved to 129 with IV fluids. His hyponatremia is likely secondary to hypovolemia from poor intake.    Acute anemia, likely secondary to blood loss in the setting of metastatic gastric cancer with slow GI bleed  Iron deficiency anemia  He was seen by gastroenterology during during previous admissions and the thought would be that endoscopy would likely be of little benefit with how advanced his disease would be and that there is unlikely to be identifiable and target lesion for blood loss that could be remedied.   - prn transfusion orders for hgb <7 (has rec'd 2 U this admission)   - He is at risk for brisk GI bleed given his cancer but likely has slow oozing and some dark stools related to his iron supplement.    Severe malnutrition in the context of chronic illness  Weight loss  Deconditioning  -Appreciate nutrition consult  -Daily multivitamin  -Trend potassium, magnesium and phosphate  -Ensured supplements daily  -PT/OT    Gastric cancer with metastasis to retroperitoneum  -Appreciate oncology consultation. Last seen by Dr. Mccarthy on 9/15/2021. See oncology note for summary of disease and treatment.  - Patient should maintain plan oncology follow-up for initiation of pembrolizumab.     Diet: Regular Diet Adult  Snacks/Supplements Adult: [employer] Standard Oral Supplement; Between Meals  Snacks/Supplements Adult: Ensure Clear; Between Meals    DVT Prophylaxis: Contraindicated secondary to anemia  Hamm Catheter: Not present  Central Lines: None  Code Status: Full Code      Disposition Plan   Expected discharge: 2-3 days recommended to prior living arrangement once pain controlled.     The patient's care was discussed with the Bedside Nurse, Patient, Patient's Family and Oncology, urology, palliative care Consultant.    Lynn Waggoner MD  Hospitalist Service, [address]  [facility]  Securely message with the Vocera Web Console (learn more here)  Text page via [facility] Paging/Directory  Please see sign in/sign out for up to date coverage information    ______________________________________________________________________    Interval History   Pain worse again this AM but responsive to pain meds. Daughter at bedside. Denies N/V.  No cp, sob. Urine clear, making small amounts.     Data reviewed today: I reviewed all medications, new labs and imaging results over the last 24 hours. I personally reviewed no images or EKG's today.    Physical Exam   Vital Signs: Temp: 99  F (37.2  C) Temp src: Oral BP: 128/86 Pulse: 76   Resp: 18 SpO2: 99 % O2 Device: None (Room air)    Weight: 108 lbs 11.2 oz  Exam  Gen: awake and alert, appears comfortable  HEENT: NCAT, anicteric conjunctiva  CV: RRR, S1/S2, extremities warm and well perfused, no lower extremity edema  Pulm: normal work of breathing, lungs clear to auscultation, no crackles or wheezing  GI: mild tenderness throughout  Skin: warm, no jaundice  Neuro: Aox3, speech normal, moves all extremities symmetrically and equally  Psych: mood is good, affect is congruent, Hmong speaking, but does understand quite a bit of english it seems.       Data   Recent Labs   Lab 10/07/21  0624 10/06/21  0659 10/05/21  0528 10/04/21  2024 10/04/21  2024 10/01/21  1558 10/01/21  1558   WBC 9.1 6.3 6.5   < > 7.5  --   --    HGB 7.9* 6.9* 6.3*   < > 7.4*  --   --    MCV 84 82 79   < > 80  --   --     374 406   < > 454*  --   --     133 129*   < > 126*   < > 126*   POTASSIUM 4.3 4.2 4.4   < > 4.6   < > 5.1   CHLORIDE 102 101 97   < > 92*   < > 93*   CO2 25 26 28   < > 27   < > 25   BUN 26 27 38*   < > 40*   < > 22   CR 0.97 0.85 1.02   < > 1.03   < > 1.21   ANIONGAP 6 6 4   < > 7   < > 8   HAWA 9.5 8.7 8.9   < > 9.4   < > 9.1   GLC 88 101* 112*   < > 148*   < > 138*   ALBUMIN  --   --   --   --  3.2*  --  3.2*   PROTTOTAL  --   --   --    --  8.3  --  8.2   BILITOTAL  --   --   --   --  0.4  --  0.2   ALKPHOS  --   --   --   --  75  --  81   ALT  --   --   --   --  20  --  18   AST  --   --   --   --  12  --  14   LIPASE  --   --   --   --  155  --   --     < > = values in this interval not displayed.

## 2021-10-07 NOTE — PROGRESS NOTES
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 He presents on 10/4 for severe pain not managed on home regimen. He recently underwent PNT placement on 10/4.     Today, the patient was seen for:  Cancer related pain  Bladder spasms  Metastatic gastric cancer    Prognosis, Goals, or Advance Care Planning was addressed today with: No.    Mood, coping, and/or meaning in the context of serious illness were addressed today: Yes.  Summary/Comments: seems to be coping well at this time            Interval History:     Chart review/discussion with unit or clinical team members:   Notes reviewed, no acute events, getting IV hydromorphone for worsening pain post stent removal.    Per patient or family/caregivers today:  Patient is comfortable, just received IV hydromorphone which has been helpful. Feeling that the procedure yesterday caused some more pain acutely but that should get better over time.    Key Palliative Symptoms:  # Pain severity the last 12 hours: moderate    Patient is on opioids: assessed and I made recommendations about bowel care as above.           Review of Systems:     Besides above, ROS was reviewed and is unremarkable          Medications:     I have reviewed this patient's medication profile and medications during this hospitalization.    Noted meds:    MS Contin 15 mg BID  Oxybutynin 5 mg daily  miralax daily  IV hydromorphone PRN- 2.5 mg over last  24 hours  Ibuprofen 200 mg q6h PRN- none used  Oxycodone 5-10 mg q3h PRN- x3 30 mg  simethacone PRN- x1    70 OME           Physical Exam:   Vitals were reviewed  Temp: 99.5  F (37.5  C) Temp src: Oral BP: 123/89 Pulse: 72   Resp: 18 SpO2: 99 % O2 Device: None (Room air)      Intake/Output Summary (Last 24 hours) at 10/7/2021 1325  Last data filed at 10/7/2021 1100  Gross per 24 hour   Intake 975 ml   Output 1615 ml   Net -640 ml     Constitutional: Awake, alert, cooperative, no apparent distress, cachectic, chronically ill apperaing  ENT: Normocephalic, without obvious abnormality, atramatic  Lungs: No increased work of breathing, good air exchange  Neurologic: Awake, alert, oriented to name, place and time.    Neuropsychiatric: Normal affect, mood  Skin: No rashes, erythema             Data Reviewed:     Reviewed recent pertinent imaging, comments:   Impression:   In this patient with metastatic gastric adenocarcinoma:   1a. Mild asymmetrical hypoperfusion of the right kidney with new mild   hydronephrosis. Renal arteries appear patent. Question nephrostomy   tube dysfunction.   1b. Right percutaneous nephrostomy tube and ureteral stent appear   appropriately positioned.   2a. Stable circumferential thickening of the gastric antrum/pylorus   known gastric adenocarcinoma.   2b. Extensive necrotic retroperitoneal adenopathy, minimally increased   since 9/23/2021.   3. Mild diffuse mesenteric edema, likely secondary to fluid   rehydration.   4. Small left pleural effusion with overlying atelectasis. Right lower   lobe pulmonary nodule is unchanged since 11/24/2020.     Reviewed recent labs, comments:   Sodium 133  Potassium 4.3  Creatinine 0.97  WBC 9.1  Hemoglobin 7.9  Platelets 378    SYLVIE Guerrier CNS  Palliative Care Consult Team  Pager: [phone]    35 minutes spent. This includes 20 minutes face to face with patient and granddaughter discussing current complex health conditions and symptom management.  Coordination of care with the primary team and bedside RN regarding symptom management.

## 2021-10-07 NOTE — PLAN OF CARE
[id number]:  VSS on RA, afebrile. Overall pt has been reporting improved pain today. Pt had ureteral stent removed at bedside by IR @  . Post-procedure pt reported anticipated increased pain, IV dilaudid given x1 at that time with good relief. Pain increased again around 2130, another IV Dilaudid x1 given. Pt also getting schedule PO morphine Q12H. R PNT intact with good output, clear pink in color. Pt c/o increased abd distention and gassiness, stating that he could not stop belching. Provider was notified, PRN simethicone ordered & given with good relief. IVF discontinued. PIV saline locked x2. No BM today. Continue to monitor & with POC.

## 2021-10-07 NOTE — PLAN OF CARE
[id number]: VSS. Pt continuing to report lower abdominal pain, 8-10/10, IV dilaudid given x2, and PO oxycodone given x2, with little relief. Pt denies having any N/V or SOB. R PNT output: 100ml - clear/light pink.

## 2021-10-07 NOTE — PLAN OF CARE
"/89 (BP Location: Right arm)   Pulse 72   Temp 99.5  F (37.5  C) (Oral)   Resp 18   Ht 1.549 m (5' 1\")   Wt 49.3 kg (108 lb 11.2 oz)   SpO2 99%   BMI 20.54 kg/m       Pt A&O x4. AVSS. Fatigued, c/o pain 10/10 since ureteric stent was  Removed on 10/6.Oxy x2 and dilaudid x2 given, with relief to 3/10.   R PIV running at 83.33 ml/hr of NS Bolus.L PIV SL. Poor appetite. 200 ml pink tinged drainage from nephrostomy tube.  clear yellow. Continue with POC.    "

## 2021-10-07 NOTE — TELEPHONE ENCOUNTER
Active Palliative order/referral requesting [facility] location. Please review order in wq and reach out to patient to arrange an appointment

## 2021-10-08 NOTE — PLAN OF CARE
[id number]:  OVSS, slightly hypertensive, likely related to increased pain. Tmax 99.4. Pt reports progressively worsening pain in the last 24 hours, back up to 10/10 and no longer well managed with current pain regimen, calling for pain meds early. Provider notified, was given verbal OK by Dr. Daley to give next IV Dilaudid early. Provider also ordered PVR which was only 25mL. Some slight improvement in pain following IV Dilaudid but still about 7-8/10. Pt having good output via PNT and voiding. PNT urine now clear yellow - no longer pink/bloody. Poor PO intake today d/t lack of appetite r/t pain. Bolus of IV completed. Recommend staying on top of pain and giving all pain meds as soon as available. Continue to monitor & with POC.

## 2021-10-08 NOTE — PROVIDER NOTIFICATION
"\"Pt c/o very sharp 10/10 lower abd pain. Pt not due for next dose of pain meds until 10PM but is requesting more ASAP. The pain has been getting increasingly worse. Can pt have more pain meds now? Thanks! Mariajose [id number]\"  "

## 2021-10-08 NOTE — PROGRESS NOTES
"Cross cover    Called for sharp lower pelvic pain that is generalized but radiate to right side. Ok to retime dilaudid. Per day time note, \"Worsening pain despite stent removal, discussed with urology, is normal to have some renal/uretral colic after stent removal and should improve with tylenol and ibuprofen.\" Per nursing pain has been getting worse over last 24 hours. Request bladder scan. Has been PNT and urinating. Nursing to page in 1 hour with update on his status. Could consider contacting urology and repeating imaging.     Dr. Chayo Daley  Internal Medicine/Pediatrics      This note was created using voice recognition software and may contain minor errors.       "

## 2021-10-08 NOTE — PLAN OF CARE
VSS. C/o 7/10 lower abdominal pain. PRN oral oxycodone given at 0015 with effect. Pt slept the rest of the night. R PNT WDL and draining well. Pt also voiding spontaneously with urinal at bedside. Urine is clear/yellow. PRN dilaudid given this AM with effect. Continue with POC.

## 2021-10-08 NOTE — PROVIDER NOTIFICATION
"\"FYI- pt's PVR was 25mL and 1hr pain reassessment pt states pain is a little better but still 7-8/10.   Mariajose [id number]\"  "

## 2021-10-08 NOTE — PROGRESS NOTES
[facility]    Medicine Progress Note - Hospitalist Service, Gold 11    Essie Guzman is a 66 year old male admitted on 10/4/2021. Thomas is a Hmong-speaking 66 year old male with past medical history of metastatic gastric cancer with disease in the retroperitoneum, gastrointestinal hemorrhage with melena, cancer related weight loss, severe malnutrition, and right hydronephrosis secondary to malignant obstruction s/p ureteric stenting who presents to the emergency department for evaluation of pain who was found to have CT imaging with worsening right-sided hydronephrosis, right-sided renal hypoperfusion and some fluid in the right renal pelvis.    Changes today:   - Pain improving with more consistent oral medication   - Probable discharge tomorrow afternoon    Right-sided hydronephrosis secondary to malignant obstruction, worsening on CT imaging this admission with ureteral stent in place  Right-sided renal hypoperfusion on CT imaging this admission  Patient presents with abdominal pain that is been subacute in nature and present since early September. He did have a nephrostomy tube placed on 10/4/2021. The nephrostomy tube currently has output. CT imaging today obtained to evaluate the patient's ureteral stent and renal collecting system did illustrate right-sided hydronephrosis which is new from his previous imaging on 9/24, as well as right-sided renal hypoperfusion of unclear significance. The right percutaneous nephrostomy tube and ureteral stent appear appropriately positioned. He has gastric adenocarcinoma was redemonstrated with known adenopathy which is increased from last scan on 9/23. He does have diffuse mesenteric edema and a small left pleural effusion with some atelectasis. S/p R renal stent, still with PNTs in place.    - Urology consulted, appreciate assistance     Hyponatremia, resolved  Sodium is chronically in the low 130s. On admission his sodium was  126 but improved to 129 with IV fluids. His hyponatremia is likely secondary to hypovolemia from poor intake.    Acute anemia, likely secondary to blood loss in the setting of metastatic gastric cancer with slow GI bleed  Iron deficiency anemia  He was seen by gastroenterology during during previous admissions and the thought would be that endoscopy would likely be of little benefit with how advanced his disease would be and that there is unlikely to be identifiable and target lesion for blood loss that could be remedied.   - prn transfusion orders for hgb <7 (has rec'd 2 U this admission)   - He is at risk for brisk GI bleed given his cancer but likely has slow oozing and some dark stools related to his iron supplement.    Severe malnutrition in the context of chronic illness  Weight loss  Deconditioning  -Appreciate nutrition consult  -Daily multivitamin  -Trend potassium, magnesium and phosphate  -Ensured supplements daily  -PT/OT    Gastric cancer with metastasis to retroperitoneum  -Appreciate oncology consultation. Last seen by Dr. Mccarthy on 9/15/2021. See oncology note for summary of disease and treatment.  - Oncology follow-up for initiation of pembrolizumab scheduled for 10/15.     Diet: Regular Diet Adult  Snacks/Supplements Adult: "[employer] Standard Oral Supplement; Between Meals  Snacks/Supplements Adult: Ensure Clear; Between Meals    DVT Prophylaxis: Contraindicated secondary to anemia  Hamm Catheter: Not present  Central Lines: None  Code Status: Full Code      Disposition Plan   Expected discharge: tomorrow recommended to prior living arrangement once pain controlled.     The patient's care was discussed with the Bedside Nurse, Patient and Patient's Family.    Lynn Waggoner MD  Hospitalist Service, [address]  Securely message with the Vocera Web Console (learn more here)  Text page via [facility] [employer] Paging/Directory  Please see sign in/sign out  for up to date coverage information    ______________________________________________________________________    Interval History   Pain much improved today with more consistent oral medication. Much more energy and appetite - was able to go outside with family today which helped mood and energy. Is hopeful to be able to discharge tomorrow afternoon with family.     Data reviewed today: I reviewed all medications, new labs and imaging results over the last 24 hours. Labs and Imaging reviewed in Epic, pertinent discussed in A&P.       Physical Exam   Vital Signs: Temp: 98.2  F (36.8  C) Temp src: Oral BP: 118/88 Pulse: 71   Resp: 19 SpO2: 99 % O2 Device: None (Room air)    Weight: 109 lbs 3.2 oz  Exam  Gen: awake and alert, appears comfortable, improved energy compared to yesterday  HEENT: NCAT, anicteric conjunctiva  CV: RRR, S1/S2, extremities warm and well perfused, no lower extremity edema  Pulm: normal work of breathing, lungs clear to auscultation, no crackles or wheezing  GI: tenderness improved - mild in lower abdomen  Skin: warm, no jaundice  Neuro: Aox3, speech normal, moves all extremities symmetrically and equally  Psych: mood is good, affect is congruent      Data   Recent Labs   Lab 10/08/21  0557 10/07/21  0624 10/06/21  0659 10/05/21  0528 10/04/21  2024 10/01/21  1558 10/01/21  1558   WBC 6.7 9.1 6.3   < > 7.5  --   --    HGB 7.0* 7.9* 6.9*   < > 7.4*  --   --    MCV 84 84 82   < > 80  --   --     378 374   < > 454*  --   --     133 133   < > 126*   < > 126*   POTASSIUM 4.3 4.3 4.2   < > 4.6   < > 5.1   CHLORIDE 103 102 101   < > 92*   < > 93*   CO2 24 25 26   < > 27   < > 25   BUN 25 26 27   < > 40*   < > 22   CR 0.88 0.97 0.85   < > 1.03   < > 1.21   ANIONGAP 7 6 6   < > 7   < > 8   HAWA 8.8 9.5 8.7   < > 9.4   < > 9.1   GLC 86 88 101*   < > 148*   < > 138*   ALBUMIN  --   --   --   --  3.2*  --  3.2*   PROTTOTAL  --   --   --   --  8.3  --  8.2   BILITOTAL  --   --   --   --  0.4  --   0.2   ALKPHOS  --   --   --   --  75  --  81   ALT  --   --   --   --  20  --  18   AST  --   --   --   --  12  --  14   LIPASE  --   --   --   --  155  --   --     < > = values in this interval not displayed.

## 2021-10-08 NOTE — PROGRESS NOTES
[facility]  Palliative Care Daily Progress Note       Recommendations & Counseling       Schedule tylenol 1000 mg TID    Increase oxycodone to 10-20 mg q3h PRN    Increase MS Contin to 30 TID    Would use caution with ibuprofen given history of GI bleed          Assessments              Essie Guzman is a 66 year old male with a history of mestatic stomach cancer to the retroperitoneum, GI bleed with melena, cancer related weight loss, severe malnutrition, right hydronephrosis s/p stenting. He presents on 10/4 for severe pain not managed on home regimen. He recently underwent PNT placement on 10/4.     Today, the patient was seen for:  Cancer related pain  Bladder spasms  Metastatic gastric cancer          Prognosis, Goals, or Advance Care Planning was addressed today with: Yes.    Discussed with patient and daughter today his goals of care, seems that they have a realistic understanding of his current illness and what treatments have to offer. Discussed that they are still very much interested in life prolonging therapies in hopes of continued good QOL. 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 Discussed code status with him and daughter today, sounds like they agree he would not want those interventions if he was dying from his cancer but also right now would want those interventions if it was something unrelated.     Mood, coping, and/or meaning in the context of serious illness were addressed today: Yes.  Summary/Comments: coping well at this time            Interval History:     Chart review/discussion with unit or clinical team members:   Notes reviewed, worsening pain over evening with back pain and sharp lower pelvic pain ever since stent removal.     Per patient or family/caregivers today:  He is unsure of why pain was so bad last evening. Seems that he is still trying to see what all  affects his pain. Has pain in his lower abdomen and back. Feels constant and sharp at times. Doesn't seem to feel like its crampy in nature.     Key Palliative Symptoms:                 Review of Systems:     Besides above, ROS was reviewed and is unremarkable          Medications:     I have reviewed this patient's medication profile and medications during this hospitalization.    Noted meds:    MS Contin 30 mg BID  Oxybutynin 10 mg daily  Miralax daily  Senna 1-2 tablets BID  Hydromorphone 0.5 mg q4h PRN- 3.5 mg over last 24 hours  Ibuprofen PRN- x2  Oxycodone 5-10 mg q3h PRN- 40 mg over last 24 hours    OME 60 + 60 + 35 = 155             Physical Exam:   Vitals were reviewed  Temp: 98.4  F (36.9  C) Temp src: Oral BP: 116/73 Pulse: 97   Resp: 18 SpO2: 98 % O2 Device: None (Room air)      Intake/Output Summary (Last 24 hours) at 10/8/2021 1010  Last data filed at 10/8/2021 0659  Gross per 24 hour   Intake 120 ml   Output 1150 ml   Net -1030 ml     Constitutional: Awake, alert, cooperative, no apparent distress, cachectic, chronically ill apperaing  ENT: Normocephalic, without obvious abnormality, atramatic  Lungs: No increased work of breathing, good air exchange  Neurologic: Awake, alert, oriented to name, place and time.    Neuropsychiatric: Normal affect, mood  Skin: No rashes, erythema           Data Reviewed:     Reviewed recent pertinent imaging, comments:   Impression:   In this patient with metastatic gastric adenocarcinoma:   1a. Mild asymmetrical hypoperfusion of the right kidney with new mild   hydronephrosis. Renal arteries appear patent. Question nephrostomy   tube dysfunction.   1b. Right percutaneous nephrostomy tube and ureteral stent appear   appropriately positioned.   2a. Stable circumferential thickening of the gastric antrum/pylorus   known gastric adenocarcinoma.   2b. Extensive necrotic retroperitoneal adenopathy, minimally increased   since 9/23/2021.   3. Mild diffuse mesenteric edema,  likely secondary to fluid   rehydration.   4. Small left pleural effusion with overlying atelectasis. Right lower   lobe pulmonary nodule is unchanged since 11/24/2020.     Reviewed recent labs, comments:   Sodium 134  Potassium 4.3  Creatinine 0.88  GFR 88  WBC 6.7  Hemoglobin 7.0  Platelets 366    SYLVIE Guerrier CNS  Palliative Care Consult Team  Pager: [phone]    35 minutes spent. This includes 20 minutes face to face with patient and daughter discussing current complex health conditions and prognosis, goals of care, symptom management. Coordination of care with the primary team, palliative  and SW regarding goals of care and symptom management.

## 2021-10-08 NOTE — PLAN OF CARE
Time: 07:00-19:00    -Pt  continues to have lower abd pain that radiates to the back. Pain is constant aching. Ibuprofen given and it also help bring his pain down. Pt went outside and ambulate for a little bit and increase pain 8/10. Oxycodone 10mg PO given with no relief. Pain down 7/10. Pt received oxycodone 20mg PO and pain greatly improved. It brought his pain number down to 4/10 which is tolerable for him.  He is still alert and awake. Goal is to control his pain by oral pills.   Potential discharge if pt's pain is control by pain pills. Dilaudid IV prn for breakthrough pain only.     Palliative Pain consult came by and saw pt and made some changes to his pain medication. Pt is now on -schedule Tylenol 975mg TID,   -Increase MS Contin 30mg frequency from BID to TID  -Increase oxycodone dose prn from 5-10mg to 10-20mg prn.     Continue to monitor pain and respiration status.     Right PNT dressing changed today. PNT output 100-150ml at a time. He is voiding but not saving his urine output.     -Hgb 7 today, RBC 1 unit transfusing without complication.    -No BM today. Constipated. Pt report that his last BM was 2-3 days ago. Continue with senna-s and miralax. Pt continues to pass gas.

## 2021-10-09 NOTE — DISCHARGE SUMMARY
[facility]  Hospitalist Discharge Summary      Date of Admission:  10/4/2021  Date of Discharge:  10/9/2021  Discharging Provider: Lynn Waggoner MD  Discharge Team: Hospitalist Service, [place]     Discharge Diagnoses   Right-sided hydronephrosis secondary to malignant obstruction, worsening on CT imaging this admission with ureteral stent in place  Right-sided renal hypoperfusion on CT imaging this admission  Hyponatremia, resolved  Acute anemia, likely secondary to blood loss in the setting of metastatic gastric cancer with slow GI bleed  Iron deficiency anemia  Severe malnutrition in the context of chronic illness  Weight loss  Deconditioning  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  No follow   up labs or test are needed.      Appointments on [place] and/or [facility] (with [facility] or [facility]   provider or service). Call [phone] if you haven't heard regarding   these appointments within 7 days of discharge.         Follow Up ([facility])      Our schedulers are arranging labs, possible red blood cell transfusion,   and immunotherapy.     Please call [facility] cancer clinic triage line at [phone] for temp   > or = 100.4, uncontrolled nausea/vomiting/diarrhea/constipation,   unrelieved pain, bleeding not relieved with pressure, dizziness, chest   pain, shortness of breath, loss of consciousness, and any new or   concerning symptoms.       Appointments on [place] and/or [facility] (with [facility] or [facility]   provider or service). Call [phone] if you haven't heard regarding   these appointments within 7 days of discharge.             Unresulted Labs Ordered in the Past 30 Days of  this Admission     Date and Time Order Name Status Description    10/8/2021  3:16 PM CONDITIONAL Prepare red blood cells (unit) Preliminary     9/10/2021  5:41 AM Prepare red blood cells (unit) Preliminary       These results will be followed up by n/a    Discharge Disposition   Discharged to home  Condition at discharge: Fair    Hospital Course      Essie Guzman is a 66 year old male admitted on 10/4/2021. Thomas is a ong-speaking 66 year old male with past medical history of metastatic gastric cancer with disease in the retroperitoneum, gastrointestinal hemorrhage with melena, cancer related weight loss, severe malnutrition, and right hydronephrosis secondary to malignant obstruction s/p ureteric stenting who presents to the emergency department for evaluation of pain who was found to have CT imaging with worsening right-sided hydronephrosis, right-sided renal hypoperfusion and some fluid in the right renal pelvis.     Right-sided hydronephrosis secondary to malignant obstruction, worsening on CT imaging this admission with ureteral stent in place  Right-sided renal hypoperfusion on CT imaging this admission  Patient presented with abdominal pain that is been subacute in nature and present since early September. He did have a nephrostomy tube placed on 10/4/2021. CT imaging obtained to evaluate the patient's ureteral stent and renal collecting system did illustrate right-sided hydronephrosis which is new from his previous imaging on 9/24, as well as right-sided renal hypoperfusion of unclear significance. The right percutaneous nephrostomy tube and ureteral stent appear appropriately positioned. He has gastric adenocarcinoma was redemonstrated with known adenopathy which is increased from last scan on 9/23. He does have diffuse mesenteric edema and a small left pleural effusion with some atelectasis. S/p R renal stent removal, still with PNTs in place and improvement in pain.     - Discharged on oxycodone and  morphine MS contin.     Hyponatremia, resolved  Sodium is chronically in the low 130s. On admission his sodium was 126 but improved to 129 with IV fluids. His hyponatremia is likely secondary to hypovolemia from poor intake.     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 has rec'd 2 U this admission Last seen by Dr. Mccarthy on 9/15/2021. See oncology note for summary of disease and treatment.    - He is at risk for brisk GI bleed given his cancer but likely has slow oozing and some dark stools related to his iron supplement. Would hold anticoagulation.   - Oncology follow-up for initiation of pembrolizumab scheduled for 10/15.     Severe malnutrition in the context of chronic illness  Weight loss  Deconditioning  Followed by nutrition with supplementation recommendation.       Consultations This Hospital Stay   NUTRITION SERVICES ADULT IP CONSULT  ONCOLOGY ADULT IP CONSULT  UROLOGY IP CONSULT  PALLIATIVE CARE ADULT IP CONSULT  VASCULAR ACCESS CARE ADULT IP CONSULT    Code Status   Full Code    Time Spent on this Encounter   I, Lynn Waggoner MD, personally saw the patient today and spent greater than 30 minutes discharging this patient.       Lynn Waggoner MD  [facility] [address] [address]  Phone: [phone]  ______________________________________________________________________    Physical Exam   Vital Signs: Temp: 98  F (36.7  C) Temp src: Oral BP: 105/74 Pulse: 85   Resp: 12 SpO2: 97 % O2 Device: None (Room air)    Weight: 112 lbs 6.4  oz    Gen: awake and alert, continues to look more comfortable  HEENT: NCAT, anicteric conjunctiva  CV: RRR, S1/S2, extremities warm and well perfused, no lower extremity edema  Pulm: normal work of breathing, lungs clear to auscultation, no crackles or wheezing  GI: tenderness improved - mild in lower abdomen  Skin: warm, no jaundice  Neuro: Aox3, speech normal, moves all extremities symmetrically and equally  Psych: mood is good, affect is congruent       Primary Care Physician   Serge Kimble    Discharge Orders      Comprehensive metabolic panel     Follow Up ([facility])    Our schedulers are arranging labs, possible red blood cell transfusion, and immunotherapy.     Please call [facility] cancer clinic triage line at [phone] for temp > or = 100.4, uncontrolled nausea/vomiting/diarrhea/constipation, unrelieved pain, bleeding not relieved with pressure, dizziness, chest pain, shortness of breath, loss of consciousness, and any new or concerning symptoms.       Appointments on [place] and/or [facility] (with [facility] or [facility] provider or service). Call [phone] if you haven't heard regarding these appointments within 7 days of discharge.     Reason for your hospital stay    Dear Essie Guzman    Your were hospitalized at [facility] with worsening lower abdominal pain related to your cancer. Your treatment consisted of pain management and removal of ureteral stents.  Over your hospitalization your pain improved and today you are ready to be discharged home.  If you continue taking your pain medicaiton you should continue to improve but if you develop fever, shortness of breath, light headedness, chest pain or worsening pain please seek medical attention.    It was a pleasure meeting with you today. Thank you for allowing me and my team the privilege of caring for you today. You are the reason we are here, and I truly hope we provided you with the excellent service you  deserve. Please let us know if there is anything else we can do for you so that we can be sure you are leaving completely satisfied with your care experience.    Your hospital unit at the time of discharge is 7D so if you have any questions please call the hospital at [phone] and ask to talk to a nurse on 7D.    Take care!    Lynn Waggoner MD  Internal Medicine Hospitalist  [facility]     Activity    Your activity upon discharge: activity as tolerated     Adult [facility] Follow-up and recommended labs and tests    Follow up with primary care provider, Serge Kimble, within 7 days to evaluate medication change and for hospital follow- up.  No follow up labs or test are needed.      Appointments on [place] and/or [facility] (with [facility] or [facility] provider or service). Call [phone] if you haven't heard regarding these appointments within 7 days of discharge.     Diet    Follow this diet upon discharge: Orders Placed This Encounter      Snacks/Supplements Adult: [employer] Standard Oral Supplement; Between Meals      Snacks/Supplements Adult: Ensure Clear; Between Meals      Regular Diet Adult     CBC with Platelets & Differential       Significant Results and Procedures   Results for orders placed or performed during the hospital encounter of 10/04/21   CT Abdomen Pelvis w Contrast    Narrative    Examination:  CT ABDOMEN PELVIS W CONTRAST 10/5/2021 9:03 AM     History: Abdominal pain, acute, nonlocalized. History of metastatic  gastric cancer with subsequent right-sided hydronephrosis secondary to  malignant obstruction.    Comparison: CT CAP 9/23/2021, PET CT 3/9/2021.    Technique: CT of the abdomen and pelvis were obtained with contrast.  Sagittal and coronal reconstructions created and reviewed.    Findings:     Abdomen and pelvis: Normal hepatic parenchyma without focal lesions.  Gallbladder is distended without evidence of acute cholecystitis. Mild  intrahepatic biliary  dilatation. The common bile duct is within normal  limits. Mild thickening of the left adrenal gland without focal  nodule. Right adrenal gland is unremarkable. The spleen and pancreas  are unremarkable.     Right-sided percutaneous nephrostomy tube coiled in the right renal  pelvis. Right ureteral stent also coiled within the renal pelvis,  inferior end within the urinary bladder. Small amount of air within  the right renal pelvis, presumably iatrogenic. Asymmetric  hypoperfusion of the right renal parenchyma relative to the left.  Multiple left renal cysts.    The stomach is distended. Circumferential wall thickening of the  gastric antrum/pylorus. No evidence of small bowel obstruction. Normal  caliber appendix (series 3 image 29). The colon is unremarkable.    Extensive centrally-hypoattenuating retroperitoneal adenopathy with  necrotic lymph nodes measuring up to 3.0 cm and 3.8 cm along the  lesser gastric curvature (series 3 image 68 and 91), slightly  increased since prior. No free fluid. Diffuse mild to moderate  mesenteric edema, notably in the mesorectum.     Abdominal aorta is normal in caliber and patent. Celiac and mesenteric  artery origins are unremarkable. The renal arteries appear patent.  Portal veins and IVC are patent.    Lower thorax: Left pleural effusion with overlying atelectasis. Solid  3 mm nodule in the lateral right lower lobe is unchanged since  11/24/2020 (series 3, image 8). Stable tiny 2 mm nodule in the right  lung base is unchanged since 9/23/2021, possibly representing because  plugging (series 3, image 46).    Bones and soft tissues: No acute or suspicious osseous abnormality.        Impression    Impression:   In this patient with metastatic gastric adenocarcinoma:  1a. Mild asymmetrical hypoperfusion of the right kidney with new mild  hydronephrosis. Renal arteries appear patent. Question nephrostomy  tube dysfunction.  1b. Right percutaneous nephrostomy tube and ureteral stent  appear  appropriately positioned.  2a. Stable circumferential thickening of the gastric antrum/pylorus  known gastric adenocarcinoma.  2b. Extensive necrotic retroperitoneal adenopathy, minimally increased  since 9/23/2021.  3. Mild diffuse mesenteric edema, likely secondary to fluid  rehydration.  4. Small left pleural effusion with overlying atelectasis. Right lower  lobe pulmonary nodule is unchanged since 11/24/2020.      I have personally reviewed the examination and initial interpretation  and I agree with the findings.    NAI BURCIAGA MD         SYSTEM ID:  [id number]       Discharge Medications   Current Discharge Medication List      START taking these medications    Details   ibuprofen (ADVIL/MOTRIN) 200 MG tablet Take 1 tablet (200 mg) by mouth every 6 hours as needed for moderate pain  Qty: 30 tablet, Refills: 0    Associated Diagnoses: Malignant neoplasm of stomach metastatic to retroperitoneum (H)      morphine (MS CONTIN) 30 MG CR tablet Take 1 tablet (30 mg) by mouth every 8 hours  Qty: 10 tablet, Refills: 0    Associated Diagnoses: Malignant neoplasm of stomach metastatic to retroperitoneum (H)      polyethylene glycol (MIRALAX) 17 GM/Dose powder Take 17 g by mouth daily  Qty: 510 g, Refills: 0    Associated Diagnoses: Malignant neoplasm of stomach metastatic to retroperitoneum (H)      sennosides (SENOKOT) 8.6 MG tablet Take 1-2 tablets by mouth 2 times daily  Qty: 60 tablet, Refills: 0    Associated Diagnoses: Malignant neoplasm of stomach metastatic to retroperitoneum (H)      simethicone (MYLICON) 80 MG chewable tablet Take 1 tablet (80 mg) by mouth every 6 hours as needed for cramping  Qty: 20 tablet, Refills: 0    Associated Diagnoses: Malignant neoplasm of stomach metastatic to retroperitoneum (H)         CONTINUE these medications which have CHANGED    Details   acetaminophen (TYLENOL) 500 MG tablet Take 2 tablets (1,000 mg) by mouth every 8 hours  Qty: 120 tablet, Refills: 0    Associated  Diagnoses: Malignant neoplasm of stomach metastatic to retroperitoneum (H)      oxybutynin ER (DITROPAN-XL) 10 MG 24 hr tablet Take 1 tablet (10 mg) by mouth daily  Qty: 30 tablet, Refills: 0    Associated Diagnoses: Malignant neoplasm of stomach metastatic to retroperitoneum (H)      oxyCODONE (ROXICODONE) 10 MG tablet Take 1-1.5 tablets (10-15 mg) by mouth every 3 hours as needed for moderate to severe pain  Qty: 20 tablet, Refills: 0    Associated Diagnoses: Malignant neoplasm of stomach metastatic to retroperitoneum (H)         CONTINUE these medications which have NOT CHANGED    Details   ferrous gluconate (FERGON) 324 (38 Fe) MG tablet Take 1 tablet (324 mg) by mouth daily (with breakfast)  Qty: 100 tablet, Refills: 1    Associated Diagnoses: Gastrointestinal hemorrhage with melena; Metastasis from gastric cancer (H); Anemia, unspecified type; Malignant neoplasm of stomach metastatic to retroperitoneum (H)      ondansetron (ZOFRAN-ODT) 4 MG ODT tab Take 1 tablet (4 mg) by mouth every 6 hours as needed for nausea or vomiting  Qty: 30 tablet, Refills: 0    Associated Diagnoses: Gastrointestinal hemorrhage with melena; Metastasis from gastric cancer (H); Anemia, unspecified type; Malignant neoplasm of stomach metastatic to retroperitoneum (H)      pantoprazole (PROTONIX) 40 MG EC tablet Take 1 tablet (40 mg) by mouth daily  Qty: 60 tablet, Refills: 1    Associated Diagnoses: Gastrointestinal hemorrhage with melena; Metastasis from gastric cancer (H); Anemia, unspecified type; Malignant neoplasm of stomach metastatic to retroperitoneum (H)      tamsulosin (FLOMAX) 0.4 MG capsule Take 1 capsule (0.4 mg) by mouth daily  Qty: 30 capsule, Refills: 1    Associated Diagnoses: Gastrointestinal hemorrhage with melena; Metastasis from gastric cancer (H); Anemia, unspecified type; Malignant neoplasm of stomach metastatic to retroperitoneum (H)      thiamine (B-1) 100 MG tablet Take 1 tablet (100 mg) by mouth daily  Qty: 100  tablet, Refills: 1    Associated Diagnoses: Gastrointestinal hemorrhage with melena; Metastasis from gastric cancer (H); Anemia, unspecified type; Malignant neoplasm of stomach metastatic to retroperitoneum (H)           Allergies   No Known Allergies

## 2021-10-09 NOTE — PLAN OF CARE
Focus: Discharge    Data:   Pt continues to have constant aching lower abd pain. He rates his pain number at 6/10. Alternating between Tylenol, Ibuprofen and oxycodone for pain. Pain level came down to 2/10 and pt is comfortable. He reports feeling drowsy, but rouses to voice.    Constipation resolved. He reports having 2 bowel movements yesterday evening.  Pt express some interest in wanting to go home.     Intervention:   Reviewed discharge instruction with pt's daughter regarding his pain medications & its side effects. Instructed the daughter if she notices that he is becoming to lethargic, they should get in touch with his primary doctor to adjust pain medication.   After reviewing medications and follow up appointments, pt's daughter feels that the family members can manage his pain medications at home. PIV removed.     Assessment:  -Pt's daughter state understanding of discharge instructions. Pt's pain is being adequately control with oral pain med    Plan:   Discharge to home

## 2021-10-09 NOTE — PROGRESS NOTES
[facility]  Palliative Care Daily Progress Note       Recommendations & Counseling       Pain: continue to encourage patient to get out of bed, move around, help pt and family know that he will not be pain free    Continue tylenol 975mg TID    Continue MS Contin 30mg q8hrs    Decrease oxycodone 10-15mg q3hrs PRN    Ibuprofen 200mg q6hrs PRN    Taper off hydromorphone IV       Thank you for the opportunity to continue to participate in the care of this patient and family.  Please feel free to contact on-call palliative provider with any emergent needs.  We can be reached via team pager [phone] (answered 8-4:30 Monday-Friday); after-hours answering service ([phone]);     Arleen Maldonado MD / Palliative Medicine / Pager [phone]     Total time spent was 35 minutes spent in reviewing record, patient examination and family counseling, discussion with primary team and documentation. >50% of time was spent counseling and/or coordination of care regarding symptom management, plan of care.     Assessments          Essie Guzman is a 66 year old male with a history of mestatic stomach cancer to the retroperitoneum, GI bleed with melena, cancer related weight loss, severe malnutrition, right hydronephrosis s/p stenting. He presents on 10/4 for severe pain not managed on home regimen. He recently underwent PNT placement on 10/4 but pain persists.      Today, the patient was seen for:  Cancer related pain  Bladder spasms  Metastatic gastric cancer    Prognosis, Goals, or Advance Care Planning was addressed today with: No. Gala's daughter present today. She tells me that the hope is to get insurance approval for immunotherapy and they hope it will cure the cancer although she knows it will likely not do that but instead will hopefully slow cancer growth.     Mood, coping, and/or meaning in the context of serious illness were addressed today: No.              Interval  History:     Chart review/discussion with unit or clinical team members:   Sig pain over night  Today pain well managed  BM x 2 today    Per patient or family/caregivers today:  Pain when I saw pt was well controlled according to pt but daughter states that pain has been on and off all day since she arrive.     Key Palliative Symptoms:   Pain location: right CVA area and lower abdomen  # Pain severity the last 12 hours: low  # Dyspnea severity the last 12 hours: none  # Nausea severity the last 12 hours: none    Patient is on opioids: assessed and bowels ok/no needed changes to plan of care today.           Review of Systems:     Besides above, a complete 10+ ROS was reviewed and is unremarkable           Medications:     I have reviewed this patient's medication profile and medications during this hospitalization.    Noted meds:    Tylenol 975mg TID  MS Contin 30mg TID - increased 10/8   Oxybutynin ER 10mg every day - increased 10/8  Hydromorphone 0.5mg IV q8hrs PRN _ 0121 given  Ibuprofen 200mg q6hrs PRN - 3 doses 10/8  Oxycodone 10-20mg q3hrs PRN - 100mg x 24 hrs             Physical Exam:   Vitals were reviewed  Temp: 97.8  F (36.6  C) Temp src: Oral BP: 119/85 Pulse: 65   Resp: 12 SpO2: 99 % O2 Device: None (Room air)    Gen: lying in bed, sleeping, cachetic, in NAD  Eyes: Conjunctiva clear. Sclera anicteric   HENT: NCAT; +temporal wasting, mucous membranes slightly dry  Resp: no incresaed work of breathing  Abd: soft, nt, nd, +BS   Msk: no gross deformity, + sarcopenia  Skin:  no jaundice  Mental status/Psych: tired, arouses to voice then falls back asleep, able to answer ROS questions appropriately             Data Reviewed:     Reviewed recent labs and pertinent imaging

## 2021-10-09 NOTE — PLAN OF CARE
Time: 7727-5456    Tmax 99.4, OVSS on RA. No signs of PRBC transfusion reaction, completed this shift. Recheck in AM. Constant bladder spasm pain, 20mg oxy and scheduled HD tylenol given with some decrease in pain. Pt would like prn PO pain meds around the clock to get pain under control. SBA to bathroom. PIV saline locked. Fair appetite, eating food from home. Light yellow output from right PNT. Dressing is c/d/i and without pain. Pleasant and good medical historian. Daughter Jass updated via phone call at 2100 about pain regimen per pt request.     Time: 9471-7578    2 soft bowel movements after 2 senna given. Walking to bathroom independently. Pain rating 9 of 10 around 0130, IV dilaudid given x1 and 10mg oxy given. Tylenol and ibuprofen given x1. Education given on maximum tylenol intake, verbalized understanding. By end of shift, pain is very well-controlled and declining available PRNs. Monitor RR with opioid use, 12 at rest. OVSS on RA.

## 2021-10-11 PROBLEM — N50.89 TESTICULAR SWELLING: Status: ACTIVE | Noted: 2021-01-01

## 2021-10-11 PROBLEM — C16.9 MALIGNANT NEOPLASM OF STOMACH, UNSPECIFIED LOCATION (H): Status: ACTIVE | Noted: 2021-01-01

## 2021-10-11 PROBLEM — M79.89 SWELLING OF LEFT LOWER EXTREMITY: Status: ACTIVE | Noted: 2021-01-01

## 2021-10-11 NOTE — TELEPHONE ENCOUNTER
Patient's daughter Fei calling reporting patient's penis appears erect and swollen. States patient's thighs also appear to be swollen. States patient was recently discharged from the hospital. Fei is currently not with patient for RN to triage. Advised Fei to call back when with patient. Fei states patient only lives 2 blocks away from her and she will call with patient for triage.     Jayson Montalvo RN  [facility]

## 2021-10-11 NOTE — ED TRIAGE NOTES
Pt presents with swelling of testicles, penis, left hip and bilateral legs since yesterday. Pt has right neph tube and daughter states the drainage is pink tinted and that pt's urine is also pink.

## 2021-10-11 NOTE — PROGRESS NOTES
Background: Care Coordination referral placed from [facility] discharge report for reason of patient meeting criteria for a TCM outreach call by [facility] team.    Assessment: Upon chart review, [facility] Team member will cancel/close the referral for TCM outreach due to reason below:    Patient went to [facility] Emergency Department. Upon chart review, patient was only seen by Nurse Triage and not seen by a physician. There is no further notes/documenation in order to complete a TCM call.    Plan: Care Coordination referral for TCM outreach canceled to minimize duplicative efforts.    Crystal Lee MA  [facility], [facility]

## 2021-10-11 NOTE — TELEPHONE ENCOUNTER
Mohan : [place] ID [id number]  Patient's daughter Jass calling reporting patient has pelvic swollen. Patient states his scrotum and penis area is swollen and tender. Denies fever. Per guideline, advised patient to be seen at the emergency department. Patient's daughter Jass states she will figure out way to take patient to the emergency department.     Jayson Montalvo RN  [facility]     COVID 19 Nurse Triage Plan/Patient Instructions    Please be aware that novel coronavirus (COVID-19) may be circulating in the community. If you develop symptoms such as fever, cough, or SOB or if you have concerns about the presence of another infection including coronavirus (COVID-19), please contact your health care provider or visit [email or web address].     Disposition/Instructions    ED Visit recommended. Follow protocol based instructions.     Bring Your Own Device:  Please also bring your smart device(s) (smart phones, tablets, laptops) and their charging cables for your personal use and to communicate with your care team during your visit.    Thank you for taking steps to prevent the spread of this virus.  o Limit your contact with others.  o Wear a simple mask to cover your cough.  o Wash your hands well and often.    Resources    [facility]: About COVID-19: [email or web address]    CDC: What to Do If You're Sick: www.cdc.gov/coronavirus/2019-ncov/about/steps-when-sick.html    CDC: Ending Home Isolation: www.cdc.gov/coronavirus/2019-ncov/hcp/disposition-in-home-patients.html     CDC: Caring for Someone: www.cdc.gov/coronavirus/2019-ncov/if-you-are-sick/care-for-someone.html     [facility]: Interim Guidance for Hospital Discharge to Home: www.health.[facility].mn.us/diseases/coronavirus/hcp/hospdischarge.pdf    [facility] clinical trials (COVID-19 research studies): [email or web address]/umn-clinical-trials     Below are the COVID-19 hotlines at the [facility]  Health ([facility]). Interpreters are available.   o For health questions: Call 570-187-7330 or 1-899.205.4483 (7 a.m. to 7 p.m.)  o For questions about schools and childcare: Call 910-745-1072 or 1-747.299.3726 (7 a.m. to 7 p.m.)                         Reason for Disposition    Swollen scrotum OR lump in the scrotum/groin area    Scrotum is painful or tender to touch    Protocols used: PENIS AND SCROTUM SYMPTOMS-A-AH, SCROTUM SWELLING-A-AH

## 2021-10-11 NOTE — TELEPHONE ENCOUNTER
Patient daughter since patient has been waiting in the ED since a 11am and she wonders if he could be seen somewhere else (ie different hospital or ) for his swelling in his groin and penis are. He has a nephrostomy bag; urine has blood in it; pain still has pain even after use of oxycodone.    Advised he should stay until he gets evaluated in the ED; other places may be just as busy;  may refer them back to ED because he is more complicated than they can handle; and his oncology doctor is affiliated with that hospital. Advised to check in to make sure they didn't miss calling patient. Caller verbalizes understanding.    Reason for Disposition    Caller has already spoken with another triager or PCP AND has further questions AND triager able to answer questions.    Additional Information    Negative: Caller has already spoken with another triager and has no further questions.    Negative: Caller has already spoken with the PCP and has no further questions.    Protocols used: NO CONTACT OR DUPLICATE CONTACT CALL-A-

## 2021-10-12 PROBLEM — C16.9: Status: ACTIVE | Noted: 2020-01-01

## 2021-10-12 PROBLEM — C78.6: Status: ACTIVE | Noted: 2020-01-01

## 2021-10-12 NOTE — H&P
[facility]    History and Physical - Hospitalist Service, Gold night       Date of Admission:  10/11/2021    Assessment & Plan      Essie Guzman is a 66 year old male admitted on 10/11/2021. 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       # scrotal edema:  - no phimosis or paraphimosis. No drainage or erythema.    - testicular US without major findings.    CT abd with R PNET in place, but with scrotal wall edema.  However, nothing compressed per imaging, cause not entirely known, but DDx includes dependent edema and low oncotic pressure vs increase in hydrostatic pressure from malignancy.  Urinalysis to eval protein loss with protein, P:Cr ratio pending.  - consulted lymphedema team  - consider prn lasix  - would recommend discussing imaging with urology to assess other causes of scrotal edema    # LE edema:  Poor po intake, but albumin >3.  No DVT on US of legs.  - suspect similar cause as scrotal edema.      #Metastatic gastric cancer:  Per prior notes  Diagnosed November 2020. Patient's preference was to hold off on chemo. Anticipated regimen was Nivo+chemo or Pembro alone. At time of diagnosis workup revealed metastatic to retroperitoneal LN ([id number])-Stage LUDMILA. 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  Oncology consult in am    # pain control:  Home oxycodone, and recently started MS contin, 30 mg Q 8,   plus breakthrough IV    # Hyponatremia:  Monitor    # subacute normocytic anemia, with history of GI bleeding from gastric cancer:  Stable, monitor    # weight loss  Patient believes he has lost 70-80 pounds in the last year and this is supported by redundant soft tissue of abdomen. Likely secondary to mestatic gastric cancer, though has also has had trouble swallowing meat, poor appetite and recent nausea.   -zofran prn  -Consider PT OT once stabilized     Hyperglycemia: not previously diabetic  - Will monitor blood glucoses       Diet:   regular  DVT Prophylaxis: mechanical, due to recent gastric bleeding  Hamm Catheter: Not present  Central Lines: None  Code Status:   full            # Hyponatremia: Na = 132 mmol/L (Ref range: 133 - 144 mmol/L) on admission, will monitor as appropriate           Disposition Plan   Expected discharge:  1-2 days recommended to prior living arrangement once adequate pain management/ tolerating PO medications.     The patient's care was discussed with the Bedside Nurse, Patient and Patient's Family.    Garett Kennedy MD  [facility]  Securely message with the Vocera Web Console (learn more here)  Text page via [employer] Paging/Directory      ______________________________________________________________________    Chief Complaint   Scrotal swelling    History is obtained from the patient    History of Present Illness   Essie Guzman is a 66 year old male with a past medical history of metastatic gastric cancer with disease in the retroperitoneum, gastrointestinal hemorrhage with melena, cancer related weight loss, severe malnutrition, and right hydronephrosis secondary to malignant obstruction s/p ureteric stenting who presents with scrotal swelling.  He was recently discharged from hospital.  And then on Monday, developed sudden scrotal and LE edema, so came to the ED.  He was at a family gathering that day, but no known exposures that could lead  to swelling.    During recent hospital stay from 10/4 to 10/9, Patient presented with abdominal pain that is been subacute in nature and present since early September. The day of that admission (but prior to admission) he had a nephrostomy tube placed on 10/4/2021. The R renal stent removal occurred with improvement in pain, still with PNTs in place and improvement in pain.     - Discharged on oxycodone and morphine MS contin.     Review of Systems    The 10 point Review of Systems is negative other than noted in the HPI or here.     Past Medical History     metastatic gastric cancer     Past Surgical History   I have reviewed this patient's surgical history and updated it with pertinent information if needed.  Past Surgical History:   Procedure Laterality Date     COLONOSCOPY N/A 11/24/2015    Procedure: COLONOSCOPY;  Surgeon: Clyde Mosher MD;  Location:  GI     COMBINED CYSTOSCOPY, INSERT STENT URETER(S) Right 9/10/2021    Procedure: CYSTOSCOPY, WITH right URETERAL STENT INSERTION, retrograde pyleogram;  Surgeon: Jez Friedman MD;  Location: [facility] OR     ESOPHAGOSCOPY, GASTROSCOPY, DUODENOSCOPY (EGD), COMBINED N/A 11/24/2020    Procedure: ESOPHAGOGASTRODUODENOSCOPY with biopsies using cold forceps;  Surgeon: Clyde Mosher MD;  Location:  GI     ESOPHAGOSCOPY, GASTROSCOPY, DUODENOSCOPY (EGD), COMBINED N/A 12/22/2020    Procedure: ESOPHAGOGASTRODUODENOSCOPY, WITH FINE NEEDLE ASPIRATION BIOPSY, WITH ENDOSCOPIC ULTRASOUND GUIDANCE;  Surgeon: Guru Teri Pedraza MD;  Location: [facility] GI     IR NEPHROSTOMY TUBE PLACEMENT RIGHT  10/4/2021       Social History   I have reviewed this patient's social history and updated it with pertinent information if needed.  Social History     Tobacco Use     Smoking status: Never Smoker     Smokeless tobacco: Never Used   Substance Use Topics     Alcohol use: No     Drug use: No       Family History   No family hx any cancer    Prior to Admission  Medications   Prior to Admission Medications   Prescriptions Last Dose Informant Patient Reported? Taking?   acetaminophen (TYLENOL) 500 MG tablet   No No   Sig: Take 2 tablets (1,000 mg) by mouth every 8 hours   ferrous gluconate (FERGON) 324 (38 Fe) MG tablet  Daughter No No   Sig: Take 1 tablet (324 mg) by mouth daily (with breakfast)   ibuprofen (ADVIL/MOTRIN) 200 MG tablet   No No   Sig: Take 1 tablet (200 mg) by mouth every 6 hours as needed for moderate pain   morphine (MS CONTIN) 30 MG CR tablet   No No   Sig: Take 1 tablet (30 mg) by mouth every 8 hours   ondansetron (ZOFRAN-ODT) 4 MG ODT tab  Daughter No No   Sig: Take 1 tablet (4 mg) by mouth every 6 hours as needed for nausea or vomiting   oxyCODONE (ROXICODONE) 10 MG tablet   No No   Sig: Take 1-1.5 tablets (10-15 mg) by mouth every 3 hours as needed for moderate to severe pain   oxybutynin ER (DITROPAN-XL) 10 MG 24 hr tablet   No No   Sig: Take 1 tablet (10 mg) by mouth daily   pantoprazole (PROTONIX) 40 MG EC tablet  Daughter No No   Sig: Take 1 tablet (40 mg) by mouth daily   polyethylene glycol (MIRALAX) 17 GM/Dose powder   No No   Sig: Take 17 g by mouth daily   sennosides (SENOKOT) 8.6 MG tablet   No No   Sig: Take 1-2 tablets by mouth 2 times daily   simethicone (MYLICON) 80 MG chewable tablet   No No   Sig: Take 1 tablet (80 mg) by mouth every 6 hours as needed for cramping   tamsulosin (FLOMAX) 0.4 MG capsule  Daughter No No   Sig: Take 1 capsule (0.4 mg) by mouth daily   thiamine (B-1) 100 MG tablet  Daughter No No   Sig: Take 1 tablet (100 mg) by mouth daily      Facility-Administered Medications: None     Allergies   No Known Allergies    Physical Exam   Vital Signs: Temp: 98.8  F (37.1  C) Temp src: Oral BP: (!) 141/102 Pulse: 68   Resp: 12 SpO2: 100 % O2 Device: None (Room air)    Weight: 0 lbs 0 oz    Gen: NAD  HEENT: EOMI, PERRL, MMM  CV: extremities warm and well perfused  Resp: breathing comfortably on RA  : deferred  Msk: no LE  edema  Skin: no rashes  Neuro: nonfocal exam        Data   Data reviewed today: I reviewed all medications, new labs and imaging results over the last 24 hours.

## 2021-10-12 NOTE — ED PROVIDER NOTES
ED Provider Note  [facility]      History     Chief Complaint   Patient presents with     Leg Swelling     Groin Swelling     The history is provided by the patient, medical records and a relative.     Essie Guzman is a 66 year old male with a past medical history significant for metastatic gastric cancer with malignant obstruction and right hydronephrosis secondary to malignant obstruction s/p ureteric stenting. Patient presents to the ED for an evaluation of leg and groin swelling. Patient's daughter reports an onset of bilateral lege edema, testicular and penile swelling, left flank swelling 10/10/21 in the evening after their family was out taking family pictures. Additionally, he is having some groin pain and mild lower abdominal pain. Patient's daughter reports hematuria and testicular pain after urination. She reports interruptions in his urine stream while urinating.  No drainage from penis. Denies fever or chills.  No numbness, tingling, or leg weakness. No chest pain or trouble breathing. No cough. No nausea or vomiting. No diarrhea or constipation. Denies history of cardiac problems. Patient has not had any chemotherapy or radiation since his cancer diagnosis in October 2020.  No history of DVT or PE. PNT tube is draining on the right.     Per review of the patient's medical record, they were recently hospitalized at [facility] from 10/4/21-10/9/21 for evaluation of abdominal pain. He did have a right nephrostomy tube placed on 10/4/2021. CT abdomen/pelvis illustrated right-sided hydronephrosis which is new from his previous imaging on 9/24. The right percutaneous nephrostomy tube and ureteral stent appear appropriately positioned. On admission his sodium was 126 but improved to 129 with IV fluids. Patient was discharged on oxycodone and morphine.     Patient's daughter preferred to  for him.     CT Abdomen Pelvis w Contrast 10/4/21  Impression:   In this patient with  metastatic gastric adenocarcinoma:  1a. Mild asymmetrical hypoperfusion of the right kidney with new mild  hydronephrosis. Renal arteries appear patent. Question nephrostomy  tube dysfunction.  1b. Right percutaneous nephrostomy tube and ureteral stent appear  appropriately positioned.  2a. Stable circumferential thickening of the gastric antrum/pylorus  known gastric adenocarcinoma.  2b. Extensive necrotic retroperitoneal adenopathy, minimally increased  since 9/23/2021.  3. Mild diffuse mesenteric edema, likely secondary to fluid  rehydration.  4. Small left pleural effusion with overlying atelectasis. Right lower  lobe pulmonary nodule is unchanged since 11/24/2020.    Past Medical History  No past medical history on file.  Past Surgical History:   Procedure Laterality Date     COLONOSCOPY N/A 11/24/2015    Procedure: COLONOSCOPY;  Surgeon: Clyde Mosher MD;  Location:  GI     COMBINED CYSTOSCOPY, INSERT STENT URETER(S) Right 9/10/2021    Procedure: CYSTOSCOPY, WITH right URETERAL STENT INSERTION, retrograde pyleogram;  Surgeon: Jez Friedman MD;  Location: [facility] OR     ESOPHAGOSCOPY, GASTROSCOPY, DUODENOSCOPY (EGD), COMBINED N/A 11/24/2020    Procedure: ESOPHAGOGASTRODUODENOSCOPY with biopsies using cold forceps;  Surgeon: Clyde Mosher MD;  Location:  GI     ESOPHAGOSCOPY, GASTROSCOPY, DUODENOSCOPY (EGD), COMBINED N/A 12/22/2020    Procedure: ESOPHAGOGASTRODUODENOSCOPY, WITH FINE NEEDLE ASPIRATION BIOPSY, WITH ENDOSCOPIC ULTRASOUND GUIDANCE;  Surgeon: Guru Teri Pedraza MD;  Location: [facility] GI     IR NEPHROSTOMY TUBE PLACEMENT RIGHT  10/4/2021     acetaminophen (TYLENOL) 500 MG tablet  ferrous gluconate (FERGON) 324 (38 Fe) MG tablet  ibuprofen (ADVIL/MOTRIN) 200 MG tablet  morphine (MS CONTIN) 30 MG CR tablet  ondansetron (ZOFRAN-ODT) 4 MG ODT tab  oxybutynin ER (DITROPAN-XL) 10 MG 24 hr tablet  oxyCODONE (ROXICODONE) 10 MG tablet  pantoprazole (PROTONIX) 40 MG EC  tablet  polyethylene glycol (MIRALAX) 17 GM/Dose powder  sennosides (SENOKOT) 8.6 MG tablet  simethicone (MYLICON) 80 MG chewable tablet  tamsulosin (FLOMAX) 0.4 MG capsule  thiamine (B-1) 100 MG tablet      No Known Allergies  Family History  Family History   Problem Relation Age of Onset     Asthma No family hx of      Diabetes No family hx of      Hypertension No family hx of      Cerebrovascular Disease No family hx of      Cancer No family hx of      Colon Cancer No family hx of      Anesthesia Reaction No family hx of      Deep Vein Thrombosis (DVT) No family hx of      Social History   Social History     Tobacco Use     Smoking status: Never Smoker     Smokeless tobacco: Never Used   Substance Use Topics     Alcohol use: No     Drug use: No      Past medical history, past surgical history, medications, allergies, family history, and social history were reviewed with the patient. No additional pertinent items.       Review of Systems  A complete review of systems was performed with pertinent positives and negatives noted in the HPI, and all other systems negative.    Physical Exam   BP: 109/81  Pulse: 85  Temp: 98.8  F (37.1  C)  Resp: 12  SpO2: 100 %  Physical Exam  Vitals reviewed.   Constitutional:       General: He is not in acute distress.     Appearance: He is well-developed.   HENT:      Head: Normocephalic and atraumatic.   Eyes:      Extraocular Movements: Extraocular movements intact.      Conjunctiva/sclera: Conjunctivae normal.      Pupils: Pupils are equal, round, and reactive to light.   Cardiovascular:      Rate and Rhythm: Normal rate and regular rhythm.      Pulses: Normal pulses.      Heart sounds: Normal heart sounds. No murmur heard.     Pulmonary:      Effort: Pulmonary effort is normal. No respiratory distress.      Breath sounds: Normal breath sounds. No stridor. No wheezing or rales.   Abdominal:      General: Bowel sounds are normal. There is no distension.      Palpations: Abdomen is  soft. There is no mass.      Tenderness: There is no abdominal tenderness. There is no guarding or rebound.      Comments: Slight soft tissue swelling noted in the left flank area. No tenderness or overlying skin changes otherwise. Right PNT appears to be in appropriate position without surrounding erythema, drainage, or swelling. No tenderness around the PNT. No abdominal tenderness on exam.    Genitourinary:     Comments: Chaperone present. Mild to moderate scrotal swelling bilaterally. Normal testicular lie. No significant tenderness on exam. No scrotal erythema or rashes. Penis with mild swelling. No phimosis or paraphimosis. He is uncircumcised. No obvious blood at the meatus. No penile rash or tenderness.   Musculoskeletal:         General: Swelling (mild bilateral lower extremity swelling, non pitting) present. No tenderness. Normal range of motion.      Cervical back: Normal range of motion and neck supple. No rigidity.      Comments: Full range of motion of all of the joints of the lower extremity without pain. Compartments are soft and compressible. 2+ radial, DP, and PT pulses bilaterally.    Skin:     General: Skin is warm and dry.      Capillary Refill: Capillary refill takes less than 2 seconds.      Findings: No rash.   Neurological:      General: No focal deficit present.      Mental Status: He is alert and oriented to person, place, and time.      GCS: GCS eye subscore is 4. GCS verbal subscore is 5. GCS motor subscore is 6.      Cranial Nerves: No cranial nerve deficit.      Sensory: No sensory deficit.      Motor: No weakness or abnormal muscle tone.      Comments: 5/5 strength in all 4 extremities bilaterally. Sensation intact to light touch in all 4 extremities.    Psychiatric:         Mood and Affect: Mood normal.         ED Course     8:45 PM  The patient was seen and examined by Kate Pollard MD in Room ED05.   Procedures            EKG Interpretation:      Interpreted by Kate HAYWOOD  MD Atul  Time reviewed: 9:15 pm  Symptoms at time of EKG: leg swelling   Rhythm: normal sinus   Rate: normal  Axis: normal  Ectopy: none  Conduction: normal  ST Segments/ T Waves: No ST-T wave changes  Q Waves: III and aVF  Comparison to prior: Unchanged from 10/4/21    Clinical Impression: unchanged from previous. No signs of acute ischemia.              Labs Ordered and Resulted from Time of ED Arrival Up to the Time of Departure from the ED   COMPREHENSIVE METABOLIC PANEL - Abnormal; Notable for the following components:       Result Value    Sodium 132 (*)     Glucose 123 (*)     Albumin 3.2 (*)     All other components within normal limits   URINE MACROSCOPIC WITH REFLEX TO MICRO - Abnormal; Notable for the following components:    Protein Albumin Urine 20  (*)     Mucus Urine Present (*)     All other components within normal limits   CBC WITH PLATELETS AND DIFFERENTIAL - Abnormal; Notable for the following components:    RBC Count 2.91 (*)     Hemoglobin 7.8 (*)     Hematocrit 25.1 (*)     MCHC 31.1 (*)     RDW 18.6 (*)     All other components within normal limits   PROTEIN RANDOM URINE - Abnormal; Notable for the following components:    Total Protein Urine g/gr Creatinine 0.38 (*)     All other components within normal limits   NT PROBNP INPATIENT - Normal   LIPASE - Normal   INR - Normal   PARTIAL THROMBOPLASTIN TIME - Normal   TSH WITH FREE T4 REFLEX - Normal   COVID-19 VIRUS (CORONAVIRUS) BY PCR - Normal    Narrative:     Testing was performed using the Xpert Xpress SARS-CoV-2 Assay on the  Cepheid Gene-Xpert Instrument Systems. Additional information about  this Emergency Use Authorization (EUA) assay can be found via the Lab  Guide. This test should be ordered for the detection of SARS-CoV-2 in  individuals who meet SARS-CoV-2 clinical and/or epidemiological  criteria. Test performance is unknown in asymptomatic patients. This  test is for in vitro diagnostic use under the FDA EUA  for  laboratories certified under CLIA to perform high complexity testing.  This test has not been FDA cleared or approved. A negative result  does not rule out the presence of PCR inhibitors in the specimen or  target RNA in concentration below the limit of detection for the  assay. The possibility of a false negative should be considered if  the patient's recent exposure or clinical presentation suggests  COVID-19. This test was validated by the [facility]. This laboratory is certified under  the Clinical Laboratory Improvement Amendments of 1988 (CLIA-88) as  qualified to perform high complexity laboratory testing.     CREATININE - Normal   BASIC METABOLIC PANEL - Normal   EXTRA BLUE TOP TUBE   EXTRA RED TOP TUBE   EXTRA PURPLE TOP TUBE   EXTRA PURPLE TOP TUBE   IP ASSIGN PROVIDER TEAM TO TREATMENT TEAM   CBC WITH PLATELETS & DIFFERENTIAL    Narrative:     The following orders were created for panel order CBC with platelets differential.  Procedure                               Abnormality         Status                     ---------                               -----------         ------                     CBC with platelets and d...[[id number]]  Abnormal            Final result                 Please view results for these tests on the individual orders.   EXTRA TUBE    Narrative:     The following orders were created for panel order Milo Draw.  Procedure                               Abnormality         Status                     ---------                               -----------         ------                     Extra Blue Top Tube[[id number]]                              Final result               Extra Red Top Tube[[id number]]                               Final result               Extra Purple Top Tube[[id number]]                            Final result                 Please view results for these tests on the individual orders.   EXTRA TUBE     Narrative:     The following orders were created for panel order Extra Tube (Danese Draw).  Procedure                               Abnormality         Status                     ---------                               -----------         ------                     Extra Purple Top Tube[[id number]]                            Final result                 Please view results for these tests on the individual orders.     US Testicular & Scrotum w Doppler Ltd   Final Result   Impression:    1. No acute testicular or scrotal pathology.   2. Trace bilateral hydrocele.   3. Small tunica albuginea cysts bilaterally.      I have personally reviewed the examination and initial interpretation   and I agree with the findings.      ANITA CHAVEZ MD            SYSTEM ID:  [id number]      US Lower Extremity Venous Duplex Bilateral   Final Result   IMPRESSION:   No evidence of deep venous thrombosis in either lower extremity.          I have personally reviewed the examination and initial interpretation   and I agree with the findings.      ANITA CHAVEZ MD            SYSTEM ID:  [id number]      CT Abdomen Pelvis w Contrast   Final Result   IMPRESSION:    1.  Similar appearance of irregular wall thickening in the antropyloric region of the stomach compatible with patient's known gastric adenocarcinoma. The stomach is distended.      2.  No significant change in extensive abdominal metastatic lymphadenopathy.      3.  Right percutaneous nephrostomy tube in place, no residual right hydronephrosis.      4.  Mild urothelial thickening in the bilateral renal pelvises may be inflammatory in nature.      5.  Gallbladder is distended but without obvious wall thickening or calcified stone.      6.  Body wall edema with extensive scrotal wall edema.          Medications   iopamidol (ISOVUE-370) solution 69 mL (69 mLs Intravenous Given 10/11/21 2205)   sodium chloride (PF) 0.9% PF flush 67 mL (67 mLs Intravenous Given 10/11/21 2205)    morphine (PF) injection 4 mg (4 mg Intravenous Given 10/11/21 2312)   morphine (PF) injection 4 mg (4 mg Intravenous Given 10/12/21 0005)        Assessments & Plan (with Medical Decision Making)   Patient presents with swelling of his bilateral lower extremities as well as groin area that has been ongoing for the past 24 hours.  This is in the setting of metastatic gastric cancer.  On exam, he does have mild to moderate swelling of the scrotum but no overlying erythema or rash and thus felt that Lakesha's gangrene be unlikely.  He has some mild swelling of the lower extremities again without skin changes.  He is vascularly intact with good pedal pulses.  He has full range of motion of all joints of the lower extremities and without significant tenderness.  He does not have significant tenderness of the scrotum either but does report that the swelling is uncomfortable.  He also has some soft tissue swelling noted in the left flank area however his PNT tube is on the right so felt that this would be unlikely to be the cause.  Did consider the potential of obstructive mass obstructing venous return in the abdomen and thus obtained a CT of the abdomen and pelvis for further evaluation.  Also considered DVT with his cancer and obtained ultrasound of the lower extremities.  Considered testicular torsion but felt to be very unlikely overall with his description but did obtain a testicular ultrasound.  Laboratory studies were initiated.  He was given IV morphine for pain control.  Also considered possible nephrotic syndrome on the differential.  EKG was obtained and is unchanged from prior EKG without signs of acute ischemia as read also considered the potential of CHF as a cause of volume overload. With unchanged EKG and no chest pain or shortness of breath, felt ACS was unlikely. BNP Is 506. CMP reveals a sodium of 132. Creatinine is within normal limits. UA shows protein of 20 with no evidence of hematuria or UTI. CBC  shows wbc of 8.2. Hemoglobin 7.8. TSH is within normal limits.  Vital signs are within normal limits and he is afebrile.  Bilateral lower extremity ultrasounds not reveal any evidence of DVT.  Testicular ultrasound also does not reveal an exact cause of the swelling.  No sign of torsion.  CT of the abdomen pelvis also does not reveal an acute cause.  No obvious obstructing mass.  Patient and his daughter are in agreement with admission for further work-up and pain management.  Spoke with hospitalist service who accepted the patient for admission.  He was admitted in stable condition.    I have reviewed the nursing notes. I have reviewed the findings, diagnosis, plan and need for follow up with the patient.    New Prescriptions    No medications on file       Final diagnoses:   Swelling of left lower extremity   Testicular swelling   Malignant neoplasm of stomach, unspecified location (H)       --  I, Maribeth Rizzo, am serving as a trained medical scribe to document services personally performed by Kate Pollard MD, based on the provider's statements to me.     I, Kate Pollard MD, was physically present and have reviewed and verified the accuracy of this note documented by Maribeth Rizzo.    Kate Pollard MD  [facility] EMERGENCY DEPARTMENT  10/11/2021     Kate Pollard MD  10/19/21 [id number]

## 2021-10-12 NOTE — PLAN OF CARE
Pt arrived to unit around 1330.     Observation goals PER UNIT ROUTINE     Comments: 1. Monitor for any scrotal swelling/edema.   Some swelling present, but improved per pt.  2. Complete US/Imaging-done.   BLE US completed. Testicular US completed. Abdominal CT completed.  3. Monitor overnight, and re-assess for any swelling in the morning.   Continuing to monitor.     VSS, afebrile. Currently denying pain. Galindo nausea/SOB. Pt/family reported dark BM this morning, notified provider, CBC being checked. PNT patent. Up w/ assist of 1 w/ gait belt/walker. No acute events. Continue to monitor & w/ POC.

## 2021-10-12 NOTE — PROGRESS NOTES
Report given to 7D RN,    AOx4, VSS, on RA, assist x1 to commode with walker, GCS 15, c/o pain 3/10 and declined pain medications at this time. Patient requesting First Class EV Conversions  for use upstairs. Nephrostomy tube draining well with pink tinged drainage. Dressing C,D,I. No edema in legs or upper pubic area currently just in scrotum.

## 2021-10-12 NOTE — PLAN OF CARE
Edema 7D:  Pt admitted with scrotal, penile, B LE edema.  Per RN, edema much improved to mild scrotal edema.  Reviewed positioning with RN.  Given obs status and short LOS anticipated, appropriate to defer to OP or HH lymphedema with any ongoing needs.  IP edema will sign off.  Please re-consult if there is a change in status and skilled need arises.

## 2021-10-13 NOTE — PROGRESS NOTES
Observation goals:   1. Monitor for any scrotal swelling/edema- Pt notes worsening abdominal and scrotal swelling since last night.  2. Complete US/Imaging- completed.  3. Monitor overnight, and re-assess for any swelling in the morning - reassessed and appears controlled per RN but per pt is worse than before. Pain still present and worsened with movement and urinating.

## 2021-10-13 NOTE — PLAN OF CARE
Observation goals PER UNIT ROUTINE     Comments:   1. Monitor for any scrotal swelling/edema   Pt has not noticed increase in testicular since 0200.    2. Complete US/Imaging-done.  BLE US completed. Testicular US completed.  Abdominal CT completed.  3. Monitor overnight, and re-assess for any swelling in the morning.  Continuing to monitor.      Pt alert and oriented times 4. VSS, afebrile. Pt c/o of pain at the PNT side and testicular area.Tylenol and IV dilaudid given  for pain management with some relief. Pt noticed more swelling in his testicular area then the evening. Provider notified, will continue to monitor.

## 2021-10-13 NOTE — PROGRESS NOTES
Observation goals PER UNIT ROUTINE     Comments: 1. Monitor for any scrotal swelling/edema.   Swelling has slightly increased since this afternoon. Patient noticed and informed nurse.   2. Complete US/Imaging-done.   BLE US completed. Testicular US completed. Abdominal CT completed.  3. Monitor overnight, and re-assess for any swelling in the morning.   Continuing to monitor.

## 2021-10-13 NOTE — PROVIDER NOTIFICATION
Pt daughter at bedside and both of them are concerned with discharging home. Daughter would like provider explanation on reasoning for swelling and pain. Concerned with re-admission without solving the problem.

## 2021-10-13 NOTE — PLAN OF CARE
Observation goals PER UNIT ROUTINE     Comments: 1. Monitor for any scrotal swelling/edema.   Swelling has slightly increased since this afternoon. Patient noticed and informed nurse. Patient has not mentioned any more increasing of swelling or pain in area.  2. Complete US/Imaging-done.   BLE US completed. Testicular US completed. Abdominal CT completed.  3. Monitor overnight, and re-assess for any swelling in the morning.   Continuing to monitor.      A&O x4, vitals stable during shift. Patient felt warm at one point, tmax 99.2 and trending down to 99.0. Patient noticed more swelling in testicular area than he had this morning. Provider was notified, will continue to monitor. Patient had pain at PNT side and testicular area, gave oxycodone 10 mg x1 and dilaudid 0.5mg IV x1. LBM 10/12.

## 2021-10-13 NOTE — PLAN OF CARE
Observation goals PER UNIT ROUTINE     Comments:   1. Monitor for any scrotal swelling/edema   Swelling has slightly increased since this 2200.   Patient noticed and informed nurse. Patient has not mentioned any more increased or pain area.   2. Complete US/Imaging-done.  BLE US completed. Testicular US completed.  Abdominal CT completed.  3. Monitor overnight, and re-assess for any swelling in the morning.  Continuing to monitor.     Pt alert and oriented times 4. VSS, afebrile. Pt c/o of pain at the PNT side and testicular area. Tylenol given  for management. Pt noticed more swelling in his testicular area then the evening. Provider notified, will continue to monitor.

## 2021-10-13 NOTE — DISCHARGE SUMMARY
[facility]  Hospitalist Discharge Summary      Date of Admission:  10/11/2021  Date of Discharge:  10/13/2021  Discharging Provider: Gennaro Ruano MD  Discharge Team: Hospitalist Service, Gold 11    Discharge Diagnoses     Mild Scrotal edema, swelling-resolved   Mild LE Edema, without sign of DVT on US imaging  Met Gastric CA  Chronic Malignancy related Pain  Hyponatremia  Subacute Normocytic Anemia  Weight loss due to malignancy  Mild Hyperglycemia    Follow-ups Needed After Discharge   Follow-up Appointments     Adult [facility] Follow-up and recommended labs and tests      Follow up with primary care provider, Serge Kimble, within 7   days for hospital follow- up.  No follow up labs or test are needed.    Medical Oncology, Keytruda dose on Friday,   10/15/2021.    Appointments on [place] and/or [facility] (with [facility] or [facility]   provider or service). Call [phone] if you haven't heard regarding   these appointments within 7 days of discharge.         {Additional follow-up instructions/to-do's for PCP    : Oncology followup as prev coordinated    Unresulted Labs Ordered in the Past 30 Days of this Admission     Date and Time Order Name Status Description    9/10/2021  5:41 AM Prepare red blood cells (unit) Preliminary       These results will be followed up    Discharge Disposition   Discharged to home  Condition at discharge: Stable      Hospital Course     Essie Guzman is a 66 year old male admitted on 10/11/2021. 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        # scrotal edema:  - no phimosis or paraphimosis. No drainage or erythema.    - testicular US without major findings.    CT abd with R PNET in place, but with scrotal wall edema.  However, nothing compressed per imaging, cause not entirely known, but DDx includes dependent edema and low oncotic pressure vs increase in hydrostatic pressure from malignancy.  Urinalysis to eval protein loss with protein, P:Cr ratio pending.  - resolved, and defer to ongoing cancer treatment to alleviate lymphadenopathy burden  - Discussed inperson with daughter prior to DC home on 10/13.     # LE edema:  Poor po intake, but albumin >3.  No DVT on US of legs.  - suspect similar cause as scrotal edema.       #Metastatic gastric cancer:  Per prior notes  Diagnosed November 2020. Patient's preference was to hold off on chemo. Anticipated regimen was Nivo+chemo or Pembro alone. At time of diagnosis workup revealed metastatic to retroperitoneal LN ([id number])-Stage LUDMILA. 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     # pain control:  Home oxycodone, and recently started MS contin, 30 mg Q 8     # Hyponatremia:  Monitor     # subacute normocytic anemia, with history of GI bleeding from gastric cancer:  Stable, monitor     # weight loss  Patient believes he has lost 70-80 pounds in the last year and this is supported by redundant soft tissue of abdomen. Likely secondary to mestatic gastric cancer, though has also has had trouble swallowing meat, poor appetite and recent nausea.   -zofran prn  -Consider PT OT once stabilized      Hyperglycemia: not previously diabetic  - Will monitor blood glucoses       Consultations This Hospital Stay   OCCUPATIONAL THERAPY ADULT IP CONSULT  PHYSICAL THERAPY ADULT IP CONSULT  LYMPHEDEMA THERAPY IP CONSULT  CARE MANAGEMENT / SOCIAL WORK IP  CONSULT    Code Status   Full Code    Time Spent on this Encounter   I, Gennaro Ruano MD, personally saw the patient today and spent greater than 30 minutes discharging this patient.       Gennaro Ruano MD  [facility] [address] [place] [id number]  Phone: [phone]  ______________________________________________________________________    Physical Exam   Vital Signs: Temp: 98.9  F (37.2  C) Temp src: Oral BP: 125/80 Pulse: 67   Resp: 18 SpO2: 99 % O2 Device: None (Room air)    Weight: 111 lbs 14.4 oz  Alert, awake, no distress  Resolved scrotal swelling       Primary Care Physician   Serge Kimble    Discharge Orders      Reason for your hospital stay    Scrotal swelling, status resolved  Gastric CA     Activity    Your activity upon discharge: activity as tolerated     Adult [facility] Follow-up and recommended labs and tests    Follow up with primary care provider, Serge Kimble, within 7 days for hospital follow- up.  No follow up labs or test are needed.    Medical Oncology, Keytruda dose on Friday,   10/15/2021.    Appointments on [place] and/or [facility] (with [facility] or [facility] provider or service). Call [phone] if you haven't heard regarding these appointments within 7 days of discharge.     Diet    Follow this diet upon discharge: Orders Placed This Encounter      Regular Diet Adult       Significant Results and Procedures   Results for orders placed or performed during the hospital encounter of 10/11/21   CT Abdomen Pelvis w Contrast    Narrative    EXAM: CT ABDOMEN PELVIS W CONTRAST  LOCATION: [facility]  DATE/TIME: 10/11/2021 10:01 PM    INDICATION: metastatic cancer patient, now with left flank swelling, scrotal and penis swelling, bilateral lower extremity swelling, eval for compressive mass, has known right nephrostomy tube related to obstruction, some mild hematuria as well  COMPARISON: CT  from 10/05/2021.  TECHNIQUE: CT scan of the abdomen and pelvis was performed following injection of IV contrast. Multiplanar reformats were obtained. Dose reduction techniques were used.  CONTRAST: iopamidol (ISOVUE-370) solution 69 mL    FINDINGS:   LOWER CHEST: Small left pleural effusion with adjacent atelectasis, similar to prior CT.    HEPATOBILIARY: Distended gallbladder. No calcified gallstones. No focal liver lesion.    PANCREAS: Normal.    SPLEEN: Normal.    ADRENAL GLANDS: Normal.    KIDNEYS/BLADDER: Right percutaneous nephrostomy tube is in place, with the pigtail terminating in the right renal pelvis near the ureteropelvic junction. The right renal collecting system is decompressed and there is no residual hydronephrosis. There is   some mild urothelial thickening and enhancement in both renal pelvises. There is a benign left upper pole cortical cyst. The bladder is empty.    BOWEL: Large amount of residual gastric contents. Again noted is circumferential wall thickening in the antropyloric region which appears qualitatively unchanged compared to recent comparison. The small and large bowel are within normal limits for   caliber. There is no free air.    LYMPH NODES: Again noted is a prominent centrally hypodense lymph node conglomerate along the gastrohepatic ligament measuring roughly 3.9 x 3.2 cm on image 64, not significantly changed, and a mesenteric root/celiac axis lymph node with similar central   hypodensity measuring 4.7 x 3.4 cm on image 89, not significantly changed. There is confluent adenopathy throughout the retroperitoneum in the para-aortic and aortocaval spaces. This extends along the left common iliac chain.    VASCULATURE: Mild atherosclerotic changes throughout.    PELVIC ORGANS: Small amount of free fluid. Pronounced scrotal wall edema. There may also be hydroceles present.    MUSCULOSKELETAL: Body wall edema.      Impression    IMPRESSION:   1.  Similar appearance of irregular wall  thickening in the antropyloric region of the stomach compatible with patient's known gastric adenocarcinoma. The stomach is distended.    2.  No significant change in extensive abdominal metastatic lymphadenopathy.    3.  Right percutaneous nephrostomy tube in place, no residual right hydronephrosis.    4.  Mild urothelial thickening in the bilateral renal pelvises may be inflammatory in nature.    5.  Gallbladder is distended but without obvious wall thickening or calcified stone.    6.  Body wall edema with extensive scrotal wall edema.   US Lower Extremity Venous Duplex Bilateral    Narrative    EXAMINATION: DOPPLER VENOUS ULTRASOUND OF BILATERAL LOWER EXTREMITIES,  10/11/2021 11:01 PM     COMPARISON: None.    HISTORY: Lower extremity swelling and cancer patient, L4 DVT     TECHNIQUE:  Gray-scale evaluation with compression, spectral flow and  color Doppler assessment of the deep venous system of both legs from  groin to knee, and then at the ankles.    FINDINGS:  In both lower extremities, the common femoral, femoral, popliteal and  posterior tibial veins demonstrate normal compressibility and blood  flow.      Impression    IMPRESSION:  No evidence of deep venous thrombosis in either lower extremity.       I have personally reviewed the examination and initial interpretation  and I agree with the findings.    ANITA CHAVEZ MD         SYSTEM ID:  [id number]   US Testicular & Scrotum w Doppler Ltd    Narrative    EXAMINATION: US TESTICULAR AND SCROTAL, 10/11/2021 11:02 PM     COMPARISON: CT 10/11/2021    HISTORY: Testicular and penile swelling, eval for torsion, etc.    TECHNIQUE: The scrotum was scanned in standard fashion with  specialized ultrasound transducer(s) using both grey scale and limited  color Doppler techniques.    Findings:  The testes demonstrate normal and symmetric echotexture and  vascularity. No evidence of a focal intratesticular lesion. 3 mm   cyst adjacent to the epididymal  head,  may represent  previously torsed appendix epididymis of no clinical significance.  Tunica albuginea inferior right testis measuring up to 14 mm, and  inferior left testis measuring up to 5 mm. The right testicle measures  3.6 x 4 x 2.9 cm and the left measures 3.5 x 2.4 x 2.5 cm. There is no  evidence of torsion.    Trace bilateral hydrocele. No varicocele.    Both the right and left epididymis are within normal limits.      Impression    Impression:   1. No acute testicular or scrotal pathology.  2. Trace bilateral hydrocele.  3. Small tunica albuginea cysts bilaterally.    I have personally reviewed the examination and initial interpretation  and I agree with the findings.    ANTIA CHAVEZ MD         SYSTEM ID:  [id number]       Discharge Medications   Current Discharge Medication List      CONTINUE these medications which have NOT CHANGED    Details   acetaminophen (TYLENOL) 500 MG tablet Take 2 tablets (1,000 mg) by mouth every 8 hours  Qty: 120 tablet, Refills: 0    Associated Diagnoses: Malignant neoplasm of stomach metastatic to retroperitoneum (H)      ferrous gluconate (FERGON) 324 (38 Fe) MG tablet Take 1 tablet (324 mg) by mouth daily (with breakfast)  Qty: 100 tablet, Refills: 1    Associated Diagnoses: Gastrointestinal hemorrhage with melena; Metastasis from gastric cancer (H); Anemia, unspecified type; Malignant neoplasm of stomach metastatic to retroperitoneum (H)      ibuprofen (ADVIL/MOTRIN) 200 MG tablet Take 1 tablet (200 mg) by mouth every 6 hours as needed for moderate pain  Qty: 30 tablet, Refills: 0    Associated Diagnoses: Malignant neoplasm of stomach metastatic to retroperitoneum (H)      morphine (MS CONTIN) 30 MG CR tablet Take 1 tablet (30 mg) by mouth every 8 hours  Qty: 10 tablet, Refills: 0    Associated Diagnoses: Malignant neoplasm of stomach metastatic to retroperitoneum (H)      ondansetron (ZOFRAN-ODT) 4 MG ODT tab Take 1 tablet (4 mg) by mouth every 6 hours as needed  for nausea or vomiting  Qty: 30 tablet, Refills: 0    Associated Diagnoses: Gastrointestinal hemorrhage with melena; Metastasis from gastric cancer (H); Anemia, unspecified type; Malignant neoplasm of stomach metastatic to retroperitoneum (H)      oxybutynin ER (DITROPAN-XL) 10 MG 24 hr tablet Take 1 tablet (10 mg) by mouth daily  Qty: 30 tablet, Refills: 0    Associated Diagnoses: Malignant neoplasm of stomach metastatic to retroperitoneum (H)      oxyCODONE (ROXICODONE) 10 MG tablet Take 1-1.5 tablets (10-15 mg) by mouth every 3 hours as needed for moderate to severe pain  Qty: 20 tablet, Refills: 0    Associated Diagnoses: Malignant neoplasm of stomach metastatic to retroperitoneum (H)      pantoprazole (PROTONIX) 40 MG EC tablet Take 1 tablet (40 mg) by mouth daily  Qty: 60 tablet, Refills: 1    Associated Diagnoses: Gastrointestinal hemorrhage with melena; Metastasis from gastric cancer (H); Anemia, unspecified type; Malignant neoplasm of stomach metastatic to retroperitoneum (H)      polyethylene glycol (MIRALAX) 17 GM/Dose powder Take 17 g by mouth daily  Qty: 510 g, Refills: 0    Associated Diagnoses: Malignant neoplasm of stomach metastatic to retroperitoneum (H)      sennosides (SENOKOT) 8.6 MG tablet Take 1-2 tablets by mouth 2 times daily  Qty: 60 tablet, Refills: 0    Associated Diagnoses: Malignant neoplasm of stomach metastatic to retroperitoneum (H)      simethicone (MYLICON) 80 MG chewable tablet Take 1 tablet (80 mg) by mouth every 6 hours as needed for cramping  Qty: 20 tablet, Refills: 0    Associated Diagnoses: Malignant neoplasm of stomach metastatic to retroperitoneum (H)      tamsulosin (FLOMAX) 0.4 MG capsule Take 1 capsule (0.4 mg) by mouth daily  Qty: 30 capsule, Refills: 1    Associated Diagnoses: Gastrointestinal hemorrhage with melena; Metastasis from gastric cancer (H); Anemia, unspecified type; Malignant neoplasm of stomach metastatic to retroperitoneum (H)      thiamine (B-1) 100 MG  tablet Take 1 tablet (100 mg) by mouth daily  Qty: 100 tablet, Refills: 1    Associated Diagnoses: Gastrointestinal hemorrhage with melena; Metastasis from gastric cancer (H); Anemia, unspecified type; Malignant neoplasm of stomach metastatic to retroperitoneum (H)           Allergies   No Known Allergies

## 2021-10-13 NOTE — CONSULTS
"Care Management Discharge Note    Discharge Date: 10/13/2021     Discharge Disposition: Home    Discharge Services: [facility]    Discharge DME: None    Discharge Transportation: Car, daughter    Private pay costs discussed: Not applicable    PAS Confirmation Code: N/A  Patient/family educated on Medicare website which has current facility and service quality ratings: N/A    Education Provided on the Discharge Plan: Yes   Persons Notified of Discharge Plans: Patient, bedside RN, KARYN  Patient/Family in Agreement with the Plan: Yes      Additional Information:    Patient presented to the emergency department for evaluation of worsening swelling of testicles, penis, and legs. Per team, patient will discharge home today.    Patient was admitted overnight under observation to monitor scrotal edema.     Patient was flagged for needing \"MOON\" document as patient has Medicare insurance and was admitted under observation.     Writer met with the patient to introduce the role of the care coordinator and and discuss \"MOON\" document. Patient stated understanding, writer obtained patient's signature and copy placed in patient's chart. Copy provided to the patient. Patient had no questions or concerns at this time.    Lacey Lau, RN, BSN, PHN  Care Coordinator   P: [phone], [facility]             "

## 2021-10-13 NOTE — PROGRESS NOTES
Discharge  D: Orders for discharge and outpatient medications written.  I: Home medications and return to clinic schedule reviewed with patient. Discharge instructions and parameters for calling Health Care Provider reviewed. Patient left at 1100 accompanied by daughter.   A: Patient/family verbalized understanding  but was frustrated with discharge. MD attempted to explain reasoning for discharge.   P: Patient instructed to  medications in Pharmacy. Follow up as scheduled.

## 2021-10-14 NOTE — PROGRESS NOTES
Essie is a 66 year old who is being evaluated via a billable video visit.      How would you like to obtain your AVS? MyChart  If the video visit is dropped, the invitation should be resent by: Text to cell phone: [phone]  Will anyone else be joining your video visit? Yes: daughter. How would they like to receive their invitation? Text to cell phone: same    Video Start Time: 8:29 AM    Assessment & Plan     Malignant neoplasm of stomach metastatic to retroperitoneum (H)  Discussed palliative care with patient and his daughter.  Referral placed 10/6/21but no outpatient treatment  Patient's daughter reports she called Oncology yesterday, but has not heard back.  - HYDROmorphone (DILAUDID) 2 MG tablet; Take 1 tablet (2 mg) by mouth every 6 hours as needed for pain  - morphine (MS CONTIN) 30 MG CR tablet; Take 1 tablet (30 mg) by mouth every 8 hours    Cancer associated pain  Pain not controlled. Pain is making sleep difficult and intolerant to eating due to pain.  Dilaudid did help pain while hospitalized.  Discussed primary care can fill, but only very immediate management.  Phone # given to RN PAL line if pt's family does not hear back from oncology by this afternoon.   - HYDROmorphone (DILAUDID) 2 MG tablet; Take 1 tablet (2 mg) by mouth every 6 hours as needed for pain  - morphine (MS CONTIN) 30 MG CR tablet; Take 1 tablet (30 mg) by mouth every 8 hours             Depression Screening Follow Up    PHQ 10/14/2021   PHQ-9 Total Score 10   Q9: Thoughts of better off dead/self-harm past 2 weeks Not at all         Follow Up Actions Taken  Patient declined referral.     See Patient Instructions    No follow-ups on file.    ZORA Ruiz [facility]    Subjective   Essie is a 66 year old who presents for the following health issues  accompanied by his daughter:    History of Present Illness       He eats 2-3 servings of fruits and vegetables daily.He consumes 0 sweetened beverage(s)  daily.He exercises with enough effort to increase his heart rate 60 or more minutes per day.  He exercises with enough effort to increase his heart rate 5 days per week.   He is taking medications regularly.         Hospital Follow-up Visit:    Hospital/Nursing Home/IP Rehab Facility: [facility]  Date of Admission: 10/11/21  Date of Discharge: 10/13/21  Reason(s) for Admission: Malignant neoplasm of stomach, scrotal swelling      Was your hospitalization related to COVID-19? No   Problems taking medications regularly:  None  Medication changes since discharge: None  Problems adhering to non-medication therapy:  None    Summary of hospitalization:  [facility] discharge summary reviewed  Diagnostic Tests/Treatments reviewed.  Follow up needed: Pallative care, onology  Other Healthcare Providers Involved in Patient s Care:         see above  Update since discharge: fluctuating course.  Post Discharge Medication Reconciliation: discharge medications reconciled and changed, per note/orders.  Plan of care communicated with patient and family              Review of Systems   Constitutional, HEENT, cardiovascular, pulmonary, gi and gu systems are negative, except as otherwise noted.      Objective           Vitals:  No vitals were obtained today due to virtual visit.    Physical Exam   GENERAL: alert, pale, frail, fatigued and uncomfortable  RESP: No audible wheeze, cough, or visible cyanosis.  No visible retractions or increased work of breathing.                  Video-Visit Details    Type of service:  Video Visit    Video End Time:0855    Originating Location (pt. Location): Home    Distant Location (provider location):  [facility]     Platform used for Video Visit: [employer]Well  Answers for HPI/ROS submitted by the patient on 10/14/2021  If you checked off any problems, how difficult have these problems made it for you to do your work,  take care of things at home, or get along with other people?: Not difficult at all  PHQ9 TOTAL SCORE: 10  JOSE DANIEL 7 TOTAL SCORE: 6

## 2021-10-14 NOTE — TELEPHONE ENCOUNTER
Lashawn Wright PAC   Please see note below, and video visit with Blanca Tang PA-C from 10/14/2021   Patient is on your schedule 10/15/2021    Any resources/pain management for family would be appreciated     Thank you,   Ramona Blount, Registered Nurse, PAL (Patient Advocate Liason)   [facility]   [phone]

## 2021-10-14 NOTE — TELEPHONE ENCOUNTER
[place] with Blanca Tang PA-C      Patient was seen today via virtual visit, hx of Malignant neoplasm of stomach, unspecified location (H)    Malignant neoplasm of stomach metastatic to retroperitoneum (H)    Patient is exhibiting a lot of pain due to above diagnosis.     Palliative care referral had been placed, number to call and schedule was given to daughter today by Blanca Tang PA-C    Patient is still completing active cancer treatment so not appropriate for hospice     Pain medications refilled today by Blanca Tang PA-C      RN placed call to Kemi Langley RN with Oncology team - message left on voicemail that Blanca Tang PA-C  Is concerned about patient's symptom management and asked that the oncology team monitor and assist patient/family with any further resources available     This RN direct number left for oncology RN if questions     Ramona Blount Registered Nurse, PAL (Patient Advocate Liason)   [facility]   [phone]

## 2021-10-15 NOTE — NURSING NOTE
Chief Complaint   Patient presents with     Blood Draw     Labs drawn by  in lab by RN. VS taken.     Labs collected from venipuncture by RN. Vitals taken. Checked in for appointment(s).  Pan Montenegro RN

## 2021-10-15 NOTE — NURSING NOTE
"Oncology Rooming Note    October 15, 2021 2:47 PM   Essie Guzman is a 66 year old male who presents for:    Chief Complaint   Patient presents with     Blood Draw     Labs drawn by MIGUEL ANGEL in lab by RN. VS taken.     Oncology Clinic Visit     Malignant neoplasm of stomach, unspecified location      Initial Vitals: BP (!) 126/93 (BP Location: Right arm, Patient Position: Sitting, Cuff Size: Adult Regular)   Pulse 95   Temp 98.8  F (37.1  C) (Oral)   Resp 16   Ht 1.549 m (5' 0.98\")   Wt 52 kg (114 lb 9.6 oz)   SpO2 95%   BMI 21.66 kg/m   Estimated body mass index is 21.66 kg/m  as calculated from the following:    Height as of this encounter: 1.549 m (5' 0.98\").    Weight as of this encounter: 52 kg (114 lb 9.6 oz). Body surface area is 1.5 meters squared.  Severe Pain (6) Comment: Data Unavailable   No LMP for male patient.  Allergies reviewed: Yes  Medications reviewed: Yes    Medications: MEDICATION REFILLS NEEDED TODAY. Provider was notified.  Pharmacy name entered into Pyreos:    [id number] - [place], [place] - [address]  [id number] - [place], [place] - [address]  [facility] [place], [place] - [address]    Clinical concerns: Requests multiple medication refills       Jaziel Hanson MA            "

## 2021-10-15 NOTE — LETTER
10/15/2021         RE: Essie Guzman  [id number]  [facility] [id number]        Dear Colleague,    Thank you for referring your patient, Essie Guzman, to the [facility]. Please see a copy of my visit note below.    MEDICAL ONCOLOGY FOLLOW UP NOTE    PATIENT NAME: Essie Guzman  ENCOUNTER DATE: 9/15/2021    Care Team  Primary Oncologist: Brennan Mccarthy MD    REASON FOR CURRENT VISIT: F/u Metastatic gastric cancer    HISTORY OF PRESENT ILLNESS:  Mr. 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 Initially Rx for H.pylori with Abx and PPI. LFT's were mildly elevated.  11/24/20- UGI endoscopy at [facility] with normal esophagus. Non-bleeding gastric ulcer in pylorus with no stigmata of bleeding.  Non-bleeding duodenal ulcer with no stigmata of bleeding. Biopsy of Stomach, pylorus, - Poorly-differentiated carcinoma; consistent with adenocarcinoma (poorly cohesive type). HER2 by FISH was non-amplified  11/24/20- CT CAP- bilateral hilar lymph nodes are indeterminate (1.4 x 1.0 cm) right infrahilar lymph node. Multiple enlarged retroperitoneal and perigastric lymph nodes (10 mm right paraduodenal lymph node, 11 mm necrotic left para-aortic lymph node and a 10 mm left para-aortic lymph node.  12/16/20-  PET/CT with metastatic disease- Hypermetabolic circumferential ulcerated mass at the gastric antrum, Multiple metastatic  hypermetabolic lymph nodes:  adjacent just inferior to the caudate lobe of the liver, Multiple enlarged, centrally necrotic, and hypermetabolic retroperitoneal para-aortic and paracaval lymph nodes.    12/18/20-Saw [facility]- Due to PET CT showing retroperitoneal lymphadenopathy not a candidate for surgical resection.  12/22/20- EUS staging and biopsy- [id number] - Partially-obstructing cratered pre-pyloric gastric ulcer with a clean ulcer base, fully-circumferential ulcer  Several sub-centimeter malignant appearing round, well-circumscribed, hypoechoic lymph nodes were  visualized along the aorta, from the 2nd portion of  duodenum which corresponds to the hypermetabolic nodes seen on the PET scan.   3/9/21- PET/CT-Overall there has been progression in the number and size of the hypermetabolic periaortic, pericaval, and zuleyma hepatis lymph nodes compared to prior.Relatively unchanged hypermetabolic circumferential ulcerated mass at the gastric antrum.  March through Aug 2021- Declined systemic Rx with pembrolizumab as he was minimally symptomatic  9/10/21 to 9/12/21- [facility] admission for - Iron deficiency anemia from chronic blood loss anemia 2/2 progressive metastatic gastric cancer, Right hydrnonephrosis secondary to malignant obstruction s/p ureteric stenting and ongoing Cancer related weight loss- Rcd 1 unit PRBC  9/10/21- CT abd/pelvis-  Wall thickening of the distal stomach consistent with the history of gastric cancer. There are multiple enlarged upper abdominal and retroperitoneal lymph nodes consistent with metastases and significantly progressed since the previous exam. Right hydronephrosis and delayed nephrogram consistent with obstruction. (There is an irregularly-shaped low-attenuation mass posterior to the body of the pancreas measuring approximately 2.1 x 3.8 x 2.6 cm and consistent with a lymph node.    9/15/2021 Met with Dr. Mccarthy, plan was to start single agent keytruda      9/27/2021 Presented  "again to the ED on 9/27 for urinary symptoms.  Not found to be infectious, urology had discomfort was secondary to stent, and had discussed PNT's.    Admitted 10/4-10/9 with right-sided hydronephrosis and worsening abdominal pain.  Had a nephrostomy tube placed 10/4/2021.  Additional findings were hyponatremia, treated with IV fluids.  Secondary to gastric cancer and slow GI bleed this is anemia. Recieved 2 units PRBCs     Admitted 10/11-10/13 for scrotal edema.  Work-up was negative for phimosis or paraphimosis.  Ultrasound without any major findings.  CT showed right PNT in place, but there was scrotal wall edema.  No clear etiology identified.  This resolved.          Interval Hx:  Essie Guzman was with his daughters (Osman), as well as an .     Patient has been admitted multiple times since he was last seen.  Please see above.  Today, his ongoing biggest concern is for pain.  His pain is located in his right groin, lower abdomen, and upper abdomen.  They have been using both Dilaudid (2 mg) and oxycodone (10 mg), along with MS Contin 30 mg 3 times daily.  Unable to fully tell me what their regimen has been, as they have been \"playing around with it\".    Previous regimen in the hospital had been MS Contin 30 mg 3 times daily, with oxycodone 10 to 20 mg every 3 hours.  He felt like his pain was well controlled at that level, and his daughter noted that it was a 0.  His bowels have been moving regularly, they are using MiraLAX and senna.    his scrotal and leg edema have resolved.  He has been able to eat and drink regularly.  His nephrostomy bag has been draining clear.  He is also urinating, and this is also clear.  No bloody or dark urine.  No burning with urination.  No fevers body aches or chills.  He denies any chest pain or shortness of breath.  At home, he is able to walk around on his own mostly.  He does rest frequently.  He is not having any drowsiness.  He denies any dizziness or " lightheadedness.  In terms of bowels, they have been pretty regular.  They do continue to be dark, no overt blood.  Denies any other bruising or bleeding.        PAST MEDICAL AND SURGICAL HISTORY:   Active Ambulatory Problems     Diagnosis Date Noted     Malignant neoplasm of stomach metastatic to retroperitoneum (H) 12/21/2020     Gastrointestinal hemorrhage with melena 09/10/2021     Cancer associated pain 10/04/2021     Testicular swelling 10/11/2021     Swelling of left lower extremity 10/11/2021     Malignant neoplasm of stomach, unspecified location (H) 10/11/2021     Resolved Ambulatory Problems     Diagnosis Date Noted     Hematochezia 10/11/2015     No Additional Past Medical History   No surgeeies    SOCIAL HISTORY:   Social History     Tobacco Use     Smoking status: Never Smoker     Smokeless tobacco: Never Used   Vaping Use     Vaping Use: Never used   Substance Use Topics     Alcohol use: No     Drug use: No   Non-smoker/non-drinker  He is now retired. 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  He lives in [place] with family, sposue, sons and grandkids  He has 6 kids, lives with 2 younger boys, 4 grand kids      FAMILY HISTORY:   Family History   Problem Relation Age of Onset     Asthma No family hx of      Diabetes No family hx of      Hypertension No family hx of      Cerebrovascular Disease No family hx of      Cancer No family hx of      Colon Cancer No family hx of      Anesthesia Reaction No family hx of      Deep Vein Thrombosis (DVT) No family hx of    No family hx any cancer      ALLERGIES: No Known Allergies    CURRENT MEDICATIONS:   Current Outpatient Medications:      acetaminophen (TYLENOL) 500 MG tablet, Take 2 tablets (1,000 mg) by mouth every 8 hours (Patient taking differently: Take 1,000 mg by mouth every 8 hours as needed ), Disp: 120 tablet, Rfl: 0     ferrous gluconate (FERGON) 324 (38 Fe) MG tablet, Take 1 tablet (324 mg) by mouth daily (with breakfast), Disp: 100  "tablet, Rfl: 1     HYDROmorphone (DILAUDID) 2 MG tablet, Take 1 tablet (2 mg) by mouth every 6 hours as needed for pain, Disp: 10 tablet, Rfl: 0     ibuprofen (ADVIL/MOTRIN) 200 MG tablet, Take 1 tablet (200 mg) by mouth every 6 hours as needed for moderate pain, Disp: 30 tablet, Rfl: 0     morphine (MS CONTIN) 30 MG CR tablet, Take 1 tablet (30 mg) by mouth every 8 hours, Disp: 10 tablet, Rfl: 0     ondansetron (ZOFRAN-ODT) 4 MG ODT tab, Take 1 tablet (4 mg) by mouth every 6 hours as needed for nausea or vomiting, Disp: 30 tablet, Rfl: 0     oxybutynin ER (DITROPAN-XL) 10 MG 24 hr tablet, Take 1 tablet (10 mg) by mouth daily, Disp: 30 tablet, Rfl: 0     oxyCODONE (ROXICODONE) 10 MG tablet, Take 1-1.5 tablets (10-15 mg) by mouth every 3 hours as needed for moderate to severe pain, Disp: 20 tablet, Rfl: 0     pantoprazole (PROTONIX) 40 MG EC tablet, Take 1 tablet (40 mg) by mouth daily, Disp: 60 tablet, Rfl: 1     polyethylene glycol (MIRALAX) 17 GM/Dose powder, Take 17 g by mouth daily (Patient taking differently: Take 17 g by mouth daily Every other day), Disp: 510 g, Rfl: 0     sennosides (SENOKOT) 8.6 MG tablet, Take 1-2 tablets by mouth 2 times daily, Disp: 60 tablet, Rfl: 0     simethicone (MYLICON) 80 MG chewable tablet, Take 1 tablet (80 mg) by mouth every 6 hours as needed for cramping, Disp: 20 tablet, Rfl: 0     tamsulosin (FLOMAX) 0.4 MG capsule, Take 1 capsule (0.4 mg) by mouth daily, Disp: 30 capsule, Rfl: 1     thiamine (B-1) 100 MG tablet, Take 1 tablet (100 mg) by mouth daily, Disp: 100 tablet, Rfl: 1    PHYSICAL EXAMINATION:  BP (!) 126/93 (BP Location: Right arm, Patient Position: Sitting, Cuff Size: Adult Regular)   Pulse 95   Temp 98.8  F (37.1  C) (Oral)   Resp 16   Ht 1.549 m (5' 0.98\")   Wt 52 kg (114 lb 9.6 oz)   SpO2 95%   BMI 21.66 kg/m      Wt Readings from Last 4 Encounters:   10/15/21 52 kg (114 lb 9.6 oz)   10/14/21 49.4 kg (109 lb)   10/13/21 50.8 kg (111 lb 14.4 oz)   10/09/21 " 51 kg (112 lb 6.4 oz)       VS: Above vitals reviewed, stable without concerns   General: Resting comfortably in no acute distress, seated in wheel chair   Head: Normocephalic, atraumatic   Heart: Regular rate and rhythm, no murmurs  Lung: Normal respiratory effort. Clear to auscultation bilaterally. No wheezes, rhonchi, or crackles   Abdomen: +Bowel Sounds, soft. TTP right lower groin. No rebound, guarding or rigidity. No palpable masses. No scrotal swelling. PNT in place, no surrounding erythema. Bag draining clear. No scrotal edema   Neuro: AAO x3. Moving all extremities   Extremities: No lower extremity edema  Skin: Warm, dry, intact. No rashes, no suspicious lesions. No petechia or bruising noted         LABORATORY DATA:     Most Recent 3 CBC's:Recent Labs   Lab Test 10/13/21  0704 10/12/21  1506 10/11/21  1232   WBC 8.5 10.6 8.2   HGB 7.6* 7.7* 7.8*   MCV 88 87 86    336 356    Most Recent 3 BMP's:  Recent Labs   Lab Test 10/13/21  0704 10/12/21  0639 10/12/21  0107 10/11/21  1232 10/11/21  1232    134  --   --  132*   POTASSIUM 4.0 4.0  --   --  4.0   CHLORIDE 104 104  --   --  101   CO2 28 25  --   --  27   BUN 16 18  --   --  23   CR 0.78 0.80 0.90   < > 0.87   ANIONGAP 3 5  --   --  4   HAWA 8.5 8.7  --   --  9.0   GLC 90 92  --   --  123*    < > = values in this interval not displayed.    Most Recent 2 LFT's:  Recent Labs   Lab Test 10/13/21  0704 10/11/21  1232   AST 14 22   ALT 14 16   ALKPHOS 64 80   BILITOTAL 0.4 0.2      I reviewed the above labs today.      ASSESSMENT AND PLAN:    Mr. Essie Guzman is a 65 year old  male with PMHx of H.pylori infection, and recently diagnosed gastric cancer. He is otherwise healthy and does not have any co morbidities.    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 Ideally, in this case combination of chemotherapy and immunotherpay (Nivo+FOLFOX) or Pembro+FOLFOX are both reasonable options. Patient was concerned about the side effects of chemotherapy and therefore was more drawn to the option of single agent immunotherapy. Pembrolizumab monotherapy- [id number] study, led to approval for treatment of a variety of advanced solid tumors, including gastric cancers, that had MSI-H or dMMR, that had progressed following prior treatment, and for which there were no satisfactory alternative treatment options.     He deferred treatment from March 2021 to now as he was minimally symptomatic . He developed symptoms (anemia), weakness in 9/2021 requiring hospital admission and CT CAP in 9/10 showed disease progression , Wall thickening of the distal stomach consistent with the history of gastric cancer. There are multiple enlarged upper abdominal and retroperitoneal lymph nodes consistent with metastases and significantly progressed since the previous exam. He is now interested in startign treatment.     After discussing available options with Dr. Mccarthy, he was inclined to start pembro alone and did not want any cytotoxic chemotherapy like treatments.      Plan  #Gastric cancer  - Plan to start single agent Keytruda 11/5 pending insurance authorization       # Anemia  - Hgb 9.0 today, no transfusion needed  - Plan for hgb recheck and next week for possible transfusion  -Continue oral iron  - Consent performed today       # Right Hydronephrosis   -s/p PNT right side, functioning well       #Cancer related pain  - Patient has been using both dilaudid and oxycodone, which I advised against  - Continue MS Contin to 30 mg TID, as  recommended by palliative care in the hospital, refilled today  - Oxycodone 10 mg q3h, refilled today, as this is regimen inpatient for which he had pain of zero  - Stop dilaudid   - Narcan sent   - Daily miralax and senna   - Move up palliative care follow up to next week for help with complex pain needs     Patient will call in the interim with any questions or concerns. They voice understanding and agree with the above plan.     60 minutes spent on the date of the encounter doing chart review, review of test results, interpretation of tests, patient visit and documentation     Lashawn Wright PA-C        Again, thank you for allowing me to participate in the care of your patient.        Sincerely,        Lashawn Wright PA-C

## 2021-10-15 NOTE — PROGRESS NOTES
MEDICAL ONCOLOGY FOLLOW UP NOTE    PATIENT NAME: Essie Guzman  ENCOUNTER DATE: 9/15/2021    Care Team  Primary Oncologist: Brennan Mccarthy MD    REASON FOR CURRENT VISIT: F/u Metastatic gastric cancer    HISTORY OF PRESENT ILLNESS:  Mr. 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 Initially Rx for H.pylori with Abx and PPI. LFT's were mildly elevated.  11/24/20- UGI endoscopy at [facility] with normal esophagus. Non-bleeding gastric ulcer in pylorus with no stigmata of bleeding.  Non-bleeding duodenal ulcer with no stigmata of bleeding. Biopsy of Stomach, pylorus, - Poorly-differentiated carcinoma; consistent with adenocarcinoma (poorly cohesive type). HER2 by FISH was non-amplified  11/24/20- CT CAP- bilateral hilar lymph nodes are indeterminate (1.4 x 1.0 cm) right infrahilar lymph node. Multiple enlarged retroperitoneal and perigastric lymph nodes (10 mm right paraduodenal lymph node, 11 mm necrotic left para-aortic lymph node and a 10 mm left para-aortic lymph node.  12/16/20-  PET/CT with metastatic disease- Hypermetabolic circumferential ulcerated mass at the gastric antrum, Multiple metastatic hypermetabolic lymph nodes:  adjacent just inferior to the caudate lobe of the liver, Multiple enlarged, centrally necrotic, and hypermetabolic retroperitoneal para-aortic and paracaval lymph nodes.    12/18/20-Saw [facility]- Due to PET CT showing  retroperitoneal lymphadenopathy not a candidate for surgical resection.  12/22/20- EUS staging and biopsy- [id number] - Partially-obstructing cratered pre-pyloric gastric ulcer with a clean ulcer base, fully-circumferential ulcer  Several sub-centimeter malignant appearing round, well-circumscribed, hypoechoic lymph nodes were  visualized along the aorta, from the 2nd portion of  duodenum which corresponds to the hypermetabolic nodes seen on the PET scan.   3/9/21- PET/CT-Overall there has been progression in the number and size of the hypermetabolic periaortic, pericaval, and zuleyma hepatis lymph nodes compared to prior.Relatively unchanged hypermetabolic circumferential ulcerated mass at the gastric antrum.  March through Aug 2021- Declined systemic Rx with pembrolizumab as he was minimally symptomatic  9/10/21 to 9/12/21- [facility] admission for - Iron deficiency anemia from chronic blood loss anemia 2/2 progressive metastatic gastric cancer, Right hydrnonephrosis secondary to malignant obstruction s/p ureteric stenting and ongoing Cancer related weight loss- Rcd 1 unit PRBC  9/10/21- CT abd/pelvis-  Wall thickening of the distal stomach consistent with the history of gastric cancer. There are multiple enlarged upper abdominal and retroperitoneal lymph nodes consistent with metastases and significantly progressed since the previous exam. Right hydronephrosis and delayed nephrogram consistent with obstruction. (There is an irregularly-shaped low-attenuation mass posterior to the body of the pancreas measuring approximately 2.1 x 3.8 x 2.6 cm and consistent with a lymph node.    9/15/2021 Met with Dr. Mccarthy, plan was to start single agent keytruda      9/27/2021 Presented again to the ED on 9/27 for urinary symptoms.  Not found to be infectious, urology had discomfort was secondary to stent, and had discussed PNT's.    Admitted 10/4-10/9 with right-sided hydronephrosis and worsening abdominal pain.  Had a nephrostomy tube  "placed 10/4/2021.  Additional findings were hyponatremia, treated with IV fluids.  Secondary to gastric cancer and slow GI bleed this is anemia. Recieved 2 units PRBCs     Admitted 10/11-10/13 for scrotal edema.  Work-up was negative for phimosis or paraphimosis.  Ultrasound without any major findings.  CT showed right PNT in place, but there was scrotal wall edema.  No clear etiology identified.  This resolved.          Interval Hx:  Essie Guzman was with his daughters (Osman), as well as an .     Patient has been admitted multiple times since he was last seen.  Please see above.  Today, his ongoing biggest concern is for pain.  His pain is located in his right groin, lower abdomen, and upper abdomen.  They have been using both Dilaudid (2 mg) and oxycodone (10 mg), along with MS Contin 30 mg 3 times daily.  Unable to fully tell me what their regimen has been, as they have been \"playing around with it\".    Previous regimen in the hospital had been MS Contin 30 mg 3 times daily, with oxycodone 10 to 20 mg every 3 hours.  He felt like his pain was well controlled at that level, and his daughter noted that it was a 0.  His bowels have been moving regularly, they are using MiraLAX and senna.    his scrotal and leg edema have resolved.  He has been able to eat and drink regularly.  His nephrostomy bag has been draining clear.  He is also urinating, and this is also clear.  No bloody or dark urine.  No burning with urination.  No fevers body aches or chills.  He denies any chest pain or shortness of breath.  At home, he is able to walk around on his own mostly.  He does rest frequently.  He is not having any drowsiness.  He denies any dizziness or lightheadedness.  In terms of bowels, they have been pretty regular.  They do continue to be dark, no overt blood.  Denies any other bruising or bleeding.        PAST MEDICAL AND SURGICAL HISTORY:   Active Ambulatory Problems     Diagnosis Date Noted     Malignant " neoplasm of stomach metastatic to retroperitoneum (H) 12/21/2020     Gastrointestinal hemorrhage with melena 09/10/2021     Cancer associated pain 10/04/2021     Testicular swelling 10/11/2021     Swelling of left lower extremity 10/11/2021     Malignant neoplasm of stomach, unspecified location (H) 10/11/2021     Resolved Ambulatory Problems     Diagnosis Date Noted     Hematochezia 10/11/2015     No Additional Past Medical History   No surgeeies    SOCIAL HISTORY:   Social History     Tobacco Use     Smoking status: Never Smoker     Smokeless tobacco: Never Used   Vaping Use     Vaping Use: Never used   Substance Use Topics     Alcohol use: No     Drug use: No   Non-smoker/non-drinker  He is now retired. 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  He lives in [place] with family, sposue, sons and grandkids  He has 6 kids, lives with 2 younger boys, 4 grand kids      FAMILY HISTORY:   Family History   Problem Relation Age of Onset     Asthma No family hx of      Diabetes No family hx of      Hypertension No family hx of      Cerebrovascular Disease No family hx of      Cancer No family hx of      Colon Cancer No family hx of      Anesthesia Reaction No family hx of      Deep Vein Thrombosis (DVT) No family hx of    No family hx any cancer      ALLERGIES: No Known Allergies    CURRENT MEDICATIONS:   Current Outpatient Medications:      acetaminophen (TYLENOL) 500 MG tablet, Take 2 tablets (1,000 mg) by mouth every 8 hours (Patient taking differently: Take 1,000 mg by mouth every 8 hours as needed ), Disp: 120 tablet, Rfl: 0     ferrous gluconate (FERGON) 324 (38 Fe) MG tablet, Take 1 tablet (324 mg) by mouth daily (with breakfast), Disp: 100 tablet, Rfl: 1     HYDROmorphone (DILAUDID) 2 MG tablet, Take 1 tablet (2 mg) by mouth every 6 hours as needed for pain, Disp: 10 tablet, Rfl: 0     ibuprofen (ADVIL/MOTRIN) 200 MG tablet, Take 1 tablet (200 mg) by mouth every 6 hours as needed for moderate pain, Disp:  "30 tablet, Rfl: 0     morphine (MS CONTIN) 30 MG CR tablet, Take 1 tablet (30 mg) by mouth every 8 hours, Disp: 10 tablet, Rfl: 0     ondansetron (ZOFRAN-ODT) 4 MG ODT tab, Take 1 tablet (4 mg) by mouth every 6 hours as needed for nausea or vomiting, Disp: 30 tablet, Rfl: 0     oxybutynin ER (DITROPAN-XL) 10 MG 24 hr tablet, Take 1 tablet (10 mg) by mouth daily, Disp: 30 tablet, Rfl: 0     oxyCODONE (ROXICODONE) 10 MG tablet, Take 1-1.5 tablets (10-15 mg) by mouth every 3 hours as needed for moderate to severe pain, Disp: 20 tablet, Rfl: 0     pantoprazole (PROTONIX) 40 MG EC tablet, Take 1 tablet (40 mg) by mouth daily, Disp: 60 tablet, Rfl: 1     polyethylene glycol (MIRALAX) 17 GM/Dose powder, Take 17 g by mouth daily (Patient taking differently: Take 17 g by mouth daily Every other day), Disp: 510 g, Rfl: 0     sennosides (SENOKOT) 8.6 MG tablet, Take 1-2 tablets by mouth 2 times daily, Disp: 60 tablet, Rfl: 0     simethicone (MYLICON) 80 MG chewable tablet, Take 1 tablet (80 mg) by mouth every 6 hours as needed for cramping, Disp: 20 tablet, Rfl: 0     tamsulosin (FLOMAX) 0.4 MG capsule, Take 1 capsule (0.4 mg) by mouth daily, Disp: 30 capsule, Rfl: 1     thiamine (B-1) 100 MG tablet, Take 1 tablet (100 mg) by mouth daily, Disp: 100 tablet, Rfl: 1    PHYSICAL EXAMINATION:  BP (!) 126/93 (BP Location: Right arm, Patient Position: Sitting, Cuff Size: Adult Regular)   Pulse 95   Temp 98.8  F (37.1  C) (Oral)   Resp 16   Ht 1.549 m (5' 0.98\")   Wt 52 kg (114 lb 9.6 oz)   SpO2 95%   BMI 21.66 kg/m      Wt Readings from Last 4 Encounters:   10/15/21 52 kg (114 lb 9.6 oz)   10/14/21 49.4 kg (109 lb)   10/13/21 50.8 kg (111 lb 14.4 oz)   10/09/21 51 kg (112 lb 6.4 oz)       VS: Above vitals reviewed, stable without concerns   General: Resting comfortably in no acute distress, seated in wheel chair   Head: Normocephalic, atraumatic   Heart: Regular rate and rhythm, no murmurs  Lung: Normal respiratory effort. " Clear to auscultation bilaterally. No wheezes, rhonchi, or crackles   Abdomen: +Bowel Sounds, soft. TTP right lower groin. No rebound, guarding or rigidity. No palpable masses. No scrotal swelling. PNT in place, no surrounding erythema. Bag draining clear. No scrotal edema   Neuro: AAO x3. Moving all extremities   Extremities: No lower extremity edema  Skin: Warm, dry, intact. No rashes, no suspicious lesions. No petechia or bruising noted         LABORATORY DATA:     Most Recent 3 CBC's:Recent Labs   Lab Test 10/13/21  0704 10/12/21  1506 10/11/21  1232   WBC 8.5 10.6 8.2   HGB 7.6* 7.7* 7.8*   MCV 88 87 86    336 356    Most Recent 3 BMP's:  Recent Labs   Lab Test 10/13/21  0704 10/12/21  0639 10/12/21  0107 10/11/21  1232 10/11/21  1232    134  --   --  132*   POTASSIUM 4.0 4.0  --   --  4.0   CHLORIDE 104 104  --   --  101   CO2 28 25  --   --  27   BUN 16 18  --   --  23   CR 0.78 0.80 0.90   < > 0.87   ANIONGAP 3 5  --   --  4   HAWA 8.5 8.7  --   --  9.0   GLC 90 92  --   --  123*    < > = values in this interval not displayed.    Most Recent 2 LFT's:  Recent Labs   Lab Test 10/13/21  0704 10/11/21  1232   AST 14 22   ALT 14 16   ALKPHOS 64 80   BILITOTAL 0.4 0.2      I reviewed the above labs today.      ASSESSMENT AND PLAN:    Mr. Essie Guzman is a 65 year old  male with PMHx of H.pylori infection, and recently diagnosed gastric cancer. He is otherwise healthy and does not have any co morbidities.    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  Ideally, in this case combination of chemotherapy and immunotherpay (Nivo+FOLFOX) or Pembro+FOLFOX are both reasonable options. Patient was concerned about the side effects of chemotherapy and therefore was more drawn to the option of single agent immunotherapy. Pembrolizumab monotherapy- [id number] study, led to approval for treatment of a variety of advanced solid tumors, including gastric cancers, that had MSI-H or dMMR, that had progressed following prior treatment, and for which there were no satisfactory alternative treatment options.     He deferred treatment from March 2021 to now as he was minimally symptomatic . He developed symptoms (anemia), weakness in 9/2021 requiring hospital admission and CT CAP in 9/10 showed disease progression , Wall thickening of the distal stomach consistent with the history of gastric cancer. There are multiple enlarged upper abdominal and retroperitoneal lymph nodes consistent with metastases and significantly progressed since the previous exam. He is now interested in startign treatment.     After discussing available options with Dr. Mccarthy, he was inclined to start pembro alone and did not want any cytotoxic chemotherapy like treatments.      Plan  #Gastric cancer  - Plan to start single agent Keytruda 11/5 pending insurance authorization       # Anemia  - Hgb 9.0 today, no transfusion needed  - Plan for hgb recheck and next week for possible transfusion  -Continue oral iron  - Consent performed today       # Right Hydronephrosis   -s/p PNT right side, functioning well       #Cancer related pain  - Patient has been using both dilaudid and oxycodone, which I advised against  - Continue MS Contin to 30 mg TID, as recommended by palliative care in the hospital, refilled today  - Oxycodone 10 mg q3h, refilled today, as this is regimen inpatient for which he had pain of zero  - Stop dilaudid   - Narcan sent   - Daily miralax and senna   - Move up palliative care follow up to  next week for help with complex pain needs     Patient will call in the interim with any questions or concerns. They voice understanding and agree with the above plan.     60 minutes spent on the date of the encounter doing chart review, review of test results, interpretation of tests, patient visit and documentation     Lashawn Wright PA-C

## 2021-10-16 NOTE — ED TRIAGE NOTES
BIB [facility] with c/o flank pain. Neph tube placed, flowing ok. Inguinal groin pain with pain radiating to the chest. 20 G placed right bicep, IV morphine given,  160 BG, VS unremarkable. Hmong is primary language- family can help translate. Pt had/has obvious kink in the PNT near the hub. Pt was educated on keeping the line straight. Pt reports a lot of fluid leaking from under the PNT dressing and associated flank/abd pain when the PNT bag stopped collecting fluid around 9 pm tonight.

## 2021-10-16 NOTE — ED NOTES
Patient arrives to ED for complaints that his nephrostomy tube is leaking urine at the site since 8pm

## 2021-10-16 NOTE — DISCHARGE INSTRUCTIONS
Thank you for your patience today.  Please follow-up with your regular doctor in the next 2-3 days for further evaluation and follow-up care.  Please call to schedule an appointment.  We recommend close follow-up with your primary care provider for repeat laboratory testing in the next 2 to 3 days.  Interventional radiology will call you this week to check in on your nephrostomy tube and see if it is still leaking.  Please continue your own medications.  Please return to the ER if you develop high fever, severe flank pain, chest pain, weakness, tingling, numbness, increase in leakage/displacement of the tube, or any worsening of your current symptoms.  It was a pleasure taking care of you today.  We hope you feel better soon.

## 2021-10-16 NOTE — ED NOTES
"     Emergency Department Patient Sign-out       Brief HPI:  This is a 66 year old male signed out to me by Dr. SANAZ Lau .  See initial ED Provider note for details of the presentation.            Significant Events prior to my assuming care: flank pain work up for the pt with gastric ca and PNT. Noted mildly high K and given lasix and Kayexalate.      Exam:   Patient Vitals for the past 24 hrs:   BP Temp Temp src Pulse Resp SpO2 Height Weight   10/16/21 0600 (!) 136/98 -- -- 85 17 -- -- --   10/16/21 0500 (!) 138/100 -- -- 78 17 100 % -- --   10/16/21 0300 (!) 150/107 -- -- 80 -- 100 % -- --   10/16/21 0200 (!) 135/96 -- -- 83 -- 99 % -- --   10/15/21 2256 (!) 144/109 99.2  F (37.3  C) Oral 98 12 99 % 1.549 m (5' 1\") 51.7 kg (114 lb)           ED RESULTS:   Results for orders placed or performed during the hospital encounter of 10/15/21 (from the past 24 hour(s))   [place] Draw     Status: None (In process)    Collection Time: 10/15/21 10:54 PM    Narrative    The following orders were created for panel order [place] Draw.  Procedure                               Abnormality         Status                     ---------                               -----------         ------                     Extra Blue Top Tube[[id number]]                              Final result               Extra Red Top Tube[[id number]]                               Final result               Extra Green Top (Lithium...[[id number]]                      Final result               Extra Purple Top Tube[[id number]]                            Final result               Extra Green Top (Lithium...[[id number]]                                                   Please view results for these tests on the individual orders.   Extra Blue Top Tube     Status: None    Collection Time: 10/15/21 10:54 PM   Result Value Ref Range    Hold Specimen [facility]    Extra Red Top Tube     Status: None    Collection Time: 10/15/21 10:54 PM   Result Value Ref Range    Hold " Specimen [facility]    Extra Purple Top Tube     Status: None    Collection Time: 10/15/21 10:54 PM   Result Value Ref Range    Hold Specimen [facility]    CBC with platelets differential     Status: Abnormal    Collection Time: 10/15/21 10:54 PM    Narrative    The following orders were created for panel order CBC with platelets differential.  Procedure                               Abnormality         Status                     ---------                               -----------         ------                     CBC with platelets and d...[[id number]]  Abnormal            Final result                 Please view results for these tests on the individual orders.   CBC with platelets and differential     Status: Abnormal    Collection Time: 10/15/21 10:54 PM   Result Value Ref Range    WBC Count 10.2 4.0 - 11.0 10e3/uL    RBC Count 3.22 (L) 4.40 - 5.90 10e6/uL    Hemoglobin 8.7 (L) 13.3 - 17.7 g/dL    Hematocrit 27.9 (L) 40.0 - 53.0 %    MCV 87 78 - 100 fL    MCH 27.0 26.5 - 33.0 pg    MCHC 31.2 (L) 31.5 - 36.5 g/dL    RDW 18.2 (H) 10.0 - 15.0 %    Platelet Count 494 (H) 150 - 450 10e3/uL    % Neutrophils 85 %    % Lymphocytes 8 %    % Monocytes 6 %    % Eosinophils 1 %    % Basophils 0 %    % Immature Granulocytes 0 %    NRBCs per 100 WBC 0 <1 /100    Absolute Neutrophils 8.7 (H) 1.6 - 8.3 10e3/uL    Absolute Lymphocytes 0.8 0.8 - 5.3 10e3/uL    Absolute Monocytes 0.6 0.0 - 1.3 10e3/uL    Absolute Eosinophils 0.1 0.0 - 0.7 10e3/uL    Absolute Basophils 0.0 0.0 - 0.2 10e3/uL    Absolute Immature Granulocytes 0.0 <=0.0 10e3/uL    Absolute NRBCs 0.0 10e3/uL   Lactic acid whole blood     Status: Normal    Collection Time: 10/15/21 10:55 PM   Result Value Ref Range    Lactic Acid 0.9 0.7 - 2.0 mmol/L   Extra Green Top (Lithium Heparin) Tube     Status: None    Collection Time: 10/15/21 10:55 PM   Result Value Ref Range    Hold Specimen [facility]    Comprehensive metabolic panel     Status: Abnormal    Collection Time: 10/15/21 10:55 PM    Result Value Ref Range    Sodium 131 (L) 133 - 144 mmol/L    Potassium 5.6 (H) 3.4 - 5.3 mmol/L    Chloride 100 94 - 109 mmol/L    Carbon Dioxide (CO2) 26 20 - 32 mmol/L    Anion Gap 5 3 - 14 mmol/L    Urea Nitrogen 16 7 - 30 mg/dL    Creatinine 0.71 0.66 - 1.25 mg/dL    Calcium 8.8 8.5 - 10.1 mg/dL    Glucose 110 (H) 70 - 99 mg/dL    Alkaline Phosphatase 74 40 - 150 U/L    AST 76 (H) 0 - 45 U/L    ALT 21 0 - 70 U/L    Protein Total 7.8 6.8 - 8.8 g/dL    Albumin 2.3 (L) 3.4 - 5.0 g/dL    Bilirubin Total 0.4 0.2 - 1.3 mg/dL    GFR Estimate >90 >60 mL/min/1.73m2   CT Abdomen Pelvis w/o Contrast     Status: None    Collection Time: 10/16/21  4:06 AM    Narrative    EXAM: CT ABDOMEN PELVIS W/O CONTRAST  LOCATION: [facility]  DATE/TIME: 10/16/2021 4:03 AM    INDICATION: Flank pain. Nephrostomy tube leaking.  COMPARISON: CT from 10/11/2021.  TECHNIQUE: CT scan of the abdomen and pelvis was performed without IV contrast. Multiplanar reformats were obtained. Dose reduction techniques were used.  CONTRAST: None.    FINDINGS:   LOWER CHEST: Hypodense appearance of the left ventricular blood pool relative to the myocardium consistent with anemia. Small left and trace right pleural effusions. Left basilar atelectasis or consolidation has increased.    HEPATOBILIARY: Distended gallbladder. Liver within normal limits for noncontrast appearance.    PANCREAS: Normal.    SPLEEN: Normal.    ADRENAL GLANDS: Normal.    KIDNEYS/BLADDER: Right percutaneous nephrostomy tube in place, pigtail looped in the renal pelvis near the ureteropelvic junction, similar to prior study. No residual hydronephrosis. Mild urothelial thickening noted in the left renal pelvis. Bladder is   nearly empty.    BOWEL: No mechanical bowel obstruction or free air. Vaguely redemonstrated antropyloric wall thickening in the distal stomach, evaluation is hindered by absence of IV contrast.    LYMPH NODES: Again noted  are juliet conglomerates along the gastrohepatic ligament (3.8 x 3.2 cm image 87 series 5), the celiac axis (4.7 x 3.9 cm image 118), and in the para-aortic and aortocaval spaces. This is unchanged.    VASCULATURE: Atherosclerotic calcifications. No abdominal aortic aneurysm.    PELVIC ORGANS: Diffuse pelvic edema.    MUSCULOSKELETAL: Anasarca. No acute osseous findings.      Impression    IMPRESSION:   1.  Stable positioning of the right percutaneous nephrostomy tube. No hydronephrosis.    2.  Redemonstrated urothelial thickening along the left renal pelvis.    3.  Within the limitations of noncontrast technique, the patient's distal gastric wall thickening and metastatic adenopathy appear unchanged compared to prior CT.    4.  Small left pleural effusion with increasing left basilar atelectasis or consolidation.    5.  Anasarca.     UA with Microscopic reflex to Culture     Status: Abnormal    Collection Time: 10/16/21  6:14 AM    Specimen: Nephrostomy, Right; Urine   Result Value Ref Range    Color Urine Straw Colorless, Straw, Light Yellow, Yellow    Appearance Urine Clear Clear    Glucose Urine Negative Negative mg/dL    Bilirubin Urine Negative Negative    Ketones Urine Negative Negative mg/dL    Specific Gravity Urine 1.007 1.003 - 1.035    Blood Urine Negative Negative    pH Urine 6.0 5.0 - 7.0    Protein Albumin Urine Negative Negative mg/dL    Urobilinogen Urine Normal Normal, 2.0 mg/dL    Nitrite Urine Negative Negative    Leukocyte Esterase Urine Large (A) Negative    RBC Urine 1 <=2 /HPF    WBC Urine 11 (H) <=5 /HPF    Narrative    Urine Culture ordered based on laboratory criteria   EKG 12-lead, tracing only     Status: None (Preliminary result)    Collection Time: 10/16/21  6:50 AM   Result Value Ref Range    Systolic Blood Pressure  mmHg    Diastolic Blood Pressure  mmHg    Ventricular Rate 91 BPM    Atrial Rate 91 BPM    DC Interval 138 ms    QRS Duration 84 ms     ms    QTc 405 ms    P Axis 43  degrees    R AXIS -25 degrees    T Axis 37 degrees    Interpretation ECG       Sinus rhythm with Premature supraventricular complexes  Inferior infarct , age undetermined  Anterior infarct , age undetermined  Abnormal ECG     Potassium     Status: Abnormal    Collection Time: 10/16/21  7:17 AM   Result Value Ref Range    Potassium 5.4 (H) 3.4 - 5.3 mmol/L       ED MEDICATIONS:   Medications   HYDROmorphone (PF) (DILAUDID) injection 0.5 mg (0.5 mg Intravenous Given 10/16/21 0326)   sodium polystyrene (KAYEXALATE) suspension 30 g (30 g Oral Given 10/16/21 0612)   furosemide (LASIX) injection 20 mg (20 mg Intravenous Given 10/16/21 0606)   oxyCODONE (ROXICODONE) tablet 5 mg (5 mg Oral Given 10/16/21 0709)         Impression:    ICD-10-CM    1. Flank pain  R10.9        Plan:    Pending studies include K down to 5.4 from 5.6, pt and family informed, requested dressing change then discharge with IR follow up.        Westley Harrell MD, Westley Wood MD  10/16/21 5246

## 2021-10-16 NOTE — ED PROVIDER NOTES
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  Patient reports right nephrostomy tube that was placed by interventional radiology on 10/4/2021.  Patient states he was doing well until last night he noticed he had leaking around his nephrostomy tube around 2000.  Patient reports that he was still able to urinate okay and was also having normal output through his nephrostomy tube.  Patient states that later in the evening he developed some right flank pain near his nephrostomy tube so came in for further evaluation to make sure there was no signs of obstruction and make sure tube was in place.  Patient denies any fever, chills, nausea, vomiting, chest pain, shortness of breath, cough or cold-like symptoms.  No other complaints.    Patient was recently admitted to the hospital here from 10/11/2021 10/13/2021 due to worsening swelling of testicles, penis and legs that started suddenly the night prior.  No DVT found on CT.    EXAM: CT ABDOMEN PELVIS W CONTRAST  LOCATION: [facility] [place]  DATE/TIME: 10/11/2021 10:01 PM  INDICATION: metastatic cancer patient, now with left flank swelling, scrotal and penis swelling, bilateral lower extremity swelling, eval for compressive mass, has known right nephrostomy tube related to obstruction, some mild hematuria as well  COMPARISON: CT from 10/05/2021.                                                           IMPRESSION:   1.  Similar appearance of irregular wall thickening in the antropyloric region of the stomach compatible with patient's known  gastric adenocarcinoma. The stomach is distended.  2.  No significant change in extensive abdominal metastatic lymphadenopathy.  3.  Right percutaneous nephrostomy tube in place, no residual right hydronephrosis.  4.  Mild urothelial thickening in the bilateral renal pelvises may be inflammatory in nature.  5.  Gallbladder is distended but without obvious wall thickening or calcified stone.  6.  Body wall edema with extensive scrotal wall edema.    Past Medical History  History reviewed. No pertinent past medical history.  Past Surgical History:   Procedure Laterality Date     COLONOSCOPY N/A 11/24/2015    Procedure: COLONOSCOPY;  Surgeon: Clyde Mosher MD;  Location:  GI     COMBINED CYSTOSCOPY, INSERT STENT URETER(S) Right 9/10/2021    Procedure: CYSTOSCOPY, WITH right URETERAL STENT INSERTION, retrograde pyleogram;  Surgeon: Jez Friedman MD;  Location: [facility] OR     ESOPHAGOSCOPY, GASTROSCOPY, DUODENOSCOPY (EGD), COMBINED N/A 11/24/2020    Procedure: ESOPHAGOGASTRODUODENOSCOPY with biopsies using cold forceps;  Surgeon: Clyde Mosher MD;  Location:  GI     ESOPHAGOSCOPY, GASTROSCOPY, DUODENOSCOPY (EGD), COMBINED N/A 12/22/2020    Procedure: ESOPHAGOGASTRODUODENOSCOPY, WITH FINE NEEDLE ASPIRATION BIOPSY, WITH ENDOSCOPIC ULTRASOUND GUIDANCE;  Surgeon: Guru Teri Pedraza MD;  Location: [facility] GI     IR NEPHROSTOMY TUBE PLACEMENT RIGHT  10/4/2021     acetaminophen (TYLENOL) 500 MG tablet  ferrous gluconate (FERGON) 324 (38 Fe) MG tablet  ibuprofen (ADVIL/MOTRIN) 200 MG tablet  morphine (MS CONTIN) 30 MG CR tablet  naloxone (NARCAN) 4 MG/0.1ML nasal spray  ondansetron (ZOFRAN-ODT) 4 MG ODT tab  oxybutynin ER (DITROPAN-XL) 10 MG 24 hr tablet  oxyCODONE IR (ROXICODONE) 10 MG tablet  pantoprazole (PROTONIX) 40 MG EC tablet  polyethylene glycol (MIRALAX) 17 GM/Dose powder  sennosides (SENOKOT) 8.6 MG tablet  simethicone (MYLICON) 80 MG chewable tablet  tamsulosin (FLOMAX) 0.4 MG  "capsule  thiamine (B-1) 100 MG tablet      No Known Allergies  Family History  Family History   Problem Relation Age of Onset     Asthma No family hx of      Diabetes No family hx of      Hypertension No family hx of      Cerebrovascular Disease No family hx of      Cancer No family hx of      Colon Cancer No family hx of      Anesthesia Reaction No family hx of      Deep Vein Thrombosis (DVT) No family hx of      Social History   Social History     Tobacco Use     Smoking status: Never Smoker     Smokeless tobacco: Never Used   Vaping Use     Vaping Use: Never used   Substance Use Topics     Alcohol use: No     Drug use: No      Past medical history, past surgical history, medications, allergies, family history, and social history were reviewed with the patient. No additional pertinent items.       Review of Systems  A complete review of systems was performed with pertinent positives and negatives noted in the HPI, and all other systems negative.    Physical Exam   BP: (!) 144/109  Pulse: 98  Temp: 99.2  F (37.3  C)  Resp: 12  Height: 154.9 cm (5' 1\")  Weight: 51.7 kg (114 lb)  SpO2: 99 %  Physical Exam  General: Afebrile, no acute distress   HEENT: Normocephalic, atraumatic, conjunctivae normal. MMM  Neck: non-tender, supple  Cardio: regular rate. regular rhythm   Resp: Normal work of breathing, no respiratory distress, lungs clear bilaterally, no wheezing, rhonchi, rales  Chest/Back: no visual signs of trauma, no CVA tenderness, nephrostomy tube in place with no erythema, drainage, mild leakage of urine, clear urine in nephrostomy output  Abdomen: soft, non distension, no tenderness, no peritoneal signs   Neuro: alert and fully oriented. CN II-XII grossly intact. Grossly normal strength and sensation in all extremities.   MSK: no deformities. Normal range of motion  Integumentary/Skin: no rash visualized, normal color  Psych: normal affect, normal behavior    ED Course      Procedures         EKG Interpretation:  "     Interpreted by Melly Lau MD  Time reviewed: 0650  Symptoms at time of EKG: hyperkalemia   Rhythm: Sinus rhythm  Rate: normal - 91  Axis: normal  Ectopy: none  Conduction: normal  ST Segments/ T Waves: No acute ischemic change   Q Waves: none  Comparison to prior: No significant change when compared to prior EKG on October 11, 2021     Clinical Impression: sinus rhythm with no acute ischemic change       Results for orders placed or performed during the hospital encounter of 10/16/21   XR Chest 2 Views     Status: None    Narrative    EXAM: XR CHEST 2 VW  LOCATION: [facility]  DATE/TIME: 10/17/2021 12:47 AM    INDICATION: chest pain  COMPARISON: Abdominal CT from 10/16/2021      Impression    IMPRESSION: Heart size is normal. Left basilar consolidation and small pleural effusion. Bandlike scarring in the right midlung. Right lung otherwise radiographically clear. No visible pneumothorax. Percutaneous nephrostomy tube overlies the right upper   quadrant.   Comprehensive metabolic panel     Status: Abnormal   Result Value Ref Range    Sodium 132 (L) 133 - 144 mmol/L    Potassium 3.3 (L) 3.4 - 5.3 mmol/L    Chloride 98 94 - 109 mmol/L    Carbon Dioxide (CO2) 30 20 - 32 mmol/L    Anion Gap 4 3 - 14 mmol/L    Urea Nitrogen 13 7 - 30 mg/dL    Creatinine 0.71 0.66 - 1.25 mg/dL    Calcium 8.3 (L) 8.5 - 10.1 mg/dL    Glucose 104 (H) 70 - 99 mg/dL    Alkaline Phosphatase 63 40 - 150 U/L    AST 13 0 - 45 U/L    ALT 9 0 - 70 U/L    Protein Total 6.6 (L) 6.8 - 8.8 g/dL    Albumin 2.1 (L) 3.4 - 5.0 g/dL    Bilirubin Total 0.3 0.2 - 1.3 mg/dL    GFR Estimate >90 >60 mL/min/1.73m2   Troponin I     Status: Normal   Result Value Ref Range    Troponin I <0.015 0.000 - 0.045 ug/L   CBC with platelets and differential     Status: Abnormal   Result Value Ref Range    WBC Count 7.9 4.0 - 11.0 10e3/uL    RBC Count 2.96 (L) 4.40 - 5.90 10e6/uL    Hemoglobin 7.8 (L) 13.3 - 17.7 g/dL     Hematocrit 25.0 (L) 40.0 - 53.0 %    MCV 85 78 - 100 fL    MCH 26.4 (L) 26.5 - 33.0 pg    MCHC 31.2 (L) 31.5 - 36.5 g/dL    RDW 17.3 (H) 10.0 - 15.0 %    Platelet Count 429 150 - 450 10e3/uL    % Neutrophils 84 %    % Lymphocytes 9 %    % Monocytes 7 %    % Eosinophils 0 %    % Basophils 0 %    % Immature Granulocytes 0 %    NRBCs per 100 WBC 0 <1 /100    Absolute Neutrophils 6.5 1.6 - 8.3 10e3/uL    Absolute Lymphocytes 0.7 (L) 0.8 - 5.3 10e3/uL    Absolute Monocytes 0.6 0.0 - 1.3 10e3/uL    Absolute Eosinophils 0.0 0.0 - 0.7 10e3/uL    Absolute Basophils 0.0 0.0 - 0.2 10e3/uL    Absolute Immature Granulocytes 0.0 <=0.0 10e3/uL    Absolute NRBCs 0.0 10e3/uL   Lactic acid whole blood     Status: Normal   Result Value Ref Range    Lactic Acid 0.8 0.7 - 2.0 mmol/L   EKG 12-lead, tracing only     Status: None (Preliminary result)   Result Value Ref Range    Systolic Blood Pressure  mmHg    Diastolic Blood Pressure  mmHg    Ventricular Rate 72 BPM    Atrial Rate 72 BPM    CO Interval 134 ms    QRS Duration 88 ms     ms    QTc 398 ms    P Axis 13 degrees    R AXIS -12 degrees    T Axis 40 degrees    Interpretation ECG       Sinus rhythm with Premature atrial complexes  Possible Anterior infarct , age undetermined  Abnormal ECG     CBC with platelets differential     Status: Abnormal    Narrative    The following orders were created for panel order CBC with platelets differential.  Procedure                               Abnormality         Status                     ---------                               -----------         ------                     CBC with platelets and d...[[id number]]  Abnormal            Final result                 Please view results for these tests on the individual orders.   Results for orders placed or performed during the hospital encounter of 10/15/21   CT Abdomen Pelvis w/o Contrast     Status: None    Narrative    EXAM: CT ABDOMEN PELVIS W/O CONTRAST  LOCATION: [facility]  DATE/TIME: 10/16/2021 4:03 AM    INDICATION: Flank pain. Nephrostomy tube leaking.  COMPARISON: CT from 10/11/2021.  TECHNIQUE: CT scan of the abdomen and pelvis was performed without IV contrast. Multiplanar reformats were obtained. Dose reduction techniques were used.  CONTRAST: None.    FINDINGS:   LOWER CHEST: Hypodense appearance of the left ventricular blood pool relative to the myocardium consistent with anemia. Small left and trace right pleural effusions. Left basilar atelectasis or consolidation has increased.    HEPATOBILIARY: Distended gallbladder. Liver within normal limits for noncontrast appearance.    PANCREAS: Normal.    SPLEEN: Normal.    ADRENAL GLANDS: Normal.    KIDNEYS/BLADDER: Right percutaneous nephrostomy tube in place, pigtail looped in the renal pelvis near the ureteropelvic junction, similar to prior study. No residual hydronephrosis. Mild urothelial thickening noted in the left renal pelvis. Bladder is   nearly empty.    BOWEL: No mechanical bowel obstruction or free air. Vaguely redemonstrated antropyloric wall thickening in the distal stomach, evaluation is hindered by absence of IV contrast.    LYMPH NODES: Again noted are juliet conglomerates along the gastrohepatic ligament (3.8 x 3.2 cm image 87 series 5), the celiac axis (4.7 x 3.9 cm image 118), and in the para-aortic and aortocaval spaces. This is unchanged.    VASCULATURE: Atherosclerotic calcifications. No abdominal aortic aneurysm.    PELVIC ORGANS: Diffuse pelvic edema.    MUSCULOSKELETAL: Anasarca. No acute osseous findings.      Impression    IMPRESSION:   1.  Stable positioning of the right percutaneous nephrostomy tube. No hydronephrosis.    2.  Redemonstrated urothelial thickening along the left renal pelvis.    3.  Within the limitations of noncontrast technique, the patient's distal gastric wall thickening and metastatic adenopathy appear unchanged compared to prior CT.    4.   Small left pleural effusion with increasing left basilar atelectasis or consolidation.    5.  Anasarca.     Lactic acid whole blood     Status: Normal   Result Value Ref Range    Lactic Acid 0.9 0.7 - 2.0 mmol/L   Extra Blue Top Tube     Status: None   Result Value Ref Range    Hold Specimen JIC    Extra Red Top Tube     Status: None   Result Value Ref Range    Hold Specimen JIC    Extra Green Top (Lithium Heparin) Tube     Status: None   Result Value Ref Range    Hold Specimen JIC    Extra Purple Top Tube     Status: None   Result Value Ref Range    Hold Specimen JIC    Comprehensive metabolic panel     Status: Abnormal   Result Value Ref Range    Sodium 131 (L) 133 - 144 mmol/L    Potassium 5.6 (H) 3.4 - 5.3 mmol/L    Chloride 100 94 - 109 mmol/L    Carbon Dioxide (CO2) 26 20 - 32 mmol/L    Anion Gap 5 3 - 14 mmol/L    Urea Nitrogen 16 7 - 30 mg/dL    Creatinine 0.71 0.66 - 1.25 mg/dL    Calcium 8.8 8.5 - 10.1 mg/dL    Glucose 110 (H) 70 - 99 mg/dL    Alkaline Phosphatase 74 40 - 150 U/L    AST 76 (H) 0 - 45 U/L    ALT 21 0 - 70 U/L    Protein Total 7.8 6.8 - 8.8 g/dL    Albumin 2.3 (L) 3.4 - 5.0 g/dL    Bilirubin Total 0.4 0.2 - 1.3 mg/dL    GFR Estimate >90 >60 mL/min/1.73m2   UA with Microscopic reflex to Culture     Status: Abnormal    Specimen: Nephrostomy, Right; Urine   Result Value Ref Range    Color Urine Straw Colorless, Straw, Light Yellow, Yellow    Appearance Urine Clear Clear    Glucose Urine Negative Negative mg/dL    Bilirubin Urine Negative Negative    Ketones Urine Negative Negative mg/dL    Specific Gravity Urine 1.007 1.003 - 1.035    Blood Urine Negative Negative    pH Urine 6.0 5.0 - 7.0    Protein Albumin Urine Negative Negative mg/dL    Urobilinogen Urine Normal Normal, 2.0 mg/dL    Nitrite Urine Negative Negative    Leukocyte Esterase Urine Large (A) Negative    RBC Urine 1 <=2 /HPF    WBC Urine 11 (H) <=5 /HPF    Narrative    Urine Culture ordered based on laboratory criteria   CBC with  platelets and differential     Status: Abnormal   Result Value Ref Range    WBC Count 10.2 4.0 - 11.0 10e3/uL    RBC Count 3.22 (L) 4.40 - 5.90 10e6/uL    Hemoglobin 8.7 (L) 13.3 - 17.7 g/dL    Hematocrit 27.9 (L) 40.0 - 53.0 %    MCV 87 78 - 100 fL    MCH 27.0 26.5 - 33.0 pg    MCHC 31.2 (L) 31.5 - 36.5 g/dL    RDW 18.2 (H) 10.0 - 15.0 %    Platelet Count 494 (H) 150 - 450 10e3/uL    % Neutrophils 85 %    % Lymphocytes 8 %    % Monocytes 6 %    % Eosinophils 1 %    % Basophils 0 %    % Immature Granulocytes 0 %    NRBCs per 100 WBC 0 <1 /100    Absolute Neutrophils 8.7 (H) 1.6 - 8.3 10e3/uL    Absolute Lymphocytes 0.8 0.8 - 5.3 10e3/uL    Absolute Monocytes 0.6 0.0 - 1.3 10e3/uL    Absolute Eosinophils 0.1 0.0 - 0.7 10e3/uL    Absolute Basophils 0.0 0.0 - 0.2 10e3/uL    Absolute Immature Granulocytes 0.0 <=0.0 10e3/uL    Absolute NRBCs 0.0 10e3/uL   Potassium     Status: Abnormal   Result Value Ref Range    Potassium 5.4 (H) 3.4 - 5.3 mmol/L   EKG 12-lead, tracing only     Status: None   Result Value Ref Range    Systolic Blood Pressure  mmHg    Diastolic Blood Pressure  mmHg    Ventricular Rate 91 BPM    Atrial Rate 91 BPM    PA Interval 138 ms    QRS Duration 84 ms     ms    QTc 405 ms    P Axis 43 degrees    R AXIS -25 degrees    T Axis 37 degrees    Interpretation ECG       Sinus rhythm with Premature supraventricular complexes  Inferior infarct , age undetermined  Anterior infarct , age undetermined  Abnormal ECG  Unconfirmed report - interpretation of this ECG is computer generated - see medical record for final interpretation  Confirmed by - EMERGENCY ROOM, PHYSICIAN ([id number]),  JADE LYNN ([id number]) on 10/16/2021 9:07:58 AM     Blood Culture Line, venous     Status: Normal (Preliminary result)    Specimen: Line, venous; Blood   Result Value Ref Range    Culture No growth after 1 day    [place] Draw *Canceled*     Status: None ()    Narrative    The following orders were created for panel order  [place] Draw.  Procedure                               Abnormality         Status                     ---------                               -----------         ------                       Please view results for these tests on the individual orders.   [place] Draw     Status: None (In process)    Narrative    The following orders were created for panel order [place] Draw.  Procedure                               Abnormality         Status                     ---------                               -----------         ------                     Extra Blue Top Tube[[id number]]                              Final result               Extra Red Top Tube[[id number]]                               Final result               Extra Green Top (Lithium...[[id number]]                      Final result               Extra Purple Top Tube[[id number]]                            Final result               Extra Green Top (Lithium...[[id number]]                                                   Please view results for these tests on the individual orders.   CBC with platelets differential     Status: Abnormal    Narrative    The following orders were created for panel order CBC with platelets differential.  Procedure                               Abnormality         Status                     ---------                               -----------         ------                     CBC with platelets and d...[[id number]]  Abnormal            Final result                 Please view results for these tests on the individual orders.     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  Upon arrival patient is well-appearing, afebrile, no distress.  Patient is nontoxic-appearing.  At this time will obtain comprehensive labs as well as CT imaging to ensure nephrostomy tube is in place, no signs of hydronephrosis, obstruction, or infection.  Patient given IV Dilaudid to help with discomfort.    I reviewed comprehensive labs which are unremarkable with white blood cell count 10.2, hemoglobin 8.7, no acute metabolic or electrolyte abnormality, creatinine 0.71, normal lactic acid of 0.9.  Patient's potassium was slightly elevated at 5.6 so was treated in the emergency department.  Urinalysis with no signs of acute infection, negative nitrates, large leukocyte esterase, 11 white blood cells, will follow up on culture and hold off on antibiotics at this time.  Blood culture and urine cultures pending.  I reviewed CT scan which is unremarkable with stable positioning of the right percutaneous nephrostomy tube with no evidence of hydronephrosis.  I discussed the case with urology and interventional radiology and at this time states that some leakage may be normal just due to the tube being placed recently on 10/4, likely will resolve her interventional radiology will follow up with the patient on Monday and if persistent or worsening waking will consider replacement.  On reevaluation patient resting comfortably feels better after IV Dilaudid.  I discussed results with patient and daughter.  At this time plan for likely discharge home.  Patient pending reevaluation, and recheck of potassium.  Patient signed out to morning provider pending disposition.  Patient understands and agrees the  plan.    I have reviewed the nursing notes. I have reviewed the findings, diagnosis, plan and need for follow up with the patient.    Discharge Medication List as of 10/16/2021  8:54 AM          Final diagnoses:   Flank pain       --  Melly Lau MD  [facility] EMERGENCY DEPARTMENT  10/15/2021     Melly Lau MD  10/17/21 0546

## 2021-10-17 PROBLEM — R10.84 ABDOMINAL PAIN, GENERALIZED: Status: ACTIVE | Noted: 2021-01-01

## 2021-10-17 NOTE — ED NOTES
Bed: ED17  Expected date:   Expected time:   Means of arrival:   Comments:  Linda [id number]  56M abd pain

## 2021-10-17 NOTE — UTILIZATION REVIEW
Admission Status; Secondary Review Determination    Under the authority of the Utilization Management Committee, the utilization review process indicated a secondary review on the above patient. The review outcome is based on review of the medical records, discussions with staff, and applying clinical experience noted on the date of the review.    (x) Inpatient Status Appropriate - This patient's medical care is consistent with medical management for inpatient care and reasonable inpatient medical practice.    RATIONALE FOR DETERMINATION: 66-year-old male with history of metastatic gastric adenocarcinoma complicated by malignant obstruction of the right kidney requiring ureteral stenting and then percutaneous nephrostomy tube 2 weeks prior to admission.  Patient now presents with uncontrolled abdominal pain not resolved with oral narcotics.  Patient requiring frequent doses of rescue intravenous narcotics in addition to oral medications, IV antibiotics for significant PNT pyuria with expected greater than 2 night hospital stay appropriate for inpatient management    At the time of admission with the information available to the attending physician more than 2 nights Hospital complex care was anticipated, based on patient risk of adverse outcome if treated as outpatient and complex care required. Inpatient admission is appropriate based on the Medicare guidelines.    This document was produced using voice recognition software    The information on this document is developed by the utilization review team in order for the business office to ensure compliance. This only denotes the appropriateness of proper admission status and does not reflect the quality of care rendered.    The definitions of Inpatient Status and Observation Status used in making the determination above are those provided in the CMS Coverage Manual, Chapter 1 and Chapter 6, section 70.4.    Sincerely,    Eleazar Garcia MD  Utilization  Review  Physician Advisor  [facility].

## 2021-10-17 NOTE — H&P
[facility]    History and Physical - Hospitalist Service, Gold Nights       Date of Admission:  10/16/2021    Assessment & Plan     Essie Guzman is a 66-year-old M with a history of metastatic gastric adenocarcinoma complicated by malignant obstruction of the right kidney resulting in hydronephrosis s/p right ureteral stent (9/10) and PCN (10/1) who presented to the ED on 10/17 for acute on chronic malignancy related abdominal and groin pain, which has since improved significantly with IV opiates in the ED.  He is being admitted for pain control and optimization of his outpatient oral pain regimen.    # Poorly differentiated metastatic gastric adenocarcinoma (patient has not begun pembrolizumab infusions yet due to insurance related issues)  # Malignancy-related chronic abdominal pain  # Groin pain   Patient has been noted to have groin pain since at least 9/25/2021 per chart review, and both he and his daughter do state that his groin pain has been present for at least the past month.  But the abdominal and groin pain are the same in quality as previous, just severe in intensity last night before presenting to the ED.  CT done at patient's ED visit on 10/15 showed stable right percutaneous nephrostomy, unchanged gastric wall thickening and metastatic adenopathy compared to prior CT.  There was no new acute pathology.  The patient and his daughter are attributing the worsening pain to getting behind on his oral pain regimen, which usually works well to control the pain if the get the MS Contin scheduled twice a day without pushing back doses or skipping doses.  Differential of the abdominal and groin pain include acute intra-abdominal pathology, though I think this is unlikely since abdomen was soft and nondistended, tender only to deep palpation on my exam.  Also considered testicular pathology such as torsion, but I think this is also unlikely given the pain has been  waxing and waning for at least the past month, and recent testicular ultrasound on 10/11 and the CT on 10/15 were unrevealing for a cause of the pain. No swelling was noted on my exam, and patient reported that his pain was relatively well controlled after the hydromorphone in the ED. No suprapubic tenderness or dysuria to suggest UTI, no fevers or chills.  - Continue PTA MS Contin 30mg q8h scheduled  - Continue PTA Oxycodone 10mg q3h PRN  - Pain team consult placed-- patient and his daughter are amenable; they attribute his acute pain flares to missing doses of his scheduled MS Contin or taking it late (would a fentanyl patch help?)     # Malignant obstruction of R ureter   # R hydronephrosis s/p ureteral stent (9/10) and percutaneous nephrostomy (10/1)  - Continue PTA oxybutynin 10mg daily    # Normocytic anemia  # Dark stools  Iron studies done 9/10/2021 consistent with iron deficiency anemia: Iron 15, TIBC normal at 377, ferritin 14.  Hemoglobin on presentation was 7.8, which is stable at his recent baseline of ~7.5-8. Patient does report dark stools for at least the past month. He is on ferrous sulfate, which could explain the dark stools, though he also has a history of GI bleed. I think hemorrhage is less likely given stable hemoglobin.  - Hold PTA ferrous sulfate for now  - Follow up HgB ordered for noon    # Mild hyponatremia  Monitor clinically. In the past month, patient has been running in the high 120s-mid 130s. Suspect ADH release in the context of chronic illness/malignancy.     # Mild hypokalemia (3.3)  - s/p KCl 40meQ in the ED     # Severe malnutrition in the context of chronic illness  - Ensure supplements ordered    Diet: Regular Diet Adult  DVT Prophylaxis: Ambulate every shift  Hamm Catheter: Not present  Central Lines: None  Code Status: Full Code, confirmed with patient and his daughter at the time of admission.  Patient has not completed an advanced directive.    Disposition Plan   Expected  discharge: 10/18/2021 recommended to prior living arrangement once adequate pain management/ tolerating PO medications.     The patient's care was discussed with the patient and his daughter.    Anat Ramires MD  [facility]  Securely message with the Vocera Web Console (learn more here)  Text page via [facility] Paging/Directory  Please see sign in/sign out for up to date coverage information    ______________________________________________________________________    Chief Complaint   Lower abdominal/groin pain     History is obtained from the patient and his daughter, Jass.    History of Present Illness   Essie Guzman is a 66-year-old M with a history of H. pylori infection and metastatic poorly differentiated gastric adenocarcinoma who presented to the ED overnight with uncontrolled lower abdominal and groin pain.    Mr. Guzman was in the ED with his daughter 10/15 due to some leaking around his nephrostomy tube and uncontrolled lower abdominal pain.  A CT of the abdomen pelvis was done, which showed no significant interval change since the patient's last CT, and the patient received some IV hydromorphone.  He was discharged reportedly around 8:00 on the morning of 10/16, and the nephrostomy leaking was attributed to a kink in the external tubing and had resolved by time of discharge.  He went home to rest, and reportedly at about 2 PM that day, he noted that his chronic lower abdominal/groin pain was getting significantly worse.  His daughter gave him his prescribed OxyContin and some oxycodone, neither of which brought the pain under control, and so they came to the emergency department for further evaluation and for pain control.    Patient arrived to the ED afebrile and hemodynamically stable.  EKG with sinus rhythm and PACs, CBC remarkable for a hemoglobin of 7.8 (which appears to be around the patient's recent baseline), CMP significant for mild hyponatremia to 132  "and hypokalemia to 3.3, troponin negative, lactic acid was 0.8.  Mr. Guzman received a 1 mg dose of IV Dilaudid, ondansetron, and KCl replacement.  He reported to me that he felt significantly better in terms of his pain then when he arrived in the ED.  The quality of the pain essentially is unchanged, just that it become significantly worse if he does not keep on top of his pain regimen.  Per his daughter, the pain regimen that he has prescribed works fairly well to keep his malignancy related chronic pain under control, but if he misses a dose or pushes it back, then he gets behind and often times has to present to the ED because the pain becomes excruciating.  Patient notes that he has had the current groin pain for \"a long time now,\" and it is also unchanged from prior except that it was quite severe earlier and now better with the IV hydromorphone.    Patient is being admitted to internal medicine for pain management of his malignancy related abdominal and groin pain.    Review of Systems    Constitutional: Positive for significant weight loss in the past year.   Ears/Nose/Throat: No upper respiratory symptoms.  Respiratory: No trouble breathing,.  Cardiovascular: Patient denied chest pain.  Gastrointestinal: No diarrhea, constipation, nausea, vomiting.  Patient takes MiraLAX daily but does note that he has had some very dark stools for the past few months.  No lynn red blood in his stool.  Genitourinary: No dysuria or trouble urinating.  No changes in color of the urine coming out of the nephrostomy or when patient urinates.  He does notes that sometimes after (but not during) urination, he feels some pain in his testicles.  Is currently not having that pain in his testicles.  Musculoskeletal: Positive for weakness.    Past Medical History    Metastatic gastric adenocarcinoma (diagnosed 11/2020)    Past Surgical History   Past Surgical History:   Procedure Laterality Date     COLONOSCOPY N/A 11/24/2015    " Procedure: COLONOSCOPY;  Surgeon: Clyde Mosher MD;  Location: [facility]     COMBINED CYSTOSCOPY, INSERT STENT URETER(S) Right 9/10/2021    Procedure: CYSTOSCOPY, WITH right URETERAL STENT INSERTION, retrograde pyleogram;  Surgeon: Jez Friedman MD;  Location: [facility]     ESOPHAGOSCOPY, GASTROSCOPY, DUODENOSCOPY (EGD), COMBINED N/A 11/24/2020    Procedure: ESOPHAGOGASTRODUODENOSCOPY with biopsies using cold forceps;  Surgeon: Clyde Mosher MD;  Location: [facility]     ESOPHAGOSCOPY, GASTROSCOPY, DUODENOSCOPY (EGD), COMBINED N/A 12/22/2020    Procedure: ESOPHAGOGASTRODUODENOSCOPY, WITH FINE NEEDLE ASPIRATION BIOPSY, WITH ENDOSCOPIC ULTRASOUND GUIDANCE;  Surgeon: Guru Teri Pedraza MD;  Location: [facility]     IR NEPHROSTOMY TUBE PLACEMENT RIGHT  10/4/2021       Social History   Denies current/past tobacco use. No current/past ETOH use. Patient has several children, and he identifies his daughter (who is at bedside, Youa) as his primary support person and caregiver lately.     Family History   Mother with dementia.  Father with ruptured aneurysm.  No family history of malignancies, heart disease, lung disease.     Prior to Admission Medications   Prior to Admission Medications   Prescriptions Last Dose Informant Patient Reported? Taking?   acetaminophen (TYLENOL) 500 MG tablet 10/15/2021 at 6: 20 PM Daughter No Yes   Sig: Take 2 tablets (1,000 mg) by mouth every 8 hours   Patient taking differently: Take 1,000 mg by mouth every 8 hours as needed    ferrous gluconate (FERGON) 324 (38 Fe) MG tablet 10/15/2021 at am Daughter No Yes   Sig: Take 1 tablet (324 mg) by mouth daily (with breakfast)   ibuprofen (ADVIL/MOTRIN) 200 MG tablet  Daughter No No   Sig: Take 1 tablet (200 mg) by mouth every 6 hours as needed for moderate pain   morphine (MS CONTIN) 30 MG CR tablet Past Week at Unknown time  No Yes   Sig: Take 1 tablet (30 mg) by mouth every 8 hours for 7 days   naloxone (NARCAN) 4 MG/0.1ML nasal  spray 10/15/2021 at 550pm  No Yes   Sig: Spray 1 spray (4 mg) into one nostril alternating nostrils once as needed for opioid reversal every 2-3 minutes until assistance arrives   ondansetron (ZOFRAN-ODT) 4 MG ODT tab  Daughter No No   Sig: Take 1 tablet (4 mg) by mouth every 6 hours as needed for nausea or vomiting   oxyCODONE IR (ROXICODONE) 10 MG tablet Unknown at Unknown time  No Yes   Sig: Take 1-1.5 tablets (10-15 mg) by mouth every 3 hours as needed for moderate to severe pain   oxybutynin ER (DITROPAN-XL) 10 MG 24 hr tablet Unknown at Unknown time Daughter No Yes   Sig: Take 1 tablet (10 mg) by mouth daily   pantoprazole (PROTONIX) 40 MG EC tablet 10/16/2021 at am Daughter No Yes   Sig: Take 1 tablet (40 mg) by mouth daily   polyethylene glycol (MIRALAX) 17 GM/Dose powder 10/15/2021 at Unknown time Daughter No Yes   Sig: Take 17 g by mouth daily   Patient taking differently: Take 17 g by mouth daily Every other day   sennosides (SENOKOT) 8.6 MG tablet Past Week at Unknown time  No Yes   Sig: Take 1-2 tablets by mouth 2 times daily   simethicone (MYLICON) 80 MG chewable tablet Unknown at Unknown time Daughter No Yes   Sig: Take 1 tablet (80 mg) by mouth every 6 hours as needed for cramping   tamsulosin (FLOMAX) 0.4 MG capsule Unknown at Unknown time Daughter No Yes   Sig: Take 1 capsule (0.4 mg) by mouth daily   thiamine (B-1) 100 MG tablet 10/15/2021 at Unknown time Daughter No Yes   Sig: Take 1 tablet (100 mg) by mouth daily      Facility-Administered Medications: None     Allergies   NKDA    Physical Exam   Vital Signs: Temp: 99.1  F (37.3  C) Temp src: Oral   Pulse: 73       O2 Device: None (Room air)    Weight: 113 lbs 15.65 oz    GENERAL: The patient is awake and alert, appears chronically ill with temporal wasting.  HEAD: Atraumatic, normocephalic. Extraocular muscles are intact. Sclerae are muddy without injection or icterus.  NOSE: Without deformity, bleeding or discharge.   LUNGS: Clear to  auscultation bilaterally. No rales, rhonchi or wheezes are appreciated. Good air movement is auscultated in all 4 lung fields.  HEART: Regular rate and rhythm. No rubs or gallops noted. II/VI early systolic murmur heard best at the apex.  No S3, S4 or rub is auscultated.   ABDOMEN: Soft, nondistended. Normal bowel sounds are auscultated.  Patient is mildly tender to deep palpation of the left side of his abdomen and periumbilically.  No suprapubic tenderness.  : Uncircumcised penis.  No scrotal swelling noted.   SKIN: No rashes. No jaundice. Pink and warm with good turgor.     Data   Reviewed.

## 2021-10-17 NOTE — ED NOTES
[facility]   ED Nurse to Floor Handoff     Essie Guzman is a 66 year old male who speaks Hmong and lives with family members,  in a home  They arrived in the ED by ambulance from home    ED Chief Complaint: Abdominal Pain and Nausea    ED Dx;   Final diagnoses:   Abdominal pain, generalized         Needed?: Yes Hmong    Allergies: No Known Allergies.  Past Medical Hx: History reviewed. No pertinent past medical history.   Baseline Mental status: WDL  Current Mental Status changes: at basesline    Infection present or suspected this encounter: no  Sepsis suspected: No  Isolation type: No active isolations  Patient tested for COVID 19 prior to admission: NO     Activity level - Baseline/Home:  Stand with Assist  Activity Level - Current:   Stand with Assist    Bariatric equipment needed?: No    In the ED these meds were given:   Medications   ondansetron (ZOFRAN) injection 4 mg (4 mg Intravenous Not Given 10/17/21 0134)   acetaminophen (TYLENOL) tablet 1,000 mg (has no administration in time range)   morphine (MS CONTIN) 12 hr tablet 30 mg (has no administration in time range)   oxybutynin ER (DITROPAN-XL) 24 hr tablet 10 mg (has no administration in time range)   pantoprazole (PROTONIX) EC tablet 40 mg (has no administration in time range)   polyethylene glycol (MIRALAX) Packet 17 g (has no administration in time range)   sennosides (SENOKOT) tablet 1-2 tablet (has no administration in time range)   simethicone (MYLICON) chewable tablet 80 mg (has no administration in time range)   tamsulosin (FLOMAX) capsule 0.4 mg (has no administration in time range)   thiamine (B-1) tablet 100 mg (has no administration in time range)   melatonin tablet 1 mg (has no administration in time range)   ondansetron (ZOFRAN-ODT) ODT tab 4 mg (has no administration in time range)     Or   ondansetron (ZOFRAN) injection 4 mg (has no administration in time range)   oxyCODONE IR  (ROXICODONE) tablet 10 mg (has no administration in time range)   HYDROmorphone (DILAUDID) injection 1 mg (1 mg Intravenous Given 10/17/21 0134)   potassium chloride (KAYCIEL) solution 40 mEq (40 mEq Oral Given 10/17/21 0621)   HYDROmorphone (PF) (DILAUDID) injection 0.5 mg (0.5 mg Intravenous Given 10/17/21 0621)       Drips running?  No    Home pump  No    Current LDAs  Peripheral IV 10/17/21 Anterior;Left Lower forearm (Active)   Site Assessment [facility] 10/17/21 0032   Line Status Saline locked 10/17/21 0032   Dressing Intervention New dressing  10/17/21 0032   Phlebitis Scale 0-->no symptoms 10/17/21 0032   Infiltration Scale 0 10/17/21 0032   Infiltration Site Treatment Method  None 10/17/21 0032   Number of days: 0       Nephrostomy 1 Right (Active)   Site Description WDL 10/17/21 0005   Dressing Status Normal: Clean, Dry & Intact 10/17/21 0005   Number of days: 13       Labs results:   Labs Ordered and Resulted from Time of ED Arrival Up to the Time of Departure from the ED   COMPREHENSIVE METABOLIC PANEL - Abnormal; Notable for the following components:       Result Value    Sodium 132 (*)     Potassium 3.3 (*)     Calcium 8.3 (*)     Glucose 104 (*)     Protein Total 6.6 (*)     Albumin 2.1 (*)     All other components within normal limits   CBC WITH PLATELETS AND DIFFERENTIAL - Abnormal; Notable for the following components:    RBC Count 2.96 (*)     Hemoglobin 7.8 (*)     Hematocrit 25.0 (*)     MCH 26.4 (*)     MCHC 31.2 (*)     RDW 17.3 (*)     Absolute Lymphocytes 0.7 (*)     All other components within normal limits   TROPONIN I - Normal   LACTIC ACID WHOLE BLOOD - Normal   ROUTINE [facility] WITH MICROSCOPIC REFLEX TO CULTURE   COVID-19 VIRUS (CORONAVIRUS) BY PCR   IP ASSIGN PROVIDER TEAM TO TREATMENT TEAM   PERIPHERAL IV CATHETER   OBSERVATION GOALS   ASSESS   NOTIFY PHYSICIAN   MEASURE HEIGHT AND WEIGHT   VITAL SIGNS   NOTIFY   NOTIFY   ACTIVITY   CBC WITH PLATELETS & DIFFERENTIAL    Narrative:     The  "following orders were created for panel order CBC with platelets differential.  Procedure                               Abnormality         Status                     ---------                               -----------         ------                     CBC with platelets and d...[[id number]]  Abnormal            Final result                 Please view results for these tests on the individual orders.       Imaging Studies:   Recent Results (from the past 24 hour(s))   XR Chest 2 Views    Narrative    EXAM: XR CHEST 2 VW  LOCATION: [facility]  DATE/TIME: 10/17/2021 12:47 AM    INDICATION: chest pain  COMPARISON: Abdominal CT from 10/16/2021      Impression    IMPRESSION: Heart size is normal. Left basilar consolidation and small pleural effusion. Bandlike scarring in the right midlung. Right lung otherwise radiographically clear. No visible pneumothorax. Percutaneous nephrostomy tube overlies the right upper   quadrant.       Recent vital signs:   BP (!) 140/101   Pulse 79   Temp 99.1  F (37.3  C) (Oral)   Resp 16   Ht 1.549 m (5' 1\")   Wt 51.7 kg (113 lb 15.7 oz)   SpO2 98%   BMI 21.54 kg/m      Michelle Coma Scale Score: 15 (10/16/21 2354)       Cardiac Rhythm:  Pt needs tele? No  Skin/wound Issues: None    Code Status: Full Code    Pain control: fair    Nausea control: pt had none    Abnormal labs/tests/findings requiring intervention:  See epic    Family present during ED course? Yes   Family Comments/Social Situation comments:  N/A    Tasks needing completion: None    Shania Chavez RN  [phone] [facility] ED      "

## 2021-10-17 NOTE — PROGRESS NOTES
[facility]    Medicine Progress Note - Hospitalist Service, Gold 11       Date of Admission:  10/16/2021    Assessment & Plan          Essie Guzman is a 66-year-old M with a history of metastatic gastric adenocarcinoma complicated by malignant obstruction of the right kidney resulting in hydronephrosis s/p right ureteral stent (9/10) and PCN (10/1) who presented to the ED on 10/17 for acute on chronic malignancy related abdominal and groin pain, which has since improved significantly with IV opiates in the ED.  He is being admitted for pain control and optimization of his outpatient oral pain regimen.       # Staph epi PNT pyuria, RIGHT PNT  - Start IV Cefepime, ID consult  - CT did not show hydronephrosis  - Monitor PNT drainage, Follow PNT cultures    # Poorly differentiated metastatic gastric adenocarcinoma (patient has not begun pembrolizumab infusions yet due to insurance related issues)  # Malignancy-related chronic abdominal pain  - Still with inadequate pain control, start prn IV Dilaudid    # Malignant obstruction of R ureter   # R hydronephrosis s/p ureteral stent (9/10) and percutaneous nephrostomy (10/1)  - Continue PTA oxybutynin 10mg daily     # Normocytic anemia  # Dark stools  Iron studies done 9/10/2021 consistent with iron deficiency anemia: Iron 15, TIBC normal at 377, ferritin 14.  Hemoglobin on presentation was 7.8, which is stable at his recent baseline of ~7.5-8. Patient does report dark stools for at least the past month. He is on ferrous sulfate, which could explain the dark stools, though he also has a history of GI bleed. I think hemorrhage is less likely given stable hemoglobin.  - Hold PTA ferrous sulfate for now  - Follow up HgB ordered for noon     # Mild hyponatremia  Monitor clinically. In the past month, patient has been running in the high 120s-mid 130s. Suspect ADH release in the context of chronic illness/malignancy.      # Mild  hypokalemia (3.3)  - s/p KCl 40meQ in the ED      # Severe malnutrition in the context of chronic illness  - Ensure supplements ordered         Diet: Regular Diet Adult  Snacks/Supplements Adult: Ensure Clear; Between Meals    DVT Prophylaxis: Low Risk/Ambulatory with no VTE prophylaxis indicated  Hamm Catheter: Not present  Central Lines: None  Code Status: Full Code      Disposition Plan   Expected discharge: 10/18/2021   recommended to prior living arrangement once adequate pain management/ tolerating PO medications and antibiotic plan established.     The patient's care was discussed with the Bedside Nurse, Patient and Patient's Family.    Gennaro Ruano MD  Hospitalist Service, [address]  Securely message with the Vocera Web Console (learn more here)  Text page via [employer] Paging/Directory  Please see sign in/sign out for up to date coverage information    Clinically Significant Risk Factors Present on Admission         # Hyponatremia: Na = 132 mmol/L (Ref range: 133 - 144 mmol/L) on admission, will monitor as appropriate          ______________________________________________________________________    Interval History   Seen today for follow up: cancer related pain, pyuria, pain control, Gastric CA    No acute overnight events per patient or patient's family.    C/o dysuria, c/o suprapubic pain  C/o abdominal pain    Discussed with daughter on the phone and pt at his bed on 7D unit.      As of today/at time of my visit:  Patient denies any headache, sore throat  Patient denies any sleeping disturbance  Patient denies any nausea or vomiting  Patient denies any shortness of breath   Patient denies any chest pain  Patient reports no arm or leg edema      Data reviewed today: I reviewed all medications, new labs and imaging results over the last 24 hours. I personally reviewed no images or EKG's today.    Physical Exam   Vital Signs: Temp: 98.3  F (36.8  C)  Temp src: Oral BP: (!) 149/88 Pulse: 66   Resp: 18 SpO2: 99 % O2 Device: None (Room air)    Weight: 114 lbs 3.17 oz    General: Alert awake and oriented, no distress, thin appearing  Eyes: Normal pink mucosa with no signs of pallor, no scleral icterus.  Neck: No significant lymphadenopathy, no palpable LN, No JVD  Heart: Regular rate and rhythm, normal S1, normal S2, no murmurs rubs or gallops, PMI is nondisplaced   Peripherally there is no edema  Lungs: No increased work of breathing, good effort, lungs are clear to auscultation bilaterally   No wheezing or crackles   Breathing on room air  Abdomen: suprapubic area is tender, nondistended, normal active bowel sounds, no palpable masses   No scrotal edema noted  Extremities: Normal capillary refill, no edema, no clubbing, no cyanosis  Neuro: Alert and oriented to person place location and situation   Grossly arms and legs are without any focal motor or sensory deficits   Gait was not assessed   Cranial nerves II through XII are grossly intact  Psych: No acute psychosis, fair mood      Data   Recent Labs   Lab 10/17/21  0128 10/16/21  0717 10/15/21  2255 10/15/21  2254 10/15/21  1434 10/15/21  1434 10/12/21  0107 10/11/21  2113 10/11/21  1232   WBC 7.9  --   --  10.2  --  9.2   < >  --  8.2   HGB 7.8*  --   --  8.7*  --  9.0*   < >  --  7.8*   MCV 85  --   --  87  --  86   < >  --  86     --   --  494*  --  485*   < >  --  356   INR  --   --   --   --   --   --   --  1.08  --    *  --  131*  --   --  134   < >  --  132*   POTASSIUM 3.3* 5.4* 5.6*  --    < > 4.1   < >  --  4.0   CHLORIDE 98  --  100  --   --  101   < >  --  101   CO2 30  --  26  --   --  28   < >  --  27   BUN 13  --  16  --   --  16   < >  --  23   CR 0.71  --  0.71  --   --  0.79   < >  --  0.87   ANIONGAP 4  --  5  --   --  5   < >  --  4   HAWA 8.3*  --  8.8  --   --  9.0   < >  --  9.0   *  --  110*  --   --  106*   < >  --  123*   ALBUMIN 2.1*  --  2.3*  --    < > 2.4*   < >   --  3.2*   PROTTOTAL 6.6*  --  7.8  --    < > 7.3   < >  --  7.9   BILITOTAL 0.3  --  0.4  --    < > 0.3   < >  --  0.2   ALKPHOS 63  --  74  --    < > 78   < >  --  80   ALT 9  --  21  --    < > 16   < >  --  16   AST 13  --  76*  --    < > 15   < >  --  22   LIPASE  --   --   --   --   --   --   --   --  87   TROPONIN <0.015  --   --   --   --   --   --   --   --     < > = values in this interval not displayed.     Recent Results (from the past 24 hour(s))   XR Chest 2 Views    Narrative    EXAM: XR CHEST 2 VW  LOCATION: [facility]  DATE/TIME: 10/17/2021 12:47 AM    INDICATION: chest pain  COMPARISON: Abdominal CT from 10/16/2021      Impression    IMPRESSION: Heart size is normal. Left basilar consolidation and small pleural effusion. Bandlike scarring in the right midlung. Right lung otherwise radiographically clear. No visible pneumothorax. Percutaneous nephrostomy tube overlies the right upper   quadrant.     Medications       ceFEPIme (MAXIPIME) IV  1 g Intravenous Q24H     morphine  30 mg Oral Q8H     ondansetron  4 mg Intravenous Once     oxybutynin ER  10 mg Oral Daily     pantoprazole  40 mg Oral QAM AC     polyethylene glycol  17 g Oral Daily     sennosides  1-2 tablet Oral BID     tamsulosin  0.4 mg Oral Daily     thiamine  100 mg Oral Daily

## 2021-10-17 NOTE — CONSULTS
Consult Note - Inpatient Pain Management Service    Date of Admission: 10/16/2021   Date: 10/17/21   Consulted by provider/service: Medicine  Consult reason: Acute on Chronic Abdominal Pain    Assessment/Recommendations   ASSESSMENT  Essie Guzman is a 66 year old male with history of metastatic gastric adenocarcinoma who presents today with acute on chronic abdominal pain.     PDMP reviewed on 10/17/21  MME: 92.33 mg daily.   30 mg ER Morphine Q8H, Oxycodone 10 mg Q3H/PRN     Person responsible for prescribing: Serge Kimble Pain Provider: No consistent prescriber      RECOMMENDATIONS:  1. Schedule Acetaminophen 650 mg Q6H  2. Continue home MS Contin Q8H  3. Continue oxycodone PRN  4. Consider addition of gabapentin 100 mg Q8H  5. Consider addition of tizanidine 2 mg Q12H/PRN  6. Given opioid tolerance, consider ketamine infusion 5 mg/hr  7. Refer to [facility] phone number [phone] for outpatient intervention.    Pain Service will continue to follow.    Thank you for the opportunity to participate in the care of Essie Guzman.   Tung Louis MD    Contact Information:  Mon - Friday 8 AM - 3 PM: Daytime Pager:  [phone]  Send text msg via [employer] paging directory     After-hours, Weekends and Holidays: Call [facility] Answering Service: [phone] Primary Service provider should call and request to speak with the on-call pain specialist   ___________     CHIEF COMPLAINT: Abdominal Pain    History of Present Illness   IDENTIFICATION: Essie Guzman is a 66 year old male with history of metastatic gastric adenocarcinoma complicated by malignant obstruction of the right kidney resulting in hydronephrosis s/p right ureteral stent (9/10) and PCN (10/1) who presented to the ED on 10/17 for acute on chronic abdominal pain. He has been using his ER morphine and oxycodone for >3 weeks without much benefit as anagesic efficacy is short-lived. His baseline pain at home is 8/10 with medication and  is intermittent in nature, with radiation to his groin. His scheduled for chemotherapy and has had prior hospitalizations in the past for missing a dose of his opioid medications.    Character is sharp  Pain intensity is 9/10 on a scale of 0-10.   Alleviating factors include None  Aggravating factors include None      Medications related to Pain Management (From now, onward)    Start     Dose/Rate Route Frequency Ordered Stop    10/17/21 1054  oxyCODONE (ROXICODONE) tablet 5-10 mg      5-10 mg Oral EVERY 3 HOURS PRN 10/17/21 1054      10/17/21 1054  HYDROmorphone (PF) (DILAUDID) injection 0.5 mg      0.5 mg Intravenous EVERY 4 HOURS PRN 10/17/21 1054      10/17/21 0800  acetaminophen (TYLENOL) tablet 1,000 mg      1,000 mg Oral EVERY 8 HOURS PRN 10/17/21 0611      10/17/21 0800  morphine (MS CONTIN) 12 hr tablet 30 mg      30 mg Oral EVERY 8 HOURS 10/17/21 0611      10/17/21 0800  polyethylene glycol (MIRALAX) Packet 17 g      17 g Oral DAILY 10/17/21 0611      10/17/21 0800  sennosides (SENOKOT) tablet 1-2 tablet      1-2 tablet Oral 2 TIMES DAILY 10/17/21 0611      10/17/21 0611  simethicone (MYLICON) chewable tablet 80 mg      80 mg Oral EVERY 6 HOURS PRN 10/17/21 0611               Medical History  Surgical History   History reviewed. No pertinent past medical history.  Past Surgical History:   Procedure Laterality Date     COLONOSCOPY N/A 11/24/2015    Procedure: COLONOSCOPY;  Surgeon: Clyde Mosher MD;  Location:  GI     COMBINED CYSTOSCOPY, INSERT STENT URETER(S) Right 9/10/2021    Procedure: CYSTOSCOPY, WITH right URETERAL STENT INSERTION, retrograde pyleogram;  Surgeon: Jez Friedman MD;  Location: [facility] OR     ESOPHAGOSCOPY, GASTROSCOPY, DUODENOSCOPY (EGD), COMBINED N/A 11/24/2020    Procedure: ESOPHAGOGASTRODUODENOSCOPY with biopsies using cold forceps;  Surgeon: Clyde oMsher MD;  Location:  GI     ESOPHAGOSCOPY, GASTROSCOPY, DUODENOSCOPY (EGD), COMBINED N/A 12/22/2020    Procedure:  ESOPHAGOGASTRODUODENOSCOPY, WITH FINE NEEDLE ASPIRATION BIOPSY, WITH ENDOSCOPIC ULTRASOUND GUIDANCE;  Surgeon: Guru Teri Pedraza MD;  Location: [facility] GI     IR NEPHROSTOMY TUBE PLACEMENT RIGHT  10/4/2021         Family History Social History   Family History   Problem Relation Age of Onset     Asthma No family hx of      Diabetes No family hx of      Hypertension No family hx of      Cerebrovascular Disease No family hx of      Cancer No family hx of      Colon Cancer No family hx of      Anesthesia Reaction No family hx of      Deep Vein Thrombosis (DVT) No family hx of         Social History     Socioeconomic History     Marital status:      Spouse name: Not on file     Number of children: Not on file     Years of education: Not on file     Highest education level: Not on file   Occupational History     Not on file   Tobacco Use     Smoking status: Never Smoker     Smokeless tobacco: Never Used   Vaping Use     Vaping Use: Never used   Substance and Sexual Activity     Alcohol use: No     Drug use: No     Sexual activity: Yes     Partners: Female   Other Topics Concern     Parent/sibling w/ CABG, MI or angioplasty before 65F 55M? No   Social History Narrative     Not on file     Social Determinants of Health     Financial Resource Strain:      Difficulty of Paying Living Expenses:    Food Insecurity:      Worried About Running Out of Food in the Last Year:      Ran Out of Food in the Last Year:    Transportation Needs:      Lack of Transportation (Medical):      Lack of Transportation (Non-Medical):    Physical Activity:      Days of Exercise per Week:      Minutes of Exercise per Session:    Stress:      Feeling of Stress :    Social Connections:      Frequency of Communication with Friends and Family:      Frequency of Social Gatherings with Friends and Family:      Attends Zoroastrianism Services:      Active Member of Clubs or Organizations:      Attends Club or Organization Meetings:       Marital Status:    Intimate Partner Violence:      Fear of Current or Ex-Partner:      Emotionally Abused:      Physically Abused:      Sexually Abused:            Medications  Allergies   No current facility-administered medications on file prior to encounter.  acetaminophen (TYLENOL) 500 MG tablet, Take 2 tablets (1,000 mg) by mouth every 8 hours (Patient taking differently: Take 1,000 mg by mouth every 8 hours as needed )  ferrous gluconate (FERGON) 324 (38 Fe) MG tablet, Take 1 tablet (324 mg) by mouth daily (with breakfast)  ibuprofen (ADVIL/MOTRIN) 200 MG tablet, Take 1 tablet (200 mg) by mouth every 6 hours as needed for moderate pain  morphine (MS CONTIN) 30 MG CR tablet, Take 1 tablet (30 mg) by mouth every 8 hours for 7 days  naloxone (NARCAN) 4 MG/0.1ML nasal spray, Spray 1 spray (4 mg) into one nostril alternating nostrils once as needed for opioid reversal every 2-3 minutes until assistance arrives  ondansetron (ZOFRAN-ODT) 4 MG ODT tab, Take 1 tablet (4 mg) by mouth every 6 hours as needed for nausea or vomiting  oxybutynin ER (DITROPAN-XL) 10 MG 24 hr tablet, Take 1 tablet (10 mg) by mouth daily  oxyCODONE IR (ROXICODONE) 10 MG tablet, Take 1-1.5 tablets (10-15 mg) by mouth every 3 hours as needed for moderate to severe pain  pantoprazole (PROTONIX) 40 MG EC tablet, Take 1 tablet (40 mg) by mouth daily  polyethylene glycol (MIRALAX) 17 GM/Dose powder, Take 17 g by mouth daily (Patient taking differently: Take 17 g by mouth daily Every other day)  sennosides (SENOKOT) 8.6 MG tablet, Take 1-2 tablets by mouth 2 times daily  simethicone (MYLICON) 80 MG chewable tablet, Take 1 tablet (80 mg) by mouth every 6 hours as needed for cramping  tamsulosin (FLOMAX) 0.4 MG capsule, Take 1 capsule (0.4 mg) by mouth daily  thiamine (B-1) 100 MG tablet, Take 1 tablet (100 mg) by mouth daily       No Known Allergies      Exam/ROS   VITALS:   BP (!) 149/88 (BP Location: Right arm)   Pulse 66   Temp 98.3  F (36.8  C)  "(Oral)   Resp 18   Ht 1.549 m (5' 1\")   Wt 51.8 kg (114 lb 3.2 oz)   SpO2 99%   BMI 21.58 kg/m      PHYSICAL EXAM  General Appearance:   Awake, alert, oriented  emaciated   ENT/Mouth: Atraumatic, normocephalic   EYES:  no scleral icterus, normal conjunctivae   Neck:    Chest/Lungs:   lungs are clear to auscultation bilaterally   Cardiovascular:   RRR, no evidence of r/g/m   Abdomen:  Soft, non-distended   Extremities: no joint deformities   Skin: no suspicious rashes or lesions    Neurologic: EOMI   Psychiatric: alert and oriented x3, calm, pleasant, cooperative    REVIEW OF SYSTEMS  10 point ROS neg other than the symptoms noted above in the HPI.   Skin: negative  Eyes: negative  Ears/Nose/Throat: negative  Respiratory: No shortness of breath, dyspnea on exertion, cough, or hemoptysis  Cardiovascular: negative  Gastrointestinal: negative  Genitourinary: negative  Musculoskeletal: negative  Neurologic: negative  Psychiatric: negative  Hematologic/Lymphatic/Immunologic: negative  Endocrine: negative         Lab Results   Chemistry CBC PTT/INR   Recent Labs   Lab Test 10/17/21  0128   *   POTASSIUM 3.3*   CHLORIDE 98   CO2 30   *   BUN 13   CR 0.71   GFRESTIMATED >90   HAWA 8.3*     Recent Labs   Lab Test 10/17/21  0128 10/15/21  2255 10/15/21  1434   CR 0.71 0.71 0.79    Recent Labs   Lab Test 10/17/21  0128   WBC 7.9   HGB 7.8*   HCT 25.0*   MCV 85       Recent Labs   Lab Test 10/11/21  2113   PTT 32     Recent Labs   Lab Test 10/11/21  2113   INR 1.08                "

## 2021-10-17 NOTE — PLAN OF CARE
Essie was admitted for uncontrolled back/abdominal pain. He was given Tylenol and IV Dilaudid with good relief of pain. Please offer Oxycodone instead of IV Dilaudid. Denied nausea. Placed on Fall's precautions and is up with standby assistance. Started on Cefepime. He has a left P.N.T. He speaks Hmong, but understands some English and may need an  for medica; information.

## 2021-10-17 NOTE — ED PROVIDER NOTES
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 Was evaluated in the ED 10/16 evening for severe abdominal pain. CT did not show obvious structural source of pain. Was noted to have hyperkalemia. Daughter is present in the room and expresses concern about being able to manage pain at home, does note caregiver fatigue with increased frequency of visits. Does have palliative care meeting schedule in the next 1-2 weeks. Does have subjective fever and mild dysuria. Endorses chest pain that radiates from the epigastric area.     Past Medical History  History reviewed. No pertinent past medical history.  Past Surgical History:   Procedure Laterality Date     COLONOSCOPY N/A 11/24/2015    Procedure: COLONOSCOPY;  Surgeon: Clyde Mosher MD;  Location:  GI     COMBINED CYSTOSCOPY, INSERT STENT URETER(S) Right 9/10/2021    Procedure: CYSTOSCOPY, WITH right URETERAL STENT INSERTION, retrograde pyleogram;  Surgeon: Jez Friedman MD;  Location: [facility]     ESOPHAGOSCOPY, GASTROSCOPY, DUODENOSCOPY (EGD), COMBINED N/A 11/24/2020    Procedure: ESOPHAGOGASTRODUODENOSCOPY with biopsies using cold forceps;  Surgeon: Clyde Mosher MD;  Location:  GI     ESOPHAGOSCOPY, GASTROSCOPY, DUODENOSCOPY (EGD), COMBINED N/A 12/22/2020    Procedure: ESOPHAGOGASTRODUODENOSCOPY, WITH FINE NEEDLE ASPIRATION BIOPSY, WITH ENDOSCOPIC ULTRASOUND GUIDANCE;  Surgeon: Guru Teri Pedraza MD;  Location: [facility] GI     IR NEPHROSTOMY TUBE PLACEMENT RIGHT  10/4/2021     acetaminophen  (TYLENOL) 500 MG tablet  ferrous gluconate (FERGON) 324 (38 Fe) MG tablet  morphine (MS CONTIN) 30 MG CR tablet  naloxone (NARCAN) 4 MG/0.1ML nasal spray  oxybutynin ER (DITROPAN-XL) 10 MG 24 hr tablet  oxyCODONE IR (ROXICODONE) 10 MG tablet  pantoprazole (PROTONIX) 40 MG EC tablet  polyethylene glycol (MIRALAX) 17 GM/Dose powder  sennosides (SENOKOT) 8.6 MG tablet  simethicone (MYLICON) 80 MG chewable tablet  tamsulosin (FLOMAX) 0.4 MG capsule  thiamine (B-1) 100 MG tablet  ibuprofen (ADVIL/MOTRIN) 200 MG tablet  ondansetron (ZOFRAN-ODT) 4 MG ODT tab      No Known Allergies  Family History  Family History   Problem Relation Age of Onset     Asthma No family hx of      Diabetes No family hx of      Hypertension No family hx of      Cerebrovascular Disease No family hx of      Cancer No family hx of      Colon Cancer No family hx of      Anesthesia Reaction No family hx of      Deep Vein Thrombosis (DVT) No family hx of      Social History   Social History     Tobacco Use     Smoking status: Never Smoker     Smokeless tobacco: Never Used   Vaping Use     Vaping Use: Never used   Substance Use Topics     Alcohol use: No     Drug use: No      Past medical history, past surgical history, medications, allergies, family history, and social history were reviewed with the patient. No additional pertinent items.       Review of Systems   Constitutional: Positive for fatigue and fever. Negative for chills.   Respiratory: Positive for shortness of breath. Negative for chest tightness.    Cardiovascular: Positive for chest pain. Negative for palpitations.   Gastrointestinal: Positive for abdominal pain. Negative for abdominal distention, anal bleeding, blood in stool, constipation, diarrhea, nausea and vomiting.   Genitourinary: Positive for dysuria, flank pain and scrotal swelling. Negative for difficulty urinating, frequency, hematuria and urgency.   Musculoskeletal: Positive for back pain. Negative for arthralgias and  "myalgias.   Skin: Negative for rash.   Neurological: Negative for dizziness, light-headedness and headaches.   All other systems reviewed and are negative.    A complete review of systems was performed with pertinent positives and negatives noted in the HPI, and all other systems negative.    Physical Exam   Pulse: 73  Temp: 99.1  F (37.3  C)  Height: 154.9 cm (5' 1\")  Weight: 51.7 kg (113 lb 15.7 oz)  Physical Exam  Vitals and nursing note reviewed.   Constitutional:       Appearance: He is well-developed.   HENT:      Head: Normocephalic and atraumatic.      Mouth/Throat:      Pharynx: Oropharynx is clear. No pharyngeal swelling or oropharyngeal exudate.      Comments: Dry mucus membranes  Eyes:      General: No scleral icterus.     Extraocular Movements: Extraocular movements intact.      Pupils: Pupils are equal, round, and reactive to light.   Cardiovascular:      Rate and Rhythm: Normal rate and regular rhythm.      Heart sounds: Normal heart sounds. No murmur heard.   No friction rub. No gallop.    Pulmonary:      Effort: Pulmonary effort is normal. No respiratory distress.      Breath sounds: Normal breath sounds. No stridor. No wheezing, rhonchi or rales.   Abdominal:      General: Abdomen is flat. Bowel sounds are normal. There is no distension.      Palpations: Abdomen is soft. There is no hepatomegaly, splenomegaly or mass.      Tenderness: There is abdominal tenderness in the right upper quadrant, right lower quadrant, epigastric area and left upper quadrant. There is right CVA tenderness and left CVA tenderness.      Hernia: No hernia is present.      Comments: Nephrostomy tube in right flank, c/d/i without erythema, draining clear, light yellow urine. CVA tenderness bilaterally, more so on the right.    Skin:     General: Skin is warm and dry.      Capillary Refill: Capillary refill takes more than 3 seconds.      Findings: No erythema.      Comments: Increased skin turgor    Neurological:      " General: No focal deficit present.      Mental Status: He is alert.       ED Course     ED Course as of Oct 17 0317   Sun Oct 17, 2021   0126 Sinus rhythm with PACs, otherwise unchanged from previous EKG.    EKG 12-lead, tracing only   0127 Left basilar consolidation and small pleural effusion. Bandlike scarring in the right midlung. Otherwise unremarkable.    XR Chest 2 Views   0215 No leukocytosis, otherwise unremarkable.    CBC with platelets differential(!)   0223 Mild hyponatremia (stable) and mild hypokalemia (stable). Otherwise unremarkable.    Comprehensive metabolic panel(!)   0224 wnl   Lactic acid whole blood   0224 negative   Troponin I     Procedures            EKG Interpretation:      Interpreted by Gennaro Mckeon MD  Time reviewed: 0100  Symptoms at time of EKG: chest pain   Rhythm: normal sinus   Rate: Normal  Axis: Normal  Ectopy: premature atrial contraction  Conduction: normal  ST Segments/ T Waves: No acute ischemic changes  Q Waves: nonspecific  Comparison to prior: Unchanged    Clinical Impression: no acute changes                   Results for orders placed or performed during the hospital encounter of 10/16/21   XR Chest 2 Views     Status: None    Narrative    EXAM: XR CHEST 2 VW  LOCATION: [facility]  DATE/TIME: 10/17/2021 12:47 AM    INDICATION: chest pain  COMPARISON: Abdominal CT from 10/16/2021      Impression    IMPRESSION: Heart size is normal. Left basilar consolidation and small pleural effusion. Bandlike scarring in the right midlung. Right lung otherwise radiographically clear. No visible pneumothorax. Percutaneous nephrostomy tube overlies the right upper   quadrant.   Comprehensive metabolic panel     Status: Abnormal   Result Value Ref Range    Sodium 132 (L) 133 - 144 mmol/L    Potassium 3.3 (L) 3.4 - 5.3 mmol/L    Chloride 98 94 - 109 mmol/L    Carbon Dioxide (CO2) 30 20 - 32 mmol/L    Anion Gap 4 3 - 14 mmol/L    Urea Nitrogen  13 7 - 30 mg/dL    Creatinine 0.71 0.66 - 1.25 mg/dL    Calcium 8.3 (L) 8.5 - 10.1 mg/dL    Glucose 104 (H) 70 - 99 mg/dL    Alkaline Phosphatase 63 40 - 150 U/L    AST 13 0 - 45 U/L    ALT 9 0 - 70 U/L    Protein Total 6.6 (L) 6.8 - 8.8 g/dL    Albumin 2.1 (L) 3.4 - 5.0 g/dL    Bilirubin Total 0.3 0.2 - 1.3 mg/dL    GFR Estimate >90 >60 mL/min/1.73m2   Troponin I     Status: Normal   Result Value Ref Range    Troponin I <0.015 0.000 - 0.045 ug/L   CBC with platelets and differential     Status: Abnormal   Result Value Ref Range    WBC Count 7.9 4.0 - 11.0 10e3/uL    RBC Count 2.96 (L) 4.40 - 5.90 10e6/uL    Hemoglobin 7.8 (L) 13.3 - 17.7 g/dL    Hematocrit 25.0 (L) 40.0 - 53.0 %    MCV 85 78 - 100 fL    MCH 26.4 (L) 26.5 - 33.0 pg    MCHC 31.2 (L) 31.5 - 36.5 g/dL    RDW 17.3 (H) 10.0 - 15.0 %    Platelet Count 429 150 - 450 10e3/uL    % Neutrophils 84 %    % Lymphocytes 9 %    % Monocytes 7 %    % Eosinophils 0 %    % Basophils 0 %    % Immature Granulocytes 0 %    NRBCs per 100 WBC 0 <1 /100    Absolute Neutrophils 6.5 1.6 - 8.3 10e3/uL    Absolute Lymphocytes 0.7 (L) 0.8 - 5.3 10e3/uL    Absolute Monocytes 0.6 0.0 - 1.3 10e3/uL    Absolute Eosinophils 0.0 0.0 - 0.7 10e3/uL    Absolute Basophils 0.0 0.0 - 0.2 10e3/uL    Absolute Immature Granulocytes 0.0 <=0.0 10e3/uL    Absolute NRBCs 0.0 10e3/uL   Lactic acid whole blood     Status: Normal   Result Value Ref Range    Lactic Acid 0.8 0.7 - 2.0 mmol/L   EKG 12-lead, tracing only     Status: None (Preliminary result)   Result Value Ref Range    Systolic Blood Pressure  mmHg    Diastolic Blood Pressure  mmHg    Ventricular Rate 72 BPM    Atrial Rate 72 BPM    SC Interval 134 ms    QRS Duration 88 ms     ms    QTc 398 ms    P Axis 13 degrees    R AXIS -12 degrees    T Axis 40 degrees    Interpretation ECG       Sinus rhythm with Premature atrial complexes  Possible Anterior infarct , age undetermined  Abnormal ECG     CBC with platelets differential      Status: Abnormal    Narrative    The following orders were created for panel order CBC with platelets differential.  Procedure                               Abnormality         Status                     ---------                               -----------         ------                     CBC with platelets and d...[[id number]]  Abnormal            Final result                 Please view results for these tests on the individual orders.     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 Differential diagnosis includes ureteral obstruction, nephrolithiasis, pyelonephritis, pancreatitis, GERD. Given the pain is similar to presentation from 10/16 but unresponsive to at home pain meds and recent CT without abnormalities, unlikely that there is a new onset renal pathology. Possible to have pancreatitis or GERD with malnutrition status.    EKG shows sinus rhythm with PACs, no acute ischemic changes when compared to previous EKG.     Laboratory results significant for CBC unremarkable, stable mild hyponatremia, mild hypokalemia, lactate wnl, troponin wnl.     CXR shows left basilar consolidation and small pleural effusion, possible source of chest pain. Also shows bandlike scarring of the right midlung. Otherwise unremarkable. Nephrostomy tube in RUQ of abdomen.     Was able to get pain relief with IV dilaudid, patient finally able to rest.     Discussed with results with daughter, work up  unremarkable today. Pain on presentation likely due to poorly managed at home regimen. Daughter concerned that if they were to discharge from the ED they would return tomorrow due to poor pain control again. Does not feel that this is sustainable.     Patient understanding and agreeable to admit to internal medicine for optimization of pain control.     I have reviewed the nursing notes. I have reviewed the findings, diagnosis, plan and need for follow up with the patient.    New Prescriptions    No medications on file       Final diagnoses:   Abdominal pain, generalized     Sussy Allyson, MS4  [employer]    --  --    ED Attending Physician Attestation    I Gennaro Mckeon MD, cared for this patient with the Medical Student. I performed, or re-performed, the physical exam and medical decision-making. I have verified the accuracy of all the medical student findings and documentation above, and have edited as necessary.    Summary of HPI, PE, ED Course   Patient is a 66 year old male evaluated in the emergency department for abd pain, failure to thrive. Exam notable for abd pain LUQ. ED course notable for inadequate pain relief, hypokalemia. After the completion of care in the emergency department, the patient was admitted to observation.    Critical Care & Procedures  Not applicable.    Medical Decision Making  The medical record was reviewed and interpreted.  Current labs reviewed and interpreted.  Previous labs reviewed and interpreted.  EKG reviewed and interpreted: sinus rhythm, no ischemic changes.      Gennaro Mckeon MD  Emergency Medicine       Gennaro Mckeon  [facility] EMERGENCY DEPARTMENT  10/16/2021     Gennaro Mckeon MD  10/17/21 0430

## 2021-10-17 NOTE — CONSULTS
"    [facility] ID Service: Initial Consultation     Patient:  Essie Guzman, Date of birth 1955, Medical record number [id number]  Date of Visit:  October 17, 2021  Consult Requested by: Gennaro Ruano MD         Assessment and Recommendations:   Problem List:  1. Right PNT culture growing Staphylococcus epidermidis  2. Chronic, progressive abdominal pain  3. Prior diagnosis of metastatic gastric adenocarcinoma  4. Prior right hydronephrosis 2/2 malignant obstruction s/p ureteral stent and PNT (10/1/21)  5. H. pylori infection    Recommendations:  1) Discontinue cefepime  2) Monitor for fever, leukocytosis, worsening abdominal pain off of antibiotics  3) If new fever or leukocytosis, would recollect urine and blood cultures.    Discussion:  67yo M with known metastatic gastric carcinoma complicated by chronic abdominal pain and right malignant ureteral obstruction s/p PNT, now admission for acute-on-chronic abdominal pain and found to have CoNS in PNT culture.     Do not suspect this PNT culture result represents true infection. Staphylococcus epidermidis is common skin machelle and would not be unexpected in PNT, even one recently placed. Absence of fever, or leukocytosis similarly point away from acute infection here. He does mention some pain \"after urinating\" but no lynn description of dysuria. His pain is located mostly in lower groin and right upper thigh. Favor pain being related to malignancy and possible missing doses of home pain regimen. Recommend stopping antibiotics and monitoring off of this therapy for now. Certainly, if fever or leukocytosis develop while off antibiotics, would recommend resuming them after reculturing urine and blood.    Gennaro Hua MD  Division of Infectious Diseases and International Medicine  P: [phone]        History of Present Illness:     67yo M with history of H. pylori infection, metastatic gastric adenocarcinoma, severe malnutrition, right hydronephrosis due " to malignant obstruction s/p ureteral stenting (9/10/21) and right PNT (10/1/21) who presented on the evening of 10/15 with worsening abdominal pain. Per patients description, the pain had been waxing and waning for about a month but has been worse in the last two weels. He has denied dysuria during evaluation (though says zi felt like maybe the pain was worse after urinating a few times), and has been afebrile throughout his time as an inpatient. WBC has been within normal limits. A CT was obtained on 10/16 which was notable for unchanged gastric wall thickening, small left pleural effusion, anasarca, but no hydronephrosis. Similarly, a CXR on 10/17 was notable for a small left basilar consolidation. A urine sample was collected from the right PNT on 10/16 and has grown >100k cfu/ml Staphylococcus epidermidis. No other cultures (blood on 10/15, urine on 10/17) have grown anything as of yet. Patient was started on cefepime on 10/17 due to PNT culture findings. ID consulted for assistance with antibiotics.         Review of Systems:   Full 10 point ROS obtained, pertinent positives and negatives as above.       Past Medical History:   History reviewed. No pertinent past medical history.  Past Surgical History:   Procedure Laterality Date     COLONOSCOPY N/A 11/24/2015    Procedure: COLONOSCOPY;  Surgeon: Clyde Mosher MD;  Location:  GI     COMBINED CYSTOSCOPY, INSERT STENT URETER(S) Right 9/10/2021    Procedure: CYSTOSCOPY, WITH right URETERAL STENT INSERTION, retrograde pyleogram;  Surgeon: Jez Friedman MD;  Location: [facility] OR     ESOPHAGOSCOPY, GASTROSCOPY, DUODENOSCOPY (EGD), COMBINED N/A 11/24/2020    Procedure: ESOPHAGOGASTRODUODENOSCOPY with biopsies using cold forceps;  Surgeon: Clyde Mosher MD;  Location:  GI     ESOPHAGOSCOPY, GASTROSCOPY, DUODENOSCOPY (EGD), COMBINED N/A 12/22/2020    Procedure: ESOPHAGOGASTRODUODENOSCOPY, WITH FINE NEEDLE ASPIRATION BIOPSY, WITH ENDOSCOPIC ULTRASOUND  GUIDANCE;  Surgeon: Guru Teri Pedraza MD;  Location: [facility] GI     IR NEPHROSTOMY TUBE PLACEMENT RIGHT  10/4/2021         Allergies:    No Known Allergies         Current Antimicrobials:     Cefepime (10/17 - )       Family History:     Family History   Problem Relation Age of Onset     Asthma No family hx of      Diabetes No family hx of      Hypertension No family hx of      Cerebrovascular Disease No family hx of      Cancer No family hx of      Colon Cancer No family hx of      Anesthesia Reaction No family hx of      Deep Vein Thrombosis (DVT) No family hx of         Social History:     Social History     Socioeconomic History     Marital status:      Spouse name: Not on file     Number of children: Not on file     Years of education: Not on file     Highest education level: Not on file   Occupational History     Not on file   Tobacco Use     Smoking status: Never Smoker     Smokeless tobacco: Never Used   Vaping Use     Vaping Use: Never used   Substance and Sexual Activity     Alcohol use: No     Drug use: No     Sexual activity: Yes     Partners: Female   Other Topics Concern     Parent/sibling w/ CABG, MI or angioplasty before 65F 55M? No   Social History Narrative     Not on file     Social Determinants of Health     Financial Resource Strain:      Difficulty of Paying Living Expenses:    Food Insecurity:      Worried About Running Out of Food in the Last Year:      Ran Out of Food in the Last Year:    Transportation Needs:      Lack of Transportation (Medical):      Lack of Transportation (Non-Medical):    Physical Activity:      Days of Exercise per Week:      Minutes of Exercise per Session:    Stress:      Feeling of Stress :    Social Connections:      Frequency of Communication with Friends and Family:      Frequency of Social Gatherings with Friends and Family:      Attends Yazdanism Services:      Active Member of Clubs or Organizations:      Attends Club or Organization  Meetings:      Marital Status:    Intimate Partner Violence:      Fear of Current or Ex-Partner:      Emotionally Abused:      Physically Abused:      Sexually Abused:           Physical Exam:   Ranges forvital signs:  Temp:  [98.2  F (36.8  C)-99.1  F (37.3  C)] 98.2  F (36.8  C)  Pulse:  [66-84] 84  Resp:  [16-18] 17  BP: (111-149)/() 117/85  SpO2:  [97 %-99 %] 97 %    Intake/Output Summary (Last 24 hours) at 10/17/2021 1639  Last data filed at 10/17/2021 1413  Gross per 24 hour   Intake 100 ml   Output --   Net 100 ml     Exam:   GENERAL:  well-developed, well-nourished, sitting in bed in no acute distress.   ENT:  Head is normocephalic, atraumatic. Oropharynx is moist without exudates or ulcers.  EYES:  Eyes have anicteric sclerae.    NECK:  Supple.  LUNGS:  Clear to auscultation.  CARDIOVASCULAR:  Regular rate and rhythm with no murmurs, gallops or rubs.  ABDOMEN:  Normal bowel sounds, soft. Very minimal TTP inferior to suprapubic region and along upper area of right thigh, otherwise not TTP. Right PNT present, no signs of erythema, induration.  EXT: Extremities warm and without edema.  SKIN:  No acute rashes.    NEUROLOGIC:  Grossly nonfocal.         Laboratory Data:     Inflammatory Markers    Recent Labs   Lab Test 04/08/14  1621   SED 10     Hematology Studies    Recent Labs   Lab Test 10/17/21  1213 10/17/21  0128 10/15/21  2254 10/15/21  1434 10/13/21  0704 10/12/21  1506 10/12/21  1506 10/11/21  1232 10/11/21  1232 09/10/21  0448 10/23/20  1422 09/08/20  1410 09/08/20  1410 10/11/15  1335 10/11/15  1045 10/11/15  0315 10/11/15  0315   WBC  --  7.9 10.2 9.2 8.5  --  10.6  --  8.2   < > 8.9   < > 9.3   < > 16.2*   < > 18.7*   ANEU  --   --   --   --   --   --   --   --   --   --  6.0  --  6.4  --  14.0*  --  16.1*   AEOS  --   --   --   --   --   --   --   --   --   --  0.1  --  0.1  --  0.0  --  0.1   HGB 8.0* 7.8* 8.7* 9.0* 7.6*  --  7.7*   < > 7.8*   < > 11.9*   < > 13.0*   < > 16.3   < > 18.3*    MCV  --  85 87 86 88  --  87  --  86   < > 92   < > 94   < > 90   < > 92   PLT  --  429 494* 485* 325   < > 336   < > 356   < > 297   < > 297   < > 221   < > 215    < > = values in this interval not displayed.     Metabolic Studies     Recent Labs   Lab Test 10/17/21  0128 10/16/21  0717 10/15/21  2255 10/15/21  1434 10/13/21  0704 10/12/21  0639 10/12/21  0639   *  --  131* 134 135  --  134   POTASSIUM 3.3* 5.4* 5.6* 4.1 4.0   < > 4.0   CHLORIDE 98  --  100 101 104  --  104   CO2 30  --  26 28 28  --  25   BUN 13  --  16 16 16  --  18   CR 0.71  --  0.71 0.79 0.78  --  0.80   GFRESTIMATED >90  --  >90 >90 >90  --  >90    < > = values in this interval not displayed.     Hepatic Studies    Recent Labs   Lab Test 10/17/21  0128 10/15/21  2255 10/15/21  1434 10/13/21  0704 10/11/21  1232 10/04/21  2024   BILITOTAL 0.3 0.4 0.3 0.4 0.2 0.4   ALKPHOS 63 74 78 64 80 75   ALBUMIN 2.1* 2.3* 2.4* 2.2* 3.2* 3.2*   AST 13 76* 15 14 22 12   ALT 9 21 16 14 16 20     Thyroid Studies    Recent Labs   Lab Test 10/15/21  1434 10/11/21  1232   TSH 4.64* 3.91   T4 1.21  --      Microbiology:  Culture   Date Value Ref Range Status   10/16/2021 >100,000 CFU/mL Staphylococcus epidermidis (A)  Preliminary   10/16/2021 <1,000 CFU/mL Urogenital machelle  Preliminary   10/15/2021 No growth after 1 day  Preliminary   10/04/2021 No Growth  Final   09/21/2021 <10,000 CFU/mL Mixture of urogenital machelle  Final     Urine Studies    Recent Labs   Lab Test 10/17/21  0623 10/16/21  0614 10/11/21  1232 10/04/21  2036 10/04/21  1216   LEUKEST Moderate* Large* Negative Small* Large*   WBCU 12* 11* 2 5 12*          Imaging:     CT Abd/Pelvis (10/16)  IMPRESSION:   1.  Stable positioning of the right percutaneous nephrostomy tube. No hydronephrosis.  2.  Redemonstrated urothelial thickening along the left renal pelvis.  3.  Within the limitations of noncontrast technique, the patient's distal gastric wall thickening and metastatic adenopathy appear  unchanged compared to prior CT.  4.  Small left pleural effusion with increasing left basilar atelectasis or consolidation.  5.  Anasarca.

## 2021-10-18 NOTE — PLAN OF CARE
"[id number]    /86 (BP Location: Right arm)   Pulse 67   Temp 97.5  F (36.4  C) (Oral)   Resp 16   Ht 1.549 m (5' 1\")   Wt 51.8 kg (114 lb 3.2 oz)   SpO2 98%   BMI 21.58 kg/m      Pain remains uncontrolled, states it's in his back/lower abdomen and right groin, Oxy x2 and IV Dilaudid x2 provided. Expressed concern that his right groin area was getting edematous again. R PNT patent. Denies nausea and SOB. PIV SL. Continue with POC.   "

## 2021-10-18 NOTE — PROGRESS NOTES
CLINICAL NUTRITION SERVICES - ASSESSMENT NOTE     Nutrition Prescription    RECOMMENDATIONS FOR MDs/PROVIDERS TO ORDER:  Recommend nutrition support if PO intake < 50%. Patient with 36% weight loss in the last year.     Malnutrition Status:    Unable to determine due to patient not available in room when this writer attempted to visit.     Recommendations already ordered by Registered Dietitian (RD):  Send ensure clear BID and [employer] (vanilla) once daily between meals     Future/Additional Recommendations:  Monitor tolerance of oral intake, use of supplements     If able to tolerate enteral nutrition and needs more aggressive nutrition support, recommend:   [employer] Peptide 1.5 @ 45 mL/hr (1080 mL/day) to provide 1661 kcal (33  kcal/kg), 80 g protein (1.6 g/kg), 149 g CHO, 10 g fiber, 83 g fat (40% from MCTs), and 756 mL free water daily   Start TF @ 15 ml/hr and advance by 10 ml q 12 hrs until goal rate.  --Do not start or advance TF rate unless K+ >3.0, Mg++ > 1.5,  and Phos > 1.9.  --Minimum 30 ml q 4 hrs water flushes for tube patency.  --If gastric enteral access: HOB > 30 degrees     If not able to tolerate oral intake or enteral nutrition, consider TPN:   Dosing weight 50 kg   Access: Central   TPN: 1080 ml/day (45 ml/hr), Initial dex 95 g Dex (GIR = 1.3 mg/kg/min), AA 80 g and 250 m 20% lipid 5x per week = 1016 kcal (~20 kcal/kg) and 35% kcal from fat   Advance dex by 40-50 g/day to goal 215 g (GIR= 2.92 mg/kg/min) pending K>/=3, Mg>/= 1.5 and Po4 >/= 1.9   Goal TPN provisions 215 g dex, 80 g AA and 250 mL 20% lipid 5x per week = 1408 kcal (28 kcal/kg), 1.6 g/kg AA and 25% kcal from fat   -Recommend 10 mL infuvite daily + MTE-4  Electrolytes/additives per pharmD      REASON FOR ASSESSMENT  Essie Guzman is a/an 66 year old male assessed by the dietitian for Admission Nutrition Risk Screen for positive    Per chart review patient with a history of H. pylori infection and metastatic poorly differentiated  "gastric adenocarcinoma     NUTRITION HISTORY  Information obtained from chart review. Patient was not available in room when this writer attempted to visit.   Per last RD visit (10/5/2021). At that time information was obtained from 2 family members who reported that his appetite has been unchanged since early September - largest barrier to eating was pain. Meat was still difficult for him to eat but he was able to tolerate some breakfast.     CURRENT NUTRITION ORDERS  Diet: Regular with ensure clear between meals     LABS  Labs reviewed  (10/18): CRP 33 mg/L (H)    MEDICATIONS  Medications reviewed  Miralax  thiamine    ANTHROPOMETRICS  Height: 154.9 cm (5' 1\")  Most Recent Weight: 50.3 kg (110 lb 12.8 oz)    IBW: 47.7 kg  BMI: Normal BMI  Weight History:   Wt Readings from Last 10 Encounters:   10/18/21 50.3 kg (110 lb 12.8 oz)   10/15/21 51.7 kg (114 lb)   10/15/21 52 kg (114 lb 9.6 oz)   10/14/21 49.4 kg (109 lb)   10/13/21 50.8 kg (111 lb 14.4 oz)   10/09/21 51 kg (112 lb 6.4 oz)   10/04/21 51 kg (112 lb 8 oz)   09/27/21 50.8 kg (112 lb)   09/22/21 50.8 kg (112 lb)   09/15/21 52.5 kg (115 lb 11.2 oz)   36% weight loss 1 year significant   Dosing Weight: 50 kg (lowest weight this admission 10/18)    ASSESSED NUTRITION NEEDS  Estimated Energy Needs: 4093-2975+ kcals/day (30-35+ kcals/kg)  Justification: Increased needs and Repletion  Estimated Protein Needs: 65-90+ grams protein/day (1.3 - 1.8+ grams of pro/kg)  Justification: Increased needs and Repletion  Estimated Fluid Needs: 1 mL/kcal  Justification: Maintenance    PHYSICAL FINDINGS  See malnutrition section below.    MALNUTRITION  % Intake: Unable to assess  % Weight Loss: > 20% in 1 year (severe)  Subcutaneous Fat Loss: Unable to assess  Muscle Loss: Unable to assess  Fluid Accumulation/Edema: None noted  Malnutrition Diagnosis: Unable to determine due to patient not available in room when this writer attempted to visit.     NUTRITION DIAGNOSIS  Unintended " weight loss related to decreased oral intake and hypermetabolism 2/2 cancer as evidenced by weight loss of 36% in 1 year      INTERVENTIONS  Implementation  Nutrition Education: Unable to complete due to patient not available in room    Medical food supplement therapy - see above     Goals  Patient to consume % of nutritionally adequate meal trays TID, or the equivalent with supplements/snacks.     Monitoring/Evaluation  Progress toward goals will be monitored and evaluated per protocol.    Chantell Obrien, MS/RD/LD/CNSC  6A/7D RD Pager: [phone]

## 2021-10-18 NOTE — PLAN OF CARE
0700 to 1500:VSS,A&O x4 right groin and abdominal pain controled with oxycodone and IV dilaudid in addition to scheduled MS  Contin and tylenol,right groin  appears to be swollen,LS clear,BS+,tolerated well regular diet, food from home,denied nausea and SOB,pt's daughter at bedside supportive with cares.Will continue to monitor.

## 2021-10-18 NOTE — PROGRESS NOTES
[facility]    Medicine Progress Note - Hospitalist Service, [place]       Date of Admission:  10/16/2021    Assessment & Plan          Essie Guzman is a 66-year-old M with a history of metastatic gastric adenocarcinoma complicated by malignant obstruction of the right kidney resulting in hydronephrosis s/p right ureteral stent (9/10) and PCN (10/1) who presented to the ED on 10/17 for acute on chronic malignancy related abdominal and groin pain, which has since improved significantly with IV opiates in the ED.  He is being admitted for pain control and optimization of his outpatient oral pain regimen.     Today's plan:  - stop ms contin, start po methadone   Keep breakthrough oxycodone and IV Dilaudid as ordered  - Pain mgmt to assist new pain plan, and consider Thursday celiac neurolysis  - No new current onc recs, pt needs pain control and O.P. Onc follow up    # Poorly differentiated metastatic gastric adenocarcinoma (patient has not begun pembrolizumab infusions yet due to insurance related issues)  # Malignancy-related chronic abdominal pain  - Still with inadequate pain control  - 10/18: Pain mgmt consult: Stop MS Contin, start Methadone 10/18   - EKG with QTc stable   - Pt/daughter aware of starting Methadone  - Correction, daughter, confirms pt taking MS contin RTC, but prn missed oxycodone doses at home  - Pain will also consider later in the week, Thursday, Celiac Neurolysis procedure    # Malignant obstruction of R ureter   # R hydronephrosis s/p ureteral stent (9/10) and percutaneous nephrostomy (10/1)  - Continue PTA oxybutynin 10mg daily    # Staph epi PNT pyuria, RIGHT PNT  - CT did not show hydronephrosis  - Monitor PNT drainage, Follow PNT cultures  - ID consulted, Cefepime stopped, not suspected infectious, monitor PNT output     # Normocytic anemia  # Dark stools  Iron studies done 9/10/2021 consistent with iron deficiency anemia: Iron 15, TIBC normal  at 377, ferritin 14.  Hemoglobin on presentation was 7.8, which is stable at his recent baseline of ~7.5-8. Patient does report dark stools for at least the past month. He is on ferrous sulfate, which could explain the dark stools, though he also has a history of GI bleed. I think hemorrhage is less likely given stable hemoglobin.  - restart pta iron     # Mild hyponatremia  Monitor clinically. In the past month, patient has been running in the high 120s-mid 130s. Suspect ADH release in the context of chronic illness/malignancy.      # Mild hypokalemia, stable     # Severe malnutrition in the context of chronic illness  - Ensure supplements ordered         Diet: Regular Diet Adult  Snacks/Supplements Adult: Ensure Clear; Between Meals  Snacks/Supplements Adult: [employer] Standard Oral Supplement; With Meals    DVT Prophylaxis: Low Risk/Ambulatory with no VTE prophylaxis indicated  Hamm Catheter: Not present  Central Lines: None  Code Status: Full Code      Disposition Plan   Expected discharge: 10/22/2021   recommended to prior living arrangement once adequate pain management/ tolerating PO medications and antibiotic plan established.     The patient's care was discussed with the Bedside Nurse, Patient and Patient's Family.    Gennaro Ruano MD  Hospitalist Service, [address]  Securely message with the Vocera Web Console (learn more here)  Text page via [employer] Paging/Directory  Please see sign in/sign out for up to date coverage information    Clinically Significant Risk Factors Present on Admission               ______________________________________________________________________    Interval History   Seen today for follow up: cancer related pain, pyuria, pain control, Gastric CA    No acute overnight events per patient or patient's family.    Discussed with pt, daughter at bedside.  Pain still not yet controlled, agreeable to new pain plan as per  discussions for today  No nausea,  No scrotal swelling      As of today/at time of my visit:  Patient denies any headache, sore throat  Patient denies any sleeping disturbance  Patient denies any nausea or vomiting  Patient denies any shortness of breath   Patient denies any chest pain  Patient reports no arm or leg edema      Data reviewed today: I reviewed all medications, new labs and imaging results over the last 24 hours. I personally reviewed no images or EKG's today.    Physical Exam   Vital Signs: Temp: 98.3  F (36.8  C) Temp src: Oral BP: 120/87 Pulse: 88   Resp: 18 SpO2: 97 % O2 Device: None (Room air)    Weight: 110 lbs 12.8 oz    General: Alert awake and oriented, no distress, thin appearing  Eyes: Normal pink mucosa with no signs of pallor, no scleral icterus.  Neck: No significant lymphadenopathy, no palpable LN, No JVD  Heart: Regular rate and rhythm, normal S1, normal S2, no murmurs rubs or gallops, PMI is nondisplaced   Peripherally there is no edema  Lungs: No increased work of breathing, good effort, lungs are clear to auscultation bilaterally   No wheezing or crackles   Breathing on room air  Abdomen: suprapubic area is tender, nondistended, normal active bowel sounds, no palpable masses   No scrotal edema noted  Extremities: Normal capillary refill, no edema, no clubbing, no cyanosis  Neuro: Alert and oriented to person place location and situation   Grossly arms and legs are without any focal motor or sensory deficits   Gait was not assessed   Cranial nerves II through XII are grossly intact  Psych: No acute psychosis, fair mood      Data   Recent Labs   Lab 10/18/21  0433 10/17/21  1616 10/17/21  1213 10/17/21  0128 10/16/21  0717 10/15/21  2255 10/15/21  2254 10/15/21  1434 10/12/21  0107 10/11/21  2113   WBC 6.2  --   --  7.9  --   --  10.2  --    < >  --    HGB 7.8*  --  8.0* 7.8*  --   --  8.7*   < >   < >  --    MCV 85  --   --  85  --   --  87  --    < >  --      --   --  429  --    --  494*  --    < >  --    INR  --   --   --   --   --   --   --   --   --  1.08     --   --  132*  --  131*  --    < >   < >  --    POTASSIUM 3.8 4.0  --  3.3*   < > 5.6*  --    < >   < >  --    CHLORIDE 101  --   --  98  --  100  --    < >   < >  --    CO2 29  --   --  30  --  26  --    < >   < >  --    BUN 13  --   --  13  --  16  --    < >   < >  --    CR 0.86  --   --  0.71  --  0.71  --    < >   < >  --    ANIONGAP 4  --   --  4  --  5  --    < >   < >  --    HAWA 8.6  --   --  8.3*  --  8.8  --    < >   < >  --    GLC 87  --   --  104*  --  110*  --    < >   < >  --    ALBUMIN 2.2*  --   --  2.1*  --  2.3*  --    < >   < >  --    PROTTOTAL 6.6*  --   --  6.6*  --  7.8  --    < >   < >  --    BILITOTAL 0.4  --   --  0.3  --  0.4  --    < >   < >  --    ALKPHOS 59  --   --  63  --  74  --    < >   < >  --    ALT 7  --   --  9  --  21  --    < >   < >  --    AST 7  --   --  13  --  76*  --    < >   < >  --    TROPONIN  --   --   --  <0.015  --   --   --   --   --   --     < > = values in this interval not displayed.     No results found for this or any previous visit (from the past 24 hour(s)).  Medications       methadone  5 mg Oral Q12H CHIRAG     ondansetron  4 mg Intravenous Once     oxybutynin ER  10 mg Oral Daily     pantoprazole  40 mg Oral QAM AC     polyethylene glycol  17 g Oral Daily     sennosides  1-2 tablet Oral BID     tamsulosin  0.4 mg Oral Daily     thiamine  100 mg Oral Daily

## 2021-10-18 NOTE — PROGRESS NOTES
Inpatient Pain Service: Follow Up Note  10/18/21    Patient: Essie Guzman  Admission Date:10/16/2021    * No surgery found *     Visit/Communication Style  In person Visit     Chief Pain Endorsement:  Abdominal Pain     Recommendations were discussed and relayed to Oncology, Nursing Team and Hospital Medicine Team   Plan was reviewed by the Inpatient Pain Service, staff attending Dr. Ruano.          1. Cancer pain: Poorly differentiated metastatic gastric adenocarcinoma (patient has not begun pembrolizumab infusions yet due to insurance related issues). Suggest rotation to methadone, continue short acting opioids. Consider Celiac plexus block (once cleared by ID and oncology).  2. Continuous opioid dependence: Discussed rotation to methadone with patient and family. Would continue oxycodone and PRN dilaudid if needed.   3. Opioid induced constipation: continue plan as current (Miralax and Senna) and monitor closely.     -- Outpatient opioid requirements prior to admission: OME = >180 (taking MS contin 30mg tid, oxycodone 10-20mg PRN, and Dilaudid occasionally).      Primary Care Provider: Serge Kimble  Chronic Pain Provider: viktoriyaly Lashawn Wright       1. Agree with scheduled Tylenol   2. Initiate Lidocaine topical cream around the neph tube site.  3. Rotate to Methadone - suggest starting with 5mg BID today, increase to 5mg TID on Thursday. In preparation for methadone rotation, check Qtc today and then again on Thursday.   4. Continue Oxycodone 5-10mg Q3h PRN   5. Consider 2 day trial of pyridium. TID   6. Would avoid NSAIDs as hemoglobin on admission was 7.8   7. Bowel regimen per primary team to prevent opioid induced constipation.  8. Consider Celiac Plexus block, potentially on Thursday.   9. Outpatient Follow up on 10/27 with Palliative Care. Also could consider follow up with Dr. Caleb Menezes for consideration of intrathecal morphine.     Pain Service will continue to follow.     Thank you for  consulting the Inpatient Pain Management Service. The above recommendations are to be acted upon at the primary team s discretion.       To reach us:  Mon - Friday 8 AM - 3 PM: Pager [phone] (Text Page)   After hours, weekends and holidays: Primary service should call [phone] The answering service for the on-call pain specialist    PAIN MEDICATION SAFE USE:   Prior to discharge instruct patient on the following in addition to the medication fact sheet:    Caution: these medications can cause sedation    Take prescription medicine only if it has been prescribed by your doctor    Do not take more medicine or take it more often than instructed     Call your doctor if pain gets worse    Never mix pain medicine with alcohol, sleeping pills, or any illicit drugs    Do not operate heavy machinery, including vehicles, when initiation opioid therapy or increasing dosage    Store prescription opioids in a locked container, whenever possible     Dispose of unused opioids appropriately     Do not stop abruptly once at higher doses.  These medications must be tapered off.    Opioid pain medications do carry the risk for physical dependence and addiction and patients should be counseled about this.     Interval History:  Essie Guzman was seen today (10/18/21) and he reports that his pain is severe with movement. He describes pain at the end of his urinary stream. Pain at the insertion site of tube/right posterior. I spoke with the patient and his daughter. She is one of his 6 children. We discussed multimodal pain care. Reviewed benefit of celiac plexus block at length. Essie would like to discuss with his children and wife. He reports BM every few days. Has been using bowel meds at home per daughter. She states that he takes MScontin tid and takes oxycodone routinely (10-20mg) and this only controls his pain for a few hours. They have a chart at home, where all pain meds are tracked. Pt and daughter reporting intermittent  scrotal edema and groin pain as well. Discussed with Dr. Ruano. 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 He has a history of metastatic gastric adenocarcinoma complicated by malignant obstruction of the right kidney resulting in hydronephrosis s/p right ureteral stent (9/10) and PCN (10/1) who presented to the ED on 10/17 for acute on chronic malignancy related abdominal and groin pain, which has since improved significantly with IV opiates in the ED.  He is being admitted for pain control and optimization of his outpatient oral pain regimen.  Pain intensity 7-9/10.       No BM on file.Report 1 BM every 2 days.     FUNCTIONAL STATUS:  Change:      staying the same  Oral intake:     Regular  Activity level:     Limited bed mobility  Mood:      happy     -- Inpatient Medications Related to Pain Management:   Medications related to Pain Management (From now, onward)    Start     Dose/Rate Route Frequency Ordered Stop    10/17/21 1054  oxyCODONE (ROXICODONE) tablet 5-10 mg      5-10 mg Oral EVERY 3 HOURS PRN 10/17/21 1054      10/17/21 1054  HYDROmorphone (PF) (DILAUDID) injection 0.5 mg      0.5 mg Intravenous EVERY 4 HOURS PRN 10/17/21 1054      10/17/21 0800  acetaminophen (TYLENOL) tablet 1,000 mg      1,000 mg Oral EVERY 8 HOURS PRN 10/17/21 0611      10/17/21 0800  morphine (MS CONTIN) 12 hr tablet 30 mg      30 mg Oral EVERY 8 HOURS 10/17/21 0611      10/17/21 0800  polyethylene glycol (MIRALAX) Packet 17 g      17 g Oral DAILY 10/17/21 0611      10/17/21 0800  sennosides (SENOKOT) tablet 1-2 tablet      1-2 tablet Oral 2 TIMES DAILY 10/17/21 0611      10/17/21 0611  simethicone (MYLICON) chewable tablet 80 mg      80 mg Oral EVERY 6 HOURS PRN 10/17/21 0611            LAB DATA:  Recent Labs   Lab 10/18/21  0433 10/17/21  1213 10/17/21  0128 10/15/21  2255 10/15/21  2254 10/15/21  2254 10/15/21  1434   CR 0.86  --  0.71 0.71  --   --    < >   WBC 6.2  --  7.9  --   --  10.2  --   "  HGB 7.8* 8.0* 7.8*  --    < > 8.7*  --    AST 7  --  13 76*  --   --    < >   ALT 7  --  9 21  --   --    < >    < > = values in this interval not displayed.       /86 (BP Location: Right arm)   Pulse 83   Temp 98.5  F (36.9  C) (Oral)   Resp 16   Ht 1.549 m (5' 1\")   Wt 50.3 kg (110 lb 12.8 oz)   SpO2 97%   BMI 20.94 kg/m     EXAM:  Constitutional: alert, no distress and thin   Cardiovascular: negative, PMI normal. No lifts, heaves, or thrills. RRR. No murmurs, clicks gallops or rub  Respiratory: negative, Percussion normal. Good diaphragmatic excursion. Lungs clear  Psychiatric: mentation appears normal and affect normal/bright  Head: Normocephalic. No masses, lesions, tenderness or abnormalities  Neck: Neck supple. No adenopathy. Thyroid symmetric, normal size,  ENT: neck without nodes  Abdomen: Abdomen soft, non-tender. BS normal. No masses, organomegaly, positive findings: Pain  : Deferred and male positive for scrotal swelling- mild  NEURO: negative findings: muscle strength normal  SKIN: no suspicious lesions or rashes  LYMPH: Normal cervical lymph nodes  JOINT/EXTREMITIES: extremities normal- no gross deformities noted and normal muscle tone    ----------------------------------------------------------------------------------  SYLVIE Long [facility]  Inpatient Pain Service     Total unit/floor time 35  minutes, time consisted of the following, examination of patient, reviewing the record and lab results, and completing documentation. Coordination of care time with nurse Guerra, Oncology called, Discussed with Dr. Louis. Discussed with family. Utilized professional  on the phone.    Reviewed with Dr. Ruano.       Helpful Resources:  Getting Rid of Unwanted Medications (printable PDF for patients)   Opioid Overdose Prevention Toolkit (printable PDF for patients)   Prescription Opioids: What You Need To Know (printable PDF for patients)   "

## 2021-10-18 NOTE — PLAN OF CARE
[id number]  AVSS, alert and oriented x 4, denies nausea/sob. C/o pain right groin, pain is 6/10 given PRN IV dilaudid x 1.   Up sba, voiding adequately, LBM today 10/18 PIV is intact and saline locked.  Pt took shower the evening. PNT on right side, no drainage noticed at 4 1630 pm and dressing change by day nurse.  Continue to monitor care.

## 2021-10-18 NOTE — CONSULTS
Care Management Initial Consult    General Information  Assessment completed with: Patient, VM-chart review    Type of CM/SW Visit: Initial Assessment    Primary Care Provider verified and updated as needed: Yes   Readmission within the last 30 days: Previous discharge plan unsuccessful      Reason for Consult: Elevated Risk Score  Advance Care Planning: Reviewed: Yes        Communication Assessment  Patient's communication style: Spoken language (non-English)    Hearing Difficulty or Deaf: no   Wear Glasses or Blind: no    Cognitive  Cognitive/Neuro/Behavioral: WDL                      Living Environment:   People in home: Spouse, child(shanita), adult, grandchild(shanita)     Current living Arrangements: House      Able to return to prior arrangements: Yes    Family/Social Support:  Care provided by: Self  Provides care for: No one             Description of Support System: Supportive, Involved      Current Resources:   Patient receiving home care services: No  Community Resources: OP Infusion  Equipment currently used at home: None  Supplies currently used at home: None    Employment/Financial:  Employment Status: Retired        Financial Concerns: No concerns identified       Lifestyle & Psychosocial Needs:  Social Determinants of Health     Tobacco Use: Low Risk      Smoking Tobacco Use: Never Smoker     Smokeless Tobacco Use: Never Used   Alcohol Use:      Frequency of Alcohol Consumption:      Average Number of Drinks:      Frequency of Binge Drinking:    Financial Resource Strain:      Difficulty of Paying Living Expenses:    Food Insecurity:      Worried About Running Out of Food in the Last Year:      Ran Out of Food in the Last Year:    Transportation Needs:      Lack of Transportation (Medical):      Lack of Transportation (Non-Medical):    Physical Activity:      Days of Exercise per Week:      Minutes of Exercise per Session:    Stress:      Feeling of Stress :    Social Connections:      Frequency of  Communication with Friends and Family:      Frequency of Social Gatherings with Friends and Family:      Attends Confucianism Services:      Active Member of Clubs or Organizations:      Attends Club or Organization Meetings:      Marital Status:    Intimate Partner Violence:      Fear of Current or Ex-Partner:      Emotionally Abused:      Physically Abused:      Sexually Abused:    Depression: Not at risk     PHQ-2 Score: 0   Housing Stability:      Unable to Pay for Housing in the Last Year:      Number of Places Lived in the Last Year:      Unstable Housing in the Last Year:        Functional Status:  Prior to admission patient needed assistance:   Dependent ADLs: Independent  Dependent IADLs: Independent  Assesssment of Functional Status: Not at functional baseline (uncontrolled pain)    Mental Health Status:  Mental Health Status: No Current Concerns       Chemical Dependency Status:  Chemical Dependency Status: No Current Concerns           Values/Beliefs:  Spiritual, Cultural Beliefs, Confucianism Practices, Values that affect care: Yes               Additional Information:    Patient is a 66-year-old M with a history of metastatic gastric adenocarcinoma complicated by malignant obstruction of the right kidney resulting in hydronephrosis, s/p right ureteral stent and PCN, who presented to the ED on 10/17 for acute on chronic malignancy related abdominal and groin pain, which has since improved significantly with IV opiates in the ED. Patient was admitted for pain management.     CM assessment being completed due to elevated unplanned readmission risk score.     Patient is independent at baseline and does not receive any in-home supports or services. Patient does receive OP Infusion services at the [facility]. No current RNCC/SW needs identified. Care Management will continue to follow and assist with discharge planning as needed.     Lacey Lau, RN, BSN, PHN  Care Coordinator   P: [phone], [facility]

## 2021-10-19 NOTE — PROVIDER NOTIFICATION
Paged:     MD Pino #[phone]    Pt crying and shaking in pain. Order for 1x dose of IV dilaudid? Thanks.     Nirali [id number]      Response:     Putting order in now.     MD Pino

## 2021-10-19 NOTE — PROVIDER NOTIFICATION
Paged:     MD Pino #[phone]    Pt reporting severe pain with no relief from current medications.     Response:     Ok, to give 1x dose of 0.5mg IV dilaudid.     MD Pino #[phone]

## 2021-10-19 NOTE — PROVIDER NOTIFICATION
Pged [phone] at 1655    FYI Patient's BP slightly elevated 146/107. Did you want to put in parameters? Lisa OAKLEY [id number]

## 2021-10-19 NOTE — PLAN OF CARE
[id number]: VSS, pt afebrile on RA. Pt denies nausea/dyspnea. Pt continues to experience constant pain in low abdomen/groin that radiates to low back. Scheduled methadone changed to Q8H, PRN oxycodone given X1, and IV dilaudid X1, finding some relief with additional PRN pain medications. Right PNT patent and intact with 30 mL output. Pt voided spontaneously and did not save urine. Dgt visited this AM while MD rounded. Pt consitpated LBM 10/17; scheduled senna and miralax given. Bowel sounds active. Pt UAL. Pt with fair appetite. Pt Hmong speaking,  used with assessments. Pt able to make needs known. Continue POC.

## 2021-10-19 NOTE — PROGRESS NOTES
[facility]    Medicine Progress Note - Hospitalist Service, [place]       Date of Admission:  10/16/2021    Assessment & Plan          Essie Guzman is a 66-year-old M with a history of metastatic gastric adenocarcinoma complicated by malignant obstruction of the right kidney resulting in hydronephrosis s/p right ureteral stent (9/10) and PCN (10/1) who presented to the ED on 10/17 for acute on chronic malignancy related abdominal and groin pain, which has since improved significantly with IV opiates in the ED.  He is being admitted for pain control and optimization of his outpatient oral pain regimen.     Today's plan:  - stop ms contin, start po methadone --> increase frequent of methadone per pain service  - Add gabapentin per pain service    # Poorly differentiated metastatic gastric adenocarcinoma (patient has not begun pembrolizumab infusions yet due to insurance related issues)  # Malignancy-related chronic abdominal pain  - Still with inadequate pain control  - 10/18: Pain mgmt consult: Stop MS Contin, start Methadone 10/18   - EKG with QTc stable   - Pt/daughter aware of starting Methadone  - Correction, daughter, confirms pt taking MS contin RTC, but prn missed oxycodone doses at home  - Pain will also consider later in the week, Thursday, Celiac Neurolysis procedure    # Malignant obstruction of R ureter   # R hydronephrosis s/p ureteral stent (9/10) and percutaneous nephrostomy (10/1)  - Continue PTA oxybutynin 10mg daily    # Staph epi PNT pyuria vs. PNT colonization, RIGHT PNT  - CT did not show hydronephrosis  - Monitor PNT drainage, Follow PNT cultures  - ID consulted, Cefepime stopped, not suspected infectious, monitor PNT output     # Normocytic anemia  # Dark stools  Iron studies done 9/10/2021 consistent with iron deficiency anemia: Iron 15, TIBC normal at 377, ferritin 14.  Hemoglobin on presentation was 7.8, which is stable at his recent baseline of  ~7.5-8. Patient does report dark stools for at least the past month. He is on ferrous sulfate, which could explain the dark stools, though he also has a history of GI bleed. I think hemorrhage is less likely given stable hemoglobin.  - restart pta iron     # Mild hyponatremia  Monitor clinically. In the past month, patient has been running in the high 120s-mid 130s. Suspect ADH release in the context of chronic illness/malignancy.      # Mild hypokalemia, stable     # Severe malnutrition in the context of chronic illness  - Ensure supplements ordered         Diet: Regular Diet Adult  Snacks/Supplements Adult: Ensure Clear; Between Meals  Snacks/Supplements Adult: Mansi Wiziva Standard Oral Supplement; With Meals    DVT Prophylaxis: Low Risk/Ambulatory with no VTE prophylaxis indicated  Hamm Catheter: Not present  Central Lines: None  Code Status: Full Code      Disposition Plan   Expected discharge: 10/23/2021   recommended to prior living arrangement once adequate pain management/ tolerating PO medications and antibiotic plan established.     The patient's care was discussed with the Bedside Nurse, Patient and Patient's Family.    Magdalena Braxton MD  Hospitalist Service, [address]  Securely message with the Vocera Web Console (learn more here)  Text page via [facility] Paging/Directory  Please see sign in/sign out for up to date coverage information    Clinically Significant Risk Factors Present on Admission               ______________________________________________________________________    Interval History   Continues to have severe pain, mostly groin and abd.    No chest pain,dyspnea, fever.     Data reviewed today: I reviewed all medications, new labs and imaging results over the last 24 hours. I personally reviewed no images or EKG's today.    Physical Exam   Vital Signs: Temp: 98.9  F (37.2  C) Temp src: Oral BP: (!) 139/100 Pulse: 72   Resp: 16 SpO2: 96 %  O2 Device: None (Room air)    Weight: 111 lbs 0 oz    General Appearance: NAD  HEENT: No thursh / ulcer, EOMI  Respiratory: CTAB on RA   Cardiovascular: RRR  GI: soft, NTND   + R nephrostomy tube  Extremities: No LE edema noted  Neuro: Moving all extremities  Skin: No rash on exposed areas   Psych: Alert and oriented, appropriate mood and affect, speech coherent / logical

## 2021-10-19 NOTE — PROGRESS NOTES
Inpatient Pain Service: Follow Up Note  10/19/21    Patient: Essie Guzman  Admission Date:10/16/2021     Visit/Communication Style  In person Visit     Chief Pain Endorsement:  Abdominal Pain         1. Cancer pain: Poorly differentiated metastatic gastric adenocarcinoma (patient has not begun pembrolizumab infusions yet due to insurance related issues). Suggest rotation to methadone, continue short acting opioids. Consider Celiac plexus block (once cleared by ID and oncology).  2. Continuous opioid dependence: Discussed rotation to methadone with patient and family. Would continue oxycodone and PRN dilaudid if needed.   3. Opioid induced constipation: continue plan as current (Miralax and Senna) and monitor closely.     -- Outpatient opioid requirements prior to admission: OME = >180 (taking MS contin 30mg tid, oxycodone 10-20mg PRN, and Dilaudid occasionally).      Primary Care Provider: Serge Kimble  Chronic Pain Provider: lately Lashawn Wright       1. Continue scheduled Tylenol   2. Continue Lidocaine topical cream around the neph tube site.  3. Increase methadone today to 5 mg Q8H. Repeat ECG for Qtc check on Thursday 10/21.   4. Continue Oxycodone 5-10mg Q3h PRN   5. Consider 2 day trial of pyridium. TID   6. Consider the addition of gabapentin 100 mg Q8H for his radiating groin pain  7. Bowel regimen per primary team to prevent opioid induced constipation.  8. After discussion with both Oncology and Chronic Pain Clinic, Interventional Pain physician will discuss possibility of placement of IT opioid pump. Have discussed today with patient as well.     Pain Service will continue to follow.     Thank you for consulting the Inpatient Pain Management Service. The above recommendations are to be acted upon at the primary team s discretion.       To reach us:  Mon - Friday 8 AM - 3 PM: Pager [phone] (Text Page)   After hours, weekends and holidays: Primary service should call [phone] The  answering service for the on-call pain specialist    PAIN MEDICATION SAFE USE:   Prior to discharge instruct patient on the following in addition to the medication fact sheet:    Caution: these medications can cause sedation    Take prescription medicine only if it has been prescribed by your doctor    Do not take more medicine or take it more often than instructed     Call your doctor if pain gets worse    Never mix pain medicine with alcohol, sleeping pills, or any illicit drugs    Do not operate heavy machinery, including vehicles, when initiation opioid therapy or increasing dosage    Store prescription opioids in a locked container, whenever possible     Dispose of unused opioids appropriately     Do not stop abruptly once at higher doses.  These medications must be tapered off.    Opioid pain medications do carry the risk for physical dependence and addiction and patients should be counseled about this.     Interval History:  Essie Guzman was seen this morning and he reports he feels otherwise well except after he urinates. It is then that he describes the radiating pain from his abdomen to his groin that is severe.     Pain intensity today is 6-7 at rest, which is lower than his baseline at home.      FUNCTIONAL STATUS:  Change:      staying the same  Oral intake:     Regular  Activity level:     Limited bed mobility  Mood:      happy     -- Inpatient Medications Related to Pain Management:   Medications related to Pain Management (From now, onward)    Start     Dose/Rate Route Frequency Ordered Stop    10/17/21 1054  oxyCODONE (ROXICODONE) tablet 5-10 mg      5-10 mg Oral EVERY 3 HOURS PRN 10/17/21 1054      10/17/21 1054  HYDROmorphone (PF) (DILAUDID) injection 0.5 mg      0.5 mg Intravenous EVERY 4 HOURS PRN 10/17/21 1054      10/17/21 0800  acetaminophen (TYLENOL) tablet 1,000 mg      1,000 mg Oral EVERY 8 HOURS PRN 10/17/21 0611      10/17/21 0800  morphine (MS CONTIN) 12 hr tablet 30 mg      30 mg Oral  "EVERY 8 HOURS 10/17/21 0611      10/17/21 0800  polyethylene glycol (MIRALAX) Packet 17 g      17 g Oral DAILY 10/17/21 0611      10/17/21 0800  sennosides (SENOKOT) tablet 1-2 tablet      1-2 tablet Oral 2 TIMES DAILY 10/17/21 0611      10/17/21 0611  simethicone (MYLICON) chewable tablet 80 mg      80 mg Oral EVERY 6 HOURS PRN 10/17/21 0611            LAB DATA:  Recent Labs   Lab 10/18/21  0433 10/17/21  1213 10/17/21  0128 10/15/21  2255 10/15/21  2254 10/15/21  2254 10/15/21  1434   CR 0.86  --  0.71 0.71  --   --    < >   WBC 6.2  --  7.9  --   --  10.2  --    HGB 7.8* 8.0* 7.8*  --    < > 8.7*  --    AST 7  --  13 76*  --   --    < >   ALT 7  --  9 21  --   --    < >    < > = values in this interval not displayed.       BP (!) 135/97 (BP Location: Left arm)   Pulse 88   Temp 98.9  F (37.2  C) (Oral)   Resp 16   Ht 1.549 m (5' 1\")   Wt 50.3 kg (110 lb 12.8 oz)   SpO2 98%   BMI 20.94 kg/m     EXAM:  Constitutional: alert, no distress and thin   Cardiovascular: negative, PMI normal. No lifts, heaves, or thrills. RRR. No murmurs, clicks gallops or rub  Respiratory: negative, Percussion normal. Good diaphragmatic excursion. Lungs clear  Psychiatric: mentation appears normal and affect normal/bright  Head: Normocephalic. No masses, lesions, tenderness or abnormalities  Neck: Neck supple. No adenopathy. Thyroid symmetric, normal size,  ENT: neck without nodes  Abdomen: Abdomen soft, non-tender. BS normal. No masses, organomegaly, positive findings: Pain  : Deferred and male positive for scrotal swelling- mild  NEURO: negative findings: muscle strength normal  SKIN: no suspicious lesions or rashes  LYMPH: Normal cervical lymph nodes  JOINT/EXTREMITIES: extremities normal- no gross deformities noted and normal muscle tone    -------------------------------------------------------------------------------    "

## 2021-10-19 NOTE — PROGRESS NOTES
"    [facility] General ID Service: Progress Note     Patient:  Essie Guzman, Date of birth 1955, Medical record number [id number]  Date of Visit:  October 19, 2021         Assessment and Recommendations:   Problem List:  1. Right PNT culture growing Staphylococcus epidermidis. Subsequent urine culture with mixed machelle  2. Chronic, progressive abdominal pain  3. Prior diagnosis of metastatic gastric adenocarcinoma  4. Prior right hydronephrosis 2/2 malignant obstruction s/p ureteral stent and PNT (10/1/21)  5. H. pylori infection    Recommendations:  1) No obvious evidence of infection to explain patient's ongoing pain  2) Staph epidermidis in PNT culture likely represents contamination and not true infection, do not recommend antibiotic therapy    Discussion:  65yo M with known metastatic gastric carcinoma complicated by chronic abdominal pain and right malignant ureteral obstruction s/p PNT, now admission for acute-on-chronic abdominal pain and found to have CoNS in PNT culture.     Do not suspect this PNT culture result represents true infection. Staphylococcus epidermidis is common skin machelle and would not be unexpected in PNT, even one recently placed. Absence of fever, or leukocytosis similarly point away from acute infection here. He does mention some pain \"after urinating\" but no lynn description of dysuria, frequency, urgency or any other symptoms suggestive of UTI. His pain is located mostly in lower groin and right upper thigh along with some scrotal pain. He has additionally had a recent scrotal US/doppler on 10/11 which was negative for acute pathology to explain the pain. Possible that this is related to malignancy and possible missing doses of home pain regimen. He has been off cefepime since 10/17 and has remained afebrile, hemodynamically stable. No leukocytosis, blood cultures remain negative and repeat urine culture was also negative    ID will sign off  Please call us back with questions or " concerns    FRANKLYN Haq  Staff Physician, Infectious Diseases  Pager [phone]      Interval History:   Patient seen and examined today with a [employer] phone    He was last seen by ID 10/17/21. Since then, he has remained off antibiotics and has been afebrile and hemodynamically stable. WBC count trended down and is currently within normal limits at 6.2. Patient's blood cultures have remained negative and his repeat urine culture (that was collected as a midstream sample showed mixed urogenital machelle)  Patient denies fevers, chills, rigors, sweats  He denies dysuria, urgency, frequency, malodorous urine. Denies blood in urine. Urine in urobag also appears clear yellow  Patient denies nausea, vomiting, diarrhea  Ongoing abdominal pain and reports pain near right inner thigh and scrotum, which occurs after he has finished passing urine  A scrotal US that was performed 10/11 showed no acute abnormalities       History of Present Illness:     67yo M with history of H. pylori infection, metastatic gastric adenocarcinoma, severe malnutrition, right hydronephrosis due to malignant obstruction s/p ureteral stenting (9/10/21) and right PNT (10/1/21) who presented on the evening of 10/15 with worsening abdominal pain. Per patients description, the pain had been waxing and waning for about a month but has been worse in the last two weels. He has denied dysuria during evaluation (though says e felt like maybe the pain was worse after urinating a few times), and has been afebrile throughout his time as an inpatient. WBC has been within normal limits. A CT was obtained on 10/16 which was notable for unchanged gastric wall thickening, small left pleural effusion, anasarca, but no hydronephrosis. Similarly, a CXR on 10/17 was notable for a small left basilar consolidation. A urine sample was collected from the right PNT on 10/16 and has grown >100k cfu/ml Staphylococcus epidermidis. No other cultures (blood  on 10/15, urine on 10/17) have grown anything as of yet. Patient was started on cefepime on 10/17 due to PNT culture findings. ID consulted for assistance with antibiotics.         Review of Systems:   Full 10 point ROS obtained, pertinent positives and negatives as above.       Past Medical History:   History reviewed. No pertinent past medical history.  Past Surgical History:   Procedure Laterality Date     COLONOSCOPY N/A 11/24/2015    Procedure: COLONOSCOPY;  Surgeon: Clyde Mosher MD;  Location: [facility]     COMBINED CYSTOSCOPY, INSERT STENT URETER(S) Right 9/10/2021    Procedure: CYSTOSCOPY, WITH right URETERAL STENT INSERTION, retrograde pyleogram;  Surgeon: Jez Frideman MD;  Location: [facility]     ESOPHAGOSCOPY, GASTROSCOPY, DUODENOSCOPY (EGD), COMBINED N/A 11/24/2020    Procedure: ESOPHAGOGASTRODUODENOSCOPY with biopsies using cold forceps;  Surgeon: Clyde Mosher MD;  Location:  GI     ESOPHAGOSCOPY, GASTROSCOPY, DUODENOSCOPY (EGD), COMBINED N/A 12/22/2020    Procedure: ESOPHAGOGASTRODUODENOSCOPY, WITH FINE NEEDLE ASPIRATION BIOPSY, WITH ENDOSCOPIC ULTRASOUND GUIDANCE;  Surgeon: Guru Teri Pedraza MD;  Location: [facility]     IR NEPHROSTOMY TUBE PLACEMENT RIGHT  10/4/2021         Allergies:    No Known Allergies         Current Antimicrobials:   None    Previous:  Cefepime (10/17)       Family History:     Family History   Problem Relation Age of Onset     Asthma No family hx of      Diabetes No family hx of      Hypertension No family hx of      Cerebrovascular Disease No family hx of      Cancer No family hx of      Colon Cancer No family hx of      Anesthesia Reaction No family hx of      Deep Vein Thrombosis (DVT) No family hx of         Social History:     Social History     Socioeconomic History     Marital status:      Spouse name: Not on file     Number of children: Not on file     Years of education: Not on file     Highest education level: Not on file    Occupational History     Not on file   Tobacco Use     Smoking status: Never Smoker     Smokeless tobacco: Never Used   Vaping Use     Vaping Use: Never used   Substance and Sexual Activity     Alcohol use: No     Drug use: No     Sexual activity: Yes     Partners: Female   Other Topics Concern     Parent/sibling w/ CABG, MI or angioplasty before 65F 55M? No   Social History Narrative     Not on file     Social Determinants of Health     Financial Resource Strain:      Difficulty of Paying Living Expenses:    Food Insecurity:      Worried About Running Out of Food in the Last Year:      Ran Out of Food in the Last Year:    Transportation Needs:      Lack of Transportation (Medical):      Lack of Transportation (Non-Medical):    Physical Activity:      Days of Exercise per Week:      Minutes of Exercise per Session:    Stress:      Feeling of Stress :    Social Connections:      Frequency of Communication with Friends and Family:      Frequency of Social Gatherings with Friends and Family:      Attends Protestant Services:      Active Member of Clubs or Organizations:      Attends Club or Organization Meetings:      Marital Status:    Intimate Partner Violence:      Fear of Current or Ex-Partner:      Emotionally Abused:      Physically Abused:      Sexually Abused:           Physical Exam:   Ranges forvital signs:  Temp:  [97.9  F (36.6  C)-98.9  F (37.2  C)] 98.9  F (37.2  C)  Pulse:  [72-88] 72  Resp:  [16-18] 16  BP: (124-151)/() 139/100  SpO2:  [96 %-98 %] 96 %    Intake/Output Summary (Last 24 hours) at 10/17/2021 1639  Last data filed at 10/17/2021 1413  Gross per 24 hour   Intake 100 ml   Output --   Net 100 ml     Exam:   GENERAL:  well-developed, well-nourished, sitting in bed in no acute distress.   ENT:  Head is normocephalic, atraumatic. Oropharynx is moist without exudates or ulcers.  EYES:  Eyes have anicteric sclerae.    NECK:  Supple.  LUNGS:  Clear to auscultation. On room  air  CARDIOVASCULAR:  Regular rate and rhythm with no murmurs, gallops or rubs.  ABDOMEN:  Normal bowel sounds, soft. Not TTP. Right PNT present, no signs of erythema, induration. Urobag with clear yellow urine  EXT: Extremities warm and without edema.  SKIN:  No acute rashes.    : very minimal TTP along base of right scrotum and inner thigh, no redness, swelling, discharge  NEUROLOGIC:  Grossly nonfocal.         Laboratory Data:     Inflammatory Markers    Recent Labs   Lab Test 10/18/21  0433 10/17/21  1616 04/08/14  1621   SED  --   --  10   CRP 33.0* 42.0*  --      Hematology Studies    Recent Labs   Lab Test 10/18/21  0433 10/17/21  1213 10/17/21  0128 10/15/21  2254 10/15/21  1434 10/13/21  0704 10/13/21  0704 10/12/21  1506 10/12/21  1506 09/10/21  0448 10/23/20  1422 09/08/20  1410 09/08/20  1410 10/11/15  1335 10/11/15  1045 10/11/15  0315 10/11/15  0315   WBC 6.2  --  7.9 10.2 9.2  --  8.5  --  10.6   < > 8.9   < > 9.3   < > 16.2*   < > 18.7*   ANEU  --   --   --   --   --   --   --   --   --   --  6.0  --  6.4  --  14.0*  --  16.1*   AEOS  --   --   --   --   --   --   --   --   --   --  0.1  --  0.1  --  0.0  --  0.1   HGB 7.8* 8.0* 7.8* 8.7* 9.0*  --  7.6*   < > 7.7*   < > 11.9*   < > 13.0*   < > 16.3   < > 18.3*   MCV 85  --  85 87 86  --  88  --  87   < > 92   < > 94   < > 90   < > 92     --  429 494* 485*   < > 325   < > 336   < > 297   < > 297   < > 221   < > 215    < > = values in this interval not displayed.     Metabolic Studies     Recent Labs   Lab Test 10/18/21  0433 10/17/21  1616 10/17/21  0128 10/16/21  0717 10/15/21  2255 10/15/21  1434 10/15/21  1434 10/13/21  0704 10/13/21  0704     --  132*  --  131*  --  134  --  135   POTASSIUM 3.8 4.0 3.3* 5.4* 5.6*   < > 4.1   < > 4.0   CHLORIDE 101  --  98  --  100  --  101  --  104   CO2 29  --  30  --  26  --  28  --  28   BUN 13  --  13  --  16  --  16  --  16   CR 0.86  --  0.71  --  0.71  --  0.79  --  0.78   GFRESTIMATED 89  --   >90  --  >90  --  >90  --  >90    < > = values in this interval not displayed.     Hepatic Studies    Recent Labs   Lab Test 10/18/21  0433 10/17/21  0128 10/15/21  2255 10/15/21  1434 10/13/21  0704 10/11/21  1232   BILITOTAL 0.4 0.3 0.4 0.3 0.4 0.2   ALKPHOS 59 63 74 78 64 80   ALBUMIN 2.2* 2.1* 2.3* 2.4* 2.2* 3.2*   AST 7 13 76* 15 14 22   ALT 7 9 21 16 14 16     Thyroid Studies    Recent Labs   Lab Test 10/15/21  1434 10/11/21  1232   TSH 4.64* 3.91   T4 1.21  --      Microbiology:  Culture   Date Value Ref Range Status   10/17/2021 50,000-100,000 CFU/mL Mixture of urogenital machelle  Final   10/16/2021 >100,000 CFU/mL Staphylococcus epidermidis (A)  Final   10/16/2021 <1,000 CFU/mL Urogenital machelle  Final   10/15/2021 No growth after 3 days  Preliminary   10/04/2021 No Growth  Final   09/21/2021 <10,000 CFU/mL Mixture of urogenital machelle  Final     Urine Studies    Recent Labs   Lab Test 10/17/21  0623 10/16/21  0614 10/11/21  1232 10/04/21  2036 10/04/21  1216   LEUKEST Moderate* Large* Negative Small* Large*   WBCU 12* 11* 2 5 12*          Imaging:     CT Abd/Pelvis (10/16)  IMPRESSION:   1.  Stable positioning of the right percutaneous nephrostomy tube. No hydronephrosis.  2.  Redemonstrated urothelial thickening along the left renal pelvis.  3.  Within the limitations of noncontrast technique, the patient's distal gastric wall thickening and metastatic adenopathy appear unchanged compared to prior CT.  4.  Small left pleural effusion with increasing left basilar atelectasis or consolidation.  5.  Anasarca.    US testicular/Scrotum with Doppler 10/11/21:  Impression:   1. No acute testicular or scrotal pathology.  2. Trace bilateral hydrocele.  3. Small tunica albuginea cysts bilaterally.

## 2021-10-19 NOTE — PLAN OF CARE
[id number]: VSS. Pt reporting pain, 8-10/10, in his right groin and back, IV dilaudid given x2, and PO oxycodone given x2, with minimal relief. Pt denies having any N/V or SOB.

## 2021-10-20 NOTE — PLAN OF CARE
[id number]: VSS. Pt continuing to report pain in his lower back and abdomen, 4-7/10, IV dilaudid given x2, PO oxycodone given x2 and acetaminophen given x1, with some relief. Denies having any N/V or SOB. Pt was able to get some rest last night, and has been voiding spontaneously with AUOP.

## 2021-10-20 NOTE — PLAN OF CARE
[id number]    Max BP of 146/107. Patient does not have parameters, provider notified. OVSS on RA. Denies nausea and SOB. Continues to complain of 4-7/10 pain in lower abdomen and groin that radiates to the back, managed with PRN IV dilaudid x1, PRN tylenol x1, PRN oxy x1, and scheduled methadone.     Patient's PNT is patent and intact with 125mL output. Dressing changed. Patient also voiding spontaneously, not saving.

## 2021-10-20 NOTE — PLAN OF CARE
Pt afebrile with stable vs, except occasionally hypertensive. Pain well controlled with q8hr scheduled methadone and prn oxycodone ~q4hrs. No needs for IV dilaudid. PNT d/I. Occasionally empties it himself without recording. Pt reminded that we would like to measure output. Just above PIV site tender and itchy while receiving IV Iron. IV  flushes easily. No systemic reaction. MD notified. Advised to continue infusion with benadryl for itching. Cold packs also placed on IV site.

## 2021-10-20 NOTE — PROGRESS NOTES
[facility]    Medicine Progress Note - Hospitalist Service, [place]       Date of Admission:  10/16/2021    Assessment & Plan           Essie Guzman is a 66-year-old M with a history of metastatic gastric adenocarcinoma complicated by malignant obstruction of the right kidney resulting in hydronephrosis s/p right ureteral stent (9/10) and PCN (10/1) who presented to the ED on 10/17 for acute on chronic malignancy related abdominal and groin pain, which has since improved significantly with IV opiates in the ED.  He is being admitted for pain control and optimization of his outpatient oral pain regimen.     Today's plan:  - Continue current dose of methadone  - Start IV iron while inpatient    # Poorly differentiated metastatic gastric adenocarcinoma (patient has not begun pembrolizumab infusions yet due to insurance related issues)  # Malignancy-related chronic abdominal pain  - Still with inadequate pain control  - 10/18: Pain mgmt consult: Stop MS Contin, start Methadone 10/18   - EKG with QTc stable   - Pt/daughter aware of starting Methadone  - Correction, daughter, confirms pt taking MS contin RTC, but prn missed oxycodone doses at home  - Pain will also consider later in the week, Thursday, Celiac Neurolysis procedure    # Malignant obstruction of R ureter   # R hydronephrosis s/p ureteral stent (9/10) and percutaneous nephrostomy (10/1)  - Continue PTA oxybutynin 10mg daily    # Staph epi PNT pyuria vs. PNT colonization, RIGHT PNT  - CT did not show hydronephrosis  - Monitor PNT drainage, Follow PNT cultures  - ID consulted, Cefepime stopped, not suspected infectious, monitor PNT output     # Normocytic anemia, severe iron deficiency  # Dark stools, likely from PO iron, with stable hemoglobin  Iron studies done 9/10/2021 consistent with iron deficiency anemia: Iron 15, TIBC normal at 377, ferritin 14.  Hemoglobin on presentation was 7.8, which is stable at his  recent baseline of ~7.5-8. Patient does report dark stools for at least the past month. He is on ferrous sulfate, which could explain the dark stools, though he also has a history of GI bleed. I think hemorrhage is less likely given stable hemoglobin.  - restart pta iron but give IV iron while inpatient  - Last EGD on 11/2020 with normal esophagus and non-bleeding gastric ulcer which turned out to be the gastric cancer.  Last colonoscopy in 2015 was normal and was recommended repeat in 10 years.      # Mild hyponatremia  Monitor clinically. In the past month, patient has been running in the high 120s-mid 130s. Suspect ADH release in the context of chronic illness/malignancy.      # Mild hypokalemia, stable     # Severe malnutrition in the context of chronic illness  - Ensure supplements ordered         Diet: Regular Diet Adult  Snacks/Supplements Adult: Ensure Clear; Between Meals  Snacks/Supplements Adult: Mansi Beard Standard Oral Supplement; With Meals    DVT Prophylaxis: Low Risk/Ambulatory with no VTE prophylaxis indicated  Hamm Catheter: Not present  Central Lines: None  Code Status: Full Code      Disposition Plan   Expected discharge: 10/23/2021   recommended to prior living arrangement once adequate pain management/ tolerating PO medications and antibiotic plan established.     The patient's care was discussed with the Bedside Nurse, Patient and Patient's Family.    Magdalena Braxton MD  Hospitalist Service, [address]  Securely message with the Vocera Web Console (learn more here)  Text page via [facility] Paging/Directory  Please see sign in/sign out for up to date coverage information    Clinically Significant Risk Factors Present on Admission               ______________________________________________________________________    Interval History   Pain improved quite a bit.     No chest pain,dyspnea, fever.     Data reviewed today: I reviewed all  medications, new labs and imaging results over the last 24 hours. I personally reviewed no images or EKG's today.    Physical Exam   Vital Signs: Temp: 97.7  F (36.5  C) Temp src: Oral BP: (!) 143/96 Pulse: 63   Resp: 18 SpO2: 97 % O2 Device: None (Room air)    Weight: 110 lbs 4.8 oz    General Appearance: NAD  HEENT: No thursh / ulcer, EOMI  Respiratory: CTAB on RA   Cardiovascular: RRR  GI: soft, NTND   + R nephrostomy tube  Extremities: No LE edema noted  Neuro: Moving all extremities  Skin: No rash on exposed areas   Psych: Alert and oriented, appropriate mood and affect, speech coherent / logical

## 2021-10-21 NOTE — PLAN OF CARE
[id number]    Hypertensive at times. OVSS on RA. Denies nausea and SOB. Complaining of abdominal/groin pain that radiates to his back, utilized PRN oral oxy x2, tylenol x1, and scheduled methadone.     PNT is patent and dressing CDI. Patient is also voiding spontaneously and not saving. Dressing change due tomorrow. PIV flushing well.

## 2021-10-21 NOTE — PROGRESS NOTES
Inpatient Pain Service: Follow Up Note  10/21/21    Patient: Essie Guzman  Admission Date:10/16/2021     Visit/Communication Style  In person Visit     Chief Pain Endorsement:  Abdominal Pain         1. Cancer pain: Poorly differentiated metastatic gastric adenocarcinoma (patient has not begun pembrolizumab infusions yet due to insurance related issues). Suggest rotation to methadone, continue short acting opioids. Consider Celiac plexus block (once cleared by ID and oncology).  2. Continuous opioid dependence: Discussed rotation to methadone with patient and family. Would continue oxycodone and PRN dilaudid if needed.   3. Opioid induced constipation: continue plan as current (Miralax and Senna) and monitor closely.     -- Outpatient opioid requirements prior to admission: OME = >180 (taking MS contin 30mg tid, oxycodone 10-20mg PRN, and Dilaudid occasionally).      Primary Care Provider: Serge Kimble  Chronic Pain Provider: lately Lashawn Wright       1. Continue scheduled Tylenol   2. Continue Lidocaine topical cream around the neph tube site  3. Repeat ECG for Qtc check on Thursday 10/21   4. Continue Oxycodone 5-10mg Q3h PRN, but may discontinue HM IV   5. Consider 2 day trial of pyridium. TID   6. Consider increasing gabapentin to 300 mg Q8H for his radiating groin pain  7. Bowel regimen per primary team to prevent opioid induced constipation  8. Interventional Pain physician will discuss possibility of placement of IT opioid pump today or tomrrow     Pain Service will continue to follow.     Thank you for consulting the Inpatient Pain Management Service. The above recommendations are to be acted upon at the primary team s discretion.       To reach us:  Mon - Friday 8 AM - 3 PM: Pager [phone] (Text Page)   After hours, weekends and holidays: Primary service should call [phone] The answering service for the on-call pain specialist    PAIN MEDICATION SAFE USE:   Prior to discharge instruct  patient on the following in addition to the medication fact sheet:    Caution: these medications can cause sedation    Take prescription medicine only if it has been prescribed by your doctor    Do not take more medicine or take it more often than instructed     Call your doctor if pain gets worse    Never mix pain medicine with alcohol, sleeping pills, or any illicit drugs    Do not operate heavy machinery, including vehicles, when initiation opioid therapy or increasing dosage    Store prescription opioids in a locked container, whenever possible     Dispose of unused opioids appropriately     Do not stop abruptly once at higher doses.  These medications must be tapered off.    Opioid pain medications do carry the risk for physical dependence and addiction and patients should be counseled about this.     Interval History:  Essie Guzman was seen this morning and continues to report radiating pain from his abdomen to his groin after urination. He does report his abdominal pain is all but completely resolved and now it is just this radiating pain that is bothering him.    Pain intensity today is 6 at rest, which is lower than his baseline at home.      FUNCTIONAL STATUS:  Change:      staying the same  Oral intake:     Regular  Activity level:     Limited bed mobility  Mood:      happy     -- Inpatient Medications Related to Pain Management:   Medications related to Pain Management (From now, onward)    Start     Dose/Rate Route Frequency Ordered Stop    10/17/21 1054  oxyCODONE (ROXICODONE) tablet 5-10 mg      5-10 mg Oral EVERY 3 HOURS PRN 10/17/21 1054      10/17/21 1054  HYDROmorphone (PF) (DILAUDID) injection 0.5 mg      0.5 mg Intravenous EVERY 4 HOURS PRN 10/17/21 1054      10/17/21 0800  acetaminophen (TYLENOL) tablet 1,000 mg      1,000 mg Oral EVERY 8 HOURS PRN 10/17/21 0611      10/17/21 0800  morphine (MS CONTIN) 12 hr tablet 30 mg      30 mg Oral EVERY 8 HOURS 10/17/21 0611      10/17/21 0800   "polyethylene glycol (MIRALAX) Packet 17 g      17 g Oral DAILY 10/17/21 0611      10/17/21 0800  sennosides (SENOKOT) tablet 1-2 tablet      1-2 tablet Oral 2 TIMES DAILY 10/17/21 0611      10/17/21 0611  simethicone (MYLICON) chewable tablet 80 mg      80 mg Oral EVERY 6 HOURS PRN 10/17/21 0611            LAB DATA:  Recent Labs   Lab 10/18/21  0433 10/17/21  1213 10/17/21  0128 10/15/21  2255 10/15/21  2254 10/15/21  2254 10/15/21  1434   CR 0.86  --  0.71 0.71  --   --    < >   WBC 6.2  --  7.9  --   --  10.2  --    HGB 7.8* 8.0* 7.8*  --    < > 8.7*  --    AST 7  --  13 76*  --   --    < >   ALT 7  --  9 21  --   --    < >    < > = values in this interval not displayed.       BP (!) 147/96 (BP Location: Left arm)   Pulse 68   Temp 98.3  F (36.8  C) (Oral)   Resp 18   Ht 1.549 m (5' 1\")   Wt 50 kg (110 lb 4.8 oz)   SpO2 99%   BMI 20.84 kg/m     EXAM:  Constitutional: alert, no distress and thin   Cardiovascular: negative, PMI normal. No lifts, heaves, or thrills. RRR. No murmurs, clicks gallops or rub  Respiratory: negative, Percussion normal. Good diaphragmatic excursion. Lungs clear  Psychiatric: mentation appears normal and affect normal/bright  Head: Normocephalic. No masses, lesions, tenderness or abnormalities  Neck: Neck supple. No adenopathy. Thyroid symmetric, normal size,  ENT: neck without nodes  Abdomen: Abdomen soft, non-tender. BS normal. No masses, organomegaly, positive findings: Pain  : Deferred and male positive for scrotal swelling- mild  NEURO: negative findings: muscle strength normal  SKIN: no suspicious lesions or rashes  LYMPH: Normal cervical lymph nodes  JOINT/EXTREMITIES: extremities normal- no gross deformities noted and normal muscle tone    -------------------------------------------------------------------------------    "

## 2021-10-21 NOTE — PLAN OF CARE
[id number]  AVSS, alert an oriented x 4, c/o pain right groin, PNT site, giving PRN oxycodone every 3 hrs with relief, given 2 times for shift. Dressing for PNT done today 10/21  Plan to discharge tomorrow at 9 AM, needs last IV iron sucrose at 7 AM before discharge. Palliative has order methadone at discharge.  Continue to monitor care.

## 2021-10-21 NOTE — PLAN OF CARE
[id number]: VSS. Pt continuing to report pain in his lower back, abdomen and right groin, 6-7/10, prn oxycodone 10mg given x3 and prn acetaminophen given x1 along with his scheduled methadone, these provided little relief. Pt trying not to take IV dilaudid d/t wanting to discharge soon. Pt up ambulating to the bathroom independently, reports AUOP. Last BM: 10/19/21. Continue POC.

## 2021-10-21 NOTE — PLAN OF CARE
[id number]    VSS on RA. Denies nausea and SOB. Experiencing 4-7/10 pain in abdomen and groin, radiating to his back. Utilized PRN oxy q3h, gave 3x.     PNT is patent with adequate output. Dsg CDI. Patient is also voiding spontaneously.     Plan to discharge tomorrow (10/22) at 9am. Make sure to hang IV iron sucrose at 7am tomorrow before discharge, gets PO benadryl for premed.

## 2021-10-22 NOTE — PROGRESS NOTES
I met with the patient at the bedside this morning along with the pain medicine team.     Briefly Essie Guzman is a 66 years old male who speaks some English( Hmong mother tongue) presented to the hospital with acute abdominal pain. My colleague Dr. Louis managed him and brought his pain under control with opioid rotation. The patient states that his most pain is right after micturition. He also has pain and ambulates with forward flexion. Given the metastatic disease and need for recent opioid escalation the option of IDDS was presented to the patient. He has good family support. One of his daughters works at the [employer]. This morning at the bedside his wife and 2 daughters are present.     We are recommending that the patient is a candidate  for intrathecal drug delivery system in the procedure suite. Risks/benefits/alternatives were discussed.      I discussed with the patient an intrathecal drug delivery system.  I explained that they are implanted pumps that deliver targetted pain relief to an area of the spine that relays signals between an affected area of the body and the brain.  We discussed that with these systems medication is immediately released into the fluid surrounding the spinal cord and directly reaches the nerves of in the spinal cord that are responsible for symptoms, such as perception of pain.  This typically allows a much lower amount of medication to be used. Thereby decreasing medication side effects and improving activities of daily living and sleep.      It was explained to the patient that for this type of therapy I place a catheter beneath the skin into the space along the spine (the intrathecal space) to release the medicine into the cerebrospinal fluid.  An infusion pump is then implanted.  I shared that the pump is implanted under the skin, typically in upper abdomen, in the area between the skin and muscle tissue.      The risk, benefits, and alternatives of surgery  were reviewed with the patient.  The typical risks associated with surgery were reviewed with the patient.  In addition, I explained that for this particular surgical procedure risks can include, but are not limited to- spinal headache, bleeding causing spinal stenosis, and paralysis, cauda equina syndrome, spinal nerve roots injury, damage to other structures or organs, and risk associated with anesthesia. I explained that the implantation can be complicated by infection. I also shared with the patient that there can be challenges with pain control given the instability of metastatic cancer. Our team will make attempts to titrate the medications through the device as tolerated.      I also discussed with the patient that there are risks associated with the catheter and pump. Risks associated with the catheter include, but are not limited to migration, kinking, leakage, fracturing.      A risk associated with the pump and the reservoir can malfunction leading and pump itself can have adverse consequences including, but not limited to reduce pain control, pocket fills, stalling.      The patient has also been aware of the importance of regular follow up and adherence to post-implantation protocols. The patient has been made aware of the post-procedure MRI protocols which require will require to be followed after implantation. I have discussed the frequent follow up required with refills and titration and medication fill/exchange techniques. Given that the patient lives a bit far away from the clinic, our office will attempt to set up home refill program with [facility] for him.      I have discussed with the patient that combination therapy medications hydromorphone and bupivacaine are being planned to be used in the pump. The patient already has tolerance to opioids and therefore it is reasonable to add an adjunct bupivacaine to help with cancer related pain control.      I showed the patient a model of IDDS. We will  send the patient a [employer] pamphlet Kostas Gonzalez from [employer] will be connecting with the patient as well.      RTC in 1 week, video visit.

## 2021-10-22 NOTE — PLAN OF CARE
[id number]: VSS. Pt reporting pain 4/10, prn acetaminophen given with relief. Pt denies having any N/V or SOB. Pt showered and met with MD from pain services. Pt discharged @ 6804.

## 2021-10-22 NOTE — PROGRESS NOTES
Care Management Discharge Note    Discharge Date: 10/22/2021     Discharge Disposition: Home    Discharge Services: Outpatient Infusion Services    Discharge DME: None    Discharge Transportation: Car, family or friend will provide    Private pay costs discussed: Not applicable    PAS Confirmation Code: N/A   Patient/family educated on Medicare website which has current facility and service quality ratings: N/A    Education Provided on the Discharge Plan: Yes   Persons Notified of Discharge Plans: Patient, bedside RN, SW  Patient/Family in Agreement with the Plan: Yes    Handoff Referral Completed: Yes    Additional Information:    Per team, patient is stable to discharge home today.     OP follow-up appointments arranged. No RNCC/SW needs identified for discharge.     Lacey Lau, RN, BSN, PHN  Care Coordinator   P: [phone], [facility]

## 2021-10-22 NOTE — PROGRESS NOTES
Discharge    D: Orders for discharge and outpatient medications written.  I: Home medications and return to clinic schedule reviewed with patient. Discharge instructions and parameters for calling Health Care Provider reviewed. Patient left at 10:55am accompanied by family.   A: Patient/family verbalized understanding and were ready for discharge.   P: Patient instructed to  medications at discharge Pharmacy. Follow-ups as scheduled.

## 2021-10-22 NOTE — DISCHARGE SUMMARY
[facility]  Hospitalist Discharge Summary      Date of Admission:  10/16/2021  Date of Discharge:  10/22/2021 10:55 AM  Discharging Provider: Magdalena Braxton MD  Discharge Team: Hospitalist Service, Gold     Discharge Diagnoses     # Poorly differentiated metastatic gastric adenocarcinoma   # Cancer related pain  # Malignant obstruction of R ureter   # R hydronephrosis s/p ureteral stent (9/10) and percutaneous nephrostomy (10/1)  # Staph epi PNT colonization  # Normocytic anemia  # Severe iron deficiency   # Severe malnutrition in the context of chronic illness      Follow-ups Needed After Discharge   Follow-up Appointments     Adult [facility] Follow-up and recommended labs and tests      Follow-up with palliative care team within 1 week.     Appointments on [place] and/or [facility] (with [facility] or [facility]   provider or service). Call [phone] if you haven't heard regarding   these appointments within 7 days of discharge.           Unresulted Labs Ordered in the Past 30 Days of this Admission     No orders found from 9/16/2021 to 10/17/2021.      These results will be followed up by     Discharge Disposition   Discharged to home  Condition at discharge: Stable      Hospital Course   Essie Guzman is a 66-year-old M with a history of metastatic gastric adenocarcinoma complicated by malignant obstruction of the right kidney resulting in hydronephrosis s/p right ureteral stent (9/10) and PCN (10/1) who presented to the ED on 10/17 for acute on chronic malignancy related abdominal and groin pain, which has since improved significantly with IV opiates in the ED.  He is being admitted for pain control and optimization of his outpatient oral pain regimen.  Palliative care and pain services were consulted:   - Stop MS Contin, start Methadone -- with improvement in pain control.  QTc ok.  Discharged with 1 week supply of pain meds until palliative care follow-up.    -  Continue PTA oxybutynin and tamsulosin.  - Schedule tylenol  - Gabapentin also started  - Outpatient pain clinic follow-up for possible intrathecal implantable pump     # Staph epi PNT pyuria vs. PNT colonization, RIGHT PNT  - CT did not show hydronephrosis  ID was consulted, Cefepime stopped, not suspected infectious.      # Normocytic anemia, severe iron deficiency  - Patient was treated with 3 doses of IV venofer while inpatient because patient is having some black stools and constipation with oral iron.  Patient was discharged with oral iron but this can be stopped if poorly tolerated.  Patient should receive 2 more doses of IV venofer outpatient if possible, and if not should get that if hospitalized again in the future.   - Last EGD on 11/2020 with normal esophagus and non-bleeding gastric ulcer which turned out to be the gastric cancer.  Last colonoscopy in 2015 was normal and was recommended repeat in 10 years.     # Severe malnutrition in the context of chronic illness  - Patient is eating well, family providing all meals.  Ensure supplements as able to eat.       Consultations This Hospital Stay   VASCULAR ACCESS CARE ADULT IP CONSULT  PAIN MANAGEMENT ADULT IP CONSULT  INFECTIOUS DISEASE GENERAL ADULT IP CONSULT  ONCOLOGY ADULT IP CONSULT  CARE MANAGEMENT / SOCIAL WORK IP CONSULT  PALLIATIVE CARE ADULT IP CONSULT    Code Status   Prior    Time Spent on this Encounter   I, Magdalena Braxton MD, personally saw the patient today and spent greater than 30 minutes discharging this patient.       Magdalena Braxton MD  [facility] [address] [place] [id number]  Phone: [phone]  ______________________________________________________________________    Physical Exam   Vital Signs: Temp: 98.1  F (36.7  C) Temp src: Oral BP: 120/80 Pulse: 100   Resp: 18 SpO2: 96 % O2 Device: None (Room air)    Weight: 112 lbs 3.2 oz  General Appearance:  NAD  Respiratory: On RA   Cardiovascular:  RRR  GI: soft, NTND   + R nephrostomy tube  Extremities: No LE edema noted  Neuro: Moving all extremities  Skin: No rash on exposed areas   Psych: Alert and oriented, appropriate mood and affect, speech coherent / logical            Primary Care Physician   Serge Kimble    Discharge Orders      Care Coordination Referral      Reason for your hospital stay    You came in due to cancer related pain and your pain control improved with methadone, oxycodone, gabapentin, and tylenol.  Please follow-up with palliative care clinic outpatient.     Activity    Your activity upon discharge: activity as tolerated     Adult [facility] Follow-up and recommended labs and tests    Follow-up with palliative care team within 1 week.     Appointments on [place] and/or [facility] (with [facility] or [facility] provider or service). Call [phone] if you haven't heard regarding these appointments within 7 days of discharge.     Diet    Follow this diet upon discharge: Orders Placed This Encounter      Snacks/Supplements Adult: Ensure Clear; Between Meals      Snacks/Supplements Adult: Mansi Beard Standard Oral Supplement; With Meals      Regular Diet Adult       Significant Results and Procedures       Discharge Medications   Discharge Medication List as of 10/22/2021 10:01 AM      START taking these medications    Details   gabapentin (NEURONTIN) 100 MG capsule Take 1 capsule (100 mg) by mouth 3 times daily, Disp-90 capsule, R-0, Local Print      melatonin 1 MG TABS tablet Take 3 tablets (3 mg) by mouth At Bedtime, Disp-90 tablet, R-0, E-Prescribe      methadone (DOLOPHINE) 5 MG tablet Take 1 tablet (5 mg) by mouth every 8 hours for 10 days, Disp-30 tablet, R-0, Local Print      !! ondansetron (ZOFRAN-ODT) 4 MG ODT tab Take 1 tablet (4 mg) by mouth every 6 hours as needed for nausea or vomiting, Disp-20 tablet, R-0, E-Prescribe       !! - Potential duplicate medications found. Please discuss with provider.      CONTINUE these  medications which have CHANGED    Details   acetaminophen (TYLENOL) 500 MG tablet Take 2 tablets (1,000 mg) by mouth every 8 hours, Disp-120 tablet, R-0, E-Prescribe      oxyCODONE IR (ROXICODONE) 10 MG tablet Take 1 tablet (10 mg) by mouth every 4 hours as needed for moderate to severe pain, Disp-30 tablet, R-0, Local Print      polyethylene glycol (MIRALAX) 17 GM/Dose powder Take 17 g by mouth daily, Disp-510 g, R-0, E-Prescribe         CONTINUE these medications which have NOT CHANGED    Details   ferrous gluconate (FERGON) 324 (38 Fe) MG tablet Take 1 tablet (324 mg) by mouth daily (with breakfast), Disp-100 tablet, R-1, E-Prescribe      ibuprofen (ADVIL/MOTRIN) 200 MG tablet Take 1 tablet (200 mg) by mouth every 6 hours as needed for moderate pain, Disp-30 tablet, R-0, E-Prescribe      naloxone (NARCAN) 4 MG/0.1ML nasal spray Spray 1 spray (4 mg) into one nostril alternating nostrils once as needed for opioid reversal every 2-3 minutes until assistance arrives, Disp-0.2 mL, R-0, E-Prescribe      !! ondansetron (ZOFRAN-ODT) 4 MG ODT tab Take 1 tablet (4 mg) by mouth every 6 hours as needed for nausea or vomiting, Disp-30 tablet, R-0, E-Prescribe      oxybutynin ER (DITROPAN-XL) 10 MG 24 hr tablet Take 1 tablet (10 mg) by mouth daily, Disp-30 tablet, R-0, E-Prescribe      pantoprazole (PROTONIX) 40 MG EC tablet Take 1 tablet (40 mg) by mouth daily, Disp-60 tablet, R-1, E-Prescribe      sennosides (SENOKOT) 8.6 MG tablet Take 1-2 tablets by mouth 2 times daily, Disp-60 tablet, R-0, E-Prescribe      simethicone (MYLICON) 80 MG chewable tablet Take 1 tablet (80 mg) by mouth every 6 hours as needed for cramping, Disp-20 tablet, R-0, E-Prescribe      tamsulosin (FLOMAX) 0.4 MG capsule Take 1 capsule (0.4 mg) by mouth daily, Disp-30 capsule, R-1, E-Prescribe      thiamine (B-1) 100 MG tablet Take 1 tablet (100 mg) by mouth daily, Disp-100 tablet, R-1, E-Prescribe       !! - Potential duplicate medications found. Please  discuss with provider.      STOP taking these medications       morphine (MS CONTIN) 30 MG CR tablet Comments:   Reason for Stopping:             Allergies   No Known Allergies

## 2021-10-22 NOTE — PLAN OF CARE
[id number]:     NEURO: A&O x4 and intact.      RESPIRATORY:  Denies SOB, O2 99% on room air.     CARDIO: WDL     GI/:  Right PNT and voiding spontaneously with AUOP. Last BM: 10/19/21.       SKIN: WDL     ACTIVITY: SBA     PAIN: Continuing to report pain in his right lower abdomen and right groin, 5-7/10, scheduled methadone given along with prn 10 mg oxycodone x2, pt reports relief with this regimen.      LDA: L PIV - iron infusing at 110ml/hr.         PLAN: Pt scheduled for discharge this morning at 9am. Daughter updated last night and will be here to pick him up.

## 2021-10-25 NOTE — PROGRESS NOTES
Clinic Care Coordination Contact  [facility]/Voicemail       Clinical Data: Care Coordinator Outreach  Outreach attempted x 1. Unable to leave voicemail on patient or patients daughters phone.     Chart review: Referral from IP handoff, in ED to hospital on 10/16-10/22 for cancer associated pain. Discharged to home, has lab and infusions tomorrow, palliative care appt 10/27, oncology 11/5.     Plan: Care Coordinator will try to reach patient again in 1-2 business days.    LEWIS Weaver, B.S. [facility]  Clinic Care Coordination  [facility] Clinics:  [place], [place] and [place]  [phone]  [email or web address]

## 2021-10-25 NOTE — LETTER
[facility] CARE COORDINATION  [facility]  October 27, 2021    Essie VALIENTE Thomas  [address]  [place] [address]      Dear Essie,    I am a clinic community health worker who works with Serge Kimble MD at the [facility]. I have been trying to reach you recently to introduce Clinic Care Coordination and to see if there was anything I could assist you with.  Below is a description of clinic care coordination and how I can further assist you.      The clinic care coordination team is made up of a registered nurse,  and community health worker who understand the health care system. The goal of clinic care coordination is to help you manage your health and improve access to the health care system in the most efficient manner. The team can assist you in meeting your health care goals by providing education, coordinating services, strengthening the communication among your providers and supporting you with any resource needs.    Please feel free to contact me at [phone] with any questions or concerns. We are focused on providing you with the highest-quality healthcare experience possible and that all starts with you.     Sincerely,     Loli Chung, LEWIS, B.S. [facility]  Clinic Care Coordination  [facility]:  [place], [place] and [place]  [phone]  [email or web address]

## 2021-10-26 NOTE — PROGRESS NOTES
Infusion Nursing Note:  Essie Guzman presents today for labs/no transfusion needed.    Patient seen by provider today: No   present during visit today: Yes, Language: Hmong.     Note: Pt arrived in wheelchair with daughter. Reports feeling post-hospitalization and starting to regain some strength. Denied need for further intervention for low back, abdominal and scrotal pain beyond his home regimen. Has a visit with Palliative Care tomorrow to continue to address this ongoing issue. Denies any SOB/chest pain or fevers. Only concern was that his weight is up about 8 lbs in the past 5 days and he has noticed increased abdominal swelling and bilateral leg swelling (mild at not new). Pt is not symptomatic with the abdominal swelling.    Discussed with ANISA Womack who will touch base with pt prior to her visit with him on 11/5. Educated pt on monitoring his weight at home and to notify us if he becomes symptomatic.     Labs did not meet parameters for transfusion today.    Intravenous Access:  Peripheral IV placed.    Treatment Conditions:  Lab Results   Component Value Date    HGB 8.4 (L) 10/26/2021    WBC 8.9 10/26/2021    ANEU 6.0 10/23/2020    ANEUTAUTO 7.0 10/26/2021     10/26/2021      Lab Results   Component Value Date     10/26/2021    POTASSIUM 4.2 10/26/2021    MAG 1.8 10/18/2021    CR 0.73 10/26/2021    HAWA 8.5 10/26/2021    BILITOTAL 0.2 10/26/2021    ALBUMIN 2.5 (L) 10/26/2021    ALT 15 10/26/2021    AST 18 10/26/2021     Results reviewed, labs did NOT meet treatment parameters. No transfusion given.    Post Infusion Assessment:  Access discontinued per protocol.     Discharge Plan:   Patient declined prescription refills.  Copy of AVS reviewed with patient and/or family.  Patient will return 11/5 for next appointment.  Patient discharged in stable condition accompanied by: daughter.  Departure Mode: Wheelchair.      Livia Lopez RN

## 2021-10-26 NOTE — NURSING NOTE
Chief Complaint   Patient presents with     Blood Draw     Labs drawn via vpt by RN in lab. VS taken     Labs drawn from PIV placed by RN. Line flushed with saline. Vitals taken. Pt checked in for next appointment.      Arleen Joaquin RN

## 2021-10-26 NOTE — PATIENT INSTRUCTIONS
Contact Numbers    [facility] [place] (for Scheduling/Triage/After Hours Nurse Line): [phone]    Please call the [facility] nurse triage or the after hours nurse line if you experience a temperature greater than or equal to 100.5, shaking chills, have uncontrolled nausea, vomiting and/or diarrhea, dizziness, lightheadedness, shortness of breath, chest pain, bleeding, unexplained bruising, or if you have any other new/concerning symptoms, questions or concerns.     If you are having any concerning symptoms or wish to speak to a provider before your next infusion visit, please call your care coordinator or triage to notify them so we can adequately serve you.     If you need any refills on medications (narcotics or other medications), please call before your infusion appointment.       October 2021 Sunday Monday Tuesday Wednesday Thursday Friday Saturday                            1    ASYMPTOMATIC PCR   4:00 PM   (15 min.)   [facility] LAB   [facility] Lab [place] 2       3     4    PROCEDURE - 4.5 HR  10:00 AM   (270 min.)   U2A ROOM 1   [facility] Unit [address] [place] [address]    Admission  10:15 AM   Warren Segura MD   [address]   (Discharge: 10/4/2021)    IR NEPHROSTOMY TUBE PLCMT RT  11:30 AM   (120 min.)   UUIR4   [facility] Interventional Radiology    UU URINARY CATH (PNT, SUPRA)   3:00 PM   (60 min.)   Rebecca Sumner, ADIN   [facility]    Admission   8:42 PM   Garett Kennedy MD   [facility] [address] [place] [address]   (Discharge: 10/9/2021) 5    CT ABDOMEN PELVIS W   8:35 AM   (20 min.)   [address] [place] [address] Imaging 6     7     8     9       10     11    Admission   4:30 PM   Gennaro Ruano MD   [facility] [address] [place] [address]   (Discharge: 10/13/2021)    CT ABDOMEN PELVIS W   9:25 PM   (20 min.)   UUCT1   [facility] Imaging    US LWR EXT VENOUS DUPLEX BILAT  10:10 PM   (40  min.)   UUUS2   [facility]    US TESTICULAR/SCROT W DOP LTD  10:15 PM   (30 min.)   UUUS2   [facility] Imaging 12     13     PHONE   9:30 AM   (15 min.)   Essie Orozco   [facility]  Services 14    MYC VIDEO VISIT SB2-4   8:15 AM   (30 min.)   Blanca Tang PA-C M [facility] 15    RETURN   2:15 PM   (45 min.)   Lashawn Wright PA-C M [facility]    LAB PERIPHERAL   2:15 PM   (15 min.)    [facility] DRAW   [facility]     PHONE   2:50 PM   (60 min.)   Essie Orozco   [facility]  Services    Admission  11:02 PM   Melly Lau MD   [facility]   (Discharge: 10/16/2021) 16    CT ABDOMEN PELVIS WO   3:50 AM   (20 min.)   UUCT1   [facility]    Admission  11:51 PM   Homar Becerra MD   [facility] Unit 7D [place]   (Discharge: 10/22/2021)   17    XR CHEST 2 VIEWS  12:50 AM   (20 min.)   UUXR3   [facility] 18     19     20     21     22     23       24     25     26    LAB CENTRAL   7:30 AM   (15 min.)   [facility]   [facility]    ONC INFUSION 6 HR (360 MIN)   8:00 AM   (360 min.)    ONC INFUSION NURSE   [facility] 27    VIDEO VISIT RETURN   3:45 PM   (45 min.)   Serafin Sanches MD   [facility] 28     29     30       31 November 2021 Sunday Monday Tuesday Wednesday Thursday Friday Saturday        1     2     3     4     5    LAB CENTRAL   9:15 AM   (15 min.)    [facility]   [facility]    RETURN   9:45 AM   (45 min.)   Lashawn Wright PA-C M [facility]    ONC INFUSION 2 HR (120 MIN)  10:30 AM   (120 min.)   TARIK  ONC INFUSION NURSE   [facility] 6       7     8     9     10    LAB PERIPHERAL  11:30 AM   (15 min.)   [facility]   [facility]    RETURN  11:45 AM   (30 min.)   Brennan Mccarthy MD   [facility]    ONC INFUSION 4 HR (240 MIN)   1:00 PM   (240 min.)    ONC INFUSION NURSE   [facility] 11     12     13       14     15     16     17     18     19     20       21     22     23     24     25     26     27       28     29     30                                         Lab Results:  Recent Results (from the past 12 hour(s))   Comprehensive metabolic panel    Collection Time: 10/26/21  7:54 AM   Result Value Ref Range    Sodium 136 133 - 144 mmol/L    Potassium 4.2 3.4 - 5.3 mmol/L    Chloride 104 94 - 109 mmol/L    Carbon Dioxide (CO2) 28 20 - 32 mmol/L    Anion Gap 4 3 - 14 mmol/L    Urea Nitrogen 15 7 - 30 mg/dL    Creatinine 0.73 0.66 - 1.25 mg/dL    Calcium 8.5 8.5 - 10.1 mg/dL    Glucose 107 (H) 70 - 99 mg/dL    Alkaline Phosphatase 85 40 - 150 U/L    AST 18 0 - 45 U/L    ALT 15 0 - 70 U/L    Protein Total 7.5 6.8 - 8.8 g/dL    Albumin 2.5 (L) 3.4 - 5.0 g/dL    Bilirubin Total 0.2 0.2 - 1.3 mg/dL    GFR Estimate >90 >60 mL/min/1.73m2   CBC with platelets and differential    Collection Time: 10/26/21  7:54 AM   Result Value Ref Range    WBC Count 8.9 4.0 - 11.0 10e3/uL    RBC Count 3.16 (L) 4.40 - 5.90 10e6/uL    Hemoglobin 8.4 (L) 13.3 - 17.7 g/dL    Hematocrit 27.3 (L) 40.0 - 53.0 %    MCV 86 78 - 100 fL    MCH 26.6 26.5 - 33.0 pg    MCHC 30.8 (L) 31.5 - 36.5 g/dL    RDW 18.1 (H) 10.0 - 15.0 %    Platelet Count 413 150 - 450 10e3/uL    % Neutrophils 79 %    % Lymphocytes 12 %    % Monocytes 8 %    % Eosinophils 1 %    % Basophils 0 %    % Immature Granulocytes 0 %    NRBCs per 100 WBC 0 <1 /100    Absolute Neutrophils 7.0 1.6 - 8.3 10e3/uL    Absolute Lymphocytes 1.1 0.8 - 5.3 10e3/uL     Absolute Monocytes 0.7 0.0 - 1.3 10e3/uL    Absolute Eosinophils 0.1 0.0 - 0.7 10e3/uL    Absolute Basophils 0.0 0.0 - 0.2 10e3/uL    Absolute Immature Granulocytes 0.0 <=0.0 10e3/uL    Absolute NRBCs 0.0 10e3/uL

## 2021-10-27 NOTE — PROGRESS NOTES
Essie is a 66 year old who is being evaluated via a billable video visit.      How would you like to obtain your AVS? [employer]  If the video visit is dropped, the invitation should be resent by: Text to cell phone: [phone]  Will anyone else be joining your video visit? Yes, daughter You will be there with and to help Interp.      Video Start Time: 445  Video-Visit Details    Type of service:  Video Visit    Video End Time:450    Originating Location (pt. Location): Home    Distant Location (provider location):  [facility] CANCER [facility]     Platform used for Video Visit: [facility]     Palliative Care Outpatient Clinic Consultation Note    Patient:  Essie Guzman    Chief Complaint:   Essie Guzman 66 year old male who is presenting to the palliative medicine clinic today at the request of Dr. Mccarthy for a palliative care consultation secondary to metastatic gastric cancer.   The patient's primary care provider is:  Serge Kimble.     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     The patient presents today with his daughter.They declined an  The patient had two admissions in the last month due to uncontrolled pain. On his last admission her was started on methadone, gabapentin and prn oxycodone.     His pain is under good control today. The patient is only taking 1 tab of oxycodone 1-2 times per day as needed. He is sleeping better, he is having regular bowel movements, and he is eating better.     He and his daughter are looking forward to starting immunotherapy with pembrolizumab next week pending insurance approval.      Patient's Disease Understanding:   The patients goal is to have a better quality of life. He is hoping for more time with grandchildren to see them grow up more. he wants to  prioritize being at home with family as much as possibly moving forward. He also does not want to be a burden to others and does not want to be dependent on others on a day to day basis.     In the past he was worried about starting chemotherapy because it would be debilitating. They have family members who tried chemotherapy for cancer and they felt like the chemo was not helpful for them. He also has a hard time with the idea of putting a PORT in his body if there is a chance the chemo would not be helpful.     Coping:    Family and geovani are the two main ways the patient cindy with his illness      Social History  Living Situation: lives with two sons and wife and nieces and nephews  Children: 6 kids   Actual/Potential Caregiver(s): daughters - help with walking to bathroom with change of clothes, meds. Grooming.   Support System: family   Occupation: farmer by trade  Hobbies: vegetable farm and flower farm   Substance Use/History of misuse: none       Spiritual Background: geovani and Shamanism   Spiritual Concerns/Needs: they practice with family clan.   Social History     Tobacco Use     Smoking status: Never Smoker     Smokeless tobacco: Never Used   Vaping Use     Vaping Use: Never used   Substance Use Topics     Alcohol use: No     Drug use: No       Family History  Family History   Problem Relation Age of Onset     Asthma No family hx of      Diabetes No family hx of      Hypertension No family hx of      Cerebrovascular Disease No family hx of      Cancer No family hx of      Colon Cancer No family hx of      Anesthesia Reaction No family hx of      Deep Vein Thrombosis (DVT) No family hx of      Patient's Involvement with Prior History of Serious Illness in Family:     Advance Care Planning:  Advance Directive:    All children - collective decision.   POLST:   Discussed DNR status if patient passes away. Leaning in that direction but does not feel comfortable making that choice today.     No Known  Allergies  Current Outpatient Medications   Medication Sig Dispense Refill     acetaminophen (TYLENOL) 500 MG tablet Take 2 tablets (1,000 mg) by mouth every 8 hours 120 tablet 0     ferrous gluconate (FERGON) 324 (38 Fe) MG tablet Take 1 tablet (324 mg) by mouth daily (with breakfast) 100 tablet 1     gabapentin (NEURONTIN) 100 MG capsule Take 1 capsule (100 mg) by mouth 3 times daily 90 capsule 0     ibuprofen (ADVIL/MOTRIN) 200 MG tablet Take 1 tablet (200 mg) by mouth every 6 hours as needed for moderate pain (Patient not taking: Reported on 10/26/2021) 30 tablet 0     melatonin 1 MG TABS tablet Take 3 tablets (3 mg) by mouth At Bedtime (Patient not taking: Reported on 10/26/2021) 90 tablet 0     methadone (DOLOPHINE) 5 MG tablet Take 1 tablet (5 mg) by mouth every 8 hours for 10 days 30 tablet 0     naloxone (NARCAN) 4 MG/0.1ML nasal spray Spray 1 spray (4 mg) into one nostril alternating nostrils once as needed for opioid reversal every 2-3 minutes until assistance arrives (Patient not taking: Reported on 10/26/2021) 0.2 mL 0     ondansetron (ZOFRAN-ODT) 4 MG ODT tab Take 1 tablet (4 mg) by mouth every 6 hours as needed for nausea or vomiting 20 tablet 0     ondansetron (ZOFRAN-ODT) 4 MG ODT tab Take 1 tablet (4 mg) by mouth every 6 hours as needed for nausea or vomiting 30 tablet 0     oxybutynin ER (DITROPAN-XL) 10 MG 24 hr tablet Take 1 tablet (10 mg) by mouth daily 30 tablet 0     oxyCODONE IR (ROXICODONE) 10 MG tablet Take 1 tablet (10 mg) by mouth every 4 hours as needed for moderate to severe pain 30 tablet 0     pantoprazole (PROTONIX) 40 MG EC tablet Take 1 tablet (40 mg) by mouth daily 60 tablet 1     polyethylene glycol (MIRALAX) 17 GM/Dose powder Take 17 g by mouth daily 510 g 0     sennosides (SENOKOT) 8.6 MG tablet Take 1-2 tablets by mouth 2 times daily 60 tablet 0     simethicone (MYLICON) 80 MG chewable tablet Take 1 tablet (80 mg) by mouth every 6 hours as needed for cramping 20 tablet 0      tamsulosin (FLOMAX) 0.4 MG capsule Take 1 capsule (0.4 mg) by mouth daily 30 capsule 1     thiamine (B-1) 100 MG tablet Take 1 tablet (100 mg) by mouth daily 100 tablet 1     No past medical history on file.  Past Surgical History:   Procedure Laterality Date     COLONOSCOPY N/A 11/24/2015    Procedure: COLONOSCOPY;  Surgeon: Clyde Mosher MD;  Location: [facility]     COMBINED CYSTOSCOPY, INSERT STENT URETER(S) Right 9/10/2021    Procedure: CYSTOSCOPY, WITH right URETERAL STENT INSERTION, retrograde pyleogram;  Surgeon: Jez Friedman MD;  Location: [facility]     ESOPHAGOSCOPY, GASTROSCOPY, DUODENOSCOPY (EGD), COMBINED N/A 11/24/2020    Procedure: ESOPHAGOGASTRODUODENOSCOPY with biopsies using cold forceps;  Surgeon: Clyde Mosher MD;  Location: [facility]     ESOPHAGOSCOPY, GASTROSCOPY, DUODENOSCOPY (EGD), COMBINED N/A 12/22/2020    Procedure: ESOPHAGOGASTRODUODENOSCOPY, WITH FINE NEEDLE ASPIRATION BIOPSY, WITH ENDOSCOPIC ULTRASOUND GUIDANCE;  Surgeon: Guru Teri Pedraza MD;  Location: [facility]     IR NEPHROSTOMY TUBE PLACEMENT RIGHT  10/4/2021       Palliative Symptom Review (0=no symptom/no concern, 1=mild, 2=moderate, 3=severe):      Pain: 1      Fatigue: 1      Nausea: 0      Constipation: 0      Diarrhea: 0      Depressive Symptoms: 0      Anxiety: 0      Drowsiness: 1      Poor Appetite: 0      Shortness of Breath: 0      Insomnia: 0      Other: 0      Overall (0 good/no concerns, 3 very poor):  0    No vitals due to video visit.     GENERAL: Comfortable-appearing, alert and no distress  EYES: Eyes grossly normal to inspection, conjunctivae and sclerae normal  Hearing intact.   RESP: no audible wheeze, cough, or visible cyanosis.  No increased work of breathing on RA.  Able to speak easily in complete sentences.  SKIN: no suspicious lesions or rashes on exposed skin  NEURO: Cranial nerves grossly intact, mentation intact and speech normal.  No tremor.   PSYCH: mentation appears normal, affect  normal/bright and mood-congruent, judgement and insight intact, normal speech and appearance     Wt Readings from Last 10 Encounters:   10/26/21 54.7 kg (120 lb 9.6 oz)   10/21/21 50.9 kg (112 lb 3.2 oz)   10/15/21 51.7 kg (114 lb)   10/15/21 52 kg (114 lb 9.6 oz)   10/14/21 49.4 kg (109 lb)   10/13/21 50.8 kg (111 lb 14.4 oz)   10/09/21 51 kg (112 lb 6.4 oz)   10/04/21 51 kg (112 lb 8 oz)   09/27/21 50.8 kg (112 lb)   09/22/21 50.8 kg (112 lb)       Data Reviewed:  LABS:   Recent Labs   Lab Test 10/26/21  0754 10/21/21  0628 10/20/21  0622 10/18/21  0433     --   --  134   POTASSIUM 4.2 3.7   < > 3.8   CHLORIDE 104  --   --  101   CO2 28  --   --  29   ANIONGAP 4  --   --  4   *  --   --  87   BUN 15  --   --  13   CR 0.73  --   --  0.86   HAWA 8.5  --   --  8.6    < > = values in this interval not displayed.     IMAGING:     CT Abdomen / pelvis 10/16/21  LOWER CHEST: Hypodense appearance of the left ventricular blood pool relative to the myocardium consistent with anemia. Small left and trace right pleural effusions. Left basilar atelectasis or consolidation has increased.     HEPATOBILIARY: Distended gallbladder. Liver within normal limits for noncontrast appearance.     PANCREAS: Normal.     SPLEEN: Normal.     ADRENAL GLANDS: Normal.     KIDNEYS/BLADDER: Right percutaneous nephrostomy tube in place, pigtail looped in the renal pelvis near the ureteropelvic junction, similar to prior study. No residual hydronephrosis. Mild urothelial thickening noted in the left renal pelvis. Bladder is   nearly empty.     BOWEL: No mechanical bowel obstruction or free air. Vaguely redemonstrated antropyloric wall thickening in the distal stomach, evaluation is hindered by absence of IV contrast.     LYMPH NODES: Again noted are juliet conglomerates along the gastrohepatic ligament (3.8 x 3.2 cm image 87 series 5), the celiac axis (4.7 x 3.9 cm image 118), and in the para-aortic and aortocaval spaces. This is  unchanged.     VASCULATURE: Atherosclerotic calcifications. No abdominal aortic aneurysm.     PELVIC ORGANS: Diffuse pelvic edema.     MUSCULOSKELETAL: Anasarca. No acute osseous findings.                                                                      IMPRESSION:   1.  Stable positioning of the right percutaneous nephrostomy tube. No hydronephrosis.     2.  Redemonstrated urothelial thickening along the left renal pelvis.     3.  Within the limitations of noncontrast technique, the patient's distal gastric wall thickening and metastatic adenopathy appear unchanged compared to prior CT.     4.  Small left pleural effusion with increasing left basilar atelectasis or consolidation.     5.  Anasarca.    Images personally reviewed: yes    Opioid Risk Tool (ORT):    Family History of Substance Abuse:        Alcohol = 0 pt (no)       Illegal Drugs = 0 pt (no)       Prescription Drugs = 0 pt (no)      Personal History of Substance Abuse:       Alcohol = 0 pt (no)       Illegal Drugs = 0 pt (no)       Prescription Drugs = 0 pt (no)        Age: 0 pt (age < 16; age > 45)      History of Pre-adolescent Sexual Abuse: 0 pt (no)      Psychological Disease: 0 pt (none)      ORT Score = 0        0-3: Low risk for opioid abuse       4-7: Moderate risk for opioid abuse       >/= 8: High risk for opioid abuse    Impressions, Recommendations & Counseling:  Palliative Performance Score:  60      Decision Making Capacity:  present    Essie Guzman is a 66 year old with metastatic gastric cancer with periaortic LN mets and 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     #Cancer-related pain  -much improved on current regimen. Was seen by Dr. Alicia for possible IT pump, but patient and family feel like current regimen is working so there is no need to pursue the procedure. No side effects from opioids (drowsy, constipation, or jerking).   Plan  -continue methadone 5mg  TID and oxycodone 10mg q4H PRN,   -Continue gabapentin 100mg TID  -Follow up in 1 month.     #Metastatic gastric cancer  -Patient and family very thoughtful about what treatments they are willing to try and their overall goals for treatment. As discussed above, the goal is to spend more time at home with family and to be able to live as independently as possible.   -We discussed the benefits of hospice in the future if treatment does not help with cancer treatment.   -We also discussed the benefits of DNR and the unlikelihood that CPR would be beneficial if his heart stops as a result of complications related to cancer.  DNR seems to be in line with his overall mindset but didn't want to make decision today.     Follow up in 1 month.    Serafin Sanches MD   Palliative Medicine Fellow   Staffed with Dr. Sim     Attending Note:  Patient seen and evaluated with Dr Sanches and I agree with/confirm their findings/recs in this note.      Thank you for involving us in the patient's care.   Lee Sim MD / Palliative Medicine / Text me via [facility].

## 2021-10-27 NOTE — PATIENT INSTRUCTIONS
Thank you for seeing the Palliative Care Clinic today.      1. Today we discussed your pain medications. We made no changes and prescribed more methadone and oxycodone.      2. We also discussed your overall goals for treatment. Thank you for being opening to speaking with me. I'm looking forward to our next visit.    Return to clinic 1 month for a follow-up.      You can reach the Palliative Care Team during business hours at the following numbers:   -For the [facility], call [phone]  -To reach the palliative RN for questions or refills, call [phone]       To reach the Palliative Care Provider on-call After-hours or on holidays and weekends, call: [phone].  Please note that we are not able to provide pain medication refills on evenings or weekends.

## 2021-10-27 NOTE — PROGRESS NOTES
Clinic Care Coordination Contact  [facility]/Voicemail       Clinical Data: Care Coordinator Outreach  Outreach attempted x 2.  Unable to leave voicemail on patient or patients daughters phone.     Chart review: Referral from IP handoff, in ED to hospital on 10/16-10/22 for cancer associated pain. Discharged to home, has lab and infusions tomorrow, palliative care appt 10/27, oncology 11/5.     Plan: Care Coordinator will send care coordination introduction letter with care coordinator contact information and explanation of care coordination services via [employer]. Care Coordinator will do no further outreaches at this time.    Loli Chung, LEWIS, B.S. [facility] Care Coordination  [facility] Clinics:  [place], [place] and [place]  [phone]  [email or web address]

## 2021-10-27 NOTE — LETTER
10/27/2021       RE: Essie Guzman  [id number]  [facility] [address]     Dear Colleague,    Thank you for referring your patient, Essie Guzamn, [facility]. Please see a copy of my visit note below.    Palliative Care Outpatient Clinic Consultation Note    Patient:  Essie Guzman    Chief Complaint:   Essie Guzman 66 year old male who is presenting to the palliative medicine clinic today at the request of Dr. Mccarthy for a palliative care consultation secondary to metastatic gastric cancer.   The patient's primary care provider is:  Serge Kimble.     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     The patient presents today with his daughter.They declined an  The patient had two admissions in the last month due to uncontrolled pain. On his last admission her was started on methadone, gabapentin and prn oxycodone.     His pain is under good control today. The patient is only taking 1 tab of oxycodone 1-2 times per day as needed. He is sleeping better, he is having regular bowel movements, and he is eating better.     He and his daughter are looking forward to starting immunotherapy with pembrolizumab next week pending insurance approval.      Patient's Disease Understanding:   The patients goal is to have a better quality of life. He is hoping for more time with grandchildren to see them grow up more. he wants to prioritize being at home with family as much as possibly moving forward. He also does not want to be a burden to others and does not want to be dependent on others on a day to day basis.     In the past he was worried about starting chemotherapy because it would be debilitating. They have family members who  tried chemotherapy for cancer and they felt like the chemo was not helpful for them. He also has a hard time with the idea of putting a PORT in his body if there is a chance the chemo would not be helpful.     Coping:    Family and geovani are the two main ways the patient cindy with his illness      Social History  Living Situation: lives with two sons and wife and nieces and nephews  Children: 6 kids   Actual/Potential Caregiver(s): daughters - help with walking to bathroom with change of clothes, meds. Grooming.   Support System: family   Occupation: farmer by [employer]  Hobbies: vegetable farm and flower farm   Substance Use/History of misuse: none       Spiritual Background: geovani and Shamanism   Spiritual Concerns/Needs: they practice with family clan.   Social History     Tobacco Use     Smoking status: Never Smoker     Smokeless tobacco: Never Used   Vaping Use     Vaping Use: Never used   Substance Use Topics     Alcohol use: No     Drug use: No       Family History  Family History   Problem Relation Age of Onset     Asthma No family hx of      Diabetes No family hx of      Hypertension No family hx of      Cerebrovascular Disease No family hx of      Cancer No family hx of      Colon Cancer No family hx of      Anesthesia Reaction No family hx of      Deep Vein Thrombosis (DVT) No family hx of      Patient's Involvement with Prior History of Serious Illness in Family:     Advance Care Planning:  Advance Directive:    All children - collective decision.   POLST:   Discussed DNR status if patient passes away. Leaning in that direction but does not feel comfortable making that choice today.     No Known Allergies  Current Outpatient Medications   Medication Sig Dispense Refill     acetaminophen (TYLENOL) 500 MG tablet Take 2 tablets (1,000 mg) by mouth every 8 hours 120 tablet 0     ferrous gluconate (FERGON) 324 (38 Fe) MG tablet Take 1 tablet (324 mg) by mouth daily (with breakfast) 100 tablet 1     gabapentin  (NEURONTIN) 100 MG capsule Take 1 capsule (100 mg) by mouth 3 times daily 90 capsule 0     ibuprofen (ADVIL/MOTRIN) 200 MG tablet Take 1 tablet (200 mg) by mouth every 6 hours as needed for moderate pain (Patient not taking: Reported on 10/26/2021) 30 tablet 0     melatonin 1 MG TABS tablet Take 3 tablets (3 mg) by mouth At Bedtime (Patient not taking: Reported on 10/26/2021) 90 tablet 0     methadone (DOLOPHINE) 5 MG tablet Take 1 tablet (5 mg) by mouth every 8 hours for 10 days 30 tablet 0     naloxone (NARCAN) 4 MG/0.1ML nasal spray Spray 1 spray (4 mg) into one nostril alternating nostrils once as needed for opioid reversal every 2-3 minutes until assistance arrives (Patient not taking: Reported on 10/26/2021) 0.2 mL 0     ondansetron (ZOFRAN-ODT) 4 MG ODT tab Take 1 tablet (4 mg) by mouth every 6 hours as needed for nausea or vomiting 20 tablet 0     ondansetron (ZOFRAN-ODT) 4 MG ODT tab Take 1 tablet (4 mg) by mouth every 6 hours as needed for nausea or vomiting 30 tablet 0     oxybutynin ER (DITROPAN-XL) 10 MG 24 hr tablet Take 1 tablet (10 mg) by mouth daily 30 tablet 0     oxyCODONE IR (ROXICODONE) 10 MG tablet Take 1 tablet (10 mg) by mouth every 4 hours as needed for moderate to severe pain 30 tablet 0     pantoprazole (PROTONIX) 40 MG EC tablet Take 1 tablet (40 mg) by mouth daily 60 tablet 1     polyethylene glycol (MIRALAX) 17 GM/Dose powder Take 17 g by mouth daily 510 g 0     sennosides (SENOKOT) 8.6 MG tablet Take 1-2 tablets by mouth 2 times daily 60 tablet 0     simethicone (MYLICON) 80 MG chewable tablet Take 1 tablet (80 mg) by mouth every 6 hours as needed for cramping 20 tablet 0     tamsulosin (FLOMAX) 0.4 MG capsule Take 1 capsule (0.4 mg) by mouth daily 30 capsule 1     thiamine (B-1) 100 MG tablet Take 1 tablet (100 mg) by mouth daily 100 tablet 1     No past medical history on file.  Past Surgical History:   Procedure Laterality Date     COLONOSCOPY N/A 11/24/2015    Procedure:  COLONOSCOPY;  Surgeon: Clyde Mosher MD;  Location: [facility]     COMBINED CYSTOSCOPY, INSERT STENT URETER(S) Right 9/10/2021    Procedure: CYSTOSCOPY, WITH right URETERAL STENT INSERTION, retrograde pyleogram;  Surgeon: Jez Friedman MD;  Location: [facility]     ESOPHAGOSCOPY, GASTROSCOPY, DUODENOSCOPY (EGD), COMBINED N/A 11/24/2020    Procedure: ESOPHAGOGASTRODUODENOSCOPY with biopsies using cold forceps;  Surgeon: Clyde Mosher MD;  Location: [facility]     ESOPHAGOSCOPY, GASTROSCOPY, DUODENOSCOPY (EGD), COMBINED N/A 12/22/2020    Procedure: ESOPHAGOGASTRODUODENOSCOPY, WITH FINE NEEDLE ASPIRATION BIOPSY, WITH ENDOSCOPIC ULTRASOUND GUIDANCE;  Surgeon: Guru Teri Pedraza MD;  Location: [facility]     IR NEPHROSTOMY TUBE PLACEMENT RIGHT  10/4/2021       Palliative Symptom Review (0=no symptom/no concern, 1=mild, 2=moderate, 3=severe):      Pain: 1      Fatigue: 1      Nausea: 0      Constipation: 0      Diarrhea: 0      Depressive Symptoms: 0      Anxiety: 0      Drowsiness: 1      Poor Appetite: 0      Shortness of Breath: 0      Insomnia: 0      Other: 0      Overall (0 good/no concerns, 3 very poor):  0    No vitals due to video visit.     GENERAL: Comfortable-appearing, alert and no distress  EYES: Eyes grossly normal to inspection, conjunctivae and sclerae normal  Hearing intact.   RESP: no audible wheeze, cough, or visible cyanosis.  No increased work of breathing on RA.  Able to speak easily in complete sentences.  SKIN: no suspicious lesions or rashes on exposed skin  NEURO: Cranial nerves grossly intact, mentation intact and speech normal.  No tremor.   PSYCH: mentation appears normal, affect normal/bright and mood-congruent, judgement and insight intact, normal speech and appearance     Wt Readings from Last 10 Encounters:   10/26/21 54.7 kg (120 lb 9.6 oz)   10/21/21 50.9 kg (112 lb 3.2 oz)   10/15/21 51.7 kg (114 lb)   10/15/21 52 kg (114 lb 9.6 oz)   10/14/21 49.4 kg (109 lb)   10/13/21  50.8 kg (111 lb 14.4 oz)   10/09/21 51 kg (112 lb 6.4 oz)   10/04/21 51 kg (112 lb 8 oz)   09/27/21 50.8 kg (112 lb)   09/22/21 50.8 kg (112 lb)       Data Reviewed:  LABS:   Recent Labs   Lab Test 10/26/21  0754 10/21/21  0628 10/20/21  0622 10/18/21  0433     --   --  134   POTASSIUM 4.2 3.7   < > 3.8   CHLORIDE 104  --   --  101   CO2 28  --   --  29   ANIONGAP 4  --   --  4   *  --   --  87   BUN 15  --   --  13   CR 0.73  --   --  0.86   HAWA 8.5  --   --  8.6    < > = values in this interval not displayed.     IMAGING:     CT Abdomen / pelvis 10/16/21  LOWER CHEST: Hypodense appearance of the left ventricular blood pool relative to the myocardium consistent with anemia. Small left and trace right pleural effusions. Left basilar atelectasis or consolidation has increased.     HEPATOBILIARY: Distended gallbladder. Liver within normal limits for noncontrast appearance.     PANCREAS: Normal.     SPLEEN: Normal.     ADRENAL GLANDS: Normal.     KIDNEYS/BLADDER: Right percutaneous nephrostomy tube in place, pigtail looped in the renal pelvis near the ureteropelvic junction, similar to prior study. No residual hydronephrosis. Mild urothelial thickening noted in the left renal pelvis. Bladder is   nearly empty.     BOWEL: No mechanical bowel obstruction or free air. Vaguely redemonstrated antropyloric wall thickening in the distal stomach, evaluation is hindered by absence of IV contrast.     LYMPH NODES: Again noted are juliet conglomerates along the gastrohepatic ligament (3.8 x 3.2 cm image 87 series 5), the celiac axis (4.7 x 3.9 cm image 118), and in the para-aortic and aortocaval spaces. This is unchanged.     VASCULATURE: Atherosclerotic calcifications. No abdominal aortic aneurysm.     PELVIC ORGANS: Diffuse pelvic edema.     MUSCULOSKELETAL: Anasarca. No acute osseous findings.                                                                      IMPRESSION:   1.  Stable positioning of the right  percutaneous nephrostomy tube. No hydronephrosis.     2.  Redemonstrated urothelial thickening along the left renal pelvis.     3.  Within the limitations of noncontrast technique, the patient's distal gastric wall thickening and metastatic adenopathy appear unchanged compared to prior CT.     4.  Small left pleural effusion with increasing left basilar atelectasis or consolidation.     5.  Anasarca.    Images personally reviewed: yes    Opioid Risk Tool (ORT):    Family History of Substance Abuse:        Alcohol = 0 pt (no)       Illegal Drugs = 0 pt (no)       Prescription Drugs = 0 pt (no)      Personal History of Substance Abuse:       Alcohol = 0 pt (no)       Illegal Drugs = 0 pt (no)       Prescription Drugs = 0 pt (no)        Age: 0 pt (age < 16; age > 45)      History of Pre-adolescent Sexual Abuse: 0 pt (no)      Psychological Disease: 0 pt (none)      ORT Score = 0        0-3: Low risk for opioid abuse       4-7: Moderate risk for opioid abuse       >/= 8: High risk for opioid abuse    Impressions, Recommendations & Counseling:  Palliative Performance Score:  60      Decision Making Capacity:  present    Essie Guzman is a 66 year old with metastatic gastric cancer with periaortic LN mets and 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     #Cancer-related pain  -much improved on current regimen. Was seen by Dr. Alicia for possible IT pump, but patient and family feel like current regimen is working so there is no need to pursue the procedure. No side effects from opioids (drowsy, constipation, or jerking).   Plan  -continue methadone 5mg TID and oxycodone 10mg q4H PRN,   -Continue gabapentin 100mg TID  -Follow up in 1 month.     #Metastatic gastric cancer  -Patient and family very thoughtful about what treatments they are willing to try and their overall goals for treatment. As discussed above, the goal is to spend more time at home with  family and to be able to live as independently as possible.   -We discussed the benefits of hospice in the future if treatment does not help with cancer treatment.   -We also discussed the benefits of DNR and the unlikelihood that CPR would be beneficial if his heart stops as a result of complications related to cancer.  DNR seems to be in line with his overall mindset but didn't want to make decision today.     Follow up in 1 month.    Serafin Sanches MD   Palliative Medicine Fellow   Staffed with Dr. Sim     Attending Note:  Patient seen and evaluated with Dr Sanches and I agree with/confirm their findings/recs in this note.      Thank you for involving us in the patient's care.   Lee Sim MD / Palliative Medicine / Text me via [facility].            Again, thank you for allowing me to participate in the care of your patient.      Sincerely,    Serafin Sanches MD

## 2021-10-28 NOTE — TELEPHONE ENCOUNTER
RN called patient's daughter, Jass, and left a voicemail in regards to rescheduling Essie's appointment to Fri 10/29 at 8 am, per Dr. Alicia. Left call back number to pain clinic to discuss rescheduling appointment.    Daphne Sherwood RN

## 2021-11-02 NOTE — PROGRESS NOTES
Oncology Distress Screening Follow-up  Clinical Social Work  [facility]    Identified Concern and Score From Distress Screenin. How concerned are you about knowing what resources are available to help you?   6Abnormal            Date of Distress Screening: 10/27/21      Data: Essie Guzman is a 66 year old with metastatic gastric cancer. At time of last visit, Patient scored positive on distress screen.  called Patient today with intention of introducing them to psychosocial services and support, and following up on elevated distress.        Intervention/Education Provided: Phone call to patient about distress screening. No answer - message left. Provided contact information in order to reach writer.       Follow-up Required: Will remain available for support and await patient's return call.          Marcia HENNING, PRISCA  - Oncology  Phone : [phone]  Pager: [phone]

## 2021-11-05 NOTE — LETTER
November 5, 2021    CECILLE STEFFANIE MARTINEZ  [address]  [facility] [address]      Dear Cecille:    As a member of [facility] ([facility] [place] you are provided a care coordinator. I will be your new care coordinator as of 11/01/2021. I will be calling you soon to see how you are doing and determine your needs.    If you have any questions, please feel free to call me at [phone]. If you reach my voice mail, please leave a message and your phone number. If you are hearing impaired, please call the [place] Rel[employer] at 701 or [phone] ([email or web address] relay service).    I look forward to speaking with you soon.    Sincerely,    JOHN Lockhart, [facility]    E-mail: [email or web address]  Phone:[phone]      [facility] is a health plan that contracts with both Medicare and the [place] Medical Assistance (Medicaid) program to provide benefits of both programs to enrollees. Enrollment in [facility] depends on contract renewal.      [facility]  [id number] DHS Approved ([id number])  [id number] (11/18)

## 2021-11-05 NOTE — TELEPHONE ENCOUNTER
[facility] Care Coordination Contact    Member became effective with  [facility] on 11/01/2021 with [facility].  Previous Health Plan: [facility]  Previous Care System: [facility]  Previous care coordinators name and number: GEORGINA FUENTES REGI - no number available  Waiver Type: N/A  Last MMIS Entry: Date 01/12/2021 and Type hra refusal  MMIS visit date (and type) if different from above: hra refusal  11/13/2019  Services Listed in MMIS: none  UTF received: No: Requested on 11/05/2021 from [email or web address].     Kate Collins  Care Management Specialist  [facility]  [phone]

## 2021-11-09 NOTE — PROGRESS NOTES
MEDICAL ONCOLOGY FOLLOW UP NOTE    PATIENT NAME: Essie Guzman  ENCOUNTER DATE: 11/10/2021    Care Team  Primary Oncologist: Brennan Mccarthy MD    REASON FOR CURRENT VISIT: F/u Metastatic gastric cancer    HISTORY OF PRESENT ILLNESS:  Mr. 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 Initially Rx for H.pylori with Abx and PPI. LFT's were mildly elevated.  11/24/20- UGI endoscopy at [facility] with normal esophagus. Non-bleeding gastric ulcer in pylorus with no stigmata of bleeding.  Non-bleeding duodenal ulcer with no stigmata of bleeding. Biopsy of Stomach, pylorus, - Poorly-differentiated carcinoma; consistent with adenocarcinoma (poorly cohesive type). HER2 by FISH was non-amplified  11/24/20- CT CAP- bilateral hilar lymph nodes are indeterminate (1.4 x 1.0 cm) right infrahilar lymph node. Multiple enlarged retroperitoneal and perigastric lymph nodes (10 mm right paraduodenal lymph node, 11 mm necrotic left para-aortic lymph node and a 10 mm left para-aortic lymph node.  12/16/20-  PET/CT with metastatic disease- Hypermetabolic circumferential ulcerated mass at the gastric antrum, Multiple metastatic hypermetabolic lymph nodes:  adjacent just inferior to the caudate lobe of the liver, Multiple enlarged, centrally necrotic, and hypermetabolic retroperitoneal para-aortic and paracaval lymph nodes.    12/18/20-Saw [facility]- Due to PET CT  showing retroperitoneal lymphadenopathy not a candidate for surgical resection.  12/22/20- EUS staging and biopsy- [id number] - Partially-obstructing cratered pre-pyloric gastric ulcer with a clean ulcer base, fully-circumferential ulcer  Several sub-centimeter malignant appearing round, well-circumscribed, hypoechoic lymph nodes were  visualized along the aorta, from the 2nd portion of  duodenum which corresponds to the hypermetabolic nodes seen on the PET scan.   3/9/21- PET/CT-Overall there has been progression in the number and size of the hypermetabolic periaortic, pericaval, and zuleyma hepatis lymph nodes compared to prior.Relatively unchanged hypermetabolic circumferential ulcerated mass at the gastric antrum.  March through Aug 2021- Declined systemic Rx with pembrolizumab as he was minimally symptomatic  9/10/21 to 9/12/21- [facility] admission for - Iron deficiency anemia from chronic blood loss anemia 2/2 progressive metastatic gastric cancer, Right hydrnonephrosis secondary to malignant obstruction s/p ureteric stenting and ongoing Cancer related weight loss- Rcd 1 unit PRBC  9/10/21- CT abd/pelvis-  Wall thickening of the distal stomach consistent with the history of gastric cancer. There are multiple enlarged upper abdominal and retroperitoneal lymph nodes consistent with metastases and significantly progressed since the previous exam. Right hydronephrosis and delayed nephrogram consistent with obstruction. (There is an irregularly-shaped low-attenuation mass posterior to the body of the pancreas measuring approximately 2.1 x 3.8 x 2.6 cm and consistent with a lymph node.  10/4 to 10/9- [facility] admission- Flank pain from worsening right-sided hydronephrosis, secondary to malignant obstruction, right-sided renal hypoperfusion and some fluid in the right renal pelvis.right percutaneous nephrostomy tube and ureteral stent appear appropriately positioned  S/p R renal stent removal, still with PNTs in place and  improvement in pain. Also needed 2 Units prbc for anemia  10/11/21 to 10/13/21- [facility] admission for scrotal edema- no cause found- no malignat obstruction  10/16/21 to 10/21/21- [facility] admission for pain control (abd and groin) and optimization of his outpatient oral pain regimen.  Palliative care and pain services were consulted: - Stopped MS Contin, started Methadone -- with improvement in pain control         Interval Hx:  Essie Guzman was with his daughter (Osman) who did the translation.  Since I last saw him he has deteriorated significantly in terms of nutrition and generalized weakness. He has been admitted to the hospital for a number of issues as described above. Most recently was because of the rt groin/back pain secondary to malignant obstruction. Stent was removed and now he has a percutaneous tube. He uis currently on methadone for pain, which is going fairly well, he does have some acute exacerbation of pain.  He has met with Dr. Quinteros who offerered pain pump but he is not keen on it right now.   Appetite is significantly affected but has improved slowly in the last few days. He has gradually lost about 20-30 lbs of weight in the recent times and has a recent decline in his performance status. He is more sleepy and tired most of the time during the day, and rests most pf the day. He needs help with most ADL and IADL but is able to sit up a few hours daily.    PAST MEDICAL AND SURGICAL HISTORY:   Active Ambulatory Problems     Diagnosis Date Noted     Malignant neoplasm of stomach metastatic to retroperitoneum (H) 12/21/2020     Gastrointestinal hemorrhage with melena 09/10/2021     Cancer associated pain 10/04/2021     Testicular swelling 10/11/2021     Swelling of left lower extremity 10/11/2021     Malignant neoplasm of stomach, unspecified location (H) 10/11/2021     Abdominal pain, generalized 10/17/2021     Resolved Ambulatory Problems     Diagnosis Date Noted     Hematochezia 10/11/2015     No  Additional Past Medical History   No surgeeies    SOCIAL HISTORY:   Social History     Tobacco Use     Smoking status: Never Smoker     Smokeless tobacco: Never Used   Vaping Use     Vaping Use: Never used   Substance Use Topics     Alcohol use: No     Drug use: No   Non-smoker/non-drinker  He is now retired. 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  He lives in [place] with family, sposue, sons and grandkids  He has 6 kids, lives with 2 younger boys, 4 grand kids      FAMILY HISTORY:   Family History   Problem Relation Age of Onset     Asthma No family hx of      Diabetes No family hx of      Hypertension No family hx of      Cerebrovascular Disease No family hx of      Cancer No family hx of      Colon Cancer No family hx of      Anesthesia Reaction No family hx of      Deep Vein Thrombosis (DVT) No family hx of    No family hx any cancer      ALLERGIES: No Known Allergies    CURRENT MEDICATIONS:   Current Outpatient Medications:      acetaminophen (TYLENOL) 500 MG tablet, Take 2 tablets (1,000 mg) by mouth every 8 hours, Disp: 120 tablet, Rfl: 0     ferrous gluconate (FERGON) 324 (38 Fe) MG tablet, Take 1 tablet (324 mg) by mouth daily (with breakfast), Disp: 100 tablet, Rfl: 1     gabapentin (NEURONTIN) 100 MG capsule, Take 1 capsule (100 mg) by mouth 3 times daily, Disp: 90 capsule, Rfl: 0     ibuprofen (ADVIL/MOTRIN) 200 MG tablet, Take 1 tablet (200 mg) by mouth every 6 hours as needed for moderate pain (Patient not taking: Reported on 10/26/2021), Disp: 30 tablet, Rfl: 0     melatonin 1 MG TABS tablet, Take 3 tablets (3 mg) by mouth At Bedtime (Patient not taking: Reported on 10/26/2021), Disp: 90 tablet, Rfl: 0     methadone (DOLOPHINE) 5 MG tablet, Take 1 tablet (5 mg) by mouth every 8 hours, Disp: 90 tablet, Rfl: 0     naloxone (NARCAN) 4 MG/0.1ML nasal spray, Spray 1 spray (4 mg) into one nostril alternating nostrils once as needed for opioid reversal every 2-3 minutes until assistance arrives  "(Patient not taking: Reported on 10/26/2021), Disp: 0.2 mL, Rfl: 0     ondansetron (ZOFRAN-ODT) 4 MG ODT tab, Take 1 tablet (4 mg) by mouth every 6 hours as needed for nausea or vomiting, Disp: 20 tablet, Rfl: 0     ondansetron (ZOFRAN-ODT) 4 MG ODT tab, Take 1 tablet (4 mg) by mouth every 6 hours as needed for nausea or vomiting, Disp: 30 tablet, Rfl: 0     oxybutynin ER (DITROPAN-XL) 10 MG 24 hr tablet, Take 1 tablet (10 mg) by mouth daily, Disp: 30 tablet, Rfl: 0     oxyCODONE IR (ROXICODONE) 10 MG tablet, Take 1 tablet (10 mg) by mouth every 4 hours as needed for moderate to severe pain, Disp: 60 tablet, Rfl: 0     pantoprazole (PROTONIX) 40 MG EC tablet, Take 1 tablet (40 mg) by mouth daily, Disp: 60 tablet, Rfl: 1     polyethylene glycol (MIRALAX) 17 GM/Dose powder, Take 17 g by mouth daily, Disp: 510 g, Rfl: 0     sennosides (SENOKOT) 8.6 MG tablet, Take 1-2 tablets by mouth 2 times daily, Disp: 60 tablet, Rfl: 0     simethicone (MYLICON) 80 MG chewable tablet, Take 1 tablet (80 mg) by mouth every 6 hours as needed for cramping, Disp: 20 tablet, Rfl: 0     tamsulosin (FLOMAX) 0.4 MG capsule, Take 1 capsule (0.4 mg) by mouth daily, Disp: 30 capsule, Rfl: 1     thiamine (B-1) 100 MG tablet, Take 1 tablet (100 mg) by mouth daily, Disp: 100 tablet, Rfl: 1    PHYSICAL EXAMINATION:  Vital signs: /84   Pulse 99   Temp 97  F (36.1  C) (Oral)   Resp 16   Ht 1.549 m (5' 0.98\")   Wt 47.7 kg (105 lb 3.2 oz)   SpO2 99%   BMI 19.89 kg/m      General:   Frail-appearing male sitting on the chair  Eyes:  No erythema or discharge  Respiratory:  Breathing comfortably on room air.  No wheezing or distress.  Musculoskeletal:  tenderness in the Right flank and lower back , has percutaneous nephrostomy tube is draining well.  Skin:  No visible concerning skin rashes or lesions  Neuro:  No notable tremor and dyskinetic movements.  Psych:  Mood and affect appear normal.     The rest of a comprehensive physical " examination is deferred due to PHE (public health emergency) video visit restrictions.    LABORATORY DATA:  Recent Labs   Lab Test 11/10/21  1201 10/26/21  0754 10/18/21  0433 10/17/21  1213 10/17/21  0128   WBC 6.9 8.9 6.2  --  7.9   RBC 2.60* 3.16* 2.91*  --  2.96*   HGB 6.9* 8.4* 7.8*   < > 7.8*   HCT 22.5* 27.3* 24.7*  --  25.0*   MCV 87 86 85  --  85   MCH 26.5 26.6 26.8  --  26.4*   MCHC 30.7* 30.8* 31.6  --  31.2*   RDW 18.7* 18.1* 17.5*  --  17.3*    413 402  --  429   NEUTROPHIL 78 79  --   --  84    < > = values in this interval not displayed.     Recent Labs   Lab Test 11/10/21  1201 10/26/21  0754 10/21/21  0628 10/20/21  0622 10/18/21  0433   * 136  --   --  134   POTASSIUM 4.0 4.2 3.7   < > 3.8   CHLORIDE 99 104  --   --  101   CO2 26 28  --   --  29   ANIONGAP 3 4  --   --  4   * 107*  --   --  87   BUN 16 15  --   --  13   CR 0.86 0.73  --   --  0.86   HAWA 8.8 8.5  --   --  8.6    < > = values in this interval not displayed.     Recent Labs   Lab Test 11/10/21  1201 10/26/21  0754 10/18/21  0433   PROTTOTAL 7.4 7.5 6.6*   ALBUMIN 2.5* 2.5* 2.2*   BILITOTAL 0.3 0.2 0.4   ALKPHOS 74 85 59   AST 14 18 7   ALT 10 15 7         ASSESSMENT AND PLAN:    Mr. Essie Guzman is a 65 year old  male with PMHx of H.pylori infection, and recently diagnosed gastric cancer. He is otherwise healthy and does not have any co morbidities.    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 Since Sep/Oct he is experiencing significant symptoms. Ideally, in this case combination of chemotherapy and immunotherpay (Nivo+FOLFOX)  or Pembro+FOLFOX are both reasonable options. Patient was not willing for any chemotherapy.  Therefore we will try to get approval for single agent pembrolizumab, which was rejected by insurance and then we try to apply it to the company.  Due to prolonged delays changes esophageal to see if we can get nivolumab and FOLFOX approved, however consider only given nivolumab.  We are still awaiting insurance approval for this and hopefully will know this in the next couple days.  Unfortunately in the interim he has had significant disease progression.  I am very worried about his overall general condition and inability to tolerate any systemic treatment, however since he is treatment naïve I think trying single agent immunotherapy is worth that given he is MSI high cancer.  My goal is to start nivolumab as soon as possible when approved.  Today I briefly discussed, the potential side effects with nivolumab which are very similar to pembrolizumab and the goals of treatment which are palliative in nature.  His daughter understands the situation.    Plan  -Awaiting approval for nivolumab with FOLFOX, however plan to give only nivolumab once approved  --Return to clinic with me in 1 week anticipating approval for nivolumab      #Anemia  --Most likely iron deficiency anemia due to slow blood loss from the cancer.  Is at 1 episode of hematemesis last week but norecent episodes.  Hemoglobin today is less than 7 we will therefore send him to 1 unit blood transfusion.  --1 unit blood transfusion today, and he will return next week and we will recheck a CBC    #Hyponatremia  Mild to moderate hyponatremia, mostly asymptomatic.  Most likely related to dehydration due to decreased p.o. intake  --We will give intravenous fluids today in addition to blood.    # Right Hydronephrosis s/p Ureteral stent placement (later removed ) and percutaneous nephrostomy tube placement  #Cancer related pain  Patient has pain in the right lower back  since the stent placement that is from malignant obstruction of the enlarged lymph nodes.  Eventually required stent removal and then placement of percutaneous nephrostomy tube since then the pain is slightly reduced.    He is also following palliative care who is managing his pain.  He has been recently started on methadone.  He also saw Dr. Cardenas from interventional pain who suggested a pain pump however he denied it.    --Continue methadone as per palliative for 5 milligram 3 times daily  --Also has oxycodone IR 10 mg every 4 hours as needed    # Nutrition  Patient lost 50 lbs over 6 months previously, some of this was intentional due to diet changes and increased physical activity. His weight is lately stable with a recent decrease in appetite as above.  I truly believe that once his cancer starts responding to his treatment most of the symptoms will get better.  - Encouraged nutritional supplements.  - Encouraged daily multivitamin.     On the day of service  Chart review: 5 minutes  Visit duration: 30 minutes  Care coordination: 5 minutes    Brennan Mccarthy MD    Hematology, Oncology and Transplantation

## 2021-11-10 NOTE — PROGRESS NOTES
Infusion Nursing Note:  Essie Guzman presents today for 1 unit prbc, NS bolus.    Patient seen by provider today: Yes: Dr. Mccarthy   present during visit today: Yes, Language: Hmong.     Note: Pt received 500 ml NS bolus for Sodium level of 128. 1 unit prbc then transfused for Hgb 6.9. Daughter stayed with pt in infusion and helped interpret.    BP slightly elevated during/post transfusion; see charting. Pt asymptomatic. Dr. Mccarthy notified.     Pt take scheduled Methadone at home and due to being in the clinic for an extended time, missed his dose. Oxycodone 10 mg po given with relief from a 5 to a 0/10.     Intravenous Access:  Peripheral IV placed.    Treatment Conditions:  Lab Results   Component Value Date    HGB 6.9 (LL) 11/10/2021    WBC 6.9 11/10/2021    ANEU 6.0 10/23/2020    ANEUTAUTO 5.3 11/10/2021     11/10/2021      Lab Results   Component Value Date     (L) 11/10/2021    POTASSIUM 4.0 11/10/2021    MAG 1.8 10/18/2021    CR 0.86 11/10/2021    HAWA 8.8 11/10/2021    BILITOTAL 0.3 11/10/2021    ALBUMIN 2.5 (L) 11/10/2021    ALT 10 11/10/2021    AST 14 11/10/2021     Results reviewed, labs MET treatment parameters, ok to proceed with treatment.  Blood transfusion consent signed 10/15/21.    Post Infusion Assessment:  Patient tolerated infusion without incident.  Blood return noted pre and post infusion.  Site patent and intact, free from redness, edema or discomfort.  No evidence of extravasations. Access discontinued per protocol.     Discharge Plan:   Patient declined prescription refills.  Copy of AVS reviewed with patient and/or family.  Patient will return 11/17 for next provider appointment.  Patient discharged in stable condition accompanied by: self.  Departure Mode: Wheelchair.    Livia Lopez RN

## 2021-11-10 NOTE — PROGRESS NOTES
[facility] Care Coordination Contact    Member is new to [facility] effective 11/1/21. Reviewed EMR.  [facility] Care Coordinator indicates member has been a refusal for HRA in the past.  Spoke with daughter Jass by phone.  She states that member lives with his wife, sons and their family.  Jass and her sister (who lives a block away) provide care for member at home.  She states he has difficulty walking and they often help with his cares.  Jass would like a PCA assessment done and also would possibly like homemaking services.  Scheduled initial HRA and PCA assessment for 10 am on November 22nd.    Jass does not think member needs a responsible party but sometimes has difficulty speaking loudly.  Told her Care Coordinator would call with a [employer] .  JOHN Lockhart, [facility] Care Coordinator  Tel [phone]  Fax [phone]  Cell [phone]

## 2021-11-10 NOTE — LETTER
11/10/2021         RE: Essie Guzman  [id number]  [facility] [id number]        Dear Colleague,    Thank you for referring your patient, Essie Guzman, to the [facility]. Please see a copy of my visit note below.    MEDICAL ONCOLOGY FOLLOW UP NOTE    PATIENT NAME: Essie Guzman  ENCOUNTER DATE: 11/10/2021    Care Team  Primary Oncologist: Brennan Mccarthy MD    REASON FOR CURRENT VISIT: F/u Metastatic gastric cancer    HISTORY OF PRESENT ILLNESS:  Mr. 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 Initially Rx for H.pylori with Abx and PPI. LFT's were mildly elevated.  11/24/20- UGI endoscopy at [facility] with normal esophagus. Non-bleeding gastric ulcer in pylorus with no stigmata of bleeding.  Non-bleeding duodenal ulcer with no stigmata of bleeding. Biopsy of Stomach, pylorus, - Poorly-differentiated carcinoma; consistent with adenocarcinoma (poorly cohesive type). HER2 by FISH was non-amplified  11/24/20- CT CAP- bilateral hilar lymph nodes are indeterminate (1.4 x 1.0 cm) right infrahilar lymph node. Multiple enlarged retroperitoneal and perigastric lymph nodes (10 mm right paraduodenal lymph node, 11 mm necrotic left para-aortic lymph node and a 10 mm left para-aortic lymph node.  12/16/20-  PET/CT with metastatic disease- Hypermetabolic circumferential ulcerated mass at the gastric antrum, Multiple metastatic  hypermetabolic lymph nodes:  adjacent just inferior to the caudate lobe of the liver, Multiple enlarged, centrally necrotic, and hypermetabolic retroperitoneal para-aortic and paracaval lymph nodes.    12/18/20-Saw [facility]- Due to PET CT showing retroperitoneal lymphadenopathy not a candidate for surgical resection.  12/22/20- EUS staging and biopsy- [id number] - Partially-obstructing cratered pre-pyloric gastric ulcer with a clean ulcer base, fully-circumferential ulcer  Several sub-centimeter malignant appearing round, well-circumscribed, hypoechoic lymph nodes were  visualized along the aorta, from the 2nd portion of  duodenum which corresponds to the hypermetabolic nodes seen on the PET scan.   3/9/21- PET/CT-Overall there has been progression in the number and size of the hypermetabolic periaortic, pericaval, and zuleyma hepatis lymph nodes compared to prior.Relatively unchanged hypermetabolic circumferential ulcerated mass at the gastric antrum.  March through Aug 2021- Declined systemic Rx with pembrolizumab as he was minimally symptomatic  9/10/21 to 9/12/21- [facility] admission for - Iron deficiency anemia from chronic blood loss anemia 2/2 progressive metastatic gastric cancer, Right hydrnonephrosis secondary to malignant obstruction s/p ureteric stenting and ongoing Cancer related weight loss- Rcd 1 unit PRBC  9/10/21- CT abd/pelvis-  Wall thickening of the distal stomach consistent with the history of gastric cancer. There are multiple enlarged upper abdominal and retroperitoneal lymph nodes consistent with metastases and significantly progressed since the previous exam. Right hydronephrosis and delayed nephrogram consistent with obstruction. (There is an irregularly-shaped low-attenuation mass posterior to the body of the pancreas measuring approximately 2.1 x 3.8 x 2.6 cm and consistent with a lymph node.  10/4 to 10/9- [facility] admission- Flank pain from worsening right-sided hydronephrosis, secondary to  malignant obstruction, right-sided renal hypoperfusion and some fluid in the right renal pelvis.right percutaneous nephrostomy tube and ureteral stent appear appropriately positioned  S/p R renal stent removal, still with PNTs in place and improvement in pain. Also needed 2 Units prbc for anemia  10/11/21 to 10/13/21- [facility] admission for scrotal edema- no cause found- no malignat obstruction  10/16/21 to 10/21/21- [facility] admission for pain control (abd and groin) and optimization of his outpatient oral pain regimen.  Palliative care and pain services were consulted: - Stopped MS Contin, started Methadone -- with improvement in pain control         Interval Hx:  Essie Guzman was with his daughter (Osman) who did the translation.  Since I last saw him he has deteriorated significantly in terms of nutrition and generalized weakness. He has been admitted to the hospital for a number of issues as described above. Most recently was because of the rt groin/back pain secondary to malignant obstruction. Stent was removed and now he has a percutaneous tube. He uis currently on methadone for pain, which is going fairly well, he does have some acute exacerbation of pain.  He has met with Dr. Quinteros who offerered pain pump but he is not keen on it right now.   Appetite is significantly affected but has improved slowly in the last few days. He has gradually lost about 20-30 lbs of weight in the recent times and has a recent decline in his performance status. He is more sleepy and tired most of the time during the day, and rests most pf the day. He needs help with most ADL and IADL but is able to sit up a few hours daily.    PAST MEDICAL AND SURGICAL HISTORY:   Active Ambulatory Problems     Diagnosis Date Noted     Malignant neoplasm of stomach metastatic to retroperitoneum (H) 12/21/2020     Gastrointestinal hemorrhage with melena 09/10/2021     Cancer associated pain 10/04/2021     Testicular swelling 10/11/2021     Swelling of  left lower extremity 10/11/2021     Malignant neoplasm of stomach, unspecified location (H) 10/11/2021     Abdominal pain, generalized 10/17/2021     Resolved Ambulatory Problems     Diagnosis Date Noted     Hematochezia 10/11/2015     No Additional Past Medical History   No surgeeies    SOCIAL HISTORY:   Social History     Tobacco Use     Smoking status: Never Smoker     Smokeless tobacco: Never Used   Vaping Use     Vaping Use: Never used   Substance Use Topics     Alcohol use: No     Drug use: No   Non-smoker/non-drinker  He is now retired. 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  He lives in [place] with family, sposue, sons and grandkids  He has 6 kids, lives with 2 younger boys, 4 grand kids      FAMILY HISTORY:   Family History   Problem Relation Age of Onset     Asthma No family hx of      Diabetes No family hx of      Hypertension No family hx of      Cerebrovascular Disease No family hx of      Cancer No family hx of      Colon Cancer No family hx of      Anesthesia Reaction No family hx of      Deep Vein Thrombosis (DVT) No family hx of    No family hx any cancer      ALLERGIES: No Known Allergies    CURRENT MEDICATIONS:   Current Outpatient Medications:      acetaminophen (TYLENOL) 500 MG tablet, Take 2 tablets (1,000 mg) by mouth every 8 hours, Disp: 120 tablet, Rfl: 0     ferrous gluconate (FERGON) 324 (38 Fe) MG tablet, Take 1 tablet (324 mg) by mouth daily (with breakfast), Disp: 100 tablet, Rfl: 1     gabapentin (NEURONTIN) 100 MG capsule, Take 1 capsule (100 mg) by mouth 3 times daily, Disp: 90 capsule, Rfl: 0     ibuprofen (ADVIL/MOTRIN) 200 MG tablet, Take 1 tablet (200 mg) by mouth every 6 hours as needed for moderate pain (Patient not taking: Reported on 10/26/2021), Disp: 30 tablet, Rfl: 0     melatonin 1 MG TABS tablet, Take 3 tablets (3 mg) by mouth At Bedtime (Patient not taking: Reported on 10/26/2021), Disp: 90 tablet, Rfl: 0     methadone (DOLOPHINE) 5 MG tablet, Take 1 tablet  "(5 mg) by mouth every 8 hours, Disp: 90 tablet, Rfl: 0     naloxone (NARCAN) 4 MG/0.1ML nasal spray, Spray 1 spray (4 mg) into one nostril alternating nostrils once as needed for opioid reversal every 2-3 minutes until assistance arrives (Patient not taking: Reported on 10/26/2021), Disp: 0.2 mL, Rfl: 0     ondansetron (ZOFRAN-ODT) 4 MG ODT tab, Take 1 tablet (4 mg) by mouth every 6 hours as needed for nausea or vomiting, Disp: 20 tablet, Rfl: 0     ondansetron (ZOFRAN-ODT) 4 MG ODT tab, Take 1 tablet (4 mg) by mouth every 6 hours as needed for nausea or vomiting, Disp: 30 tablet, Rfl: 0     oxybutynin ER (DITROPAN-XL) 10 MG 24 hr tablet, Take 1 tablet (10 mg) by mouth daily, Disp: 30 tablet, Rfl: 0     oxyCODONE IR (ROXICODONE) 10 MG tablet, Take 1 tablet (10 mg) by mouth every 4 hours as needed for moderate to severe pain, Disp: 60 tablet, Rfl: 0     pantoprazole (PROTONIX) 40 MG EC tablet, Take 1 tablet (40 mg) by mouth daily, Disp: 60 tablet, Rfl: 1     polyethylene glycol (MIRALAX) 17 GM/Dose powder, Take 17 g by mouth daily, Disp: 510 g, Rfl: 0     sennosides (SENOKOT) 8.6 MG tablet, Take 1-2 tablets by mouth 2 times daily, Disp: 60 tablet, Rfl: 0     simethicone (MYLICON) 80 MG chewable tablet, Take 1 tablet (80 mg) by mouth every 6 hours as needed for cramping, Disp: 20 tablet, Rfl: 0     tamsulosin (FLOMAX) 0.4 MG capsule, Take 1 capsule (0.4 mg) by mouth daily, Disp: 30 capsule, Rfl: 1     thiamine (B-1) 100 MG tablet, Take 1 tablet (100 mg) by mouth daily, Disp: 100 tablet, Rfl: 1    PHYSICAL EXAMINATION:  Vital signs: /84   Pulse 99   Temp 97  F (36.1  C) (Oral)   Resp 16   Ht 1.549 m (5' 0.98\")   Wt 47.7 kg (105 lb 3.2 oz)   SpO2 99%   BMI 19.89 kg/m      General:   Frail-appearing male sitting on the chair  Eyes:  No erythema or discharge  Respiratory:  Breathing comfortably on room air.  No wheezing or distress.  Musculoskeletal:  tenderness in the Right flank and lower back , has " percutaneous nephrostomy tube is draining well.  Skin:  No visible concerning skin rashes or lesions  Neuro:  No notable tremor and dyskinetic movements.  Psych:  Mood and affect appear normal.     The rest of a comprehensive physical examination is deferred due to PHE (public health emergency) video visit restrictions.    LABORATORY DATA:  Recent Labs   Lab Test 11/10/21  1201 10/26/21  0754 10/18/21  0433 10/17/21  1213 10/17/21  0128   WBC 6.9 8.9 6.2  --  7.9   RBC 2.60* 3.16* 2.91*  --  2.96*   HGB 6.9* 8.4* 7.8*   < > 7.8*   HCT 22.5* 27.3* 24.7*  --  25.0*   MCV 87 86 85  --  85   MCH 26.5 26.6 26.8  --  26.4*   MCHC 30.7* 30.8* 31.6  --  31.2*   RDW 18.7* 18.1* 17.5*  --  17.3*    413 402  --  429   NEUTROPHIL 78 79  --   --  84    < > = values in this interval not displayed.     Recent Labs   Lab Test 11/10/21  1201 10/26/21  0754 10/21/21  0628 10/20/21  0622 10/18/21  0433   * 136  --   --  134   POTASSIUM 4.0 4.2 3.7   < > 3.8   CHLORIDE 99 104  --   --  101   CO2 26 28  --   --  29   ANIONGAP 3 4  --   --  4   * 107*  --   --  87   BUN 16 15  --   --  13   CR 0.86 0.73  --   --  0.86   HAWA 8.8 8.5  --   --  8.6    < > = values in this interval not displayed.     Recent Labs   Lab Test 11/10/21  1201 10/26/21  0754 10/18/21  0433   PROTTOTAL 7.4 7.5 6.6*   ALBUMIN 2.5* 2.5* 2.2*   BILITOTAL 0.3 0.2 0.4   ALKPHOS 74 85 59   AST 14 18 7   ALT 10 15 7         ASSESSMENT AND PLAN:    Mr. Essie Guzman is a 65 year old  male with PMHx of H.pylori infection, and recently diagnosed gastric cancer. He is otherwise healthy and does not have any co morbidities.    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 Since Sep/Oct he is experiencing significant symptoms. Ideally, in this case combination of chemotherapy and immunotherpay (Nivo+FOLFOX) or Pembro+FOLFOX are both reasonable options. Patient was not willing for any chemotherapy.  Therefore we will try to get approval for single agent pembrolizumab, which was rejected by insurance and then we try to apply it to the company.  Due to prolonged delays changes esophageal to see if we can get nivolumab and FOLFOX approved, however consider only given nivolumab.  We are still awaiting insurance approval for this and hopefully will know this in the next couple days.  Unfortunately in the interim he has had significant disease progression.  I am very worried about his overall general condition and inability to tolerate any systemic treatment, however since he is treatment naïve I think trying single agent immunotherapy is worth that given he is MSI high cancer.  My goal is to start nivolumab as soon as possible when approved.  Today I briefly discussed, the potential side effects with nivolumab which are very similar to pembrolizumab and the goals of treatment which are palliative in nature.  His daughter understands the situation.    Plan  -Awaiting approval for nivolumab with FOLFOX, however plan to give only nivolumab once approved  --Return to clinic with me in 1 week anticipating approval for nivolumab      #Anemia  --Most likely iron deficiency anemia due to slow blood loss from the cancer.  Is at 1 episode of hematemesis last week but norecent episodes.  Hemoglobin today is less than 7 we will therefore send him to 1 unit blood transfusion.  --1 unit blood transfusion today, and he will return next week and we will recheck a CBC    #Hyponatremia  Mild to moderate hyponatremia, mostly asymptomatic.  Most likely related to dehydration due to decreased p.o. intake  --We will  give intravenous fluids today in addition to blood.    # Right Hydronephrosis s/p Ureteral stent placement (later removed ) and percutaneous nephrostomy tube placement  #Cancer related pain  Patient has pain in the right lower back since the stent placement that is from malignant obstruction of the enlarged lymph nodes.  Eventually required stent removal and then placement of percutaneous nephrostomy tube since then the pain is slightly reduced.    He is also following palliative care who is managing his pain.  He has been recently started on methadone.  He also saw Dr. Cardenas from interventional pain who suggested a pain pump however he denied it.    --Continue methadone as per palliative for 5 milligram 3 times daily  --Also has oxycodone IR 10 mg every 4 hours as needed    # Nutrition  Patient lost 50 lbs over 6 months previously, some of this was intentional due to diet changes and increased physical activity. His weight is lately stable with a recent decrease in appetite as above.  I truly believe that once his cancer starts responding to his treatment most of the symptoms will get better.  - Encouraged nutritional supplements.  - Encouraged daily multivitamin.     On the day of service  Chart review: 5 minutes  Visit duration: 30 minutes  Care coordination: 5 minutes    Brennan Mccarthy MD    Hematology, Oncology and Transplantation

## 2021-11-11 PROBLEM — C16.2 MALIGNANT NEOPLASM OF BODY OF STOMACH (H): Status: ACTIVE | Noted: 2021-01-01

## 2021-11-12 NOTE — PROGRESS NOTES
"Called to discuss Monday infusion.   Met with Essie to discuss chemotherapy and resources available at the [facility]. Provided patient with \"My Cancer Guidebook\", and [facility] printouts on Opdivo. Reviewed administration, side effects (including myelosuppression, nausea/vomiting, diarrhea/constipation, hair loss, mouth sores) and ongoing symptom management by APPs in clinic. Provided phone numbers for triage and after hours care. Highlighted steps to expect when getting chemotherapy (check in, labs, pre- meds, infusion), Discussed that chemo treatment may be delayed a day or two due to blood counts, infusion schedule or patient's need to modify. Included a one page resource of symptoms and when to contact the Cancer Clinic with questions or concerns.      Answered all Jass's questions to his stated satisfaction.                                                                                                                                                  "

## 2021-11-15 NOTE — PROGRESS NOTES
Infusion Nursing Note:  Essie Guzman presents today for C1D1 Opdivo, IVF.    Patient seen by provider today: No   present during visit today: Yes, Language: Hmong.     Note: Patient education reinforced.  Daughter and wife present during infusion.  Patient prefers bedroom.     [address]. Dr Fabio king.  TORB: Give 500 mL normal saline today with opdivo      Intravenous Access:  Labs drawn without difficulty.  Peripheral IV placed.    Treatment Conditions:  Lab Results   Component Value Date    HGB 7.6 (L) 11/15/2021    WBC 7.0 11/15/2021    ANEU 6.0 10/23/2020    ANEUTAUTO 5.8 11/15/2021     11/15/2021      Lab Results   Component Value Date     (L) 11/15/2021    POTASSIUM 4.5 11/15/2021    MAG 1.8 10/18/2021    CR 0.84 11/15/2021    HAWA 8.5 11/15/2021    BILITOTAL 0.4 11/15/2021    ALBUMIN 2.6 (L) 11/15/2021    ALT 8 11/15/2021    AST 15 11/15/2021     Results reviewed, labs MET treatment parameters, ok to proceed with treatment.      Post Infusion Assessment:  Patient tolerated infusion without incident.  Site patent and intact, free from redness, edema or discomfort.  No evidence of extravasations.  Access discontinued per protocol.       Discharge Plan:   AVS to patient via MYCHART.   Patient discharged in stable condition accompanied by: self.  Departure Mode: Wheelchair.      Rebecca Ruiz RN

## 2021-11-17 PROBLEM — D62 ANEMIA DUE TO BLOOD LOSS, ACUTE: Status: ACTIVE | Noted: 2021-01-01

## 2021-11-17 PROBLEM — K92.2 GASTROINTESTINAL HEMORRHAGE, UNSPECIFIED GASTROINTESTINAL HEMORRHAGE TYPE: Status: ACTIVE | Noted: 2021-01-01

## 2021-11-17 NOTE — ED PROVIDER NOTES
[place] EMERGENCY DEPARTMENT ([facility])  11/17/21  History     Chief Complaint   Patient presents with     Abnormal Labs     The history is provided by the patient and medical records.     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 Per family member, patient has been vomiting dark-colored emesis and passing black bowel movements for the past week.  He last vomited yesterday and last passed a dark stool yesterday.  Patient denies chest pain, shortness of breath, or syncope.  He endorses chronic groin pain due to cancer.  Family member's mother was giving him a bath today, he was unable to get out of the tub, and his vision went black.  Patient felt like he was going to pass out, but his family was able to get him laying down in the bed before he syncopized.  Patient last received a blood transfusion last week.  He is currently receiving immunotherapy with his most recent infusion on Monday (11/15). Patient is full code status.  Patient is not on anticoagulation.    Per review of the patient's medical record, patient was admitted to [facility] from 10/16/21-10/22/21 for pain control and optimization of his outpatient oral pain regimen.  Patient presented with acute on chronic malignancy related abdominal pain and groin pain which improved significantly with IV opiates in the ED.  Patient's MS Contin was stopped and methadone was started with improvement in his pain control.      History reviewed. No pertinent past medical history.    Past Surgical History:   Procedure Laterality Date     COLONOSCOPY N/A 11/24/2015    Procedure:  COLONOSCOPY;  Surgeon: Clyde Mosher MD;  Location: [facility]     COMBINED CYSTOSCOPY, INSERT STENT URETER(S) Right 9/10/2021    Procedure: CYSTOSCOPY, WITH right URETERAL STENT INSERTION, retrograde pyleogram;  Surgeon: Jez Friedman MD;  Location: [facility] OR     ESOPHAGOSCOPY, GASTROSCOPY, DUODENOSCOPY (EGD), COMBINED N/A 11/24/2020    Procedure: ESOPHAGOGASTRODUODENOSCOPY with biopsies using cold forceps;  Surgeon: Clyde Mosher MD;  Location: [facility]     ESOPHAGOSCOPY, GASTROSCOPY, DUODENOSCOPY (EGD), COMBINED N/A 12/22/2020    Procedure: ESOPHAGOGASTRODUODENOSCOPY, WITH FINE NEEDLE ASPIRATION BIOPSY, WITH ENDOSCOPIC ULTRASOUND GUIDANCE;  Surgeon: Guru Teri Pedraza MD;  Location: [facility] GI     IR NEPHROSTOMY TUBE PLACEMENT RIGHT  10/4/2021       Family History   Problem Relation Age of Onset     Asthma No family hx of      Diabetes No family hx of      Hypertension No family hx of      Cerebrovascular Disease No family hx of      Cancer No family hx of      Colon Cancer No family hx of      Anesthesia Reaction No family hx of      Deep Vein Thrombosis (DVT) No family hx of        Social History     Tobacco Use     Smoking status: Never Smoker     Smokeless tobacco: Never Used   Substance Use Topics     Alcohol use: No       No current facility-administered medications for this encounter.     Current Outpatient Medications   Medication     acetaminophen (TYLENOL) 500 MG tablet     ferrous gluconate (FERGON) 324 (38 Fe) MG tablet     gabapentin (NEURONTIN) 100 MG capsule     ibuprofen (ADVIL/MOTRIN) 200 MG tablet     melatonin 1 MG TABS tablet     methadone (DOLOPHINE) 5 MG tablet     naloxone (NARCAN) 4 MG/0.1ML nasal spray     ondansetron (ZOFRAN) 8 MG tablet     ondansetron (ZOFRAN-ODT) 4 MG ODT tab     ondansetron (ZOFRAN-ODT) 4 MG ODT tab     oxybutynin ER (DITROPAN-XL) 10 MG 24 hr tablet     oxyCODONE IR (ROXICODONE) 10 MG tablet     pantoprazole (PROTONIX) 40 MG EC tablet      polyethylene glycol (MIRALAX) 17 GM/Dose powder     prochlorperazine (COMPAZINE) 10 MG tablet     sennosides (SENOKOT) 8.6 MG tablet     simethicone (MYLICON) 80 MG chewable tablet     tamsulosin (FLOMAX) 0.4 MG capsule     thiamine (B-1) 100 MG tablet      No Known Allergies    I have reviewed the Medications, Allergies, Past Medical and Surgical History, and Social History in the Epic system.    Review of Systems   Respiratory: Negative for shortness of breath.    Cardiovascular: Negative for chest pain.   Gastrointestinal: Positive for blood in stool and vomiting (hematemesis).   Musculoskeletal:        Positive for groin pain   Neurological: Negative for syncope.   All other systems reviewed and are negative.    A complete review of systems was performed with pertinent positives and negatives noted in the HPI, and all other systems negative.    Physical Exam   BP: 105/73  Pulse: 67  Temp: 98.2  F (36.8  C)  Resp: 14  Weight: 46.3 kg (102 lb)  SpO2: 100 %      Physical Exam  General: awake, alert, frail-appearing  Head: normal cephalic  HEENT: pupils equal, conjugate gaze intact  Neck: Supple  CV: regular rate and rhythm without murmur  Lungs: clear to auscultation  Abd: soft, non-tender, no guarding, no peritoneal signs  EXT: lower extremities without swelling or edema  Neuro: awake, answers questions appropriately. No focal deficits noted       ED Course     At 3:40 PM the patient was seen and examined by Patrick Young MD in Room ED04.        Procedures              EKG Interpretation:      Interpreted by Patrick Young MD  Time reviewed: 1600  Symptoms at time of EKG: Anemia  Rhythm: normal sinus   Rate: normal  Axis: normal  Ectopy: none  Conduction: normal  ST Segments/ T Waves: No ST-T wave changes  Q Waves: none  Comparison to prior: Unchanged    Clinical Impression: No sign of acute ischemia.        The medical record was reviewed and interpreted.  Current labs reviewed and interpreted.  Previous labs  reviewed and interpreted.   utilized.      Critical Care Addendum    My initial assessment, based on my review of prehospital provider report, review of nursing observations, review of vital signs, focused history, physical exam, 12 lead ECG analysis, discussion with MICU attending and GI physician and interpretation of Labs , established that Essie Guzman has and Life-threatening hemoglobin of 4, which requires immediate intervention, and therefore he is critically ill.     After the initial assessment, the care team initiated multiple lab tests and initiated medication therapy with TXA, Protonix, and emergent blood transfusion to provide stabilization care. Due to the critical nature of this patient, I reassessed nursing observations, vital signs, physical exam, review of cardiac rhythm monitor and mental status multiple times prior to his disposition.     Time also spent performing documentation, discussion with family to obtain medical information for decision making, reviewing test results, discussion with consultants and coordination of care.     Critical care time (excluding teaching time and procedures): 60 minutes.        Results for orders placed or performed during the hospital encounter of 11/17/21 (from the past 24 hour(s))   EKG 12-lead, tracing only   Result Value Ref Range    Systolic Blood Pressure  mmHg    Diastolic Blood Pressure  mmHg    Ventricular Rate 93 BPM    Atrial Rate 93 BPM    NV Interval 124 ms    QRS Duration 78 ms     ms    QTc 435 ms    P Axis 34 degrees    R AXIS 2 degrees    T Axis 53 degrees    Interpretation ECG       Sinus rhythm  Possible Inferior infarct , age undetermined  Abnormal ECG     CBC with platelets differential    Narrative    The following orders were created for panel order CBC with platelets differential.  Procedure                               Abnormality         Status                     ---------                               -----------          ------                     CBC with platelets and d...[[id number]]  Abnormal            Final result                 Please view results for these tests on the individual orders.   Troponin I   Result Value Ref Range    Troponin I <0.015 0.000 - 0.045 ug/L   ABO/Rh type and screen *Canceled*    Narrative    The following orders were created for panel order ABO/Rh type and screen.  Procedure                               Abnormality         Status                     ---------                               -----------         ------                     Adult Type and Screen[[id number]]                                                         Please view results for these tests on the individual orders.   Basic metabolic panel   Result Value Ref Range    Sodium 129 (L) 133 - 144 mmol/L    Potassium 4.5 3.4 - 5.3 mmol/L    Chloride 99 94 - 109 mmol/L    Carbon Dioxide (CO2) 24 20 - 32 mmol/L    Anion Gap 6 3 - 14 mmol/L    Urea Nitrogen 44 (H) 7 - 30 mg/dL    Creatinine 0.97 0.66 - 1.25 mg/dL    Calcium 8.5 8.5 - 10.1 mg/dL    Glucose 100 (H) 70 - 99 mg/dL    GFR Estimate 80 >60 mL/min/1.73m2   INR   Result Value Ref Range    INR 0.95 0.85 - 1.15   CBC with platelets and differential   Result Value Ref Range    WBC Count 7.9 4.0 - 11.0 10e3/uL    RBC Count 1.83 (L) 4.40 - 5.90 10e6/uL    Hemoglobin 5.2 (LL) 13.3 - 17.7 g/dL    Hematocrit 17.2 (L) 40.0 - 53.0 %    MCV 94 78 - 100 fL    MCH 28.4 26.5 - 33.0 pg    MCHC 30.2 (L) 31.5 - 36.5 g/dL    RDW 17.8 (H) 10.0 - 15.0 %    Platelet Count 392 150 - 450 10e3/uL    % Neutrophils 80 %    % Lymphocytes 13 %    % Monocytes 5 %    % Eosinophils 0 %    % Basophils 0 %    % Immature Granulocytes 2 %    NRBCs per 100 WBC 0 <1 /100    Absolute Neutrophils 6.4 1.6 - 8.3 10e3/uL    Absolute Lymphocytes 1.0 0.8 - 5.3 10e3/uL    Absolute Monocytes 0.4 0.0 - 1.3 10e3/uL    Absolute Eosinophils 0.0 0.0 - 0.7 10e3/uL    Absolute Basophils 0.0 0.0 - 0.2 10e3/uL    Absolute Immature  Granulocytes 0.1 (H) <=0.0 10e3/uL    Absolute NRBCs 0.0 10e3/uL   Lactic acid whole blood   Result Value Ref Range    Lactic Acid 1.3 0.7 - 2.0 mmol/L     Medications   pantoprazole (PROTONIX) IV push injection 40 mg (40 mg Intravenous Given 11/17/21 1646)   tranexamic acid 1 g in 100 mL NS IV bag (premix) (1 g Intravenous Given 11/17/21 1703)             Assessments & Plan (with Medical Decision Making)   Essie is a 66-year-old male with known metastatic stomach cancer who presents with signs and symptoms concerning for GI bleed.    Here he is frail-appearing, vital signs are stable, abdominal exam is benign.  He reports last black stool and black emesis last episode yesterday.  He endorses one near syncopal event today he otherwise denies any acute complaints such as shortness of breath or chest pain he had a reported hypotension in the clinic but here his blood pressure is 105 systolic; normal heart rate.    EKG obtained that shows no sign of acute ischemia and is unchanged from baseline.    Patient was consented for blood.  Type and cross was ordered and 2 units of blood were ordered.  Two IVs were established and he was placed on cardiac monitors with continuous blood pressure monitoring.  He was given Protonix for GI bleed symptoms.  Per chart review he did not have esophageal varices or liver cirrhosis so no octreotide was started.    Basic labs were obtained including a troponin, CBC, type and cross, INR, and lactate to assess for perfusion.    Patient was given Protonix and TXA.  Patient is not on anticoagulation.  GI was consulted; they reviewed his chart and recommended n.p.o. at midnight for scope in the morning and medical management for now.    Labs are reassuring against signs of endorgan damage and adequate perfusion.  He has a normal lactic acid he has a negative troponin.  Asymptomatic Covid pending.  Normal INR, normal LFTs.     Patient was monitored in the emergency department, will be transfused  2 units and admitted to the [facility].    I have reviewed the nursing notes.    I have reviewed the findings, diagnosis, plan and need for follow up with the patient.    New Prescriptions    No medications on file       Final diagnoses:   Gastrointestinal hemorrhage, unspecified gastrointestinal hemorrhage type   Malignant neoplasm of stomach, unspecified location (H)   Anemia due to blood loss, acute       IEmi am serving as a trained medical scribe to document services personally performed by Patrick Young MD, based on the provider's statements to me.      I, Patrick Young MD, was physically present and have reviewed and verified the accuracy of this note documented by Emi Cruz.     Patrick Young MD  11/17/2021   [facility] EMERGENCY DEPARTMENT     Patrick Young MD  11/17/21 [id number]

## 2021-11-17 NOTE — PROGRESS NOTES
Oncology RN Care Coordination Note:     Per Dr. Mccarthy, patient is in need of direct admission for GI bleed and hematemesis.  Patient getting fluids at [facility] at this time and family would prefer to avoid going to the ED if possible.  MD requesting writer call patient placement to start direct admission.    Outgoing Call:  This writer called patient placement, speaking with Rafi, she will start the process for patient to be admitted.  Provided them with Dr. Mccarthy's information to call him with updates.     This writer has updated Dr. Mccarthy.     Lashawn Mercado, RN BSN   [facility]  Nurse Coordinator

## 2021-11-17 NOTE — NURSING NOTE
"Oncology Rooming Note    November 17, 2021 11:43 AM   Essie Guzman is a 66 year old male who presents for:    Chief Complaint   Patient presents with     Oncology Clinic Visit     gastric cancer     Initial Vitals: There were no vitals taken for this visit. Estimated body mass index is 19.89 kg/m  as calculated from the following:    Height as of 11/10/21: 1.549 m (5' 0.98\").    Weight as of 11/10/21: 47.7 kg (105 lb 3.2 oz). There is no height or weight on file to calculate BSA.  Extreme Pain (8) Comment: Data Unavailable   No LMP for male patient.  Allergies reviewed: Yes  Medications reviewed: Yes    Medications: MEDICATION REFILLS NEEDED TODAY. Provider was notified. Methadone, oxycodone and gabapentin    Pharmacy name entered into [facility]:    [id number] - [place], [place] - [address]  [id number] - [place], [place] - [address]  [facility], [place] - [address]    Clinical concerns: none       Carolina Rider CMA            "

## 2021-11-17 NOTE — H&P
[facility]    History and Physical - [facility] 1 Service        Date of Admission:  11/17/2021    Assessment & Plan      Essie Guzman is a 66 year old male admitted on 11/17/2021. 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 Suspect 2/2 UGIB in setting of known gastric adenocarcinoma given hx hematemesis, melena, precipitous drop, BUN elevation. Last CT abd/pelvis 1 month ago revealed distal gastric wall thickening and metastatic adenopathy unchanged from prior. No imaging in ED. Last EGD on record was in 11/2020 when he was diagnosed.   - CT abd/pelvis with contrast   - 2 large bore IVs  - 2 units pRBCs ordered in ED, not yet transfusing  - repeat hgb @ midnight   - GI consulted, appreciate recs:  -- BID PPI  -- NPO MN  -- EGD in AM  -- transfuse to hgb>7  - oncology consult in AM    # Hyponatremia, mild   Na 129. Likely hypovolemic in setting of poor PO intake per hx and exam, although cannot r/o SIADH in setting of known malignancy. S/p 2.5L NS prior to admission.  - UA  - urine and serum OsM  - Na OsM  - serum uric acid   - low threshold for additional IVF bolus overnight while NPO     # Malignant obstruction R kidney with hydronephrosis s/p stenting and PNT  PNT appears to be working well. No increased urinary sx from baseline.   - CT abd/pelvis as above   - hold PTA tamsulosin, oxybutynin overnight    # Chronic, cancer-related pain   Recent admission for pain control with resulting pain management plan of methadone, gabapentin which has worked well per daughter and patient.   - hold methadone  and gabapentin overnight  - IV dilaudid prn for pain     # Severe malnutrition in the context of chronic illness  Cachectic-appearing on exam with reduced PO intake over the past few weeks per family.   - nutrition consult in AM    # Hx staph epi PNT pyuria vs. PNT colonization, RIGHT PNT  10/2021 CT did not show hydronephrosis  ID was consulted prior admission and felt likely colonization that did not require abx. Presently afebrile without leukocytosis, low c/f infection at this time.   - UA       Diet: NPO per Anesthesia Guidelines for Procedure/Surgery Except for: Meds  Clear Liquid Diet  DVT Prophylaxis: Pneumatic Compression Devices  Hamm Catheter: Not present  Fluids: s/p 2.5L IVF  Central Lines: None  Code Status: Full Code --> PLEASE NOTE that per family and patient wishes, CPR is to be attempted for only 10 minutes--in no achievement of ROSC within this time period, no further attempts at resuscitation should be made    Clinically Significant Risk Factors Present on Admission         # Hyponatremia: Na = 129 mmol/L (Ref range: 133 - 144 mmol/L) on admission, will monitor as appropriate           Disposition Plan   Expected discharge:  pending stabilization of hemoglobin, goals of care discussion  The patient's care was discussed with the Attending Physician, Dr. Ann.    Chayo Pak MD  [address]  Securely message with the Vocera Web Console (learn more here)  Text page via [facility] Paging/Directory    Please see sign in/sign out for up to date coverage information  ______________________________________________________________________    Chief Complaint   Hemoglobin drop     History is obtained from the patient and his daughter.     History of Present Illness   Essie Guzman is a 66 year old male who has a history of a history of GI hemorrhage, malignant adenocarcinoma of gastric pylorus (dx in 11/2020) mestastatic to retroperitoneum c/b  "malignant obstruction of R kidney with hydro s/p R ureteral stent and percutaneous nephrostomy and is admitted for acute on chronic anemia.     Per oncology note from earlier today, pt seen in clinic 11/10 and was noted to have \"deteriorated significantly in terms of nutrition and generalized weakness. He was given IVF and 1 unit pRBC transfusion at that time.\" In clinic today noted to be very weak, deconditioned, falling asleep but easily arousable. He was sent to infusion clinic for 1L NS while coordination for direct admit was attempted but due to bed shortage pt ultimately routed to ED. In the mdist of these attempts, CBC returned with hgb 4.2. Patient has been receiving single agent nivolumab since 11/15/21.     Per patient and daughter he has has dark-colored emesis, black bowel movements for the past 1-2 weeks with last episode of both occurring yesterday 11/16. Earlier today when being bathed by a family member, pt's vision went black and he was unable to get out of the tub without assistance. Felt like he was going to pass out--family able to get him on the bed. He did not lose consciousness, fall, or hit his head. In ED, pt endorses groin pain with standing and walking but no pain at present at rest. Denies dizziness, abdominal pain, chest pain, SOB, fevers, or chills. No vomiting or bowel movements since he's been in the ED. No nausea at present.     Per chart review, patient admitted 10/16/21-10/22/21 for pain control and optimization of his outpatient oral pain regimen. P/w acute on chronic malignancy related abdominal pain and groin pain which improved significantly with IV opiates in the ED. Patient's MS Contin was stopped and methadone was started with improvement in his pain control.     In ED, p tinitially hypotensive to 82/60 with subsequent improvement to 105/73. Pt given additional 1L IVF, 80 mg protonix, and TXA. GI consulted, recs as above. 2 units pRBCs ordered but not yet infusing. "     Review of Systems    The 10 point Review of Systems is negative other than noted in the HPI or here.     Past Medical History    I have reviewed this patient's medical history and updated it with pertinent information if needed.   Metastatic adenocarcinoma of the stomach with mets to retroperitoneal LN c/b R ureteral obstruction with hydronephrosis  Cancer-related pain   Malnutrition in setting of malignancy     Past Surgical History   I have reviewed this patient's surgical history and updated it with pertinent information if needed.  Past Surgical History:   Procedure Laterality Date     COLONOSCOPY N/A 11/24/2015    Procedure: COLONOSCOPY;  Surgeon: Clyde Mosher MD;  Location: [facility]     COMBINED CYSTOSCOPY, INSERT STENT URETER(S) Right 9/10/2021    Procedure: CYSTOSCOPY, WITH right URETERAL STENT INSERTION, retrograde pyleogram;  Surgeon: Jez Friedman MD;  Location: [facility]     ESOPHAGOSCOPY, GASTROSCOPY, DUODENOSCOPY (EGD), COMBINED N/A 11/24/2020    Procedure: ESOPHAGOGASTRODUODENOSCOPY with biopsies using cold forceps;  Surgeon: Clyde Mosher MD;  Location:  GI     ESOPHAGOSCOPY, GASTROSCOPY, DUODENOSCOPY (EGD), COMBINED N/A 12/22/2020    Procedure: ESOPHAGOGASTRODUODENOSCOPY, WITH FINE NEEDLE ASPIRATION BIOPSY, WITH ENDOSCOPIC ULTRASOUND GUIDANCE;  Surgeon: Guru Teri Pedraza MD;  Location: [facility]     IR NEPHROSTOMY TUBE PLACEMENT RIGHT  10/4/2021        Social History   I have reviewed this patient's social history and updated it with pertinent information if needed. Essie Guzman  reports that he has never smoked. He has never used smokeless tobacco. He reports that he does not drink alcohol and does not use drugs.   Lives with family in multigenerational home.   Used to work on a hobby farm.     Family History     Non-contributory.     Prior to Admission Medications   Prior to Admission Medications   Prescriptions Last Dose Informant Patient Reported? Taking?    acetaminophen (TYLENOL) 500 MG tablet   No No   Sig: Take 2 tablets (1,000 mg) by mouth every 8 hours   ferrous gluconate (FERGON) 324 (38 Fe) MG tablet  Daughter No No   Sig: Take 1 tablet (324 mg) by mouth daily (with breakfast)   gabapentin (NEURONTIN) 100 MG capsule   No No   Sig: Take 1 capsule (100 mg) by mouth 3 times daily   ibuprofen (ADVIL/MOTRIN) 200 MG tablet  Daughter No No   Sig: Take 1 tablet (200 mg) by mouth every 6 hours as needed for moderate pain   Patient not taking: Reported on 10/26/2021   melatonin 1 MG TABS tablet   No No   Sig: Take 3 tablets (3 mg) by mouth At Bedtime   Patient not taking: Reported on 10/26/2021   methadone (DOLOPHINE) 5 MG tablet   No No   Sig: Take 1 tablet (5 mg) by mouth every 8 hours   naloxone (NARCAN) 4 MG/0.1ML nasal spray  Daughter No No   Sig: Spray 1 spray (4 mg) into one nostril alternating nostrils once as needed for opioid reversal every 2-3 minutes until assistance arrives   Patient not taking: Reported on 10/26/2021   ondansetron (ZOFRAN) 8 MG tablet   No No   Sig: Take 1 tablet (8 mg) by mouth every 8 hours as needed (Nausea/Vomiting)   ondansetron (ZOFRAN-ODT) 4 MG ODT tab  Daughter No No   Sig: Take 1 tablet (4 mg) by mouth every 6 hours as needed for nausea or vomiting   ondansetron (ZOFRAN-ODT) 4 MG ODT tab   No No   Sig: Take 1 tablet (4 mg) by mouth every 6 hours as needed for nausea or vomiting   oxyCODONE IR (ROXICODONE) 10 MG tablet   No No   Sig: Take 1 tablet (10 mg) by mouth every 4 hours as needed for moderate to severe pain   oxybutynin ER (DITROPAN-XL) 10 MG 24 hr tablet  Daughter No No   Sig: Take 1 tablet (10 mg) by mouth daily   pantoprazole (PROTONIX) 40 MG EC tablet  Daughter No No   Sig: Take 1 tablet (40 mg) by mouth daily   polyethylene glycol (MIRALAX) 17 GM/Dose powder   No No   Sig: Take 17 g by mouth daily   prochlorperazine (COMPAZINE) 10 MG tablet   No No   Sig: Take 0.5 tablets (5 mg) by mouth every 6 hours as needed  (Nausea/Vomiting)   sennosides (SENOKOT) 8.6 MG tablet  Daughter No No   Sig: Take 1-2 tablets by mouth 2 times daily   simethicone (MYLICON) 80 MG chewable tablet  Daughter No No   Sig: Take 1 tablet (80 mg) by mouth every 6 hours as needed for cramping   tamsulosin (FLOMAX) 0.4 MG capsule  Daughter No No   Sig: Take 1 capsule (0.4 mg) by mouth daily   thiamine (B-1) 100 MG tablet  Daughter No No   Sig: Take 1 tablet (100 mg) by mouth daily      Facility-Administered Medications: None     Allergies   No Known Allergies    Physical Exam   Vital Signs: Temp: 98.2  F (36.8  C)   BP: 105/73 Pulse: 84   Resp: 12 SpO2: 99 % O2 Device: None (Room air)    Weight: 102 lbs 0 oz    Constitutional: awake, alert, cooperative, no apparent distress, intermittent lethargy and falling asleep at points during exam, cachectic  Eyes: Lids and lashes normal, pupils equal, round and reactive to light, extra ocular muscles intact, sclera clear, conjunctiva normal  ENT: Normocephalic, without obvious abnormality, atraumatic, oral pharynx clear with dry mucous membranes, tonsils without erythema or exudates, gums normal and edentulous   Respiratory: No increased work of breathing, good air exchange, clear to auscultation bilaterally, no crackles or wheezing  Cardiovascular: Normal apical impulse, regular rate and rhythm, normal S1 and S2, no S3 or S4, and no murmur noted  GI: No scars, normal bowel sounds, soft, non-distended, non-tender, no masses palpated, no hepatosplenomegally  Genitounirinary: R PNT with yellow urine in bag   Skin: pale and no bruising or bleeding  Musculoskeletal: There is no redness, warmth, or swelling of the joints.  Full range of motion noted.  Motor strength is 5 out of 5 all extremities bilaterally.  Tone is normal.  Neurologic: Awake, alert, oriented to name, place and time.  Cranial nerves II-XII are grossly intact.  Motor is 5 out of 5 bilaterally.  Cerebellar finger to nose, heel to shin intact.  Sensory is  intact.  Babinski down going, Romberg negative, and gait is normal.    Data   Data reviewed today: I reviewed all medications, new labs and imaging results over the last 24 hours.     No imaging    EKG - NSR    Recent Labs   Lab 11/17/21  1611 11/17/21  1336 11/15/21  1219 11/15/21  1157   WBC 7.9 7.1 7.0  --    HGB 5.2* 4.2* 7.6*  --    MCV 94 91 90  --     314 402  --    INR 0.95  --   --   --    *  --   --  129*   POTASSIUM 4.5  --   --  4.5   CHLORIDE 99  --   --  96   CO2 24  --   --  24   BUN 44*  --   --  23   CR 0.97  --   --  0.84   ANIONGAP 6  --   --  9   HAWA 8.5  --   --  8.5   *  --   --  99   ALBUMIN  --   --   --  2.6*   PROTTOTAL  --   --   --  7.7   BILITOTAL  --   --   --  0.4   ALKPHOS  --   --   --  74   ALT  --   --   --  8   AST  --   --   --  15   TROPONIN <0.015  --   --   --

## 2021-11-17 NOTE — LETTER
11/17/2021         RE: Essie Guzman  [id number]  [facility] [address]        Dear Colleague,    Thank you for referring your patient, Essie Guzman, to the [facility] [place]. Please see a copy of my visit note below.    Admission SBAR:    Situation:  Why is the patient being admitted?  Anemia, GI Bleed, Gastric Ca    Background:  /77   Pulse 66   Resp 14   SpO2 100%   Major diagnosis: Metastatic Stomach Ca  Summary of Interventions (if medications were administered, please specify time and color of lumen if applicable):    Antimicrobials? No    Transfusions? No    Fluids? Yes: 1.5L NS bolus    Neupogen? No    Other Medications? No    Electrolytes? No    Blood cultures? No    UA? No    UC? No    Stool culture? No    Respiratory cultures? No    Current type and cross? No - drawn and sent    Completed preadmission procedures: N/A    Procedures due: N/A    If procedures are scheduled, what time? N/A    Assessment:  Potential Falls risk? Yes: Ax3  Accompanied by: Daughters  Other Assessment: Not Applicable    Recommendations: Admit for further evaluation  Report called to Unit: ED  Report called to Nurse: ED Charge  Transported by: 911  [facility]    Infusion Nursing Note:  Essie Guzman presents today for add on IVF.    Patient seen by provider today: Yes: Dr. Basim Mccarthy   present during visit today: Yes, Language: Hmong.     Note: Patient with GI bleed came over from clinic for IVF and RBCs after patient and family members refused admission through ED. No current ABO. CBC and ABO drawn and sent. Found to have Hgb of 4.2. Patient's daughters agreed to take pt to ED. Pt received 1.5L NS bolus while in infusion and then was transported via emergency ambulance to hospital.    Intravenous Access:  Peripheral IV placed using ultrasound. Left in place upon discharge for ED.    Treatment Conditions:  Results reviewed, labs MET treatment  parameters, ok to proceed with treatment.      Post Infusion Assessment:  Patient tolerated infusion without incident.       Discharge Plan:   Patient discharged in stable condition accompanied by: daughters.      Emi Dsouza RN                          Again, thank you for allowing me to participate in the care of your patient.        Sincerely,        [facility]

## 2021-11-17 NOTE — PROGRESS NOTES
Infusion Nursing Note:  Essie Guzman presents today for add on IVF.    Patient seen by provider today: Yes: Dr. Basim Mccarthy   present during visit today: Yes, Language: Hmong.     Note: Patient with GI bleed came over from clinic for IVF and RBCs after patient and family members refused admission through ED. No current ABO. CBC and ABO drawn and sent. Found to have Hgb of 4.2. Patient's daughters agreed to take pt to ED. Pt received 1.5L NS bolus while in infusion and then was transported via emergency ambulance to hospital.    Intravenous Access:  Peripheral IV placed using ultrasound. Left in place upon discharge for ED.    Treatment Conditions:  Results reviewed, labs MET treatment parameters, ok to proceed with treatment.      Post Infusion Assessment:  Patient tolerated infusion without incident.       Discharge Plan:   Patient discharged in stable condition accompanied by: daughters.      Emi Dsouza RN

## 2021-11-17 NOTE — PROGRESS NOTES
BRIEF GI NOTE:    11/17/21    Note: patient not seen today, this note is the product of chart review and discussion with primary team.     Assessment:  66M h/o metastatic adenocarcinoma of gastric pylorus (dx in 11/2020) being followed by oncology and receiving pembrolizumab/nivolumab who presented with 1 week of coffee ground emesis and melena with Hgb of 4 (baseline 7-9). He was sent to the ED from infusion center. Initially hypotensive but responded to fluid, the rest of vital signs were reassuring. Bleeding source is likely his gastric malignancy. Last EGD on record was in 11/2020 when he was diagnosed.     Recommendations:  - Trend Hgb and supportive transfusion to goal of 7. Resuscitation per primary team.   - Agreed with IV PPI BID  - NPO at midnight.   - EGD tmr.       Patient discussed with on call GI staff Dr. Froylan Deal MD  Gastroenterology Fellow  Division of Gastroenterology, Hepatology and Nutrition  [facility]  [phone]

## 2021-11-17 NOTE — ED TRIAGE NOTES
Pt comes from clinic due to hemoglobin of 4.2 and hypotension, pt has history of malignant neoplasm of stomach.

## 2021-11-17 NOTE — PROGRESS NOTES
Admission SBAR:    Situation:  Why is the patient being admitted?  Anemia, GI Bleed, Gastric Ca    Background:  /77   Pulse 66   Resp 14   SpO2 100%   Major diagnosis: Metastatic Stomach Ca  Summary of Interventions (if medications were administered, please specify time and color of lumen if applicable):    Antimicrobials? No    Transfusions? No    Fluids? Yes: 1.5L NS bolus    Neupogen? No    Other Medications? No    Electrolytes? No    Blood cultures? No    UA? No    UC? No    Stool culture? No    Respiratory cultures? No    Current type and cross? No - drawn and sent    Completed preadmission procedures: N/A    Procedures due: N/A    If procedures are scheduled, what time? N/A    Assessment:  Potential Falls risk? Yes: Ax3  Accompanied by: Ben  Other Assessment: Not Applicable    Recommendations: Admit for further evaluation  Report called to Unit: ED  Report called to Nurse: ED Charge  Transported by: 911  Via: [facility]

## 2021-11-17 NOTE — PROGRESS NOTES
MEDICAL ONCOLOGY FOLLOW UP NOTE    PATIENT NAME: Essie Guzman  ENCOUNTER DATE: 11/17/2021    Care Team  Primary Oncologist: Brennan Mccarthy MD    REASON FOR CURRENT VISIT: F/u Metastatic gastric cancer    HISTORY OF PRESENT ILLNESS:  Mr. 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 Initially Rx for H.pylori with Abx and PPI. LFT's were mildly elevated.  11/24/20- UGI endoscopy at [facility] with normal esophagus. Non-bleeding gastric ulcer in pylorus with no stigmata of bleeding.  Non-bleeding duodenal ulcer with no stigmata of bleeding. Biopsy of Stomach, pylorus, - Poorly-differentiated carcinoma; consistent with adenocarcinoma (poorly cohesive type). HER2 by FISH was non-amplified  11/24/20- CT CAP- bilateral hilar lymph nodes are indeterminate (1.4 x 1.0 cm) right infrahilar lymph node. Multiple enlarged retroperitoneal and perigastric lymph nodes (10 mm right paraduodenal lymph node, 11 mm necrotic left para-aortic lymph node and a 10 mm left para-aortic lymph node.  12/16/20-  PET/CT with metastatic disease- Hypermetabolic circumferential ulcerated mass at the gastric antrum, Multiple metastatic hypermetabolic lymph nodes:  adjacent just inferior to the caudate lobe of the liver, Multiple enlarged, centrally necrotic, and hypermetabolic retroperitoneal para-aortic and paracaval lymph nodes.    12/18/20-Saw [facility]- Due to PET CT showing retroperitoneal  lymphadenopathy not a candidate for surgical resection.  12/22/20- EUS staging and biopsy- [id number] - Partially-obstructing cratered pre-pyloric gastric ulcer with a clean ulcer base, fully-circumferential ulcer  Several sub-centimeter malignant appearing round, well-circumscribed, hypoechoic lymph nodes were  visualized along the aorta, from the 2nd portion of  duodenum which corresponds to the hypermetabolic nodes seen on the PET scan.   3/9/21- PET/CT-Overall there has been progression in the number and size of the hypermetabolic periaortic, pericaval, and zuleyma hepatis lymph nodes compared to prior.Relatively unchanged hypermetabolic circumferential ulcerated mass at the gastric antrum.  March through Aug 2021- Declined systemic Rx with pembrolizumab as he was minimally symptomatic  9/10/21 to 9/12/21- [facility] admission for - Iron deficiency anemia from chronic blood loss anemia 2/2 progressive metastatic gastric cancer, Right hydrnonephrosis secondary to malignant obstruction s/p ureteric stenting and ongoing Cancer related weight loss- Rcd 1 unit PRBC  9/10/21- CT abd/pelvis-  Wall thickening of the distal stomach consistent with the history of gastric cancer. There are multiple enlarged upper abdominal and retroperitoneal lymph nodes consistent with metastases and significantly progressed since the previous exam. Right hydronephrosis and delayed nephrogram consistent with obstruction. (There is an irregularly-shaped low-attenuation mass posterior to the body of the pancreas measuring approximately 2.1 x 3.8 x 2.6 cm and consistent with a lymph node.  10/4 to 10/9- [facility] admission- Flank pain from worsening right-sided hydronephrosis, secondary to malignant obstruction, right-sided renal hypoperfusion and some fluid in the right renal pelvis.right percutaneous nephrostomy tube and ureteral stent appear appropriately positioned  S/p R renal stent removal, still with PNTs in place and improvement in pain. Also needed  2 Units prbc for anemia  10/11/21 to 10/13/21- [facility] admission for scrotal edema- no cause found- no malignat obstruction  10/16/21 to 10/21/21- [facility] admission for pain control (abd and groin) and optimization of his outpatient oral pain regimen.  Palliative care and pain services were consulted: - Stopped MS Contin, started Methadone -- with improvement in pain control  11/15/21: Started single-agent nivolumab.  11/17/21: Patient hypotensive with Hgb of 4.2 in the ED. He was routed to the ED for further care.     Interval Hx:  Essie Guzman was seen in the clinic today with two of his daughters. He was last seen in the clinic on 11/17/21 when he started single-agent Nivolumab. Prior to that, during the clinic visit dated 11/10/21, he was noted to have deteriorated significantly in terms of nutrition and generalized weakness. He was given IVF and 1 unit pRBC transfusion at that time.     Today, he continued to appear very weak and deconditioned. He continues to rest most of the day, increasingly dependent on others for ADLs and IADLs. He endorses to having no energy. He is managing to eat only 2-3 spoonful of meals each day and might end up vomiting if he eats more. He was frequently falling asleep during our conversation but was easily arousable. His pain remains controlled on methadone. He was offered a pain pump in the past by anesthesia which he refused.      PAST MEDICAL AND SURGICAL HISTORY:   Active Ambulatory Problems     Diagnosis Date Noted     Malignant neoplasm of stomach metastatic to retroperitoneum (H) 12/21/2020     Gastrointestinal hemorrhage with melena 09/10/2021     Cancer associated pain 10/04/2021     Testicular swelling 10/11/2021     Swelling of left lower extremity 10/11/2021     Malignant neoplasm of stomach, unspecified location (H) 10/11/2021     Abdominal pain, generalized 10/17/2021     Malignant neoplasm of body of stomach (H) 11/11/2021     Resolved Ambulatory Problems     Diagnosis  Date Noted     Hematochezia 10/11/2015     No Additional Past Medical History   No surgeeies    SOCIAL HISTORY:   Social History     Tobacco Use     Smoking status: Never Smoker     Smokeless tobacco: Never Used   Vaping Use     Vaping Use: Never used   Substance Use Topics     Alcohol use: No     Drug use: No   Non-smoker/non-drinker  He is now retired. 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  He lives in [place] with family, sposue, sons and grandkids  He has 6 kids, lives with 2 younger boys, 4 grand kids      FAMILY HISTORY:   Family History   Problem Relation Age of Onset     Asthma No family hx of      Diabetes No family hx of      Hypertension No family hx of      Cerebrovascular Disease No family hx of      Cancer No family hx of      Colon Cancer No family hx of      Anesthesia Reaction No family hx of      Deep Vein Thrombosis (DVT) No family hx of    No family hx any cancer      ALLERGIES: No Known Allergies    CURRENT MEDICATIONS: No current facility-administered medications for this visit.    Current Outpatient Medications:      acetaminophen (TYLENOL) 500 MG tablet, Take 2 tablets (1,000 mg) by mouth every 8 hours, Disp: 120 tablet, Rfl: 0     ferrous gluconate (FERGON) 324 (38 Fe) MG tablet, Take 1 tablet (324 mg) by mouth daily (with breakfast), Disp: 100 tablet, Rfl: 1     gabapentin (NEURONTIN) 100 MG capsule, Take 1 capsule (100 mg) by mouth 3 times daily, Disp: 90 capsule, Rfl: 0     ibuprofen (ADVIL/MOTRIN) 200 MG tablet, Take 1 tablet (200 mg) by mouth every 6 hours as needed for moderate pain (Patient not taking: Reported on 10/26/2021), Disp: 30 tablet, Rfl: 0     melatonin 1 MG TABS tablet, Take 3 tablets (3 mg) by mouth At Bedtime (Patient not taking: Reported on 10/26/2021), Disp: 90 tablet, Rfl: 0     methadone (DOLOPHINE) 5 MG tablet, Take 1 tablet (5 mg) by mouth every 8 hours, Disp: 90 tablet, Rfl: 0     naloxone (NARCAN) 4 MG/0.1ML nasal spray, Spray 1 spray (4 mg) into one  nostril alternating nostrils once as needed for opioid reversal every 2-3 minutes until assistance arrives (Patient not taking: Reported on 10/26/2021), Disp: 0.2 mL, Rfl: 0     ondansetron (ZOFRAN) 8 MG tablet, Take 1 tablet (8 mg) by mouth every 8 hours as needed (Nausea/Vomiting), Disp: 10 tablet, Rfl: 5     ondansetron (ZOFRAN-ODT) 4 MG ODT tab, Take 1 tablet (4 mg) by mouth every 6 hours as needed for nausea or vomiting, Disp: 20 tablet, Rfl: 0     ondansetron (ZOFRAN-ODT) 4 MG ODT tab, Take 1 tablet (4 mg) by mouth every 6 hours as needed for nausea or vomiting, Disp: 30 tablet, Rfl: 0     oxybutynin ER (DITROPAN-XL) 10 MG 24 hr tablet, Take 1 tablet (10 mg) by mouth daily, Disp: 30 tablet, Rfl: 0     oxyCODONE IR (ROXICODONE) 10 MG tablet, Take 1 tablet (10 mg) by mouth every 4 hours as needed for moderate to severe pain, Disp: 60 tablet, Rfl: 0     pantoprazole (PROTONIX) 40 MG EC tablet, Take 1 tablet (40 mg) by mouth daily, Disp: 60 tablet, Rfl: 1     polyethylene glycol (MIRALAX) 17 GM/Dose powder, Take 17 g by mouth daily, Disp: 510 g, Rfl: 0     prochlorperazine (COMPAZINE) 10 MG tablet, Take 0.5 tablets (5 mg) by mouth every 6 hours as needed (Nausea/Vomiting), Disp: 30 tablet, Rfl: 5     sennosides (SENOKOT) 8.6 MG tablet, Take 1-2 tablets by mouth 2 times daily, Disp: 60 tablet, Rfl: 0     simethicone (MYLICON) 80 MG chewable tablet, Take 1 tablet (80 mg) by mouth every 6 hours as needed for cramping, Disp: 20 tablet, Rfl: 0     tamsulosin (FLOMAX) 0.4 MG capsule, Take 1 capsule (0.4 mg) by mouth daily, Disp: 30 capsule, Rfl: 1     thiamine (B-1) 100 MG tablet, Take 1 tablet (100 mg) by mouth daily, Disp: 100 tablet, Rfl: 1    PHYSICAL EXAMINATION:  Vital signs: BP (!) 82/60   Pulse 94   Temp 97.1  F (36.2  C) (Tympanic)   SpO2 100%     General:   Frail-appearing male sitting on the chair  Chest: CTAB  Heart: S1S2 RRR  Abdomen: Soft, nont-tender, non-distended, normoactive bowel  sounds  Extremities: No peripheral edema  Neurological: Drowsy but arousable, responds to questions and engages in a conversation for short periods of time, grossly non-focal    LABORATORY DATA:  Lab Results   Component Value Date    HGB 4.2 (LL) 11/17/2021    HGB 7.6 (L) 11/15/2021    HGB 6.9 (LL) 11/10/2021    HGB 8.4 (L) 10/26/2021    HGB 7.8 (L) 10/18/2021       Recent Labs   Lab Test 11/10/21  1201 10/26/21  0754 10/21/21  0628 10/20/21  0622 10/18/21  0433   * 136  --   --  134   POTASSIUM 4.0 4.2 3.7   < > 3.8   CHLORIDE 99 104  --   --  101   CO2 26 28  --   --  29   ANIONGAP 3 4  --   --  4   * 107*  --   --  87   BUN 16 15  --   --  13   CR 0.86 0.73  --   --  0.86   HAWA 8.8 8.5  --   --  8.6    < > = values in this interval not displayed.     Recent Labs   Lab Test 11/10/21  1201 10/26/21  0754 10/18/21  0433   PROTTOTAL 7.4 7.5 6.6*   ALBUMIN 2.5* 2.5* 2.2*   BILITOTAL 0.3 0.2 0.4   ALKPHOS 74 85 59   AST 14 18 7   ALT 10 15 7         ASSESSMENT AND PLAN:    Mr. Essie Guzman is a 65 year old  male with PMHx of H.pylori infection and gastric cancer.     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 Since Sep/Oct, he began to experience significant symptoms. In his case, combination of chemotherapy and immunotherpay (Nivo+FOLFOX) or Pembro+FOLFOX were both reasonable options. Patient was not willing for any chemotherapy.  Therefore we tried to get approval for single agent pembrolizumab, but it was rejected by his insurance.  We next pursued the option of combination of nivolumab and FOLFOX and submitted it for  insurance approval. However, patient declined significantly in terms of his functional status due to continued disease progression and also continued to deny chemotherapy. Therefore, single-agent Nivolumab was started C1D1 11/15/21 in hopes of eliciting a tumor response in the setting of his MSI high cancer.      # Anemia  - Most likely iron deficiency anemia due to slow blood loss from the cancer. Patient has recently been having episodes of hematemesis, and per the daughter, the last emesis was black in color. He recently received one unit of pRBC transfusion in the clinic on 11/15/21. Today, his hypotension to 82/60 and recent black hematemesis had us concerned about ongoing significant upper GI bleeding. We recommended family that patient should be routed to the ED for further cares but the daughters declined, citing that they were agreeable to a direct transfer but not going to the ED given the potentially long wait times that they might encounter there. While we worked on directly admitting him and coordinating with patient placement, he was sent to the infusion clinic to receive 2L of normal saline. Meanwhile, the CBC that we had ordered resulted back showing a critically low Hgb of 4.2. Given this severe acute anemia, daughters agreed for the patient to be transported to the ED for closer monitoring, aggressive hemodynamic and transfusion support, GI consultation and likely upper endoscopy.     Other issues were not addressed on today's visit. Please refer to the last note from 11/10/21 for details on the remainder of problems.     Patient was seen and plan of care developed with Dr. Mccarthy.    Alexey Pringle  Hematology/Oncology Fellow  [phone]    The pt was evaluated with resident/fellow and the note was edited to relflect the combined assessment and plan      On the day of service  Chart review: 5 minutes  Visit duration: 30 minutes  Care coordination: 15 minutes    Brennan Mccarthy MD  Assistant    Hematology, Oncology and Transplantation

## 2021-11-17 NOTE — PROGRESS NOTES
[facility] Care Coordination Contact    [facility] Initial Assessment     Home visit for Initial Health Risk Assessment with Essie Guzman completed on November 16, 2021.  Assessment completed by telephone with [facility]  due to Covid 19.    Type of residence:: Private home - stairs  Current living arrangement:: I live in a private home with family     Assessment completed with:: Patient,Children (ong phone )    Current Care Plan  Member currently receiving the following home care services:   No current services.  Requesting PCA and homemaking.  Member currently receiving the following community resources: None      Medication Review  Medication reconciliation completed in Epic: Not completed due to assessment being completed by telephone.  Medication set-up & administration: Family/informal caregiver sets up weekly.  Family caregiver administers medications.  Medication Risk Assessment Medication (1 or more, place referral to MTM): Taking 1 or more high-risk medications for adults >65 years  MTM Referral Placed: No: Declined    Mental/Behavioral Health   Depression Screening:   PHQ-2 Total Score (Adult) - Positive if 3 or more points; Administer PHQ-9 if positive: 2       Mental health DX:: No        No current  services-will place referral for no referra;s ,gisela    Falls Assessment:   Fallen 2 or more times in the past year?: No   Any fall with injury in the past year?: No    ADL/IADL Dependencies:   Dependent ADLs:: Bathing,Dressing,Eating,Grooming,Transfers,Toileting  Dependent IADLs:: Cleaning,Cooking,Laundry,Shopping,Meal Preparation,Medication Management,Money Management    [facility] sponsored benefits: Shared information re: Silver Sneakers/gym memberships, ASA, Calcium +D.    PCA Assessment completed at visit: Yes Initial PCA Assessment indicated 25 units per day of PCA.     Elderly Waiver Eligibility: Yes-will open to EW.  [id number] completed and emailed to daughter JassStephen   Care Coordinator will submit to [facility] once signature sheet is received back.      Care Plan & Recommendations: PCA will be set up.  Member has chosen [facility] as PCA agency.  Care Coordinator made referral and daughter aware she needs to be hired by this agency.  Homemaking will be set up once EW is open.    See [facility] for detailed assessment information.    Follow-Up Plan: Member informed of future contact, plan to f/u with member with a 6 month telephone assessment.  Contact information shared with member and family, encouraged member to call with any questions or concerns at any time.    [place] care continuum providers: Please refer to Health Care Home on the [facility] Problem List to view this patient's [facility] Care Plan Summary.  JOHN Lockhart, [facility] Care Coordinator  Tel [phone]  Fax [phone]  Cell [phone]    11/17/21 addendum  Received email from daughter with attached signature for EW open ([id number]).  Right faxed [phone], and signature sheet to financial worker at [facility].  JOHN Lockhart, [facility] Care Coordinator  Tel [phone]  Fax [phone]  Cell [phone]

## 2021-11-17 NOTE — LETTER
11/17/2021         RE: Essie Guzmna  [id number]  [facility] [address]        Dear Colleague,    Thank you for referring your patient, Essie Guzman, to the [facility]. Please see a copy of my visit note below.    MEDICAL ONCOLOGY FOLLOW UP NOTE    PATIENT NAME: Essie Guzman  ENCOUNTER DATE: 11/17/2021    Care Team  Primary Oncologist: Brennan Mccarthy MD    REASON FOR CURRENT VISIT: F/u Metastatic gastric cancer    HISTORY OF PRESENT ILLNESS:  Mr. 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 Initially Rx for H.pylori with Abx and PPI. LFT's were mildly elevated.  11/24/20- UGI endoscopy at [facility] with normal esophagus. Non-bleeding gastric ulcer in pylorus with no stigmata of bleeding.  Non-bleeding duodenal ulcer with no stigmata of bleeding. Biopsy of Stomach, pylorus, - Poorly-differentiated carcinoma; consistent with adenocarcinoma (poorly cohesive type). HER2 by FISH was non-amplified  11/24/20- CT CAP- bilateral hilar lymph nodes are indeterminate (1.4 x 1.0 cm) right infrahilar lymph node. Multiple enlarged retroperitoneal and perigastric lymph nodes (10 mm right paraduodenal lymph node, 11 mm necrotic left para-aortic lymph node and a 10 mm left para-aortic lymph node.  12/16/20-  PET/CT with metastatic disease- Hypermetabolic circumferential ulcerated mass at the gastric antrum, Multiple metastatic hypermetabolic lymph  nodes:  adjacent just inferior to the caudate lobe of the liver, Multiple enlarged, centrally necrotic, and hypermetabolic retroperitoneal para-aortic and paracaval lymph nodes.    12/18/20-Saw [facility]- Due to PET CT showing retroperitoneal lymphadenopathy not a candidate for surgical resection.  12/22/20- EUS staging and biopsy- [id number] - Partially-obstructing cratered pre-pyloric gastric ulcer with a clean ulcer base, fully-circumferential ulcer  Several sub-centimeter malignant appearing round, well-circumscribed, hypoechoic lymph nodes were  visualized along the aorta, from the 2nd portion of  duodenum which corresponds to the hypermetabolic nodes seen on the PET scan.   3/9/21- PET/CT-Overall there has been progression in the number and size of the hypermetabolic periaortic, pericaval, and zuleyma hepatis lymph nodes compared to prior.Relatively unchanged hypermetabolic circumferential ulcerated mass at the gastric antrum.  March through Aug 2021- Declined systemic Rx with pembrolizumab as he was minimally symptomatic  9/10/21 to 9/12/21- [facility] admission for - Iron deficiency anemia from chronic blood loss anemia 2/2 progressive metastatic gastric cancer, Right hydrnonephrosis secondary to malignant obstruction s/p ureteric stenting and ongoing Cancer related weight loss- Rcd 1 unit PRBC  9/10/21- CT abd/pelvis-  Wall thickening of the distal stomach consistent with the history of gastric cancer. There are multiple enlarged upper abdominal and retroperitoneal lymph nodes consistent with metastases and significantly progressed since the previous exam. Right hydronephrosis and delayed nephrogram consistent with obstruction. (There is an irregularly-shaped low-attenuation mass posterior to the body of the pancreas measuring approximately 2.1 x 3.8 x 2.6 cm and consistent with a lymph node.  10/4 to 10/9- [facility] admission- Flank pain from worsening right-sided hydronephrosis, secondary to malignant obstruction,  right-sided renal hypoperfusion and some fluid in the right renal pelvis.right percutaneous nephrostomy tube and ureteral stent appear appropriately positioned  S/p R renal stent removal, still with PNTs in place and improvement in pain. Also needed 2 Units prbc for anemia  10/11/21 to 10/13/21- [facility] admission for scrotal edema- no cause found- no malignat obstruction  10/16/21 to 10/21/21- [facility] admission for pain control (abd and groin) and optimization of his outpatient oral pain regimen.  Palliative care and pain services were consulted: - Stopped MS Contin, started Methadone -- with improvement in pain control  11/15/21: Started single-agent nivolumab.  11/17/21: Patient hypotensive with Hgb of 4.2 in the ED. He was routed to the ED for further care.     Interval Hx:  Essie Guzman was seen in the clinic today with two of his daughters. He was last seen in the clinic on 11/17/21 when he started single-agent Nivolumab. Prior to that, during the clinic visit dated 11/10/21, he was noted to have deteriorated significantly in terms of nutrition and generalized weakness. He was given IVF and 1 unit pRBC transfusion at that time.     Today, he continued to appear very weak and deconditioned. He continues to rest most of the day, increasingly dependent on others for ADLs and IADLs. He endorses to having no energy. He is managing to eat only 2-3 spoonful of meals each day and might end up vomiting if he eats more. He was frequently falling asleep during our conversation but was easily arousable. His pain remains controlled on methadone. He was offered a pain pump in the past by anesthesia which he refused.      PAST MEDICAL AND SURGICAL HISTORY:   Active Ambulatory Problems     Diagnosis Date Noted     Malignant neoplasm of stomach metastatic to retroperitoneum (H) 12/21/2020     Gastrointestinal hemorrhage with melena 09/10/2021     Cancer associated pain 10/04/2021     Testicular swelling 10/11/2021     Swelling of  left lower extremity 10/11/2021     Malignant neoplasm of stomach, unspecified location (H) 10/11/2021     Abdominal pain, generalized 10/17/2021     Malignant neoplasm of body of stomach (H) 11/11/2021     Resolved Ambulatory Problems     Diagnosis Date Noted     Hematochezia 10/11/2015     No Additional Past Medical History   No surgeeies    SOCIAL HISTORY:   Social History     Tobacco Use     Smoking status: Never Smoker     Smokeless tobacco: Never Used   Vaping Use     Vaping Use: Never used   Substance Use Topics     Alcohol use: No     Drug use: No   Non-smoker/non-drinker  He is now retired. 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  He lives in [place] with family, sposue, sons and grandkids  He has 6 kids, lives with 2 younger boys, 4 grand kids      FAMILY HISTORY:   Family History   Problem Relation Age of Onset     Asthma No family hx of      Diabetes No family hx of      Hypertension No family hx of      Cerebrovascular Disease No family hx of      Cancer No family hx of      Colon Cancer No family hx of      Anesthesia Reaction No family hx of      Deep Vein Thrombosis (DVT) No family hx of    No family hx any cancer      ALLERGIES: No Known Allergies    CURRENT MEDICATIONS: No current facility-administered medications for this visit.    Current Outpatient Medications:      acetaminophen (TYLENOL) 500 MG tablet, Take 2 tablets (1,000 mg) by mouth every 8 hours, Disp: 120 tablet, Rfl: 0     ferrous gluconate (FERGON) 324 (38 Fe) MG tablet, Take 1 tablet (324 mg) by mouth daily (with breakfast), Disp: 100 tablet, Rfl: 1     gabapentin (NEURONTIN) 100 MG capsule, Take 1 capsule (100 mg) by mouth 3 times daily, Disp: 90 capsule, Rfl: 0     ibuprofen (ADVIL/MOTRIN) 200 MG tablet, Take 1 tablet (200 mg) by mouth every 6 hours as needed for moderate pain (Patient not taking: Reported on 10/26/2021), Disp: 30 tablet, Rfl: 0     melatonin 1 MG TABS tablet, Take 3 tablets (3 mg) by mouth At Bedtime  (Patient not taking: Reported on 10/26/2021), Disp: 90 tablet, Rfl: 0     methadone (DOLOPHINE) 5 MG tablet, Take 1 tablet (5 mg) by mouth every 8 hours, Disp: 90 tablet, Rfl: 0     naloxone (NARCAN) 4 MG/0.1ML nasal spray, Spray 1 spray (4 mg) into one nostril alternating nostrils once as needed for opioid reversal every 2-3 minutes until assistance arrives (Patient not taking: Reported on 10/26/2021), Disp: 0.2 mL, Rfl: 0     ondansetron (ZOFRAN) 8 MG tablet, Take 1 tablet (8 mg) by mouth every 8 hours as needed (Nausea/Vomiting), Disp: 10 tablet, Rfl: 5     ondansetron (ZOFRAN-ODT) 4 MG ODT tab, Take 1 tablet (4 mg) by mouth every 6 hours as needed for nausea or vomiting, Disp: 20 tablet, Rfl: 0     ondansetron (ZOFRAN-ODT) 4 MG ODT tab, Take 1 tablet (4 mg) by mouth every 6 hours as needed for nausea or vomiting, Disp: 30 tablet, Rfl: 0     oxybutynin ER (DITROPAN-XL) 10 MG 24 hr tablet, Take 1 tablet (10 mg) by mouth daily, Disp: 30 tablet, Rfl: 0     oxyCODONE IR (ROXICODONE) 10 MG tablet, Take 1 tablet (10 mg) by mouth every 4 hours as needed for moderate to severe pain, Disp: 60 tablet, Rfl: 0     pantoprazole (PROTONIX) 40 MG EC tablet, Take 1 tablet (40 mg) by mouth daily, Disp: 60 tablet, Rfl: 1     polyethylene glycol (MIRALAX) 17 GM/Dose powder, Take 17 g by mouth daily, Disp: 510 g, Rfl: 0     prochlorperazine (COMPAZINE) 10 MG tablet, Take 0.5 tablets (5 mg) by mouth every 6 hours as needed (Nausea/Vomiting), Disp: 30 tablet, Rfl: 5     sennosides (SENOKOT) 8.6 MG tablet, Take 1-2 tablets by mouth 2 times daily, Disp: 60 tablet, Rfl: 0     simethicone (MYLICON) 80 MG chewable tablet, Take 1 tablet (80 mg) by mouth every 6 hours as needed for cramping, Disp: 20 tablet, Rfl: 0     tamsulosin (FLOMAX) 0.4 MG capsule, Take 1 capsule (0.4 mg) by mouth daily, Disp: 30 capsule, Rfl: 1     thiamine (B-1) 100 MG tablet, Take 1 tablet (100 mg) by mouth daily, Disp: 100 tablet, Rfl: 1    PHYSICAL  EXAMINATION:  Vital signs: BP (!) 82/60   Pulse 94   Temp 97.1  F (36.2  C) (Tympanic)   SpO2 100%     General:   Frail-appearing male sitting on the chair  Chest: CTAB  Heart: S1S2 RRR  Abdomen: Soft, nont-tender, non-distended, normoactive bowel sounds  Extremities: No peripheral edema  Neurological: Drowsy but arousable, responds to questions and engages in a conversation for short periods of time, grossly non-focal    LABORATORY DATA:  Lab Results   Component Value Date    HGB 4.2 (LL) 11/17/2021    HGB 7.6 (L) 11/15/2021    HGB 6.9 (LL) 11/10/2021    HGB 8.4 (L) 10/26/2021    HGB 7.8 (L) 10/18/2021       Recent Labs   Lab Test 11/10/21  1201 10/26/21  0754 10/21/21  0628 10/20/21  0622 10/18/21  0433   * 136  --   --  134   POTASSIUM 4.0 4.2 3.7   < > 3.8   CHLORIDE 99 104  --   --  101   CO2 26 28  --   --  29   ANIONGAP 3 4  --   --  4   * 107*  --   --  87   BUN 16 15  --   --  13   CR 0.86 0.73  --   --  0.86   HAWA 8.8 8.5  --   --  8.6    < > = values in this interval not displayed.     Recent Labs   Lab Test 11/10/21  1201 10/26/21  0754 10/18/21  0433   PROTTOTAL 7.4 7.5 6.6*   ALBUMIN 2.5* 2.5* 2.2*   BILITOTAL 0.3 0.2 0.4   ALKPHOS 74 85 59   AST 14 18 7   ALT 10 15 7         ASSESSMENT AND PLAN:    Mr. Essie Guzman is a 65 year old  male with PMHx of H.pylori infection and gastric cancer.     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 Since Sep/Oct, he began to experience significant symptoms. In his case, combination of chemotherapy and immunotherpay (Nivo+FOLFOX) or Pembro+FOLFOX were both  reasonable options. Patient was not willing for any chemotherapy.  Therefore we tried to get approval for single agent pembrolizumab, but it was rejected by his insurance.  We next pursued the option of combination of nivolumab and FOLFOX and submitted it for insurance approval. However, patient declined significantly in terms of his functional status due to continued disease progression and also continued to deny chemotherapy. Therefore, single-agent Nivolumab was started C1D1 11/15/21 in hopes of eliciting a tumor response in the setting of his MSI high cancer.      # Anemia  - Most likely iron deficiency anemia due to slow blood loss from the cancer. Patient has recently been having episodes of hematemesis, and per the daughter, the last emesis was black in color. He recently received one unit of pRBC transfusion in the clinic on 11/15/21. Today, his hypotension to 82/60 and recent black hematemesis had us concerned about ongoing significant upper GI bleeding. We recommended family that patient should be routed to the ED for further cares but the daughters declined, citing that they were agreeable to a direct transfer but not going to the ED given the potentially long wait times that they might encounter there. While we worked on directly admitting him and coordinating with patient placement, he was sent to the infusion clinic to receive 2L of normal saline. Meanwhile, the CBC that we had ordered resulted back showing a critically low Hgb of 4.2. Given this severe acute anemia, daughters agreed for the patient to be transported to the ED for closer monitoring, aggressive hemodynamic and transfusion support, GI consultation and likely upper endoscopy.     Other issues were not addressed on today's visit. Please refer to the last note from 11/10/21 for details on the remainder of problems.     Patient was seen and plan of care developed with Dr. Mccarthy.    Alexey Pringle  Hematology/Oncology Fellow  [phone]    The  pt was evaluated with resident/fellow and the note was edited to relflect the combined assessment and plan      On the day of service  Chart review: 5 minutes  Visit duration: 30 minutes  Care coordination: 15 minutes    Brennan Mccarthy MD    Hematology, Oncology and Transplantation

## 2021-11-18 NOTE — CONSULTS
Care Management Initial Consult    General Information  Assessment completed with: Jass Rehman       Primary Care Provider verified and updated as needed: Yes   Readmission within the last 30 days: previous discharge plan unsuccessful         Advance Care Planning:   Not on file         Communication Assessment  Patient's communication style: spoken language (English or Bilingual)    Hearing Difficulty or Deaf: no   Wear Glasses or Blind: no    Cognitive  Cognitive/Neuro/Behavioral: .WDL except  Level of Consciousness: lethargic  Arousal Level: arouses to voice  Orientation: oriented x 4  Mood/Behavior: calm,cooperative  Best Language: 0 - No aphasia  Speech: clear,spontaneous    Living Environment:   People in home: child(shanita), adult,grandchild(shanita),spouse     Current living Arrangements: house      Able to return to prior arrangements: yes       Family/Social Support:  Care provided by:  Family     Wife,Children          Description of Support System: Supportive,Involved         Current Resources:   Patient receiving home care services:  no     Community Resources:  Out Patient infusion  Equipment currently used at home: none  Supplies currently used at home:      Employment/Financial:  Employment Status:     unemployed     Financial Concerns:   None identified          Lifestyle & Psychosocial Needs:  Social Determinants of Health     Tobacco Use: Low Risk      Smoking Tobacco Use: Never Smoker     Smokeless Tobacco Use: Never Used   Alcohol Use: Not At Risk     Frequency of Alcohol Consumption: Never     Average Number of Drinks: Not on file     Frequency of Binge Drinking: Never   Financial Resource Strain: Low Risk      Difficulty of Paying Living Expenses: Not hard at all   Food Insecurity: No Food Insecurity     Worried About Running Out of Food in the Last Year: Never true     Ran Out of Food in the Last Year: Never true   Transportation Needs: No Transportation Needs     Lack of Transportation (Medical): No      Lack of Transportation (Non-Medical): No   Physical Activity: Inactive     Days of Exercise per Week: 0 days     Minutes of Exercise per Session: 0 min   Stress: Stress Concern Present     Feeling of Stress : To some extent   Social Connections: Moderately Isolated     Frequency of Communication with Friends and Family: Three times a week     Frequency of Social Gatherings with Friends and Family: Three times a week     Attends Sabianism Services: Never     Active Member of Clubs or Organizations: No     Attends Club or Organization Meetings: Never     Marital Status:    Intimate Partner Violence: Not on file   Depression: Not at risk     PHQ-2 Score: 2   Housing Stability: Low Risk      Unable to Pay for Housing in the Last Year: No     Number of Places Lived in the Last Year: 1     Unstable Housing in the Last Year: No       Functional Status:  Prior to admission patient needed assistance ADLS.             Values/Beliefs:  Spiritual, Cultural Beliefs, Sabianism Practices, Values that affect care:                 Additional Information:  Pt with history of metastatic gastric adenocarcinoma admitted with hgb of 4.2 and hypotension. Prior to admission Pt was living with Family in [facility].  The Medicine Team has asked me to arrange a care conference tomorrow afternoon.  Care Conference 11/19/21  @ 1:30pm  Margarette Pak  [phone]  Heme/Onc [address]ks [id number]  Daughter Youa #[phone]    Tasneem Sainz RN   6B care coordinator #[phone]

## 2021-11-18 NOTE — PROVIDER NOTIFICATION
11/18 0610 Paged provider (Dr. Fontenot) regarding whether to administer second unit of RBCs, as AM Hgb was 7.9.    11/18 0650 Paged provider (Dr. Fontenot) regarding administration of second unit of RBCs. Provider stated to hold the second unit of RBCs.

## 2021-11-18 NOTE — PHARMACY-ADMISSION MEDICATION HISTORY
Admission Medication History Completed by Pharmacy    See [facility] Admission Navigator for allergy information, preferred outpatient pharmacy, prior to admission medications and immunization status.     Medication History Sources:     Sure Scripts    Patient's daughterJass    Changes made to PTA medication list (reason):    Added: None    Deleted:   o Ondansetron 4 mg ODT tab - take 1 tablet (4 mg) by mouth every 6 hours as needed for nausea (duplicate entry)    Changed:   o Acetaminophen 500 mg tablet - take 2 tablets (1000 mg) by mouth every 8 hours --> Take 500-1000 mg by mouth every 8 hours (per patient's daughter)  o Sennosides 8.6 mg tablet - take 1-2 tablets by mouth 2 times daily --> take 1 tablet by mouth daily as needed (per patient's daughter)  o Polyethylene glycol 17 gm/dose powder - take 17 g by mouth daily --> take 17 g by mouth daily as needed (per patient's daughter)    Additional Information:    Spoke with patient's daughterJass, who helps the patient take his medications. She was able to provide me with his current medication names, strengths, frequencies, and confirm the last time everything was taken.    Patient is currently taking methadone for pain. His regimen is 5 mg by mouth every 8 hours and patients daughter reports that he had 2 doses today, 11/17, and the last dose was at 1400. The prescription is filled at [facility] on [address] in [place] ([phone]).    The patient's daughter reported that they alternate tylenol and ibuprofen for the patient.     The patient's daughter denied that the patient is taking any other prescription or over the counter medications.      Prior to Admission medications    Medication Sig Last Dose Taking? Auth Provider   acetaminophen (TYLENOL) 500 MG tablet Take 2 tablets (1,000 mg) by mouth every 8 hours  Patient taking differently: Take 500-1,000 mg by mouth every 8 hours  11/15/2021 Yes Magdalena Braxton MD   ferrous gluconate (FERGON) 324 (38 Fe) MG  tablet Take 1 tablet (324 mg) by mouth daily (with breakfast) 11/17/2021 at AM Yes Homar Becerra MD   gabapentin (NEURONTIN) 100 MG capsule Take 1 capsule (100 mg) by mouth 3 times daily 11/17/2021 at 2PM Yes Magdalena Braxton MD   ibuprofen (ADVIL/MOTRIN) 200 MG tablet Take 1 tablet (200 mg) by mouth every 6 hours as needed for moderate pain 11/15/2021 Yes Lynn Waggoner MD   melatonin 1 MG TABS tablet Take 3 tablets (3 mg) by mouth At Bedtime 11/16/2021 at PM Yes Magdalena Braxton MD   methadone (DOLOPHINE) 5 MG tablet Take 1 tablet (5 mg) by mouth every 8 hours 11/17/2021 at 1400 Yes Lee Sim MD   ondansetron (ZOFRAN) 8 MG tablet Take 1 tablet (8 mg) by mouth every 8 hours as needed (Nausea/Vomiting) 11/15/2021 Yes Brennan Mccarthy MD   oxybutynin ER (DITROPAN-XL) 10 MG 24 hr tablet Take 1 tablet (10 mg) by mouth daily 11/17/2021 at AM Yes Lynn Waggoner MD   oxyCODONE IR (ROXICODONE) 10 MG tablet Take 1 tablet (10 mg) by mouth every 4 hours as needed for moderate to severe pain 11/15/2021 Yes Lee Sim MD   pantoprazole (PROTONIX) 40 MG EC tablet Take 1 tablet (40 mg) by mouth daily 11/17/2021 at AM Yes Homar Becerra MD   polyethylene glycol (MIRALAX) 17 GM/Dose powder Take 17 g by mouth daily  Patient taking differently: Take 17 g by mouth daily as needed  11/14/2021 Yes Magdalena Braxton MD   prochlorperazine (COMPAZINE) 10 MG tablet Take 0.5 tablets (5 mg) by mouth every 6 hours as needed (Nausea/Vomiting) 11/15/2021 Yes Brennan Mccarthy MD   sennosides (SENOKOT) 8.6 MG tablet Take 1-2 tablets by mouth 2 times daily  Patient taking differently: Take 1 tablet by mouth daily as needed  11/17/2021 at AM Yes Lashawn Wright PA-C   simethicone (MYLICON) 80 MG chewable tablet Take 1 tablet (80 mg) by mouth every 6 hours as needed for cramping 11/16/2021 Yes Lynn Waggoner MD   tamsulosin (FLOMAX) 0.4 MG capsule Take 1 capsule (0.4 mg) by mouth daily 11/17/2021 at  AM Yes Homar Becerra MD   thiamine (B-1) 100 MG tablet Take 1 tablet (100 mg) by mouth daily 11/17/2021 at AM Yes Homar Becerra MD   naloxone (NARCAN) 4 MG/0.1ML nasal spray Spray 1 spray (4 mg) into one nostril alternating nostrils once as needed for opioid reversal every 2-3 minutes until assistance arrives   Lashawn Wright PA-C   ondansetron (ZOFRAN-ODT) 4 MG ODT tab Take 1 tablet (4 mg) by mouth every 6 hours as needed for nausea or vomiting  Patient not taking: Reported on 11/17/2021 Not Taking  Magdalena Braxton MD       Date completed: 11/17/21    Medication history completed by: Ori Alex

## 2021-11-18 NOTE — OR NURSING
EGD with hemorraghic control, hemospray used on bleeding site. Report called to bedside RN. Conscious sedation and tolerated well.

## 2021-11-18 NOTE — PLAN OF CARE
Neuro: A&Ox4. PERRLA. Calm and cooperative with staff.  Cardiac: SR. HR ranging from 70-80s, SBP ranging from 100-110s.  Respiratory: Sating 98% on RA.  GI/: R-nephrostomy tube, to gravity. Patient is also able to void spontaneously, output has been around 25cc. No BM overnight.  Diet/appetite:  NPO diet as of midnight, was clear liquids before then. Patient reports having a low appetite for several months, daughter has stated that the patient has appeared to have lost 20 lbs in the past 2 months.  Activity:  Assist of 1-2, sits on side of bed to void using urinal.  Pain: Pain rating 8-9 from the groin area, 0.2 mg Hydromorphone not effective. Pain radiates to abd.   Skin: No new deficits noted.  LDA's: R-PIV & L-PIV saline locked. R-neph tube to gravity.    Plan: Continue with POC. Notify primary team with changes.

## 2021-11-18 NOTE — PROGRESS NOTES
Admission          11/17/2021  3:18 PM  -----------------------------------------------------------  Reason for admission: Malignant Neoplasm of Stomach  Primary team notified of pt arrival.  Admitted from: ED  Via: stretcher  Accompanied by: family  Belongings: Placed in closet; valuables sent home with family  Admission Profile: complete  Teaching: orientation to unit and call light- call light within reach, call don't fall, use of console, meal times, when to call for the RN, and enforced importance of safety   Access: 2 L-PIV, saline locked  Telemetry: Placed on pt  Ht./Wt.: complete  Code Status verified on armband: yes  2 RN Skin Assessment Completed By: Sam Luu  Med Rec completed: yes  Bed surface reassessed with algorithm and charted: yes  New bed surface ordered: no  Suction/Ambu bag/Flowmeter at bedside: yes  Is patient having diarrhea upon admission- if YES fill out testing algorithm : no      Pt status:    Temp:  [97.1  F (36.2  C)-98.6  F (37  C)] 98.4  F (36.9  C)  Pulse:  [] 89  Resp:  [7-17] 16  BP: ()/(60-90) 110/83  SpO2:  [97 %-100 %] 99 %

## 2021-11-18 NOTE — PROGRESS NOTES
TRANSITIONS OF CARE (OSCAR) LOG   OSCAR tasks should be completed by the CC within one (1) business day of notification of each transition. Follow up contact with member is required after return to their usual care setting.  Note:  If CC finds out about the transitions fifteen (15) days or more after the member has returned to their usual care setting, no OSCAR log is needed. However, the CC should check in with the member to discuss the transition process, any changes needed to the care plan and document it in a case note.    Member Name:  Ess[facility] Guzman [facility] [id number]   Product: [facility] Care Coordinator Contact:  JOHN Lockhart, [facility] Agency/County/Care System: [facility]   Transition Communication Actions from Care Management Contact   Transition #1   Notification Date: 11/17/21 Transition Date:   11/17/21 Transition From: Home     Is this the member s usual care setting?               yes Transition To: Hospital, [facility]   Transition Type:  Unplanned  Reason for Admission/Comments:    Member admitted on 11/17/21 with gastrointestinal hemorrhage.    CC contacted Hospital /discharge planner via the [facility] Hand-In Process, with community care plan included.  CC reached out to adult daughter Jass regarding transition and offered support as needed.  Reviewed and update care plan as needed.  Notified community service providers and placed services  on hold as needed: Services still pending from recent HRA on 11-16-21.  Daughter has not yet been hired by PCA agency.  Transition log initiated.   PCP notified of hospitalization via EMR.  JOHN Lockhart, [facility] Care Coordinator  Tel [phone]  Fax [phone]  Cell [phone]         Shared CC contact info, care plan/services with receiving setting--Date completed: 11/18/21    Notified PCP of transition--Date completed:  11/17/21     via  EMR   Transition #2    Transition #3  (if applicable)   Notification Date: 11/22/21         Transition To:  Home  Transition Date: 11/19/21     Transition Type:    Planned  Notified PCP -- Date completed: 11/22/21              Shared CC contact info, care plan/services with receiving setting or, if applicable, home care agency--Date completed:  11/22/21  *Complete additional tasks below, if this transition is a return to usual care setting.      Comments:  CC contacted adult daughter Jass and reviewed discharge summary.  Member has a follow-up appointment with PCP in 7 days: Daughter has call into clinic to schedule.  Member has had a change in condition: Yes: He is declining further treatment for gastric cancer and hospice consult is pending.  Home visit needed: No New member assessment completed last week.  Care plan reviewed and updated.  The following home based services: None were resumed.  PCA is pending approval of [facility]. Assessment completed last week.  Homemaking is pending EW open.  New referrals placed: No  Hospice referral pending.  Care Coordinator is ordering equipment pending hospice consult.  Transition log completed.   PCP notified of transition back to home via EMR.  JOHN Lockhart, [facility] Care Coordinator  Tel [phone]  Fax [phone]  Cell [phone]       Notification Date:          Transition To:    Transition Date:              Transition Type:      Notified PCP--Date completed:          Shared CC contact info, care plan/services with receiving setting or, if applicable, home care agency--Date completed:       *Complete additional tasks below, if this transition is a return to usual care setting.      Comments:       *Complete tasks below when the member is discharging TO their usual care setting within one (1) business day of notification.  For situations where the Care Coordinator is notified of the discharge prior to the date of discharge, the Care Coordinator must follow up with the  member or designated representative to confirm that discharge actually occurred and discuss required OSCAR tasks as outlined in the OSCAR Instructions.  (This includes situations where it may be a  new  usual care setting for the member. (i.e., a community member who decides upon permanent nursing home placement following hospitalization and rehab).    Date completed: 11/22/21  Communicated with member or their designated representative about the following:  care transition process; about changes to the member s health status; plan of care updates; education about transitions and how to prevent unplanned transitions/readmissions  Four Pillars for Optimal Transition:    Check  Yes  - if the member, family member and/or SNF/facility staff manages the following:    If  No  provide explanation in the comments section.          [x]  Yes     []  No     Does the member have a follow-up appointment scheduled with primary care or specialist? (Mental health hospitalizations--the appt. should be w/in 7 days)   [x]  Yes     []  No     Can the member manage their medications or is there a system in place to manage medications (e.g. home care set-up)?         [x]  Yes     []  No     Can the member verbalize warning signs and symptoms to watch for and how to respond?         [x]  Yes     []  No     Does the member use a Personal Health Care Record?  Check  Yes  if visit summary, discharge summary, and/or healthcare summary are being used as a PHR.                                                                                                                                                                                    [x] Yes      [] No      Have you updated the member s care plan?  If  No  provide explanation in comments.   Comments:  Member's POC just completed last week and has been updated.

## 2021-11-18 NOTE — PLAN OF CARE
Neuro: A&Ox4. Bilingual, Hmong speaking, afebrile.   Cardiac: SR. VSS. Denies chest pain and SOB.   Respiratory: Sating >92% on RA. Denies chest pain and SOB.   GI/: Adequate urine output via nephrostomy tube, voids small amounts of urine in urinal. No BM during shift.   Diet/appetite: Tolerating clear liquid diet. Poor appetite, needs encouragement to eat.   Activity:  Assist of x1-2, up to bedside and wheelchair.    Pain: PRN dilaudid given x3 for right groin/hip pain with some relief. Dr. Sagastume paged regarding restarting patient's PTA home pain regimen.   Skin: No new deficits noted.  LDA's: Right nephrostomy tube, right and left PIV x2-saline locked.     Plan: Care conference at 1:30 pm tomorrow. Continue with POC. Notify primary team with changes.

## 2021-11-18 NOTE — PROGRESS NOTES
[facility] Care Coordination Contact    [facility]  Ambulatory Care Coordination to Inpatient Care Management   Hand-In Communication    Date:  November 18, 2021  Name: Essie Guzman is enrolled in [facility] Care Coordination program and I am the Lead Care Coordinator.  CC Contact Information:.   Payor Source: Payor: [facility] / Plan: [facility] / Product Type: HMO /   Current services in place:     Please see the CC Snaphot and Care Management Flowsheets for specific details of this Essie Guzman care plan.   Additional details/specific concerns r/t this admission:     Essie was new to [facility] effective 11/1/21.  His daughter, Jass, requested a PCA assessment and also requested homemaking.  Health risk assessment and PCA assessment completed with Essie and Mohan phone  (daughter also present) on 11/16/21.  It was completed by telephone due to Covid 19.  Member reported that he is dependent in all ADLs except positioning.  Elderly waiver is pending, waiting approval from [facility] [employer] worker.    I will follow this admission in Epic. Please feel free to contact me with questions or for further collaboration in discharge planning.  JOHN Lockhart, [facility] Care Coordinator  Tel [phone]  Fax [phone]  Cell [phone]

## 2021-11-18 NOTE — CONSULTS
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    # Coffee ground emesis/melena  # Acute blood loss anemia  # Metastatic gastric adenocarcinoma  # Malnutrition  Presentation concerning for upper GI bleed from known malignancy. Hgb 4 (baseline 7-9) on admission. Other possible etiologies but less likely given clinical scenario are PUD, Dieaulfoy lesions. Given the location of the malignancy, there was some concern for gastric outlet obstruction but the patient was still able to tolerate PO at home but this is in settings of decreasing PO intake. CT showed stable distended stomach.          Recommendations:   - Cont with IV PPI BID.   - EGD today.   - Primary team to trend Hgb. Transfuse for Hgb < 7 or active bleeding. Keep type and screen up to date.  - Fluid resuscitation per primary team (assess adequacy with orthostatics, UOP, telemetry, etc)  - Maintain 2 large bore IVs (18-20g) at all times    It has been a pleasure to participate in the care of this patient.  Patient discussed with GI staff, Dr. Galeano.  Please do not hesitate to contact the GI service with any questions or concerns.     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    The patient was first diagnosed in 11/2020 after having early satiety and weight loss.  He has been followed by oncology on immunotherapy with pembrolizumab.  Since diagnosis he has always had chronic melena and anemia that required a few transfusions as outpatient.  However in the past 3 weeks he has been having intermittent coffee-ground emesis happening one episode every other day.  Most of the episodes happened in the setting of eating.  A few days before admission he had a near syncope episode at home.  He denied nausea and vomiting on empty stomach, no bloating, still having bowel movements and passing gas.  Appetite has been low per family and weight slowly downtrending but unable to quantify the exact amount.  The patient was seen at [facility] [place] center on 11/17 and when he endorsed the above symptoms he was advised to come to the emergency room.    In the ED, initially blood pressure was 82/60 then recovered with fluid.  The rest of the vital signs were reassuring.  CBC and BMP notable for hemoglobin 4.2 (baseline of 7-8) BMP showed BUN 44 creatinine 29 INR 1.1.  CT scan showed a distended stomach with thick pylorus consistent with known malignancy.  The patient received 2 units of blood.         Past Medical History:   Reviewed and edited as appropriate  History reviewed. No pertinent past medical history.         Past Surgical History:   Reviewed and edited as appropriate   Past Surgical History:   Procedure Laterality Date     COLONOSCOPY N/A 11/24/2015    Procedure: COLONOSCOPY;  Surgeon: Clyde Mosher MD;  Location: [facility]     COMBINED CYSTOSCOPY, INSERT STENT URETER(S) Right 9/10/2021    Procedure: CYSTOSCOPY, WITH right URETERAL STENT INSERTION, retrograde pyleogram;  Surgeon: Jez Friedman MD;  Location: [facility] OR     ESOPHAGOSCOPY, GASTROSCOPY, DUODENOSCOPY (EGD), COMBINED N/A 11/24/2020    Procedure: ESOPHAGOGASTRODUODENOSCOPY with biopsies using cold forceps;   Surgeon: Clyde Mosher MD;  Location: [facility]     ESOPHAGOSCOPY, GASTROSCOPY, DUODENOSCOPY (EGD), COMBINED N/A 12/22/2020    Procedure: ESOPHAGOGASTRODUODENOSCOPY, WITH FINE NEEDLE ASPIRATION BIOPSY, WITH ENDOSCOPIC ULTRASOUND GUIDANCE;  Surgeon: Guru Teri Pedraza MD;  Location: [facility]     IR NEPHROSTOMY TUBE PLACEMENT RIGHT  10/4/2021            Medications:     Current Facility-Administered Medications   Medication     [Held by provider] acetaminophen (TYLENOL) tablet 1,000 mg     [Held by provider] gabapentin (NEURONTIN) capsule 100 mg     HYDROmorphone (DILAUDID) injection 0.2-0.4 mg     lidocaine (LMX4) cream     lidocaine 1 % 0.1-1 mL     melatonin tablet 1 mg     [Held by provider] methadone (DOLOPHINE) tablet 5 mg     naloxone (NARCAN) injection 0.2 mg    Or     naloxone (NARCAN) injection 0.4 mg    Or     naloxone (NARCAN) injection 0.2 mg    Or     naloxone (NARCAN) injection 0.4 mg     ondansetron (ZOFRAN-ODT) ODT tab 4 mg    Or     ondansetron (ZOFRAN) injection 4 mg     [Held by provider] oxybutynin ER (DITROPAN-XL) 24 hr tablet 10 mg     pantoprazole (PROTONIX) IV push injection 40 mg     polyethylene glycol (MIRALAX) Packet 17 g     prochlorperazine (COMPAZINE) injection 5 mg    Or     prochlorperazine (COMPAZINE) tablet 5 mg    Or     prochlorperazine (COMPAZINE) suppository 12.5 mg     sodium chloride (PF) 0.9% PF flush 3 mL     sodium chloride (PF) 0.9% PF flush 3 mL     [Held by provider] tamsulosin (FLOMAX) capsule 0.4 mg     [Held by provider] thiamine (B-1) tablet 100 mg            Allergies    No Known Allergies         Social History:   Reviewed and edited as appropriate  Social History     Socioeconomic History     Marital status:      Spouse name: Not on file     Number of children: Not on file     Years of education: Not on file     Highest education level: Not on file   Occupational History     Not on file   Tobacco Use     Smoking status: Never Smoker      Smokeless tobacco: Never Used   Vaping Use     Vaping Use: Never used   Substance and Sexual Activity     Alcohol use: No     Drug use: No     Sexual activity: Yes     Partners: Female   Other Topics Concern     Parent/sibling w/ CABG, MI or angioplasty before 65F 55M? No   Social History Narrative     Not on file     Social Determinants of Health     Financial Resource Strain: Low Risk      Difficulty of Paying Living Expenses: Not hard at all   Food Insecurity: No Food Insecurity     Worried About Running Out of Food in the Last Year: Never true     Ran Out of Food in the Last Year: Never true   Transportation Needs: No Transportation Needs     Lack of Transportation (Medical): No     Lack of Transportation (Non-Medical): No   Physical Activity: Inactive     Days of Exercise per Week: 0 days     Minutes of Exercise per Session: 0 min   Stress: Stress Concern Present     Feeling of Stress : To some extent   Social Connections: Moderately Isolated     Frequency of Communication with Friends and Family: Three times a week     Frequency of Social Gatherings with Friends and Family: Three times a week     Attends Gnosticism Services: Never     Active Member of Clubs or Organizations: No     Attends Club or Organization Meetings: Never     Marital Status:    Intimate Partner Violence: Not on file   Housing Stability: Low Risk      Unable to Pay for Housing in the Last Year: No     Number of Places Lived in the Last Year: 1     Unstable Housing in the Last Year: No           Family History:   Reviewed and edited as appropriate  Family History   Problem Relation Age of Onset     Asthma No family hx of      Diabetes No family hx of      Hypertension No family hx of      Cerebrovascular Disease No family hx of      Cancer No family hx of      Colon Cancer No family hx of      Anesthesia Reaction No family hx of      Deep Vein Thrombosis (DVT) No family hx of      No known history of colorectal cancer, liver disease,  "or inflammatory bowel disease.         Review of Systems:   A complete 10 point review of systems was obtained.  Please see the HPI for pertinent positives and negatives.  All other systems were reviewed and were found to be negative.          Physical Exam:   VS:  /89   Pulse 79   Temp 98  F (36.7  C) (Oral)   Resp 16   Ht 1.575 m (5' 2\")   Wt 46.5 kg (102 lb 8 oz)   SpO2 100%   BMI 18.75 kg/m      Wt:   Wt Readings from Last 2 Encounters:   11/17/21 46.5 kg (102 lb 8 oz)   11/10/21 47.7 kg (105 lb 3.2 oz)      Constitutional: NAD, cachectic with temporal wasting.   Eyes: Conjunctiva anicteric  HEENT: Normal oropharynx without ulcers or exudate, mucus membranes moist  CV: No Giovanni edema  Respiratory: Breathing comfortably on ambient air  Abd: Nondistended, +bs, no hepatosplenomegaly, nontender, no rebound or guarding   Skin: warm, perfused, no jaundice  Neuro: A&O x 3, No asterixis         Laboratory:   BMP  Recent Labs   Lab 11/18/21  0542 11/17/21  1611 11/15/21  1157   * 129* 129*   POTASSIUM 4.4 4.5 4.5   CHLORIDE 102 99 96   HAWA 8.6 8.5 8.5   CO2 22 24 24   BUN 44* 44* 23   CR 0.95 0.97 0.84   GLC 89 100* 99     CBC  Recent Labs   Lab 11/18/21  0542 11/17/21  2345 11/17/21  1611 11/17/21  1336 11/15/21  1219   WBC 8.9  --  7.9 7.1 7.0   RBC 2.84*  --  1.83* 1.50* 2.66*   HGB 7.9* 5.6* 5.2* 4.2* 7.6*   HCT 24.9*  --  17.2* 13.7* 23.8*   MCV 88  --  94 91 90   MCH 27.8  --  28.4 28.0 28.6   MCHC 31.7  --  30.2* 30.7* 31.9   RDW 18.1*  --  17.8* 17.5* 17.2*     --  392 314 402     INR  Recent Labs   Lab 11/18/21  0542 11/17/21  1611   INR 1.16* 0.95     Liver Enzymes  Recent Labs   Lab 11/18/21  0542 11/15/21  1157   ALKPHOS 61 74   AST 14 15   ALT 7 8   BILITOTAL 0.5 0.4   PROTTOTAL 6.9 7.7   ALBUMIN 2.2* 2.6*      PANCNo lab results found in last 7 days.    Imaging/Procedures/Studies:             "

## 2021-11-18 NOTE — OP NOTE
[facility] Brief Operative Note    Pre-operative diagnosis: GIB (gastrointestinal bleeding) [K92.2]   Post-operative diagnosis * No post-op diagnosis entered *   Procedure: Procedure(s):  ESOPHAGOGASTRODUODENOSCOPY, WITH HEMORRHAGE CONTROL   Surgeon: Nolberto Galeano MD   Assistants(s): Gentry Deal MD   Estimated blood loss: Minimal    Specimens: None   Findings: Large circumferential mass lesions at the gastric antrum with friable mucosa, ulcerations and oozing blood. It also caused some stenosis of the pylorus down to 11mm.   The bleed was temporized with hemospray.   There was large amount of hematin with liquid and food in the stomach that was suctioned out but there was still of remaining food.         Recommendation:  -- IV PPI BID  -- CLD for today.   -- Trend Hgb and transfuse supportively.   -- Will discuss with advanced endoscopy about pyloric stent placement.

## 2021-11-18 NOTE — PROGRESS NOTES
[facility]    Progress Note - [place] 1 Service        Date of Admission:  11/17/2021    Assessment & Plan             Essie Guzman is a 66 year old male admitted on 11/17/2021. 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 Suspect 2/2 UGIB in setting of known gastric adenocarcinoma given hx hematemesis, melena, precipitous drop, BUN elevation. Last CT abd/pelvis 1 month ago revealed distal gastric wall thickening and metastatic adenopathy unchanged from prior. No imaging in ED. Last EGD on record was in 11/2020 when he was diagnosed.   - CT abd/pelvis with contrast: concern for cancer progression, possible new hepatic met, IVC collapse from LAD, ? New infiltrative mass-like appearance R psoas  - 2 large bore IVs  - check hgb afternoon   - GI following  -- EGD today  -- BID PPI  -- transfuse to hgb>7  - oncology consulted, appreciate recs     # Hyponatremia, mild - improving   Na 129. Likely hypovolemic in setting of poor PO intake per hx and exam. Less likely SIADH given low urine Na. S/p 2.5L NS prior to admission with improvement in Na this AM.   - bolus IVF prn but will proceed with caution given IVC collapse on CT, however no signs venous stasis LE at this time      # Malignant obstruction R kidney with hydronephrosis s/p stenting and PNT  PNT appears to be working well on exam, CT. No increased urinary sx from baseline.   - hold PTA tamsulosin, oxybutynin for  now      # Chronic, cancer-related pain   Recent admission for pain control with resulting pain management plan of methadone, gabapentin which has worked well per daughter and patient.   - hold methadone and gabapentin for now   - IV dilaudid prn for pain      # Severe malnutrition in the context of chronic illness  Cachectic-appearing on exam with reduced PO intake over the past few weeks per family.   - nutrition consulted     # Hx staph epi PNT pyuria vs. PNT colonization, RIGHT PNT  10/2021 CT did not show hydronephrosis  ID was consulted prior admission and felt likely colonization that did not require abx. Presently afebrile without leukocytosis, low c/f infection at this time.   - UA inflammatory but with neg nitrite, small LE, few bacteria, lower WBC than prior        Diet: NPO per Anesthesia Guidelines for Procedure/Surgery Except for: Meds    DVT Prophylaxis: Pneumatic Compression Devices  Hamm Catheter: Not present  Fluids: prn   Central Lines: None  Code Status: Full Code      Disposition Plan   Expected discharge: pending further work-up with GI, oncology, discussion with family and palliative care; suspect will discharge home pending stabilization of hgb.     The patient's care was discussed with the Attending Physician, Dr. Cruz.    Chayo Pak MD  [address]  Securely message with the [email or web address] Web Console (learn more here)  Text page via [employer] Paging/Directory    Please see sign in/sign out for up to date coverage information    Clinically Significant Risk Factors Present on Admission         # Hyponatremia: Na = 131 mmol/L (Ref range: 133 - 144 mmol/L) on admission, will monitor as appropriate           ______________________________________________________________________    Interval History   Received 2 units pRBCs with improvement in hgb to 7.9. VSS overnight. No acute events.   CT with evidence of significant disease  "progression.   Plan for EGD today.   This morning states he has some pain, due for dilaudid. Overall feeling \"okay.\" No hematemesis overnight.     Data reviewed today: I reviewed all medications, new labs and imaging results over the last 24 hours. I personally reviewed imaging as below.    CT abd/pelvis 11/17/21  Impression:   1. Exam is negative for active gastrointestinal bleed.  2. Progressive metastatic disease in this patient with history of  gastric adenocarcinoma. Worsening gastrohepatic and retroperitoneal  lymphadenopathy.  3. Grossly unchanged appearance of the irregular thickening of the  pylorus. The stomach is distended similar to prior.  4. Retroperitoneal adenopathy encases the inferior vena cava and  likely collapses it.  5. New infiltrative masslike appearance of the right psoas and iliacus  musculature although this could be a vascular artifact secondary to  IVC compression.  6. Worsening moderate volume ascites.  7. New right hepatic lesion concerning for metastatic disease. New  thickening of the left adrenal gland.  8. New small pulmonary nodules in the lung bases may be infectious in  etiology versus metastatic disease.  9. Small bilateral pleural effusions.  10. Gallbladder is distended similar to prior. No obvious wall  thickening or pericholecystic inflammation.  11. Right kidney with PNT decompressed. Delayed nephrographic phase in  left kidney with retroperitoneal mass appears to abut and potentially  invade the kidney and narrows the left renal vein.    Physical Exam   Vital Signs: Temp: 98  F (36.7  C) Temp src: Oral BP: 119/89 Pulse: 79   Resp: 16 SpO2: 100 % O2 Device: None (Room air)    Weight: 102 lbs 8 oz  Constitutional: awake, alert, cooperative, no apparent distress, cachectic  ENT: Normocephalic, without obvious abnormality, atraumatic, oral pharynx clear with dry mucous membranes  Respiratory: No increased work of breathing on room air   Cardiovascular: regular rate and " rhythm  GI: soft, non-distended, non-tender, no fluid wave   Genitounirinary: R PNT with yellow urine in bag   Skin: pale and no bruising or bleeding  Extremities: no LE edema   Neurologic: Awake, alert, oriented to name, place and time. Motor is 5 out of 5 bilateral upper and lower extremities.      Data   Recent Labs   Lab 11/18/21  0542 11/17/21  2345 11/17/21  1611 11/17/21  1336 11/15/21  1219 11/15/21  1157   WBC 8.9  --  7.9 7.1   < >  --    HGB 7.9* 5.6* 5.2* 4.2*   < >  --    MCV 88  --  94 91   < >  --      --  392 314   < >  --    INR 1.16*  --  0.95  --   --   --    *  --  129*  --   --  129*   POTASSIUM 4.4  --  4.5  --   --  4.5   CHLORIDE 102  --  99  --   --  96   CO2 22  --  24  --   --  24   BUN 44*  --  44*  --   --  23   CR 0.95  --  0.97  --   --  0.84   ANIONGAP 7  --  6  --   --  9   HAWA 8.6  --  8.5  --   --  8.5   GLC 89  --  100*  --   --  99   ALBUMIN 2.2*  --   --   --   --  2.6*   PROTTOTAL 6.9  --   --   --   --  7.7   BILITOTAL 0.5  --   --   --   --  0.4   ALKPHOS 61  --   --   --   --  74   ALT 7  --   --   --   --  8   AST 14  --   --   --   --  15   TROPONIN  --   --  <0.015  --   --   --     < > = values in this interval not displayed.

## 2021-11-18 NOTE — CONSULTS
Oncology  Consult Note   Date of Service: 11/18/2021    Patient: Essie Guzman  MRN: [id number]  Admission Date: 11/17/2021  Hospital Day # 1  Cancer Diagnosis: poorly differentiated adenocarcinoma of gastric pyloris  Primary Outpatient Oncologist: Dr. Mccarthy  Current Treatment Plan: Nivolumab infusion every 3 weeks started on 11/15/21    Reason for Consult: follow for possible progression of metastatic disease      History of Present Illness:    Essie Guzman is a 66 year old year old male with a history of GI bleed 2/2 to poorly differentiated adenocarcinoma of gastric pyloris who is admitted currently with chronic blood loss anemia 2/2 GI bleed. Patient currently reports having some pain around his right flank around the site of his perc neph tube that only hurts when he walks or urinates, but this is not new. Still able to urinate and draining urine in perc neph tube. Feeling improved from yesterday, not having any fevers, chills. Only reports occasional lightheadedness if he tries to stand up too fast. No nausea/vomiting, no diarrhea.        Oncologic History:  09/2020- Epigastric burning abdominal pain for 2 months. Initially Rx for H.pylori with Abx and PPI. LFT's were mildly elevated.  11/24/20- UGI endoscopy at [facility] with normal esophagus. Non-bleeding gastric ulcer in pylorus with no stigmata of bleeding.  Non-bleeding duodenal ulcer with no stigmata of bleeding. Biopsy of Stomach, pylorus, - Poorly-differentiated carcinoma; consistent with adenocarcinoma (poorly cohesive type). HER2 by FISH was non-amplified  11/24/20- CT CAP- bilateral hilar lymph nodes are indeterminate (1.4 x 1.0 cm) right infrahilar lymph node. Multiple enlarged retroperitoneal and perigastric lymph nodes (10 mm right paraduodenal lymph node, 11 mm necrotic left para-aortic lymph node and a 10 mm left para-aortic lymph node.  12/16/20-  PET/CT with metastatic disease- Hypermetabolic circumferential ulcerated mass at the gastric  antrum, Multiple metastatic hypermetabolic lymph nodes:  adjacent just inferior to the caudate lobe of the liver, Multiple enlarged, centrally necrotic, and hypermetabolic retroperitoneal para-aortic and paracaval lymph nodes.    12/18/20-Saw [facility]- Due to PET CT showing retroperitoneal lymphadenopathy not a candidate for surgical resection.  12/22/20- EUS staging and biopsy- [id number] - Partially-obstructing cratered pre-pyloric gastric ulcer with a clean ulcer base, fully-circumferential ulcer  Several sub-centimeter malignant appearing round, well-circumscribed, hypoechoic lymph nodes were  visualized along the aorta, from the 2nd portion of  duodenum which corresponds to the hypermetabolic nodes seen on the PET scan.   3/9/21- PET/CT-Overall there has been progression in the number and size of the hypermetabolic periaortic, pericaval, and zuleyma hepatis lymph nodes compared to prior.Relatively unchanged hypermetabolic circumferential ulcerated mass at the gastric antrum.  March through Aug 2021- Declined systemic Rx with pembrolizumab as he was minimally symptomatic  9/10/21 to 9/12/21- [facility] admission for - Iron deficiency anemia from chronic blood loss anemia 2/2 progressive metastatic gastric cancer, Right hydrnonephrosis secondary to malignant obstruction s/p ureteric stenting and ongoing Cancer related weight loss- Rcd 1 unit PRBC  9/10/21- CT abd/pelvis-  Wall thickening of the distal stomach consistent with the history of gastric cancer. There are multiple enlarged upper abdominal and retroperitoneal lymph nodes consistent with metastases and significantly progressed since the previous exam. Right hydronephrosis and delayed nephrogram consistent with obstruction. (There is an irregularly-shaped low-attenuation mass posterior to the body of the pancreas measuring approximately 2.1 x 3.8 x 2.6 cm and consistent with a lymph node.  10/4 to 10/9- [facility] admission- Flank pain from worsening right-sided  hydronephrosis, secondary to malignant obstruction, right-sided renal hypoperfusion and some fluid in the right renal pelvis.right percutaneous nephrostomy tube and ureteral stent appear appropriately positioned  S/p R renal stent removal, still with PNTs in place and improvement in pain. Also needed 2 Units prbc for anemia  10/11/21 to 10/13/21- [facility] admission for scrotal edema- no cause found- no malignat obstruction  10/16/21 to 10/21/21- [facility] admission for pain control (abd and groin) and optimization of his outpatient oral pain regimen.  Palliative care and pain services were consulted: - Stopped MS Contin, started Methadone -- with improvement in pain control  11/15/21: Started single-agent nivolumab.        Review of Systems:  A comprehensive ROS was performed and found to be negative or non-contributory with the exception of that noted in the HPI above.    Past Medical History:  History reviewed. No pertinent past medical history.    Past Surgical History:  Past Surgical History:   Procedure Laterality Date     COLONOSCOPY N/A 11/24/2015    Procedure: COLONOSCOPY;  Surgeon: Clyde Mosher MD;  Location:  GI     COMBINED CYSTOSCOPY, INSERT STENT URETER(S) Right 9/10/2021    Procedure: CYSTOSCOPY, WITH right URETERAL STENT INSERTION, retrograde pyleogram;  Surgeon: Jez Friedman MD;  Location:  OR     ESOPHAGOSCOPY, GASTROSCOPY, DUODENOSCOPY (EGD), COMBINED N/A 11/24/2020    Procedure: ESOPHAGOGASTRODUODENOSCOPY with biopsies using cold forceps;  Surgeon: Clyde Mosher MD;  Location:  GI     ESOPHAGOSCOPY, GASTROSCOPY, DUODENOSCOPY (EGD), COMBINED N/A 12/22/2020    Procedure: ESOPHAGOGASTRODUODENOSCOPY, WITH FINE NEEDLE ASPIRATION BIOPSY, WITH ENDOSCOPIC ULTRASOUND GUIDANCE;  Surgeon: Guru Teri Pedraza MD;  Location: [facility]     IR NEPHROSTOMY TUBE PLACEMENT RIGHT  10/4/2021       Social History:  Social History     Socioeconomic History     Marital status:       Spouse name: None     Number of children: None     Years of education: None     Highest education level: None   Occupational History     None   Tobacco Use     Smoking status: Never Smoker     Smokeless tobacco: Never Used   Vaping Use     Vaping Use: Never used   Substance and Sexual Activity     Alcohol use: No     Drug use: No     Sexual activity: Yes     Partners: Female   Other Topics Concern     Parent/sibling w/ CABG, MI or angioplasty before 65F 55M? No   Social History Narrative     None     Social Determinants of Health     Financial Resource Strain: Low Risk      Difficulty of Paying Living Expenses: Not hard at all   Food Insecurity: No Food Insecurity     Worried About Running Out of Food in the Last Year: Never true     Ran Out of Food in the Last Year: Never true   Transportation Needs: No Transportation Needs     Lack of Transportation (Medical): No     Lack of Transportation (Non-Medical): No   Physical Activity: Inactive     Days of Exercise per Week: 0 days     Minutes of Exercise per Session: 0 min   Stress: Stress Concern Present     Feeling of Stress : To some extent   Social Connections: Moderately Isolated     Frequency of Communication with Friends and Family: Three times a week     Frequency of Social Gatherings with Friends and Family: Three times a week     Attends [employer] Services: Never     Active Member of Clubs or Organizations: No     Attends Club or Organization Meetings: Never     Marital Status:    Intimate Partner Violence: Not on file   Housing Stability: Low Risk      Unable to Pay for Housing in the Last Year: No     Number of Places Lived in the Last Year: 1     Unstable Housing in the Last Year: No        Family History  Family History   Problem Relation Age of Onset     Asthma No family hx of      Diabetes No family hx of      Hypertension No family hx of      Cerebrovascular Disease No family hx of      Cancer No family hx of      Colon Cancer No family hx of       Anesthesia Reaction No family hx of      Deep Vein Thrombosis (DVT) No family hx of        Outpatient Medications:  [COMPLETED] 0.9% sodium chloride BOLUS  [COMPLETED] 0.9% sodium chloride BOLUS    acetaminophen (TYLENOL) 500 MG tablet, Take 2 tablets (1,000 mg) by mouth every 8 hours (Patient taking differently: Take 500-1,000 mg by mouth every 8 hours )  ferrous gluconate (FERGON) 324 (38 Fe) MG tablet, Take 1 tablet (324 mg) by mouth daily (with breakfast)  gabapentin (NEURONTIN) 100 MG capsule, Take 1 capsule (100 mg) by mouth 3 times daily  ibuprofen (ADVIL/MOTRIN) 200 MG tablet, Take 1 tablet (200 mg) by mouth every 6 hours as needed for moderate pain  melatonin 1 MG TABS tablet, Take 3 tablets (3 mg) by mouth At Bedtime  methadone (DOLOPHINE) 5 MG tablet, Take 1 tablet (5 mg) by mouth every 8 hours  ondansetron (ZOFRAN) 8 MG tablet, Take 1 tablet (8 mg) by mouth every 8 hours as needed (Nausea/Vomiting)  oxybutynin ER (DITROPAN-XL) 10 MG 24 hr tablet, Take 1 tablet (10 mg) by mouth daily  oxyCODONE IR (ROXICODONE) 10 MG tablet, Take 1 tablet (10 mg) by mouth every 4 hours as needed for moderate to severe pain  pantoprazole (PROTONIX) 40 MG EC tablet, Take 1 tablet (40 mg) by mouth daily  polyethylene glycol (MIRALAX) 17 GM/Dose powder, Take 17 g by mouth daily (Patient taking differently: Take 17 g by mouth daily as needed )  prochlorperazine (COMPAZINE) 10 MG tablet, Take 0.5 tablets (5 mg) by mouth every 6 hours as needed (Nausea/Vomiting)  sennosides (SENOKOT) 8.6 MG tablet, Take 1-2 tablets by mouth 2 times daily (Patient taking differently: Take 1 tablet by mouth daily as needed )  simethicone (MYLICON) 80 MG chewable tablet, Take 1 tablet (80 mg) by mouth every 6 hours as needed for cramping  tamsulosin (FLOMAX) 0.4 MG capsule, Take 1 capsule (0.4 mg) by mouth daily  thiamine (B-1) 100 MG tablet, Take 1 tablet (100 mg) by mouth daily  naloxone (NARCAN) 4 MG/0.1ML nasal spray, Spray 1 spray (4 mg) into  "one nostril alternating nostrils once as needed for opioid reversal every 2-3 minutes until assistance arrives  ondansetron (ZOFRAN-ODT) 4 MG ODT tab, Take 1 tablet (4 mg) by mouth every 6 hours as needed for nausea or vomiting (Patient not taking: Reported on 11/17/2021)         Physical Exam:    Blood pressure (!) 122/97, pulse 84, temperature 97.5  F (36.4  C), temperature source Axillary, resp. rate 18, height 1.575 m (5' 2\"), weight 46.5 kg (102 lb 8 oz), SpO2 100 %.  General: alert and cooperative, lying in bed, no acute distress  HEENT: sclera anicteric, EOMI, MMM  Neck: supple, normal ROM  CV: RRR, no murmurs  Resp: CTAB, normal respiratory effort on ambient air  GI: soft, mild tenderness over epigastric area, non-distended, bowel sounds present and normoactive, perc neph tube in place draining clear yellow urine with no erythema/drainage/other signs of infection around tube site itself  MSK: warm and well-perfused, normal tone  Skin: no rashes on limited exam, no jaundice  Neuro: Alert and interactive, moves all extremities equally, no focal deficits    Labs & Studies: I personally reviewed the following studies:  ROUTINE LABS (Last four results):  CMP  Recent Labs   Lab 11/18/21  0542 11/17/21  1611 11/15/21  1157   * 129* 129*   POTASSIUM 4.4 4.5 4.5   CHLORIDE 102 99 96   CO2 22 24 24   ANIONGAP 7 6 9   GLC 89 100* 99   BUN 44* 44* 23   CR 0.95 0.97 0.84   GFRESTIMATED 82 80 90   HAWA 8.6 8.5 8.5   PROTTOTAL 6.9  --  7.7   ALBUMIN 2.2*  --  2.6*   BILITOTAL 0.5  --  0.4   ALKPHOS 61  --  74   AST 14  --  15   ALT 7  --  8     CBC  Recent Labs   Lab 11/18/21  0542 11/17/21  2345 11/17/21  1611 11/17/21  1336 11/15/21  1219   WBC 8.9  --  7.9 7.1 7.0   RBC 2.84*  --  1.83* 1.50* 2.66*   HGB 7.9* 5.6* 5.2* 4.2* 7.6*   HCT 24.9*  --  17.2* 13.7* 23.8*   MCV 88  --  94 91 90   MCH 27.8  --  28.4 28.0 28.6   MCHC 31.7  --  30.2* 30.7* 31.9   RDW 18.1*  --  17.8* 17.5* 17.2*     --  392 314 402 "     INR  Recent Labs   Lab 11/18/21  0542 11/17/21  1611   INR 1.16* 0.95       Assessment & Plan:   Essie Guzman is a 66 year old male Essie Guzman is a 66 year old year old male with a history of GI bleed 2/2 to poorly differentiated adenocarcinoma of gastric pyloris who is admitted currently with chronic blood loss anemia 2/2 GI bleed.    #GI bleed 2/2 adenocarcinoma of gastric pyloris  #chronic blood loss anemia  Patient is improving and responded appropriately after transfusion. GI performed upper endoscopy and visualized oozing blood from malignancy, used hemospray to temporize the bleed. They are considering pyloric stent placement and will talk with advanced endoscopy team.   - plan per GI and primary team  - IV PPI BID  - clear liquid diet today  - transfuse for Hgb<7     #Poorly differentiated adenocarcinoma of gastric pylorus with metastases  #malnutrition 2/2 malignancy  Only recently started treatment with nivolumab on 11/15 before being seen in clinic with symptomatic anemia on 11/18.  Progression of malignancy has been noted since diagnosis including causing obstruction of R ureter with hydronephrosis requiring perc neph tube. This current hospitalization should not preclude him from receiving any future treatments (was planned for treatment every 3 weeks). It will take time for the immunotherapy to take effect and it is not unlikely that he should have more episodes of GI bleeding until then, possibly requiring transfusions and hospitalizations. Follows with Dr. Mccarthy   - no changes to nivolumab treatment at this time  - consult nutrition for TPN   - discussion held with patient's family about prognosis and expectations    #R-sided percutaneous nephrostomy tube  Patient handling neph tube adequately, does not appear infected or have any issues but patient having difficulties with pain with it. Draining clear yellow urine  - on IV dilaudid for pain PRN  - typical percutaneous neph tube cares      We  will continue to follow this patient. Please do not hesitate to page with any questions or concerns.    Patient was seen and plan of care was discussed with attending physician Dr. Grant.    Warren Burks MD  Internal Medicine PGY-1  Pager: [phone]

## 2021-11-18 NOTE — PROGRESS NOTES
CLINICAL NUTRITION SERVICES - ASSESSMENT NOTE     Nutrition Prescription    RECOMMENDATIONS FOR MDs/PROVIDERS TO ORDER:  Patient has lost 39% of body weight in 1 year. Recommend nutrition support if within GOC.     Malnutrition Status:    Severe malnutrition in the context of chronic illness    Recommendations already ordered by Registered Dietitian (RD):  Ensure clear Apple flavor BID    Future/Additional Recommendations:  -- monitor oral intake, diet adv and supplement intake (order Mansi [employer] vanilla Flavor once daily when diet is advanced to full liquid)  -- monitor weigh trends  -- If TF warranted for more aggressive nutrition support recommend: [employer] Peptide 1.5 @ 45 mL/hr (1080 mL/day) to provide 1661 kcal (35 kcal/kg), 80 g protein (1.7 g/kg), 149 g CHO, 10 g fiber, 83 g fat (40% from MCTs), and 756 mL free water daily   Start TF @ 15 ml/hr and advance by 10 ml q 12 hrs until goal rate.  --Do not start or advance TF rate unless K+ >3.0, Mg++ > 1.5,  and Phos > 1.9.  --Minimum 30 ml q 4 hrs water flushes for tube patency.  --If gastric enteral access: HOB > 30 degrees    If patient unable to tolerate TF recommend TPN as follows:   Dosing weight 47 kg   Access: Central   TPN: 1080 ml/day (45 ml/hr), Initial dex 95 g Dex (GIR = 1.4 mg/kg/min), AA 80 g and 250 m 20% lipid 5x per week = 1016 kcal (~20 kcal/kg) and 35% kcal from fat   Advance dex by 40-50 g/day to goal 200 g (GIR= 2.95 mg/kg/min) pending K>/=3, Mg>/= 1.5 and Po4 >/= 1.9   Goal TPN provisions 200 g dex, 80 g AA and 250 mL 20% lipid 5x per week = 1357 kcal (29 kcal/kg), 1.7 g/kg AA and 26% kcal from fat   -Recommend 10 mL infuvite daily + MTE-4  Electrolytes/additives per pharmD     Patient is at high risk for refeeding syndrome         REASON FOR ASSESSMENT  Essie Guzman is a/an 66 year old male assessed by the dietitian for Admission Nutrition Risk Screen for positive and Provider Order - malnutrition in setting of known malignancy    Per  "chart review patient with a history of GI hemorrhage, 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 This writer spoke with family who reports another 10 lb weight loss since last hospitalization. He likes ensure clear (apple flavor) and Mansi [employer] (vanilla flavor). Overall, patient's family reports he has been eating very little due to nausea and decreased appetite. + emesis sometimes.     CURRENT NUTRITION ORDERS  Diet: NPO    LABS  Labs reviewed  911/180: Na 131 mmol/L (L), BUN 44 mg/dL (H), Cr 0.95 mg/dL    MEDICATIONS  Medications reviewed  thiamine    ANTHROPOMETRICS  Height: 157.5 cm (5' 2\")  Most Recent Weight: 46.5 kg (102 lb 8 oz)    IBW: 53.6 kg  BMI: Normal BMI  Weight History:   Wt Readings from Last 10 Encounters:   11/17/21 46.5 kg (102 lb 8 oz)   11/10/21 47.7 kg (105 lb 3.2 oz)   10/26/21 54.7 kg (120 lb 9.6 oz)   10/21/21 50.9 kg (112 lb 3.2 oz)   10/15/21 51.7 kg (114 lb)   10/15/21 52 kg (114 lb 9.6 oz)   10/14/21 49.4 kg (109 lb)   10/13/21 50.8 kg (111 lb 14.4 oz)   10/09/21 51 kg (112 lb 6.4 oz)   10/04/21 51 kg (112 lb 8 oz)   10.1% weight loss in 1 month  Dosing Weight: 47 kg (admit weight)    ASSESSED NUTRITION NEEDS  Estimated Energy Needs: 1175 -8528-2162+ kcals/day (25 - 30 - 35+ kcals/kg )  Justification: Increased needs - lower end for TPN   Estimated Protein Needs: 61-85+ grams protein/day (1.3 - 1.8+ grams of pro/kg)  Justification: Increased needs and Repletion  Estimated Fluid Needs: (1 mL/kcal)   Justification: Maintenance    PHYSICAL FINDINGS  See malnutrition section below.    MALNUTRITION  % Intake: </=50% for >/= 1 month (severe)  % Weight Loss: > 5% in 1 month (severe)  Subcutaneous Fat Loss: Facial region:  Severe - visualized only  Muscle Loss: Temporal:  severe and Facial & jaw region:  Severe - " visualized only   Fluid Accumulation/Edema: None noted  Malnutrition Diagnosis: Severe malnutrition in the context of chronic illness    NUTRITION DIAGNOSIS  Inadequate oral intake related to nausea, decreased appetite as evidenced by minimal oral intake and recent weight loss    INTERVENTIONS  Implementation  Nutrition Education: See education flowsheet   Medical food supplement therapy - clear liquid nutritional supplement     Goals  Diet adv v nutrition support within 2-3 days.     Monitoring/Evaluation  Progress toward goals will be monitored and evaluated per protocol.    Chantell Obrien, MS/RD/LD/CNSC  [address]B RD pager [phone]

## 2021-11-19 NOTE — PROGRESS NOTES
[facility]    Progress Note - Margarette 1 Service        Date of Admission:  11/17/2021    Assessment & Plan      Essie Guzman is a 66 year old male admitted on 11/17/2021. 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 Suspect 2/2 UGIB in setting of known gastric adenocarcinoma given hx hematemesis, melena, precipitous drop, BUN elevation. Last CT abd/pelvis 1 month ago revealed distal gastric wall thickening and metastatic adenopathy unchanged from prior. No imaging in ED. Last EGD on record was in 11/2020 when he was diagnosed.   - CT abd/pelvis with contrast: concern for cancer progression, possible new hepatic met, IVC collapse from LAD, ? New infiltrative mass-like appearance R psoas  - 2 large bore IVs  - GI following  -- s/p EGD 11/18 AM revealed circumferential tumor with ulceration of the gastric antrum with active, diffuse oozing without clear endoscopic target, hemospray applied  -- BID PPI  -- transfuse to hgb>7  - oncology consulted, appreciate recs  -- options re: TPN, continuing nivolumab, home with comfort-focused measures discussed at interdisciplinary ml today, family to discuss and we will follow-up with them     # Chronic, cancer-related pain   Recent admission for pain control with resulting pain management plan of methadone, gabapentin which has worked well per daughter and  patient.   - restart home methadone and gabapentin   - restart IV dilaudid prn     # Severe malnutrition in the context of chronic illness  Cachectic-appearing on exam with reduced PO intake over the past few weeks per family.   - nutrition consulted     # Hyponatremia, mild - improving   Na 129. Likely hypovolemic in setting of poor PO intake per hx and exam. Less likely SIADH given low urine Na. S/p 2.5L NS prior to admission with improvement in Na this AM.   - bolus IVF prn but will proceed with caution given IVC collapse on CT, however no signs venous stasis LE at this time      # Malignant obstruction R kidney with hydronephrosis s/p stenting and PNT  PNT appears to be working well on exam, CT. No increased urinary sx from baseline.   - restart PTA tamsulosin, oxybutynin    # Hx staph epi PNT pyuria vs. PNT colonization, RIGHT PNT  10/2021 CT did not show hydronephrosis  ID was consulted prior admission and felt likely colonization that did not require abx. Presently afebrile without leukocytosis, low c/f infection at this time.   - UA inflammatory but with neg nitrite, small LE, few bacteria, lower WBC than prior        Diet: Clear Liquid Diet  Snacks/Supplements Adult: Ensure Clear; With Meals    DVT Prophylaxis: Pneumatic Compression Devices  Hamm Catheter: Not present  Fluids: prn   Central Lines: None  Code Status: Full Code      Disposition Plan   Expected discharge: 3-4 days pending further work-up with GI, oncology, discussion with family and palliative care; suspect will discharge home pending stabilization of hgb, initiation of TPN should family decide this is within GOC.     The patient's care was discussed with the Attending Physician, Dr. Cruz.    Chayo Pak MD  [address]  Securely message with the Vocera Web Console (learn more here)  Text page via [employer] Paging/Directory    Please see sign in/sign out for up to date coverage  information.    ______________________________________________________________________    Interval History   LAWRENCE. Pt transitioned to home pain med regimen yesterday per his request but with breakthrough pain today not controlled with oxycodone. Hgb and BP stable overnight.     Data reviewed today: I reviewed all medications, new labs and imaging results over the last 24 hours.     Physical Exam   Vital Signs: Temp: 98.1  F (36.7  C) Temp src: Oral BP: (!) 132/94 Pulse: 83   Resp: 18 SpO2: 100 % O2 Device: None (Room air) Oxygen Delivery: 1 LPM  Weight: 100 lbs 4.95 oz  Constitutional: lethargic but alert to voice, cooperative, cachectic-appearing   ENT: Normocephalic, without obvious abnormality, atraumatic, oral pharynx clear with dry mucous membranes  Respiratory: No increased work of breathing on room air   Cardiovascular: regular rate and rhythm  GI: soft, non-distended, non-tender, no fluid wave   Genitounirinary: R PNT with yellow urine in bag   Skin: pale and no bruising or bleeding  Extremities: no LE edema   Neurologic: lethargic, alert to loud voice, moving all extremities spontaneously    Data   Recent Labs   Lab 11/19/21  0450 11/18/21  1309 11/18/21  0542 11/17/21  2345 11/17/21  1611   WBC 7.4 10.6 8.9  --  7.9   HGB 7.8* 9.1* 7.9*   < > 5.2*   MCV 90 89 88  --  94    401 347  --  392   INR 1.09  --  1.16*  --  0.95   *  --  131*  --  129*   POTASSIUM 4.2  --  4.4  --  4.5   CHLORIDE 103  --  102  --  99   CO2 21  --  22  --  24   BUN 40*  --  44*  --  44*   CR 0.87  --  0.95  --  0.97   ANIONGAP 8  --  7  --  6   HAWA 8.5  --  8.6  --  8.5   GLC 86  --  89  --  100*   ALBUMIN 2.4*  --  2.2*  --   --    PROTTOTAL 6.8  --  6.9  --   --    BILITOTAL 0.6  --  0.5  --   --    ALKPHOS 64  --  61  --   --    ALT 8  --  7  --   --    AST 13  --  14  --   --    TROPONIN  --   --   --   --  <0.015    < > = values in this interval not displayed.

## 2021-11-19 NOTE — PROGRESS NOTES
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 Mccarthy  Current Treatment Plan: nivolumab infusion every 3 weeks initiated 11/15/21     Summary & Recommendations:   - continue with current cares  - Family communication: care conference held with patient's family 11/19, discussed therapeutic interventions possible and efficacies of said interventions and they wanted to think and discuss amongst themselves more before making final decisions.      Assessment & Plan:   Essie Guzman is a 66 year old male Essie Guzman is a 66 year old year old male with a history of GI bleed 2/2 to poorly differentiated adenocarcinoma of gastric pyloris who is admitted currently with chronic blood loss anemia 2/2 GI bleed.    #GI bleed 2/2 adenocarcinoma of gastric pyloris  #chronic blood loss anemia  Patient is improving and responded appropriately after transfusion. GI performed upper endoscopy and visualized oozing blood from malignancy, used hemospray to temporize the bleed. They are considering pyloric stent placement and will talk with advanced endoscopy team.   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  Progression of malignancy has been noted since diagnosis including causing obstruction of R ureter with hydronephrosis requiring perc neph tube. This current hospitalization should not preclude him from receiving any future treatments (was planned for treatment  every 3 weeks). It will take time for the immunotherapy to take effect and it is not unlikely that he should have more episodes of GI bleeding until then, possibly requiring transfusions and hospitalizations. Follows with Dr. Mccarthy. Care conference held with the patient's children, answered questions about TPN, pyloric stenting and what to expect in the coming months. They will discuss further amongst themselves and will decide in the coming days.  - no changes to nivolumab treatment at this time, next cycle 12/9/21  - consulted nutrition for TPN, awaiting final family decision about pursuing this        #R-sided percutaneous nephrostomy tube  Patient handling neph tube adequately, does not appear infected or have any issues but patient having difficulties with pain with it. Draining clear yellow urine  - on IV dilaudid for pain PRN  - typical percutaneous neph tube cares    Oncologic History:  See consult note from 11/18/21 for full oncological history   In short: Currently with poorly differentiated adenocarcinoma of the gastric pyloris with metastasis to upper abdominal and retroperitoneal lymph nodes that has progressed in recent months. Previously denied treatment but was started on nivolumab 11/15/21.     Patient was seen and plan of care was discussed with attending physician Dr. Grant.    Thank you for the opportunity to partake in this patient's plan of care. Please do not hesitate to page with questions. We will continue to follow.     Warren Burks MD  Internal Medicine PGY-1   Pager: [phone]  ___________________________________________________________________    Subjective & Interval History:    No acute events noted overnight. Care conference held with patient's children, discussed the efficacy of TPN, pyloric stenting and any further treatments and how there is a high likelihood that he has recurrent GI bleeds until immune response is mounted with the nivolumab. They were concerned about  "how much his cancer has progressed and were concerned about future GI bleeds.The family seemed to comprehend the discussion and were going to think about the situation and discuss it further. Patient did not wish to partake in the conference. He reports feeling relatively unchanged, some slight abdominal discomfort that has not significantly worsened or changed since yesterday. No significant lightheadedness, no fevers or chills.     Physical Exam:    Blood pressure (!) 138/102, pulse 87, temperature 98.4  F (36.9  C), temperature source Oral, resp. rate 18, height 1.575 m (5' 2\"), weight 45.5 kg (100 lb 5 oz), SpO2 100 %.    General: alert and cooperative, lying in bed, no acute distress  HEENT: sclera anicteric, EOMI, MMM  Neck: supple, normal ROM  CV: RRR, no murmurs  Resp: CTAB, normal respiratory effort on ambient air  GI: soft, mild tenderness over epigastric area, non-distended, bowel sounds present and normoactive, perc neph tube in place draining clear yellow urine with no erythema/drainage/other signs of infection around tube site itself  MSK: warm and well-perfused, normal tone  Skin: no rashes on limited exam, no jaundice  Neuro: Alert and interactive, moves all extremities equally, no focal deficits    Labs & Studies: I personally reviewed the following studies:  ROUTINE LABS (Last four results):  CMP  Recent Labs   Lab 11/19/21  0450 11/18/21  0542 11/17/21  1611 11/15/21  1157   * 131* 129* 129*   POTASSIUM 4.2 4.4 4.5 4.5   CHLORIDE 103 102 99 96   CO2 21 22 24 24   ANIONGAP 8 7 6 9   GLC 86 89 100* 99   BUN 40* 44* 44* 23   CR 0.87 0.95 0.97 0.84   GFRESTIMATED 89 82 80 90   HAWA 8.5 8.6 8.5 8.5   MAG 2.2  --   --   --    PHOS 3.3  --   --   --    PROTTOTAL 6.8 6.9  --  7.7   ALBUMIN 2.4* 2.2*  --  2.6*   BILITOTAL 0.6 0.5  --  0.4   ALKPHOS 64 61  --  74   AST 13 14  --  15   ALT 8 7  --  8     CBC  Recent Labs   Lab 11/19/21  0450 11/18/21  1309 11/18/21  0542 11/17/21  2345 11/17/21  1611 "   WBC 7.4 10.6 8.9  --  7.9   RBC 2.76* 3.18* 2.84*  --  1.83*   HGB 7.8* 9.1* 7.9* 5.6* 5.2*   HCT 24.7* 28.4* 24.9*  --  17.2*   MCV 90 89 88  --  94   MCH 28.3 28.6 27.8  --  28.4   MCHC 31.6 32.0 31.7  --  30.2*   RDW 18.8* 18.6* 18.1*  --  17.8*    401 347  --  392     INR  Recent Labs   Lab 11/19/21  0450 11/18/21  0542 11/17/21  1611   INR 1.09 1.16* 0.95     Medications list for reference:  Current Facility-Administered Medications   Medication     acetaminophen (TYLENOL) tablet 1,000 mg     gabapentin (NEURONTIN) capsule 100 mg     HYDROmorphone (DILAUDID) injection 0.2-0.4 mg     lidocaine (LMX4) cream     lidocaine 1 % 0.1-1 mL     melatonin tablet 1 mg     methadone (DOLOPHINE) tablet 5 mg     naloxone (NARCAN) injection 0.2 mg    Or     naloxone (NARCAN) injection 0.4 mg    Or     naloxone (NARCAN) injection 0.2 mg    Or     naloxone (NARCAN) injection 0.4 mg     ondansetron (ZOFRAN-ODT) ODT tab 4 mg    Or     ondansetron (ZOFRAN) injection 4 mg     oxybutynin ER (DITROPAN-XL) 24 hr tablet 10 mg     pantoprazole (PROTONIX) IV push injection 40 mg     polyethylene glycol (MIRALAX) Packet 17 g     prochlorperazine (COMPAZINE) injection 5 mg    Or     prochlorperazine (COMPAZINE) tablet 5 mg    Or     prochlorperazine (COMPAZINE) suppository 12.5 mg     sodium chloride (PF) 0.9% PF flush 3 mL     sodium chloride (PF) 0.9% PF flush 3 mL     [Held by provider] tamsulosin (FLOMAX) capsule 0.4 mg     [Held by provider] thiamine (B-1) tablet 100 mg

## 2021-11-19 NOTE — PLAN OF CARE
Neuro: A&Ox4. Hmong speaking, but can communicate in english during assessments and able to make needs known. PERRLA. Calm and cooperative with staff.  Cardiac: SR. HR ranging in 80s, SBP ranging in 130s.  Respiratory: Sating 98% on RA.  GI/: R-nephrostomy tube, to gravity. Patient is also able to void spontaneously, output is low. No BM overnight.  Diet/appetite:  Clear Liquid diet, patient has been tolerating PO meds well.  Activity:  Assist of 1-2, sits on side of bed to void using urinal.  Pain: Pain rating 8-9 from the groin area, patient has scheduled acetaminophen, gabapentin, methadone, and oxybutynin. Patient also has PRN Oxycodone if pain is moderate to severe, Oxycodone administered once this shift.  Skin: No new deficits noted.  LDA's: L-PIV saline locked. R-neph tube to gravity.     Plan: Care Conference on 11/19 @ 1330. Continue with POC. Notify primary team with changes.

## 2021-11-19 NOTE — PROGRESS NOTES
Neuro: A&Ox4. Hmong speaking, but can communicate in english during assessments and able to make needs known.   Cardiac: SR. VSS.  Respiratory: Sating above 92% on RA.  GI/: R-nephrostomy tube, to gravity. Patient is also able to void spontaneously. Low UOP, which is baseline per family.  Diet/appetite:  Clear Liquid diet  Activity:  Assist of 1, sits on side of bed to void using urinal. Went out around the hospital with daughter in a wheelchair x1.    Pain: Pain rating 8-9 from the groin area, patient has scheduled acetaminophen, gabapentin, methadone, and PRN IV dilaudid.     Skin: No new deficits noted.    LDA's: L-PIV saline locked. R-neph tube to gravity.     Plan: Care Conference was at 1330 today.     The daughter and patient informed me at 1805 that they were deciding not to do TPN and would like to go home and be with family.   MD was paged at [phone]. Awaiting to hear back form MD.  Judith Bingham RN on 11/19/2021 at 6:19 PM

## 2021-11-20 NOTE — DISCHARGE SUMMARY
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  The following labs/tests are   recommended: CBC to check hemoglobin.    Hospice and palliative care referrals placed.     Appointments on [place] and/or [facility] (with [facility] or [facility]   provider or service). Call [phone] if you haven't heard regarding   these appointments within 7 days of discharge.            - Discharged with urgent hospice referral, needs services set up ASAP  - Discussed with family that they should contact outpatient oncology clinic should they wish to pursue TPN, further treatments    Discharge Disposition   Discharged to home  Condition at discharge: Guarded    Hospital Course   Essie Guzman is a 66 year old male admitted on 11/17/2021. 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 The following problems were addressed during his hospitalization:    # Acute on chronic normocytic anemia, severe  # UGIB  # Metastatic malignant adenocarcinoma of  gastric pylorus (dx in 11/2020) s/p recent initiation of nivolumab   Presented from oncology clinic with 1-2 week history of hematemesis, melena, hgb 4.2 in setting of worsening fatigue and poor oral intake. Received 2 units of blood in the ED and underwent EGD the following morning which revealed circumferential tumor with ulceration of the gastric antrum with active, diffuse oozing without clear endoscopic target, hemospray applied. CT abdomen/pelvis revealed concern for cancer progression, possible new hepatic met, IVC collapse from LAD, ?New infiltrative mass-like appearance R psoas--overall indicative of significant cancer progression since last imaging 1 month prior.     On day of discharge, care conference held with daughter Fei in-person, remainder of family available via phone, oncology team, and our team. We discussed the patient's current precarious clinical status and that we do suspect he will re-bleed--timing would be difficult to predict. Oncology weighed in that due to having only initiated nivolumab 11/15 it is too early to know whether patient will respond clinically to immunotherapy. He is not, however, currently in a safe position to undergo more intensive chemotherapy given high bleeding risk and poor nutritional status. TPN was discussed as an option and we made clear that this should only be considered if patient's GOC remain restorative. We discussed that it is difficult to predict whether prognosis would differ considerably should nivolumab treatment+TPN be pursued. Family stressed at several points that patient would be happiest at home and is not comfortable in the hospital. Family requested time to consider options. 11/19 PM, our team was contacted by the cross-covering team that family had decided to take patient home and requested hospice enrollment.     Patient was thus discharged 11/19 after 2000 with referral for hospice services. No changes were made to his PTA medications other  than recommendation to hold further NSAIDs to avoid gastric irritation.  - Discharged with referral for outpatient hospice enrollment - family aware services may take some time to set up      # Chronic, cancer-related pain   Recent admission for pain control with resulting pain management plan of methadone, gabapentin which has worked well per daughter and patient.   - resume home methadone and gabapentin      # Severe malnutrition in the context of chronic illness  Cachectic-appearing on exam with reduced PO intake over the past few weeks per family.   - nutrition consulted, recommendations for supplemental nutrition--TPN vs TF (unclear whether latter would be an option per discussion with oncology and GI given location of tumor), however pt and family preferred discontinue home without initiation of TPN    # Malignant obstruction R kidney with hydronephrosis s/p stenting and PNT  PNT appears to be working well on exam, CT. No increased urinary sx from baseline.   - continue PTA tamsulosin, oxybutynin     # Hyponatremia, mild - improving   Na 129-->132 with IVF, likely hypovolemic.        Consultations This Hospital Stay   MEDICATION HISTORY IP PHARMACY CONSULT  VASCULAR ACCESS CARE ADULT IP CONSULT  NUTRITION SERVICES ADULT IP CONSULT  VASCULAR ACCESS CARE ADULT IP CONSULT  ONCOLOGY ADULT IP CONSULT  CARE MANAGEMENT / SOCIAL WORK IP CONSULT  GI Luminal     Code Status   Prior --> FULL CODE during hospitalization with stipulation that resuscitation be attempted only for 10 minutes--if no achievement of ROSC within that time, no further attempts should be made.   Discharge was precipitous and code status was not able to be re-addressed.        The patient was discussed with MD Adeline Bates[address] [address]  Phone: [phone]  ______________________________________________________________________    Physical Exam   Vital  Signs: Temp: 98.5  F (36.9  C) Temp src: Oral BP: (!) 131/101 Pulse: 90   Resp: 18 SpO2: 99 % O2 Device: None (Room air)    Weight: 100 lbs 4.95 oz  Constitutional: lethargic but alert to voice, cooperative, cachectic-appearing   ENT: Normocephalic, without obvious abnormality, atraumatic, oral pharynx clear with dry mucous membranes  Respiratory: No increased work of breathing on room air   Cardiovascular: regular rate and rhythm  GI: soft, non-distended, non-tender, no fluid wave   Genitounirinary: R PNT with yellow urine in bag   Skin: pale and no bruising or bleeding  Extremities: no LE edema   Neurologic: lethargic, alert to loud voice, moving all extremities spontaneously         Primary Care Physician   Serge Kimble    Discharge Orders      Hospice Referral      Palliative Care Referral      Reason for your hospital stay    You were hospitalized for bleeding in your stomach from a known tumor. You had an upper scope procedure (called an EGD) which showed bleeding from several areas in the stomach. A spray was used to stop this bleeding but there were no specific vessels that we were able to identify in order to prevent further episodes of bleeding. We had a discussion with your family earlier today to discuss options for what to do next. We discussed the option of starting nutrition through an IV in your arm which could help you gain strength and weight. This would not, however, stop further episodes of stomach bleeding. Your family wanted time to consider this and ultimately it was decided that you will go home to spend more time with your family. We discussed whether your family would like to enroll in hospice, which would connect you with services that would come to your home to help keep you comfortable and to help prevent you from having to come back to the hospital. You and your family ultimately decided that you would like to discharge home with hospice. We have placed a referral for hospice  services and will contact you and your family tomorrow to help you arrange this.     Activity    Your activity upon discharge: activity as tolerated     Adult [facility] Follow-up and recommended labs and tests    Follow up with primary care provider, Serge Kimble and Oncology within 7 days for hospital follow- up.  The following labs/tests are recommended: CBC to check hemoglobin.    Hospice and palliative care referrals placed.     Appointments on [place] and/or [facility] (with [facility] or [facility] provider or service). Call [phone] if you haven't heard regarding these appointments within 7 days of discharge.     Diet    Follow this diet upon discharge: Regular       Significant Results and Procedures   Most Recent 3 CBC's:Recent Labs   Lab Test 11/19/21  0450 11/18/21  1309 11/18/21  0542   WBC 7.4 10.6 8.9   HGB 7.8* 9.1* 7.9*   MCV 90 89 88    401 347     Most Recent 3 BMP's:Recent Labs   Lab Test 11/19/21  0450 11/18/21  0542 11/17/21  1611   * 131* 129*   POTASSIUM 4.2 4.4 4.5   CHLORIDE 103 102 99   CO2 21 22 24   BUN 40* 44* 44*   CR 0.87 0.95 0.97   ANIONGAP 8 7 6   HAWA 8.5 8.6 8.5   GLC 86 89 100*     Most Recent 2 LFT's:Recent Labs   Lab Test 11/19/21  0450 11/18/21  0542   AST 13 14   ALT 8 7   ALKPHOS 64 61   BILITOTAL 0.6 0.5       Discharge Medications   Discharge Medication List as of 11/19/2021  8:09 PM      CONTINUE these medications which have NOT CHANGED    Details   acetaminophen (TYLENOL) 500 MG tablet Take 2 tablets (1,000 mg) by mouth every 8 hours, Disp-120 tablet, R-0, E-Prescribe      ferrous gluconate (FERGON) 324 (38 Fe) MG tablet Take 1 tablet (324 mg) by mouth daily (with breakfast), Disp-100 tablet, R-1, E-Prescribe      gabapentin (NEURONTIN) 100 MG capsule Take 1 capsule (100 mg) by mouth 3 times daily, Disp-90 capsule, R-0, Local Print      melatonin 1 MG TABS tablet Take 3 tablets (3 mg) by mouth At Bedtime, Disp-90 tablet, R-0, E-Prescribe       methadone (DOLOPHINE) 5 MG tablet Take 1 tablet (5 mg) by mouth every 8 hours, Disp-90 tablet, R-0, E-Prescribe      ondansetron (ZOFRAN) 8 MG tablet Take 1 tablet (8 mg) by mouth every 8 hours as needed (Nausea/Vomiting), Disp-10 tablet, R-5, E-Prescribe      oxybutynin ER (DITROPAN-XL) 10 MG 24 hr tablet Take 1 tablet (10 mg) by mouth daily, Disp-30 tablet, R-0, E-Prescribe      oxyCODONE IR (ROXICODONE) 10 MG tablet Take 1 tablet (10 mg) by mouth every 4 hours as needed for moderate to severe pain, Disp-60 tablet, R-0, E-Prescribe      pantoprazole (PROTONIX) 40 MG EC tablet Take 1 tablet (40 mg) by mouth daily, Disp-60 tablet, R-1, E-Prescribe      polyethylene glycol (MIRALAX) 17 GM/Dose powder Take 17 g by mouth daily, Disp-510 g, R-0, E-Prescribe      prochlorperazine (COMPAZINE) 10 MG tablet Take 0.5 tablets (5 mg) by mouth every 6 hours as needed (Nausea/Vomiting), Disp-30 tablet, R-5, E-Prescribe      sennosides (SENOKOT) 8.6 MG tablet Take 1-2 tablets by mouth 2 times daily, Disp-60 tablet, R-0, E-Prescribe      simethicone (MYLICON) 80 MG chewable tablet Take 1 tablet (80 mg) by mouth every 6 hours as needed for cramping, Disp-20 tablet, R-0, E-Prescribe      tamsulosin (FLOMAX) 0.4 MG capsule Take 1 capsule (0.4 mg) by mouth daily, Disp-30 capsule, R-1, E-Prescribe      thiamine (B-1) 100 MG tablet Take 1 tablet (100 mg) by mouth daily, Disp-100 tablet, R-1, E-Prescribe      naloxone (NARCAN) 4 MG/0.1ML nasal spray Spray 1 spray (4 mg) into one nostril alternating nostrils once as needed for opioid reversal every 2-3 minutes until assistance arrives, Disp-0.2 mL, R-0, E-Prescribe      ondansetron (ZOFRAN-ODT) 4 MG ODT tab Take 1 tablet (4 mg) by mouth every 6 hours as needed for nausea or vomiting, Disp-20 tablet, R-0, E-Prescribe         STOP taking these medications       ibuprofen (ADVIL/MOTRIN) 200 MG tablet Comments:   Reason for Stopping:             Allergies   No Known Allergies

## 2021-11-20 NOTE — PLAN OF CARE
Discharged to: Home  Via: private transportation  Accompanied by: Family  Discharge Instructions: diet, activity, medications, follow up appointments, when to call the MD, aftercare instructions.  Prescriptions: No meds to be filled. medication list reviewed & sent with pt  Follow Up Appointments: arranged; information given. Hospice referral made  Belongings: All sent with pt  IV: d/c'd  Telemetry: d/c'd  Pt exhibits understanding of above discharge instructions; all questions answered.    Discharge Paperwork: Signed, copied, and sent home with patient.

## 2021-11-22 NOTE — TELEPHONE ENCOUNTER
[facility] Care Coordination Contact    Received after visit chart from care coordinator.  Completed following tasks: Mailed copy of care plan to client and Updated services in access  , Provider Signature - No POC Shared:  Member indicates that they do not want their POC shared with any EW providers.    EW not yet open - will submit homemaking auth when waiver is open.    [facility]:  Emailed completed PCA assessment to [facility].  Faxed copy of PCA assessment to PCA Agency and mailed copy to member.  Faxed MD Communication to PCP.     Member was mailed a copies of the PCA and POC signature sheets and a JANE to sign and return mail with a SASE.  This assessment was completed telephonically due to Covid-19.    Kate Collins  Care Management Specialist  [facility]  [phone]

## 2021-11-22 NOTE — LETTER
November 22, 2021      CECILLE STEFFANIE MARTINEZ  [address]  [place] [address]      Dear Cecille:    At [facility], we are dedicated to improving your health and well-being. Enclosed is the Comprehensive Care Plan that we developed with you on 11/16/2021. Please review the Care Plan carefully.    As a reminder, some of the things we discussed at your visit include:    Your physical and mental health    Ways to reduce falls    Health care needs you may have    Don t forget to contact your care coordinator if you:    Have been hospitalized or plan to be hospitalized     Have had a fall     Have experienced a change in physical health    Are experiencing emotional problems     If you do not agree with your Care Plan, have questions about it, or have experienced a change in your needs, please call me at [phone]. If you are hearing impaired, please call the [place] Relay at 839 or [phone] (iushgt-wz-xhbqhz relay service).    Sincerely,    JOHN Lockhart, [facility]    E-mail: [email or web address]  Phone:[phone]      [facility] ([facility]) is a health plan that contracts with both Medicare and the [place] Medical Assistance (Medicaid) program to provide benefits of both programs to enrollees. Enrollment in [facility] depends on contract renewal.    [id number]     [id number] (11/18)

## 2021-11-23 NOTE — TELEPHONE ENCOUNTER
I am Essie's Care Coordinator from [facility].  He is new to me this month.  He had another hospitalization last week and is doing poorly at home.  His family is requesting a bath chair.  DME supply(s) have been requested by the patient. DME orders(s) have been queued up and pended for the provider to review. Patient reports the need for DME supplies due to gastric cancer. Once completed, please route the encounter back to me and I will place the order.  There is no need to print or fax.  Let me know if you have questions.  Thanks  JOHN Lockhart, [facility] Care Coordinator  Tel [phone]  Fax [phone]  Cell [phone]

## 2021-11-24 NOTE — TELEPHONE ENCOUNTER
Voice message from ADIN Gusman with [facility] notifying that pt. Is being admitted to hospice this afternoon.  Uzma is wondering if Dr. Kimble would like to be the PCP to follow pt. While he is in hospice.   RN left message on Uzma's voicemail at [phone] explaining that hospice has their own medical director that follows the hospice patients and that is the protocol that this clinic follows.       Griselda Stephen RN - PAL (Patient Advocate and Liaison)  [facility]   [phone]

## 2021-11-24 NOTE — TELEPHONE ENCOUNTER
Voice message from ADIN Gusman with [facility] notifying that pt. Is being admitted to hospice this afternoon.  Uzma is wondering if Dr. Kimble would like to be the PCP to follow pt. While he is in hospice.   RN left message on Uzma's voicemail at [phone] explaining that hospice has their own medical director that follows the hospice patients and that is the protocol that this clinic follows.      Griselda Stephen RN - PAL (Patient Advocate and Liaison)  [facility]   [phone]

## 2021-11-29 NOTE — PROGRESS NOTES
[facility] Care Coordination Contact    Received VM from hospice social worker, Basilia Arredondo at [phone] and returned call. She reports hospice team met with member and two daughters last week and member has enrolled in hospice.  They have provided him with the following equipment: hospital bed, overbed table, wheelchair, travel wheelchair, and walker.  No incontinence supplies needed at present.  Care Coordinator called [facility] and [facility] financial worker is Taya Espinosa.  Care Coordinator LM for her re: EW open.  JOHN Lockhart, [facility] Care Coordinator  Tel [phone]  Fax [phone]  Cell [phone]    Addendum  Received call from daughter asking about PCA approval.  Told her that Care Coordinator has not heard back from [facility] but PCA hours can be flexed in six month intervals.  Told her EW is pending and homemaking will be set up as soon as Care Coordinator hears from [facility] that EW is open.  JOHN Lockhart, [facility] Care Coordinator  Tel [phone]  Fax [phone]  Cell [phone]

## 2021-11-30 NOTE — PROGRESS NOTES
RECORDS STATUS - ALL OTHER DIAGNOSIS      RECORDS RECEIVED FROM: [facility]   DATE RECEIVED: 12/3/2021   NOTES STATUS DETAILS   OFFICE NOTE from referring provider Complete Epic   ref. Dr. Pak   OFFICE NOTE from medical oncologist Complete 11/17/2021- Oncology Visit- Anemia     More in EPIC   DISCHARGE SUMMARY from hospital     DISCHARGE REPORT from the ER     OPERATIVE REPORT Complete 11/18/2021- ESOPHAGOGASTRODUODENOSCOPY    9/10/2021 Cystoscopy    MEDICATION LIST Complete [facility]   CLINICAL TRIAL TREATMENTS TO DATE     LABS     PATHOLOGY REPORTS     ANYTHING RELATED TO DIAGNOSIS Complete Labs last updated on 11/19/2021   GENONOMIC TESTING     TYPE:     IMAGING (NEED IMAGES & REPORT)     Xray Chest Complete 10/17/2021   CT SCANS Complete CT Abdomen Pelvis 11/17/2021    CT Abdomen Pelvis 10/16/2021   MRI     MAMMO     ULTRASOUND     PET

## 2021-12-01 NOTE — PROGRESS NOTES
[facility] Care Coordination Contact     again today for financial worker, Taya Rosenbaumws, at [facility] and she replied by email.  She reports that she mailed several forms to member's authorized rep to be completed in order to open EW.  She emailed the forms to this Care Coordinator but unable to open due to security issues with opening these documents.  Called daughter Jass and she states she hasn't received them yet but will watch for them in the mail.  Told her to call Care Coordinator back if they don't come in a couple of days.  JOHN Lockhart, [facility] Care Coordinator  Tel [phone]  Fax [phone]  Cell [phone]

## 2021-12-06 ENCOUNTER — PATIENT OUTREACH (OUTPATIENT)
Dept: GERIATRIC MEDICINE | Facility: CLINIC | Age: 66
End: 2021-12-06
Payer: COMMERCIAL

## 2021-12-06 NOTE — PROGRESS NOTES
[facility] Care Coordination Contact    Notified by daughter Jass that member passed away on 12-3-21 at Home.    PCP notified. Called all providers to cancel services.     Death Notification form completed and faxed to [facility].    Chart reviewed and this CC's encounter closed. Chart handed off to CMS to process disenrollment tasks.  JOHN Lockhart, [facility] Care Coordinator  Tel [phone]  Fax [phone]  Cell [phone]

## 2021-12-08 NOTE — PROGRESS NOTES
[facility] Care Coordination Contact    Care Coordinator did not follow up with family about signature sheets as member  on 12/3/21.  JONH Lockhart, [facility] Care Coordinator  Tel [phone]  Fax [phone]  Cell [phone]

## 2021-12-13 ENCOUNTER — PATIENT OUTREACH (OUTPATIENT)
Dept: GERIATRIC MEDICINE | Facility: CLINIC | Age: 66
End: 2021-12-13
Payer: COMMERCIAL

## 2021-12-13 NOTE — PROGRESS NOTES
[facility] Care Coordination Contact    From the [employer] Secure site:  CECILLE MARTINEZ 1955 [facility] PCA: Initial Assessment   11/30/2021 5/31/2022 Medically Necessary [id number]   CECILLE STEFFANIE JUAN 1955 [facility] PCA: Initial Assessment   6/1/2022 11/30/2022 Medically Necessary [phone]     JOHN Lockhart, [facility] Care Coordinator  Tel [phone]  Fax [phone]  Cell [phone]

## 2023-03-27 NOTE — PLAN OF CARE
[id number]    Changed to inpatient status. VSS on RA. Denies nausea and SOB. Complains of 7-9/10 pain in back, abdomen, and groin. Utilized PRN tylenol x1 and PRN oxy x1.     Potassium recheck was 4.0.     Small amount of output from PNT drain, also voiding. Dressing was changed today.        Odomzo Pregnancy And Lactation Text: This medication is Pregnancy Category X and is absolutely contraindicated during pregnancy. It is unknown if it is excreted in breast milk.

## 2023-04-11 NOTE — TELEPHONE ENCOUNTER
Patient was called 3x times. No VM was let due to the pt not having a voicebox set up.    Will call back Monday.    Dilshad Acevedo, EMT     No

## (undated) DEVICE — CATH URETERAL OPEN END 05FR 70CM 020015

## (undated) DEVICE — LINEN TOWEL PACK X5 5464

## (undated) DEVICE — SYR 10ML LL W/O NDL 302995

## (undated) DEVICE — PAD CHUX UNDERPAD 23X24" 7136

## (undated) DEVICE — DRSG GAUZE 4X4" TRAY 6939

## (undated) DEVICE — ENDO BITE BLOCK ADULT OLYMPUS LATEX FREE MAJ-1632

## (undated) DEVICE — GLOVE PROTEXIS W/NEU-THERA 8.0  2D73TE80

## (undated) DEVICE — GOWN XLG DISP 9545

## (undated) DEVICE — PACK CYSTO UMMC CUSTOM

## (undated) DEVICE — SYR INFLATION DEVICE 20ML W/ 26GA TORQUE DEVICE M001151062

## (undated) DEVICE — SUCTION MANIFOLD NEPTUNE 2 SYS 4 PORT 0702-020-000

## (undated) DEVICE — KIT ENDO TURNOVER/PROCEDURE W/CLEAN A SCOPE LINERS 103888

## (undated) DEVICE — GUIDEWIRE SENSOR DUAL FLEX ANG 0.035"X150CM M0066703010

## (undated) DEVICE — DRAPE C-ARM W/STRAPS 42X72" 07-CA104

## (undated) DEVICE — SOL NACL 0.9% IRRIG 3000ML BAG 2B7477

## (undated) DEVICE — ENDO SEAL BX PORT BPS-A

## (undated) DEVICE — CONNECTOR WATER VALVE PERFUSION PACK STR 020272801

## (undated) DEVICE — ENDO FORCEP ENDOJAW BIOPSY 2.8MMX160CM FB-220K

## (undated) DEVICE — GLOVE PROTEXIS W/NEU-THERA 7.5  2D73TE75

## (undated) RX ORDER — CEFAZOLIN SODIUM 1 G/3ML
INJECTION, POWDER, FOR SOLUTION INTRAMUSCULAR; INTRAVENOUS
Status: DISPENSED
Start: 2021-01-01

## (undated) RX ORDER — SODIUM CHLORIDE 9 MG/ML
INJECTION, SOLUTION INTRAVENOUS
Status: DISPENSED
Start: 2021-01-01

## (undated) RX ORDER — FENTANYL CITRATE 50 UG/ML
INJECTION, SOLUTION INTRAMUSCULAR; INTRAVENOUS
Status: DISPENSED
Start: 2021-01-01

## (undated) RX ORDER — AMPICILLIN 1 G/1
INJECTION, POWDER, FOR SOLUTION INTRAMUSCULAR; INTRAVENOUS
Status: DISPENSED
Start: 2021-01-01

## (undated) RX ORDER — FENTANYL CITRATE 50 UG/ML
INJECTION, SOLUTION INTRAMUSCULAR; INTRAVENOUS
Status: DISPENSED
Start: 2020-11-24

## (undated) RX ORDER — LIDOCAINE HYDROCHLORIDE 20 MG/ML
INJECTION, SOLUTION EPIDURAL; INFILTRATION; INTRACAUDAL; PERINEURAL
Status: DISPENSED
Start: 2021-01-01

## (undated) RX ORDER — LIDOCAINE HYDROCHLORIDE 10 MG/ML
INJECTION, SOLUTION EPIDURAL; INFILTRATION; INTRACAUDAL; PERINEURAL
Status: DISPENSED
Start: 2021-01-01

## (undated) RX ORDER — SIMETHICONE 40MG/0.6ML
SUSPENSION, DROPS(FINAL DOSAGE FORM)(ML) ORAL
Status: DISPENSED
Start: 2020-01-01

## (undated) RX ORDER — ONDANSETRON 2 MG/ML
INJECTION INTRAMUSCULAR; INTRAVENOUS
Status: DISPENSED
Start: 2021-01-01

## (undated) RX ORDER — PROPOFOL 10 MG/ML
INJECTION, EMULSION INTRAVENOUS
Status: DISPENSED
Start: 2021-01-01